# Patient Record
Sex: FEMALE | Race: BLACK OR AFRICAN AMERICAN | Employment: FULL TIME | URBAN - METROPOLITAN AREA
[De-identification: names, ages, dates, MRNs, and addresses within clinical notes are randomized per-mention and may not be internally consistent; named-entity substitution may affect disease eponyms.]

---

## 2017-02-02 RX ORDER — FLUCONAZOLE 150 MG/1
150 TABLET ORAL ONCE
Qty: 2 TABLET | Refills: 0 | Status: SHIPPED | OUTPATIENT
Start: 2017-02-02 | End: 2019-07-26 | Stop reason: SDUPTHER

## 2017-02-02 RX ORDER — TIZANIDINE 4 MG/1
4 TABLET ORAL EVERY 8 HOURS PRN
Qty: 30 TABLET | Refills: 0 | Status: SHIPPED | OUTPATIENT
Start: 2017-02-02 | End: 2017-05-17 | Stop reason: SDUPTHER

## 2017-02-10 ENCOUNTER — CARE COORDINATION (OUTPATIENT)
Dept: CARE COORDINATION | Age: 37
End: 2017-02-10

## 2017-02-16 ENCOUNTER — OFFICE VISIT (OUTPATIENT)
Dept: INTERNAL MEDICINE CLINIC | Age: 37
End: 2017-02-16

## 2017-02-16 VITALS
SYSTOLIC BLOOD PRESSURE: 110 MMHG | OXYGEN SATURATION: 98 % | BODY MASS INDEX: 27.15 KG/M2 | TEMPERATURE: 98.7 F | HEART RATE: 105 BPM | DIASTOLIC BLOOD PRESSURE: 78 MMHG | WEIGHT: 173 LBS | HEIGHT: 67 IN

## 2017-02-16 DIAGNOSIS — R05.9 COUGH: ICD-10-CM

## 2017-02-16 DIAGNOSIS — M79.7 FIBROMYALGIA: ICD-10-CM

## 2017-02-16 DIAGNOSIS — Z09 HOSPITAL DISCHARGE FOLLOW-UP: Primary | ICD-10-CM

## 2017-02-16 DIAGNOSIS — R07.89 COSTOCHONDRAL CHEST PAIN: ICD-10-CM

## 2017-02-16 DIAGNOSIS — I10 ESSENTIAL HYPERTENSION: ICD-10-CM

## 2017-02-16 PROCEDURE — 99214 OFFICE O/P EST MOD 30 MIN: CPT | Performed by: NURSE PRACTITIONER

## 2017-02-16 RX ORDER — METOCLOPRAMIDE 10 MG/1
10 TABLET ORAL
Qty: 120 TABLET | Refills: 3 | Status: SHIPPED | OUTPATIENT
Start: 2017-02-16 | End: 2017-03-17 | Stop reason: ALTCHOICE

## 2017-02-16 RX ORDER — VENLAFAXINE HYDROCHLORIDE 75 MG/1
CAPSULE, EXTENDED RELEASE ORAL
COMMUNITY
Start: 2017-01-09 | End: 2017-02-16 | Stop reason: SDUPTHER

## 2017-02-16 RX ORDER — METHYLPREDNISOLONE 4 MG/1
TABLET ORAL
Qty: 1 KIT | Refills: 0 | Status: SHIPPED | OUTPATIENT
Start: 2017-02-16 | End: 2017-03-17 | Stop reason: ALTCHOICE

## 2017-02-16 RX ORDER — BENZONATATE 100 MG/1
100 CAPSULE ORAL 3 TIMES DAILY PRN
Qty: 30 CAPSULE | Refills: 0 | Status: SHIPPED | OUTPATIENT
Start: 2017-02-16 | End: 2017-11-13

## 2017-02-16 RX ORDER — VENLAFAXINE HYDROCHLORIDE 75 MG/1
75 TABLET, EXTENDED RELEASE ORAL
Qty: 30 TABLET | Refills: 1 | Status: SHIPPED | OUTPATIENT
Start: 2017-02-16 | End: 2017-02-16

## 2017-02-16 RX ORDER — LANOLIN ALCOHOL/MO/W.PET/CERES
CREAM (GRAM) TOPICAL
COMMUNITY
Start: 2017-01-18 | End: 2017-05-17 | Stop reason: SDUPTHER

## 2017-02-16 ASSESSMENT — ENCOUNTER SYMPTOMS
CONSTIPATION: 0
COLOR CHANGE: 0
BLOOD IN STOOL: 0
COUGH: 0
SHORTNESS OF BREATH: 0
ABDOMINAL PAIN: 0

## 2017-02-21 ENCOUNTER — OFFICE VISIT (OUTPATIENT)
Dept: RHEUMATOLOGY | Age: 37
End: 2017-02-21

## 2017-02-21 VITALS
BODY MASS INDEX: 26.43 KG/M2 | HEIGHT: 67 IN | DIASTOLIC BLOOD PRESSURE: 72 MMHG | SYSTOLIC BLOOD PRESSURE: 110 MMHG | WEIGHT: 168.4 LBS | TEMPERATURE: 98.1 F

## 2017-02-21 DIAGNOSIS — M79.7 FIBROMYALGIA: ICD-10-CM

## 2017-02-21 DIAGNOSIS — R07.89 CHEST WALL PAIN: Primary | ICD-10-CM

## 2017-02-21 PROCEDURE — 99214 OFFICE O/P EST MOD 30 MIN: CPT | Performed by: INTERNAL MEDICINE

## 2017-02-21 RX ORDER — HYDROCODONE BITARTRATE AND ACETAMINOPHEN 5; 325 MG/1; MG/1
TABLET ORAL
Qty: 20 TABLET | Refills: 0 | Status: SHIPPED | OUTPATIENT
Start: 2017-02-21 | End: 2017-03-17 | Stop reason: ALTCHOICE

## 2017-02-21 RX ORDER — HYDROCODONE BITARTRATE AND ACETAMINOPHEN 5; 325 MG/1; MG/1
TABLET ORAL
Qty: 15 TABLET | Refills: 0 | Status: SHIPPED | OUTPATIENT
Start: 2017-02-21 | End: 2017-03-17 | Stop reason: ALTCHOICE

## 2017-03-17 ENCOUNTER — OFFICE VISIT (OUTPATIENT)
Dept: ENDOCRINOLOGY | Age: 37
End: 2017-03-17

## 2017-03-17 VITALS
HEIGHT: 67 IN | BODY MASS INDEX: 27.59 KG/M2 | SYSTOLIC BLOOD PRESSURE: 146 MMHG | WEIGHT: 175.8 LBS | DIASTOLIC BLOOD PRESSURE: 102 MMHG | HEART RATE: 83 BPM | RESPIRATION RATE: 16 BRPM | OXYGEN SATURATION: 100 %

## 2017-03-17 DIAGNOSIS — E05.90 HYPERTHYROIDISM: Primary | ICD-10-CM

## 2017-03-17 PROCEDURE — 99213 OFFICE O/P EST LOW 20 MIN: CPT | Performed by: INTERNAL MEDICINE

## 2017-03-17 RX ORDER — METHIMAZOLE 5 MG/1
5 TABLET ORAL DAILY
Qty: 30 TABLET | Refills: 1 | Status: SHIPPED | OUTPATIENT
Start: 2017-03-17 | End: 2019-03-20

## 2017-03-29 ENCOUNTER — TELEPHONE (OUTPATIENT)
Dept: INTERNAL MEDICINE CLINIC | Age: 37
End: 2017-03-29

## 2017-03-29 RX ORDER — ETODOLAC 400 MG/1
400 TABLET, FILM COATED ORAL 2 TIMES DAILY
Qty: 60 TABLET | Refills: 0 | Status: SHIPPED | OUTPATIENT
Start: 2017-03-29 | End: 2019-03-20

## 2017-03-30 ENCOUNTER — TELEPHONE (OUTPATIENT)
Dept: INTERNAL MEDICINE CLINIC | Age: 37
End: 2017-03-30

## 2017-04-11 ENCOUNTER — TELEPHONE (OUTPATIENT)
Dept: INTERNAL MEDICINE CLINIC | Age: 37
End: 2017-04-11

## 2017-04-11 RX ORDER — NITROFURANTOIN 25; 75 MG/1; MG/1
100 CAPSULE ORAL 2 TIMES DAILY
Qty: 14 CAPSULE | Refills: 0 | Status: SHIPPED | OUTPATIENT
Start: 2017-04-11 | End: 2017-04-18

## 2017-05-17 RX ORDER — VENLAFAXINE HYDROCHLORIDE 75 MG/1
CAPSULE, EXTENDED RELEASE ORAL
Qty: 30 CAPSULE | Status: CANCELLED | OUTPATIENT
Start: 2017-05-17

## 2017-05-17 RX ORDER — HYDROXYZINE HYDROCHLORIDE 25 MG/1
TABLET, FILM COATED ORAL
Qty: 30 TABLET | Refills: 1 | Status: SHIPPED | OUTPATIENT
Start: 2017-05-17 | End: 2017-11-13 | Stop reason: SDUPTHER

## 2017-05-17 RX ORDER — LANOLIN ALCOHOL/MO/W.PET/CERES
3 CREAM (GRAM) TOPICAL NIGHTLY PRN
Qty: 60 TABLET | Refills: 1 | Status: SHIPPED | OUTPATIENT
Start: 2017-05-17 | End: 2017-11-13 | Stop reason: SDUPTHER

## 2017-05-17 RX ORDER — TRAZODONE HYDROCHLORIDE 50 MG/1
50 TABLET ORAL NIGHTLY
Qty: 45 TABLET | Refills: 1 | Status: SHIPPED | OUTPATIENT
Start: 2017-05-17 | End: 2017-11-13 | Stop reason: SDUPTHER

## 2017-05-17 RX ORDER — FLUCONAZOLE 100 MG/1
100 TABLET ORAL ONCE
Qty: 2 TABLET | Refills: 0 | Status: CANCELLED | OUTPATIENT
Start: 2017-05-17 | End: 2017-05-17

## 2017-05-17 RX ORDER — VENLAFAXINE HYDROCHLORIDE 150 MG/1
150 CAPSULE, EXTENDED RELEASE ORAL DAILY
Qty: 30 CAPSULE | Refills: 1 | Status: SHIPPED | OUTPATIENT
Start: 2017-05-17 | End: 2017-11-13 | Stop reason: SDUPTHER

## 2017-05-17 RX ORDER — TIZANIDINE 4 MG/1
4 TABLET ORAL EVERY 8 HOURS PRN
Qty: 30 TABLET | Refills: 1 | Status: SHIPPED | OUTPATIENT
Start: 2017-05-17 | End: 2017-11-13 | Stop reason: SDUPTHER

## 2017-05-17 RX ORDER — FLUCONAZOLE 150 MG/1
150 TABLET ORAL ONCE
Qty: 2 TABLET | Refills: 0 | Status: SHIPPED | OUTPATIENT
Start: 2017-05-17 | End: 2017-05-17

## 2017-06-28 ENCOUNTER — OFFICE VISIT (OUTPATIENT)
Dept: INTERNAL MEDICINE CLINIC | Age: 37
End: 2017-06-28

## 2017-06-28 VITALS
SYSTOLIC BLOOD PRESSURE: 120 MMHG | WEIGHT: 168 LBS | HEIGHT: 67 IN | BODY MASS INDEX: 26.37 KG/M2 | DIASTOLIC BLOOD PRESSURE: 96 MMHG

## 2017-06-28 DIAGNOSIS — I10 ESSENTIAL HYPERTENSION: ICD-10-CM

## 2017-06-28 DIAGNOSIS — M79.7 FIBROMYALGIA: Primary | ICD-10-CM

## 2017-06-28 PROCEDURE — 99214 OFFICE O/P EST MOD 30 MIN: CPT | Performed by: NURSE PRACTITIONER

## 2017-06-28 ASSESSMENT — ENCOUNTER SYMPTOMS
BLOOD IN STOOL: 0
COLOR CHANGE: 0
ABDOMINAL PAIN: 0
COUGH: 0
SHORTNESS OF BREATH: 0
CONSTIPATION: 0

## 2017-07-05 ENCOUNTER — TELEPHONE (OUTPATIENT)
Dept: INTERNAL MEDICINE CLINIC | Age: 37
End: 2017-07-05

## 2017-07-21 ENCOUNTER — OFFICE VISIT (OUTPATIENT)
Dept: ENDOCRINOLOGY | Age: 37
End: 2017-07-21

## 2017-07-21 VITALS
BODY MASS INDEX: 25.01 KG/M2 | SYSTOLIC BLOOD PRESSURE: 136 MMHG | WEIGHT: 165 LBS | HEIGHT: 68 IN | DIASTOLIC BLOOD PRESSURE: 98 MMHG | HEART RATE: 75 BPM | RESPIRATION RATE: 14 BRPM

## 2017-07-21 DIAGNOSIS — E05.90 HYPERTHYROIDISM: Primary | ICD-10-CM

## 2017-07-21 DIAGNOSIS — E05.90 HYPERTHYROIDISM: ICD-10-CM

## 2017-07-21 LAB
ANION GAP SERPL CALCULATED.3IONS-SCNC: 14 MMOL/L (ref 3–16)
BUN BLDV-MCNC: 12 MG/DL (ref 7–20)
CALCIUM SERPL-MCNC: 9.7 MG/DL (ref 8.3–10.6)
CHLORIDE BLD-SCNC: 101 MMOL/L (ref 99–110)
CO2: 26 MMOL/L (ref 21–32)
CREAT SERPL-MCNC: 0.6 MG/DL (ref 0.6–1.1)
GFR AFRICAN AMERICAN: >60
GFR NON-AFRICAN AMERICAN: >60
GLUCOSE BLD-MCNC: 89 MG/DL (ref 70–99)
POTASSIUM SERPL-SCNC: 4.3 MMOL/L (ref 3.5–5.1)
SODIUM BLD-SCNC: 141 MMOL/L (ref 136–145)
T4 FREE: 1.4 NG/DL (ref 0.9–1.8)
TSH SERPL DL<=0.05 MIU/L-ACNC: 0.95 UIU/ML (ref 0.27–4.2)

## 2017-07-21 PROCEDURE — 99213 OFFICE O/P EST LOW 20 MIN: CPT | Performed by: INTERNAL MEDICINE

## 2017-07-24 ENCOUNTER — TELEPHONE (OUTPATIENT)
Dept: INTERNAL MEDICINE CLINIC | Age: 37
End: 2017-07-24

## 2017-07-24 RX ORDER — FLUCONAZOLE 150 MG/1
150 TABLET ORAL ONCE
Qty: 2 TABLET | Refills: 0 | Status: SHIPPED | OUTPATIENT
Start: 2017-07-24 | End: 2017-07-24

## 2017-07-24 RX ORDER — FLUCONAZOLE 100 MG/1
100 TABLET ORAL DAILY
Qty: 7 TABLET | Refills: 0 | Status: CANCELLED | OUTPATIENT
Start: 2017-07-24 | End: 2017-07-31

## 2017-11-13 ENCOUNTER — OFFICE VISIT (OUTPATIENT)
Dept: INTERNAL MEDICINE CLINIC | Age: 37
End: 2017-11-13

## 2017-11-13 VITALS
HEART RATE: 84 BPM | WEIGHT: 166 LBS | HEIGHT: 68 IN | OXYGEN SATURATION: 99 % | SYSTOLIC BLOOD PRESSURE: 122 MMHG | DIASTOLIC BLOOD PRESSURE: 82 MMHG | BODY MASS INDEX: 25.16 KG/M2

## 2017-11-13 DIAGNOSIS — M79.7 FIBROMYALGIA: Primary | ICD-10-CM

## 2017-11-13 DIAGNOSIS — F33.1 MODERATE EPISODE OF RECURRENT MAJOR DEPRESSIVE DISORDER (HCC): ICD-10-CM

## 2017-11-13 DIAGNOSIS — B37.31 YEAST VAGINITIS: ICD-10-CM

## 2017-11-13 DIAGNOSIS — G47.9 SLEEP DIFFICULTIES: ICD-10-CM

## 2017-11-13 PROCEDURE — 99999 PR OFFICE/OUTPT VISIT,PROCEDURE ONLY: CPT | Performed by: FAMILY MEDICINE

## 2017-11-13 RX ORDER — LANOLIN ALCOHOL/MO/W.PET/CERES
3 CREAM (GRAM) TOPICAL NIGHTLY PRN
Qty: 60 TABLET | Refills: 2 | Status: SHIPPED | OUTPATIENT
Start: 2017-11-13 | End: 2018-09-21 | Stop reason: SDUPTHER

## 2017-11-13 RX ORDER — TIZANIDINE 4 MG/1
4 TABLET ORAL EVERY 8 HOURS PRN
Qty: 30 TABLET | Refills: 2 | Status: SHIPPED | OUTPATIENT
Start: 2017-11-13 | End: 2018-09-21 | Stop reason: SDUPTHER

## 2017-11-13 RX ORDER — VENLAFAXINE HYDROCHLORIDE 150 MG/1
150 CAPSULE, EXTENDED RELEASE ORAL DAILY
Qty: 30 CAPSULE | Refills: 2 | Status: SHIPPED | OUTPATIENT
Start: 2017-11-13 | End: 2018-09-21 | Stop reason: SDUPTHER

## 2017-11-13 RX ORDER — FLUCONAZOLE 150 MG/1
150 TABLET ORAL ONCE
Qty: 2 TABLET | Refills: 0 | Status: SHIPPED | OUTPATIENT
Start: 2017-11-13 | End: 2017-11-13

## 2017-11-13 RX ORDER — HYDROXYZINE HYDROCHLORIDE 25 MG/1
TABLET, FILM COATED ORAL
Qty: 30 TABLET | Refills: 2 | Status: SHIPPED | OUTPATIENT
Start: 2017-11-13 | End: 2018-09-21 | Stop reason: SDUPTHER

## 2017-11-13 RX ORDER — TRAZODONE HYDROCHLORIDE 50 MG/1
50 TABLET ORAL NIGHTLY
Qty: 30 TABLET | Refills: 2 | Status: SHIPPED | OUTPATIENT
Start: 2017-11-13 | End: 2018-09-21 | Stop reason: SDUPTHER

## 2017-11-13 ASSESSMENT — ENCOUNTER SYMPTOMS
SHORTNESS OF BREATH: 0
DIARRHEA: 0
NAUSEA: 0
VOMITING: 0
WHEEZING: 0
ROS SKIN COMMENTS: HAIR THINNING
BACK PAIN: 1
ABDOMINAL PAIN: 0
STRIDOR: 0

## 2017-11-14 ENCOUNTER — TELEPHONE (OUTPATIENT)
Dept: INTERNAL MEDICINE CLINIC | Age: 37
End: 2017-11-14

## 2017-11-14 NOTE — TELEPHONE ENCOUNTER
Patient calling to verify if the [place] papers were ever received in our office. Call patient only if they were not received yesterday at [phone] so she can call and have others sent to the office.

## 2017-11-20 ENCOUNTER — TELEPHONE (OUTPATIENT)
Dept: INTERNAL MEDICINE CLINIC | Age: 37
End: 2017-11-20

## 2018-04-06 ENCOUNTER — TELEPHONE (OUTPATIENT)
Dept: INTERNAL MEDICINE CLINIC | Age: 38
End: 2018-04-06

## 2018-05-08 ENCOUNTER — OFFICE VISIT (OUTPATIENT)
Dept: INTERNAL MEDICINE CLINIC | Age: 38
End: 2018-05-08

## 2018-05-08 VITALS
DIASTOLIC BLOOD PRESSURE: 82 MMHG | WEIGHT: 162 LBS | BODY MASS INDEX: 25.43 KG/M2 | SYSTOLIC BLOOD PRESSURE: 128 MMHG | TEMPERATURE: 98.4 F | HEIGHT: 67 IN

## 2018-05-08 DIAGNOSIS — M25.50 ARTHRALGIA, UNSPECIFIED JOINT: ICD-10-CM

## 2018-05-08 DIAGNOSIS — R30.0 DYSURIA: Primary | ICD-10-CM

## 2018-05-08 DIAGNOSIS — H61.23 BILATERAL IMPACTED CERUMEN: ICD-10-CM

## 2018-05-08 DIAGNOSIS — I10 ESSENTIAL HYPERTENSION: ICD-10-CM

## 2018-05-08 DIAGNOSIS — E05.90 HYPERTHYROIDISM: ICD-10-CM

## 2018-05-08 DIAGNOSIS — M79.7 FIBROMYALGIA: ICD-10-CM

## 2018-05-08 PROCEDURE — 99214 OFFICE O/P EST MOD 30 MIN: CPT | Performed by: NURSE PRACTITIONER

## 2018-05-08 ASSESSMENT — ENCOUNTER SYMPTOMS
BLOOD IN STOOL: 0
ABDOMINAL PAIN: 0
CONSTIPATION: 0
COUGH: 0
SHORTNESS OF BREATH: 0
COLOR CHANGE: 0

## 2018-05-14 ENCOUNTER — TELEPHONE (OUTPATIENT)
Dept: INTERNAL MEDICINE CLINIC | Age: 38
End: 2018-05-14

## 2018-07-17 ENCOUNTER — OFFICE VISIT (OUTPATIENT)
Dept: RHEUMATOLOGY | Age: 38
End: 2018-07-17

## 2018-07-17 VITALS
BODY MASS INDEX: 25.58 KG/M2 | HEIGHT: 67 IN | WEIGHT: 163 LBS | SYSTOLIC BLOOD PRESSURE: 120 MMHG | TEMPERATURE: 98.4 F | DIASTOLIC BLOOD PRESSURE: 78 MMHG

## 2018-07-17 DIAGNOSIS — M79.7 FIBROMYALGIA: Primary | ICD-10-CM

## 2018-07-17 PROCEDURE — 99214 OFFICE O/P EST MOD 30 MIN: CPT | Performed by: INTERNAL MEDICINE

## 2018-07-17 RX ORDER — PREGABALIN 25 MG/1
CAPSULE ORAL
Qty: 60 CAPSULE | Refills: 2 | Status: SHIPPED | OUTPATIENT
Start: 2018-07-17 | End: 2019-03-20

## 2018-07-17 NOTE — PROGRESS NOTES
edema. Data:  Lab Results   Component Value Date    WBC 6.0 02/09/2017    RBC 4.19 02/09/2017    HGB 10.0 02/09/2017    HCT 32.0 02/09/2017     02/09/2017    MCV 76.4 02/09/2017    MCH 23.8 02/09/2017    MCHC 31.2 02/09/2017    RDW 18.0 02/09/2017    SEGSPCT 50 11/26/2014    LYMPHOPCT 33.8 02/09/2017    MONOPCT 9.5 02/09/2017    BASOPCT 0.8 02/09/2017    MONOSABS 0.6 02/09/2017    LYMPHSABS 2.0 02/09/2017    EOSABS 0.3 02/09/2017    BASOSABS 0.1 02/09/2017       Chemistry        Component Value Date/Time     07/21/2017 1133    K 4.3 07/21/2017 1133     07/21/2017 1133    CO2 26 07/21/2017 1133    BUN 12 07/21/2017 1133    CREATININE 0.6 07/21/2017 1133        Component Value Date/Time    CALCIUM 9.7 07/21/2017 1133    ALKPHOS 95 09/30/2016 1206    AST 15 09/30/2016 1206    ALT 7 (L) 09/30/2016 1206    BILITOT 0.5 09/30/2016 1206          I thank you for giving me the opportunity to be involved in [employer] care and I look forward following Tonia Mcgowan along with you. If you have any questions or concerns please feel free to contact me at any time. Enid Mejia MD 07/17/18         NOTE: This report was transcribed using voice recognition software. Every effort was made to ensure accuracy; however, inadvertent computerized transcription errors may be present.

## 2018-07-31 PROBLEM — N20.0 RIGHT NEPHROLITHIASIS: Status: ACTIVE | Noted: 2018-07-31

## 2018-08-01 PROBLEM — N20.2 NEPHROURETEROLITHIASIS: Status: ACTIVE | Noted: 2018-07-31

## 2018-08-01 PROBLEM — N20.1 URETEROLITHIASIS: Status: ACTIVE | Noted: 2018-07-31

## 2018-09-21 ENCOUNTER — HOSPITAL ENCOUNTER (OUTPATIENT)
Age: 38
Discharge: HOME | End: 2018-09-21
Payer: MEDICAID

## 2018-09-21 ENCOUNTER — OFFICE VISIT (OUTPATIENT)
Dept: INTERNAL MEDICINE CLINIC | Age: 38
End: 2018-09-21

## 2018-09-21 ENCOUNTER — TELEPHONE (OUTPATIENT)
Dept: INTERNAL MEDICINE CLINIC | Age: 38
End: 2018-09-21

## 2018-09-21 VITALS
BODY MASS INDEX: 25.84 KG/M2 | DIASTOLIC BLOOD PRESSURE: 98 MMHG | HEART RATE: 73 BPM | SYSTOLIC BLOOD PRESSURE: 110 MMHG | OXYGEN SATURATION: 99 % | WEIGHT: 165 LBS

## 2018-09-21 DIAGNOSIS — F33.1 MODERATE EPISODE OF RECURRENT MAJOR DEPRESSIVE DISORDER (HCC): ICD-10-CM

## 2018-09-21 DIAGNOSIS — G47.9 SLEEP DIFFICULTIES: ICD-10-CM

## 2018-09-21 DIAGNOSIS — R19.7 DIARRHEA, UNSPECIFIED TYPE: ICD-10-CM

## 2018-09-21 DIAGNOSIS — B37.31 YEAST VAGINITIS: ICD-10-CM

## 2018-09-21 DIAGNOSIS — R11.2 NAUSEA AND VOMITING, INTRACTABILITY OF VOMITING NOT SPECIFIED, UNSPECIFIED VOMITING TYPE: Primary | ICD-10-CM

## 2018-09-21 DIAGNOSIS — N20.2 NEPHROURETEROLITHIASIS: ICD-10-CM

## 2018-09-21 DIAGNOSIS — M79.7 FIBROMYALGIA: ICD-10-CM

## 2018-09-21 PROCEDURE — 99214 OFFICE O/P EST MOD 30 MIN: CPT | Performed by: NURSE PRACTITIONER

## 2018-09-21 PROCEDURE — G8419 CALC BMI OUT NRM PARAM NOF/U: HCPCS | Performed by: NURSE PRACTITIONER

## 2018-09-21 PROCEDURE — G8427 DOCREV CUR MEDS BY ELIG CLIN: HCPCS | Performed by: NURSE PRACTITIONER

## 2018-09-21 PROCEDURE — 1036F TOBACCO NON-USER: CPT | Performed by: NURSE PRACTITIONER

## 2018-09-21 RX ORDER — LANOLIN ALCOHOL/MO/W.PET/CERES
3 CREAM (GRAM) TOPICAL NIGHTLY PRN
Qty: 60 TABLET | Refills: 2 | Status: SHIPPED | OUTPATIENT
Start: 2018-09-21 | End: 2019-03-20

## 2018-09-21 RX ORDER — HYDROXYZINE HYDROCHLORIDE 25 MG/1
TABLET, FILM COATED ORAL
Qty: 30 TABLET | Refills: 2 | Status: SHIPPED | OUTPATIENT
Start: 2018-09-21 | End: 2019-03-20 | Stop reason: SDUPTHER

## 2018-09-21 RX ORDER — TIZANIDINE 4 MG/1
4 TABLET ORAL EVERY 8 HOURS PRN
Qty: 30 TABLET | Refills: 2 | Status: SHIPPED | OUTPATIENT
Start: 2018-09-21 | End: 2019-03-20 | Stop reason: SDUPTHER

## 2018-09-21 RX ORDER — VENLAFAXINE HYDROCHLORIDE 150 MG/1
150 CAPSULE, EXTENDED RELEASE ORAL DAILY
Qty: 30 CAPSULE | Refills: 2 | Status: SHIPPED | OUTPATIENT
Start: 2018-09-21 | End: 2019-03-20

## 2018-09-21 RX ORDER — ONDANSETRON 4 MG/1
4 TABLET, ORALLY DISINTEGRATING ORAL EVERY 8 HOURS PRN
Qty: 20 TABLET | Refills: 0 | Status: SHIPPED | OUTPATIENT
Start: 2018-09-21 | End: 2019-05-21 | Stop reason: SDUPTHER

## 2018-09-21 RX ORDER — FLUCONAZOLE 100 MG/1
100 TABLET ORAL DAILY
Qty: 2 TABLET | Refills: 0 | Status: SHIPPED | OUTPATIENT
Start: 2018-09-21 | End: 2019-03-20

## 2018-09-21 RX ORDER — TRAZODONE HYDROCHLORIDE 50 MG/1
50 TABLET ORAL NIGHTLY
Qty: 30 TABLET | Refills: 2 | Status: SHIPPED | OUTPATIENT
Start: 2018-09-21 | End: 2019-03-20 | Stop reason: SDUPTHER

## 2018-09-21 ASSESSMENT — ENCOUNTER SYMPTOMS
ABDOMINAL DISTENTION: 0
DIARRHEA: 1
ABDOMINAL PAIN: 1
BLOOD IN STOOL: 0
VOMITING: 1
CONSTIPATION: 0
RESPIRATORY NEGATIVE: 1
NAUSEA: 1

## 2018-09-21 NOTE — PROGRESS NOTES
Social History     Social History    Marital status: Single     Spouse name: N/A    Number of children: N/A    Years of education: N/A     Occupational History    Not on file. Social History Main Topics    Smoking status: Never Smoker    Smokeless tobacco: Never Used    Alcohol use 0.0 oz/week      Comment: occasional     Drug use: No    Sexual activity: Yes     Partners: Male     Other Topics Concern    Not on file     Social History Narrative    No narrative on file       Review of Systems   Constitutional: Positive for appetite change. Negative for activity change, chills and fever. HENT: Negative. Respiratory: Negative. Cardiovascular: Negative. Gastrointestinal: Positive for abdominal pain, diarrhea, nausea and vomiting. Negative for abdominal distention, blood in stool and constipation. Genitourinary: Positive for flank pain. Skin: Negative. Neurological: Negative. Hematological: Negative. Psychiatric/Behavioral: Negative. OBJECTIVE:  BP (!) 110/98   Pulse 73   Wt 165 lb (74.8 kg)   SpO2 99%   BMI 25.84 kg/m²     Physical Exam   Constitutional: She is oriented to person, place, and time. She appears well-developed and well-nourished. HENT:   Head: Normocephalic and atraumatic. Mouth/Throat: Oropharynx is clear and moist.   Eyes: Pupils are equal, round, and reactive to light. Conjunctivae are normal. No scleral icterus. Neck: Normal range of motion. Neck supple. Cardiovascular: Normal rate, regular rhythm and normal heart sounds. Pulmonary/Chest: Effort normal and breath sounds normal. No respiratory distress. Abdominal: Soft. Normal appearance and bowel sounds are normal. She exhibits no distension and no mass. There is no hepatosplenomegaly. There is tenderness (generalized to palpation. ). There is CVA tenderness. There is no rebound and no guarding. Musculoskeletal: Normal range of motion.    Neurological: She is alert and oriented to

## 2018-09-21 NOTE — TELEPHONE ENCOUNTER
Patient faxed [facility] paperwork to our office. It is due 9/30/18  - all that needs to be done on this is a date change since she was in the office in May. Please review.   Call her back at [phone]

## 2018-09-26 NOTE — TELEPHONE ENCOUNTER
Please let her know that I have the forms. I will try to fill them out in the next 1-2 days. I had not filled them out previously, as I wasn't sure if they were for her chronic FMLA condition, or recent urologic issues.   I will fill them out for her chronic conditions

## 2018-10-25 ENCOUNTER — TELEPHONE (OUTPATIENT)
Dept: INTERNAL MEDICINE CLINIC | Age: 38
End: 2018-10-25

## 2019-03-20 ENCOUNTER — OFFICE VISIT (OUTPATIENT)
Dept: INTERNAL MEDICINE CLINIC | Age: 39
End: 2019-03-20
Payer: MEDICAID

## 2019-03-20 VITALS
DIASTOLIC BLOOD PRESSURE: 82 MMHG | TEMPERATURE: 98.4 F | WEIGHT: 175 LBS | HEART RATE: 90 BPM | SYSTOLIC BLOOD PRESSURE: 128 MMHG | BODY MASS INDEX: 27.41 KG/M2 | OXYGEN SATURATION: 98 %

## 2019-03-20 DIAGNOSIS — J22 LOWER RESPIRATORY INFECTION: Primary | ICD-10-CM

## 2019-03-20 DIAGNOSIS — F33.1 MODERATE EPISODE OF RECURRENT MAJOR DEPRESSIVE DISORDER (HCC): ICD-10-CM

## 2019-03-20 DIAGNOSIS — G47.9 SLEEP DIFFICULTIES: ICD-10-CM

## 2019-03-20 DIAGNOSIS — M79.7 FIBROMYALGIA: ICD-10-CM

## 2019-03-20 PROCEDURE — 99214 OFFICE O/P EST MOD 30 MIN: CPT | Performed by: FAMILY MEDICINE

## 2019-03-20 PROCEDURE — G8484 FLU IMMUNIZE NO ADMIN: HCPCS | Performed by: FAMILY MEDICINE

## 2019-03-20 PROCEDURE — G8419 CALC BMI OUT NRM PARAM NOF/U: HCPCS | Performed by: FAMILY MEDICINE

## 2019-03-20 PROCEDURE — G8427 DOCREV CUR MEDS BY ELIG CLIN: HCPCS | Performed by: FAMILY MEDICINE

## 2019-03-20 PROCEDURE — 1036F TOBACCO NON-USER: CPT | Performed by: FAMILY MEDICINE

## 2019-03-20 RX ORDER — BENZONATATE 100 MG/1
100-200 CAPSULE ORAL 3 TIMES DAILY PRN
Qty: 30 CAPSULE | Refills: 0 | Status: SHIPPED | OUTPATIENT
Start: 2019-03-20 | End: 2019-05-01

## 2019-03-20 RX ORDER — HYDROXYZINE HYDROCHLORIDE 25 MG/1
TABLET, FILM COATED ORAL
Qty: 30 TABLET | Refills: 2 | Status: SHIPPED | OUTPATIENT
Start: 2019-03-20 | End: 2019-05-01 | Stop reason: SDUPTHER

## 2019-03-20 RX ORDER — GUAIFENESIN 600 MG/1
600 TABLET, EXTENDED RELEASE ORAL 2 TIMES DAILY
Qty: 30 TABLET | Refills: 0 | Status: SHIPPED | OUTPATIENT
Start: 2019-03-20 | End: 2019-04-04

## 2019-03-20 RX ORDER — ALBUTEROL SULFATE 90 UG/1
2 AEROSOL, METERED RESPIRATORY (INHALATION) 4 TIMES DAILY PRN
Qty: 1 INHALER | Refills: 5 | Status: SHIPPED | OUTPATIENT
Start: 2019-03-20 | End: 2019-09-27 | Stop reason: SDUPTHER

## 2019-03-20 RX ORDER — TRAZODONE HYDROCHLORIDE 50 MG/1
50 TABLET ORAL NIGHTLY
Qty: 30 TABLET | Refills: 2 | Status: SHIPPED | OUTPATIENT
Start: 2019-03-20 | End: 2019-05-01

## 2019-03-20 RX ORDER — VENLAFAXINE HYDROCHLORIDE 75 MG/1
75 CAPSULE, EXTENDED RELEASE ORAL DAILY
Qty: 30 CAPSULE | Refills: 5 | Status: SHIPPED | OUTPATIENT
Start: 2019-03-20 | End: 2019-08-05

## 2019-03-20 RX ORDER — AZITHROMYCIN 250 MG/1
250 TABLET, FILM COATED ORAL SEE ADMIN INSTRUCTIONS
Qty: 6 TABLET | Refills: 0 | Status: SHIPPED | OUTPATIENT
Start: 2019-03-20 | End: 2019-03-25

## 2019-03-20 RX ORDER — TIZANIDINE 4 MG/1
TABLET ORAL
Qty: 60 TABLET | Refills: 5 | Status: SHIPPED | OUTPATIENT
Start: 2019-03-20 | End: 2019-12-10 | Stop reason: SDUPTHER

## 2019-03-20 ASSESSMENT — ENCOUNTER SYMPTOMS
STRIDOR: 0
DIARRHEA: 0
BACK PAIN: 1
VOMITING: 0
NAUSEA: 0
WHEEZING: 0
ABDOMINAL PAIN: 0
SHORTNESS OF BREATH: 0

## 2019-03-20 NOTE — PROGRESS NOTES
[facility]          Chief Complaint   Patient presents with    Chest Congestion     with green sputum dx with URI from urgent care a month ago. Taking an inhaler with relief.  Other     fibromyalgia    Forms     FMLA       Hypertension   Associated symptoms include headaches (occ migraines). Pertinent negatives include no chest pain, palpitations or shortness of breath. Here for a sick visit, and chronic concerns    C/o chest congestion, and cough (occ productive). Used to have PND and nasal congestion, but that has improved. Used her son's albuterol inhaler which helped . Hasn't been in for routine visit for chronic health issues for over 15 months. Requests to have FMLA forms filled out though. Has been without her medications refill for over 1 month; she has been taking her meds sporadically to make them  last longer. Has been off effexor for a couple days and feels her mood slumping. Has needed to miss up to 6 days/month due to fibromyalgia flares, for 8 hours/day . (occ has to leave early, and this gets marked as a full day off). Lives with BF, 5 kids (5-15 yo). Moved to [facility] 10 months ago (so this office is even further, but she is adament that she wants to keep me as her PCP). Working at [employer] still, full-time. Likes the job    I personally reviewed and updated patient's past medical history, past surgical history, family history, allergies, and social history as captured in the relevant section of patient's chart.     Current Outpatient Medications   Medication Sig Dispense Refill    tiZANidine (ZANAFLEX) 4 MG tablet Take 1 tablet daily, and in the evening as needed 60 tablet 5    hydrOXYzine (ATARAX) 25 MG tablet TAKE ONE TABLET BY MOUTH THREE TIMES DAILY AS NEEDED FOR ITCHING 30 tablet 2    traZODone (DESYREL) 50 MG tablet Take 1 tablet by mouth nightly 30 tablet 2    venlafaxine (EFFEXOR XR) 75 MG extended release capsule Take 1 capsule by mouth daily 30 capsule 5                         ondansetron (ZOFRAN ODT) 4 MG disintegrating tablet Take 1 tablet by mouth every 8 hours as needed for Nausea 20 tablet 0     No current facility-administered medications for this visit. Review of Systems   Constitutional: Positive for fatigue. Negative for activity change, appetite change, chills, fever and unexpected weight change. HENT: Positive for rhinorrhea. Negative for congestion, postnasal drip, sinus pressure, sore throat and trouble swallowing. Eyes: Negative for discharge. Respiratory: Positive for cough. Negative for chest tightness, shortness of breath, wheezing and stridor. Cardiovascular: Negative for chest pain and palpitations. Gastrointestinal: Negative for abdominal pain, diarrhea, nausea and vomiting. Genitourinary: Negative for vaginal discharge. Musculoskeletal: Positive for back pain and myalgias (inrtermittent fibromyalgia flares). Skin: Negative for rash. Neurological: Positive for headaches (occ migraines). Psychiatric/Behavioral: Positive for sleep disturbance. Negative for dysphoric mood and suicidal ideas. The patient is nervous/anxious (stable when on effexor). Physical Exam   Constitutional: She is oriented to person, place, and time. She appears well-developed and well-nourished. No distress. HENT:   Head: Normocephalic and atraumatic. Eyes: Conjunctivae are normal.   Cardiovascular: Normal rate, regular rhythm and normal heart sounds. Pulmonary/Chest: Effort normal. She has no wheezes. She has no rales. Coarse BS throughout, no rhonchi or rales   Abdominal: Soft. Bowel sounds are normal. She exhibits no distension. Musculoskeletal: Normal range of motion. She exhibits no edema. Neurological: She is alert and oriented to person, place, and time. Skin: Skin is warm and dry. No rash noted. She is not diaphoretic. Psychiatric: She has a normal mood and affect.  Her behavior is normal. Thought content normal.   Vitals reviewed. Vitals:    03/20/19 1355   BP: 128/82   Site: Right Upper Arm   Position: Sitting   Cuff Size: Medium Adult   Pulse: 90   Temp: 98.4 °F (36.9 °C)   TempSrc: Oral   SpO2: 98%   Weight: 175 lb (79.4 kg)     Body mass index is 27.41 kg/m². Assessment/Plan:    1. Lower respiratory infection  Will treat with Zpak due to concern for Atypical PNA due to mycoplasma  Also rec mucinex, tessalon perles, and albuterol inhaler prn  REc plenty of fluids and OTC analgesics prn    - guaiFENesin (MUCINEX) 600 MG extended release tablet; Take 1 tablet by mouth 2 times daily for 15 days  Dispense: 30 tablet; Refill: 0  - benzonatate (TESSALON PERLES) 100 MG capsule; Take 1-2 capsules by mouth 3 times daily as needed for Cough  Dispense: 30 capsule; Refill: 0  - albuterol sulfate  (90 Base) MCG/ACT inhaler; Inhale 2 puffs into the lungs 4 times daily as needed for Wheezing  Dispense: 1 Inhaler; Refill: 5  - azithromycin (ZITHROMAX) 250 MG tablet; Take 1 tablet by mouth See Admin Instructions for 5 days 500mg on day 1 followed by 250mg on days 2 - 5  Dispense: 6 tablet; Refill: 0    2. Fibromyalgia  -sx's are fairly stable, with persistent occasional flares  -continue on Effexor daily, and muscle relaxant tizanidine prn   Has seen rheum Dr Shell Wright in the past  Intermittent flares cause her to miss work; Will fill out new FMLA forms    - tiZANidine (ZANAFLEX) 4 MG tablet; Take 1 tablet daily, and in the evening as needed  Dispense: 60 tablet; Refill: 5    3. Sleep difficulties  Improved with melatonin and trazodone  Insurance will no longer cover melatonin; advised her to obtain a bottle OTC    - traZODone (DESYREL) 50 MG tablet; Take 1 tablet by mouth nightly  Dispense: 30 tablet; Refill: 2    4.  Moderate episode of recurrent major depressive disorder (HCC)  Mood is generally stable when she takes effexor daily  Urged good self care; try to get adequate sleep, eat a healthy diet, try to exercise    - venlafaxine (EFFEXOR XR) 75 MG extended release capsule; Take 1 capsule by mouth daily  Dispense: 30 capsule; Refill: 5        Return for physical soon (can use 40 min slot).

## 2019-03-28 ENCOUNTER — TELEPHONE (OUTPATIENT)
Dept: INTERNAL MEDICINE CLINIC | Age: 39
End: 2019-03-28

## 2019-04-01 ASSESSMENT — ENCOUNTER SYMPTOMS
EYE DISCHARGE: 0
CHEST TIGHTNESS: 0
COUGH: 1
TROUBLE SWALLOWING: 0
RHINORRHEA: 1
SINUS PRESSURE: 0
SORE THROAT: 0

## 2019-04-02 ENCOUNTER — TELEPHONE (OUTPATIENT)
Dept: INTERNAL MEDICINE CLINIC | Age: 39
End: 2019-04-02

## 2019-04-02 NOTE — TELEPHONE ENCOUNTER
Patient requesting we call [facility] / [facility] New[facility] - [phone] ext [phone]    She received an email saying Dr. Bobby Brady did not fill in the dates and did not sign. I looked at the form for her and it is all documented correctly.

## 2019-04-02 NOTE — TELEPHONE ENCOUNTER
I called [employer] [facility] they said they did not get a doctors signature on the one we sent. I went ahead and resent the document again. I tried to notify the patient but there was no answer. Will try again later to contact the patient.

## 2019-04-10 ENCOUNTER — TELEPHONE (OUTPATIENT)
Dept: INTERNAL MEDICINE CLINIC | Age: 39
End: 2019-04-10

## 2019-04-10 NOTE — TELEPHONE ENCOUNTER
Patient said she is already taking melatonin and trazodone with no relief. She was willing to see Jhonathan for an appointment which I transferred her to scheduling to schedule. She said she also has a terrible cough that she is taking the tessalon perles for but those are not helping at all.
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
## 2019-04-29 ENCOUNTER — OFFICE VISIT (OUTPATIENT)
Dept: PSYCHOLOGY | Age: 39
End: 2019-04-29
Payer: MEDICARE

## 2019-04-29 DIAGNOSIS — F43.10 PTSD (POST-TRAUMATIC STRESS DISORDER): Primary | ICD-10-CM

## 2019-04-29 PROCEDURE — 90791 PSYCH DIAGNOSTIC EVALUATION: CPT | Performed by: SOCIAL WORKER

## 2019-04-29 PROCEDURE — 1036F TOBACCO NON-USER: CPT | Performed by: SOCIAL WORKER
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
## 2019-05-01 ENCOUNTER — OFFICE VISIT (OUTPATIENT)
Dept: INTERNAL MEDICINE CLINIC | Age: 39
End: 2019-05-01
Payer: MEDICAID

## 2019-05-01 ENCOUNTER — HOSPITAL ENCOUNTER (OUTPATIENT)
Age: 39
Discharge: HOME | End: 2019-05-01
Payer: MEDICAID

## 2019-05-01 VITALS
DIASTOLIC BLOOD PRESSURE: 82 MMHG | HEART RATE: 80 BPM | WEIGHT: 175 LBS | BODY MASS INDEX: 27.41 KG/M2 | OXYGEN SATURATION: 99 % | SYSTOLIC BLOOD PRESSURE: 134 MMHG

## 2019-05-01 DIAGNOSIS — E05.00 GRAVES DISEASE: ICD-10-CM

## 2019-05-01 DIAGNOSIS — Z00.00 ROUTINE PHYSICAL EXAMINATION: ICD-10-CM

## 2019-05-01 DIAGNOSIS — Z00.00 ROUTINE PHYSICAL EXAMINATION: Primary | ICD-10-CM

## 2019-05-01 DIAGNOSIS — M79.7 FIBROMYALGIA: ICD-10-CM

## 2019-05-01 DIAGNOSIS — R05.3 PERSISTENT COUGH: ICD-10-CM

## 2019-05-01 DIAGNOSIS — N92.0 MENORRHAGIA WITH REGULAR CYCLE: ICD-10-CM

## 2019-05-01 DIAGNOSIS — G47.9 SLEEP DIFFICULTIES: ICD-10-CM

## 2019-05-01 DIAGNOSIS — H61.23 BILATERAL IMPACTED CERUMEN: ICD-10-CM

## 2019-05-01 DIAGNOSIS — F33.1 MODERATE EPISODE OF RECURRENT MAJOR DEPRESSIVE DISORDER (HCC): ICD-10-CM

## 2019-05-01 LAB
A/G RATIO: 1.3 (ref 1.1–2.2)
ALBUMIN SERPL-MCNC: 4.6 G/DL (ref 3.4–5)
ALP BLD-CCNC: 97 U/L (ref 40–129)
ALT SERPL-CCNC: 14 U/L (ref 10–40)
ANION GAP SERPL CALCULATED.3IONS-SCNC: 12 MMOL/L (ref 3–16)
AST SERPL-CCNC: 15 U/L (ref 15–37)
BASOPHILS ABSOLUTE: 0.1 K/UL (ref 0–0.2)
BASOPHILS RELATIVE PERCENT: 0.9 %
BILIRUB SERPL-MCNC: 0.3 MG/DL (ref 0–1)
BUN BLDV-MCNC: 13 MG/DL (ref 7–20)
CALCIUM SERPL-MCNC: 9.7 MG/DL (ref 8.3–10.6)
CHLORIDE BLD-SCNC: 108 MMOL/L (ref 99–110)
CHOLESTEROL, TOTAL: 176 MG/DL (ref 0–199)
CO2: 24 MMOL/L (ref 21–32)
CREAT SERPL-MCNC: 0.8 MG/DL (ref 0.6–1.1)
EOSINOPHILS ABSOLUTE: 0.4 K/UL (ref 0–0.6)
EOSINOPHILS RELATIVE PERCENT: 4.3 %
GFR AFRICAN AMERICAN: >60
GFR NON-AFRICAN AMERICAN: >60
GLOBULIN: 3.6 G/DL
GLUCOSE BLD-MCNC: 83 MG/DL (ref 70–99)
HCT VFR BLD CALC: 34.3 % (ref 36–48)
HDLC SERPL-MCNC: 41 MG/DL (ref 40–60)
HEMOGLOBIN: 10.7 G/DL (ref 12–16)
LDL CHOLESTEROL CALCULATED: 113 MG/DL
LYMPHOCYTES ABSOLUTE: 2.2 K/UL (ref 1–5.1)
LYMPHOCYTES RELATIVE PERCENT: 26.1 %
MCH RBC QN AUTO: 23.3 PG (ref 26–34)
MCHC RBC AUTO-ENTMCNC: 31.3 G/DL (ref 31–36)
MCV RBC AUTO: 74.6 FL (ref 80–100)
MONOCYTES ABSOLUTE: 0.8 K/UL (ref 0–1.3)
MONOCYTES RELATIVE PERCENT: 9.5 %
NEUTROPHILS ABSOLUTE: 5.1 K/UL (ref 1.7–7.7)
NEUTROPHILS RELATIVE PERCENT: 59.2 %
PDW BLD-RTO: 18 % (ref 12.4–15.4)
PLATELET # BLD: 310 K/UL (ref 135–450)
PMV BLD AUTO: 8.4 FL (ref 5–10.5)
POTASSIUM SERPL-SCNC: 3.9 MMOL/L (ref 3.5–5.1)
RBC # BLD: 4.6 M/UL (ref 4–5.2)
SODIUM BLD-SCNC: 144 MMOL/L (ref 136–145)
T4 FREE: 1.3 NG/DL (ref 0.9–1.8)
TOTAL PROTEIN: 8.2 G/DL (ref 6.4–8.2)
TRIGL SERPL-MCNC: 112 MG/DL (ref 0–150)
TSH REFLEX: 0.56 UIU/ML (ref 0.27–4.2)
VLDLC SERPL CALC-MCNC: 22 MG/DL
WBC # BLD: 8.6 K/UL (ref 4–11)

## 2019-05-01 PROCEDURE — G8427 DOCREV CUR MEDS BY ELIG CLIN: HCPCS | Performed by: FAMILY MEDICINE

## 2019-05-01 PROCEDURE — 84439 ASSAY OF FREE THYROXINE: CPT

## 2019-05-01 PROCEDURE — 99213 OFFICE O/P EST LOW 20 MIN: CPT | Performed by: FAMILY MEDICINE

## 2019-05-01 PROCEDURE — 85025 COMPLETE CBC W/AUTO DIFF WBC: CPT

## 2019-05-01 PROCEDURE — 80053 COMPREHEN METABOLIC PANEL: CPT

## 2019-05-01 PROCEDURE — 69210 REMOVE IMPACTED EAR WAX UNI: CPT | Performed by: FAMILY MEDICINE

## 2019-05-01 PROCEDURE — G8419 CALC BMI OUT NRM PARAM NOF/U: HCPCS | Performed by: FAMILY MEDICINE

## 2019-05-01 PROCEDURE — 83036 HEMOGLOBIN GLYCOSYLATED A1C: CPT

## 2019-05-01 PROCEDURE — 80061 LIPID PANEL: CPT

## 2019-05-01 PROCEDURE — 84443 ASSAY THYROID STIM HORMONE: CPT

## 2019-05-01 PROCEDURE — 1036F TOBACCO NON-USER: CPT | Performed by: FAMILY MEDICINE

## 2019-05-01 PROCEDURE — 36415 COLL VENOUS BLD VENIPUNCTURE: CPT

## 2019-05-01 PROCEDURE — 99395 PREV VISIT EST AGE 18-39: CPT | Performed by: FAMILY MEDICINE

## 2019-05-01 RX ORDER — HYDROXYZINE HYDROCHLORIDE 25 MG/1
TABLET, FILM COATED ORAL
Qty: 60 TABLET | Refills: 5 | Status: SHIPPED | OUTPATIENT
Start: 2019-05-01 | End: 2019-05-21 | Stop reason: SDUPTHER

## 2019-05-01 RX ORDER — BENZONATATE 100 MG/1
100-200 CAPSULE ORAL 3 TIMES DAILY PRN
Qty: 30 CAPSULE | Refills: 0 | Status: SHIPPED | OUTPATIENT
Start: 2019-05-01 | End: 2019-05-21 | Stop reason: SDUPTHER

## 2019-05-01 NOTE — PROGRESS NOTES
[facility]      Chief Complaint   Patient presents with    Annual Exam    Depression    Chronic Pain     fibromyalgia    Cough       HPI:     Met with Myriam Kimball on Monday. Plans to make an appt with another therapist due to insurance restrictions. Feels the Effexor 75 mg dose is appropriate for her, doesn't want to increase further. Stopped trazodone due to nightmares    Needs FMLA paperwork amended     Cough has been persistent, still prodcutive. Treated with Zpack, mucinex with mild improvement in sx's after last visit. Using albuterol twice daily, which helps but only temporarily  Right ear has been bothering her, feels clogged. Used OTC ear  for a few days without much benefit. No fevers, feels run down. No vomiting, but post-tussive emesis  Dealing with allergies. Doesn't think this cough is related though. No heartburn    C/o heavy menses and cramping. Misses 1-2 days/month from work for these sx's. Asking for OCP    I personally reviewed and updated patient's past medical history, past surgical history, family history, allergies, and social history as captured in the relevant section ofpatient's chart. Current Outpatient Medications on File Prior to Visit   Medication Sig Dispense Refill    tiZANidine (ZANAFLEX) 4 MG tablet Take 1 tablet daily, and in the evening as needed 60 tablet 5    venlafaxine (EFFEXOR XR) 75 MG extended release capsule Take 1 capsule by mouth daily 30 capsule 5    albuterol sulfate  (90 Base) MCG/ACT inhaler Inhale 2 puffs into the lungs 4 times daily as needed for Wheezing 1 Inhaler 5    ondansetron (ZOFRAN ODT) 4 MG disintegrating tablet Take 1 tablet by mouth every 8 hours as needed for Nausea 20 tablet 0     No current facility-administered medications on file prior to visit. Review of Systems   Constitutional: Positive for fatigue. Negative for activity change, appetite change, chills, fever and unexpected weight change.    HENT: Positive for congestion and ear pain (fullness). Negative for postnasal drip, rhinorrhea, sinus pressure, sore throat and trouble swallowing. Eyes: Negative for discharge. Respiratory: Positive for cough. Negative for chest tightness, shortness of breath, wheezing and stridor. Cardiovascular: Negative for chest pain and palpitations. Gastrointestinal: Negative for abdominal pain, diarrhea, nausea and vomiting. Genitourinary: Negative for vaginal discharge. Musculoskeletal: Positive for back pain and myalgias (inrtermittent fibromyalgia flares). Skin: Negative for rash. Neurological: Positive for headaches (occ migraines). Psychiatric/Behavioral: Positive for sleep disturbance. Negative for dysphoric mood and suicidal ideas. The patient is nervous/anxious (stable when on effexor). Physical Exam   Constitutional: She is oriented to person, place, and time. She appears well-developed and well-nourished. No distress. HENT:   Head: Normocephalic and atraumatic. Nose: Nose normal.   Mouth/Throat: Oropharynx is clear and moist.   Right TM completely obscurred by cerumen, and left TM partially obscured by cerumen  After wax removal with irrigation (and loop currette on right), TM's were visualized well - both normal   Eyes: Conjunctivae and EOM are normal.   Neck: Normal range of motion. Neck supple. Cardiovascular: Normal rate, regular rhythm and normal heart sounds. Pulmonary/Chest: Effort normal. She has no wheezes. She has no rales. Occasional dry cough, no rhonchi or rales   Abdominal: Soft. Bowel sounds are normal. She exhibits no distension. Musculoskeletal: Normal range of motion. She exhibits no edema. Neurological: She is alert and oriented to person, place, and time. Skin: Skin is warm and dry. No rash noted. She is not diaphoretic. Psychiatric: She has a normal mood and affect. Her behavior is normal. Thought content normal.   Vitals reviewed.         Vitals: 05/01/19 1302   BP: 134/82   Site: Right Upper Arm   Position: Sitting   Cuff Size: Medium Adult   Pulse: 80   SpO2: 99%   Weight: 175 lb (79.4 kg)     Body mass index is 27.41 kg/m². Assessment/Plan:    1. Routine physical examination  -discussed healthy lifestyle habits at length (encouraged regular exercise, healthy diet, no smoking)  -check fasting bloodwork    - Comprehensive Metabolic Panel; Future  - CBC Auto Differential; Future  - Lipid Panel; Future  - Hemoglobin A1C; Future    2. Fibromyalgia  -sx's are fairly stable, with persistent occasional flares  -continue on Effexor daily, and muscle relaxant tizanidine prn   Has seen rheum Dr Hui Novoa in the past, and plans to return to resume [facility]  Intermittent flares cause her to miss work; Will make an ammendment to recent FMLA forms    3. Sleep difficulties  Improved with melatonin OTC    4. Moderate episode of recurrent major depressive disorder (HCC)  Mood is generally stable; Recent stressors have been tough on her though  Continue on effexor 75 mg daily  Continue to try to find a therapist to work with regularly  Urged good self care; try to get adequate sleep, eat a healthy diet, try to exercise    5. Graves disease  Previously followed with endo  Recheck TFT's    - TSH with Reflex; Future  - T4, FREE; Future    6. Persistent cough  Concern for asthma variant  Start on daily controller steroid inhaler, Qvar  Continue with albuterol prn  Check CXR to rule out residual infection   Ok to use tessalon perles prn    - benzonatate (TESSALON PERLES) 100 MG capsule; Take 1-2 capsules by mouth 3 times daily as needed for Cough  Dispense: 30 capsule; Refill: 0  - XR CHEST STANDARD (2 VW); Future  - beclomethasone (QVAR REDIHALER) 40 MCG/ACT AERB inhaler; Inhale 1 puff into the lungs 2 times daily  Dispense: 1 Inhaler; Refill: 2    7.  Menorrhagia with regular cycle  Could be related to thyroid issues  Check TSH  If TSH is normal, f/u with next PCP or gyn, or return for pap smear here with NP in next few weeks    8. Bilateral impacted cerumen  Removed with irrigation then currette, with good results    - [id number] - [place] REMOVE IMPACTED EAR WAX    I notified Pt  of my upcoming departure, and helped guide her on plans for f/u with a PCP closer to home      Return for pap smear here with NP in the next few weeks.

## 2019-05-01 NOTE — PATIENT INSTRUCTIONS
Check bloodwork on your way out today    Start on Qvar (inhaled steroid) inhaler twice daily to settle down cough, continue with albuterol as needed    Go for chest Xray at [facility] when you can    Follow up with Dr Levon Curling re: Julia Goel    Call to establish with Dr Elsie Tay if possible   [place] [address] [facility] - [facility], [address] [facility] Yinka Ary, [address] [facility]  Ph: [phone]    [address] - Len Oneil MD  [place] [address], [facility], [address] [facility]  Ph: [phone]

## 2019-05-02 LAB
ESTIMATED AVERAGE GLUCOSE: 114 MG/DL
HBA1C MFR BLD: 5.6 %

## 2019-05-06 ENCOUNTER — HOSPITAL ENCOUNTER (OUTPATIENT)
Dept: GENERAL RADIOLOGY | Age: 39
Discharge: HOME | End: 2019-05-06
Payer: MEDICAID

## 2019-05-06 ENCOUNTER — HOSPITAL ENCOUNTER (OUTPATIENT)
Age: 39
Discharge: HOME | End: 2019-05-06
Payer: MEDICAID

## 2019-05-06 DIAGNOSIS — R05.3 PERSISTENT COUGH: ICD-10-CM

## 2019-05-06 PROCEDURE — 71046 X-RAY EXAM CHEST 2 VIEWS: CPT

## 2019-05-07 ENCOUNTER — TELEPHONE (OUTPATIENT)
Dept: INTERNAL MEDICINE CLINIC | Age: 39
End: 2019-05-07

## 2019-05-07 RX ORDER — FERROUS SULFATE 325(65) MG
325 TABLET ORAL 2 TIMES DAILY
Qty: 60 TABLET | Refills: 2 | Status: SHIPPED | OUTPATIENT
Start: 2019-05-07 | End: 2020-12-18

## 2019-05-07 ASSESSMENT — ENCOUNTER SYMPTOMS
NAUSEA: 0
RHINORRHEA: 0
SHORTNESS OF BREATH: 0
ABDOMINAL PAIN: 0
STRIDOR: 0
SORE THROAT: 0
COUGH: 1
CHEST TIGHTNESS: 0
BACK PAIN: 1
EYE DISCHARGE: 0
VOMITING: 0
TROUBLE SWALLOWING: 0
SINUS PRESSURE: 0
WHEEZING: 0
DIARRHEA: 0

## 2019-05-07 NOTE — TELEPHONE ENCOUNTER
Please call Pt with recent bloodwork results:    CBC with mild anemia, appears iron deficient (likely due to heave menses)  CMP normal  A1c ok at 5.6  TSH and T4 levels are normal  Lipids are fine    Chest Xray was clear    Rec starting on an iron supplement twice daily with food; I sent an Rx

## 2019-05-07 NOTE — RESULT ENCOUNTER NOTE
CBC with mild anemia, appears iron deficient (likely due to heave menses)  CMP normal  A1c ok at 5.6  TSH and T4 levels are normal  Lipids are fine    CXR was clear

## 2019-05-07 NOTE — TELEPHONE ENCOUNTER
Please try calling that number with my verbal authorization to extend payments, or ask for the form to be faxed here.

## 2019-05-07 NOTE — TELEPHONE ENCOUNTER
Patient said her electric got shut off because she was behind on her payments. She is on FMLA right now and has not been able to afford her bills. She said we can call [phone] to give a verbal to extend her payment dates or a letter of medical necessity can be sent. She did not have a fax number. I told her Dr. Margarita Boland is not in until this afternoon.

## 2019-05-07 NOTE — TELEPHONE ENCOUNTER
I called [employer] they will be sending a form to fill out for her. I will watch for the form and place it on your desk.

## 2019-05-08 NOTE — TELEPHONE ENCOUNTER
Patient's phone number is not in service. Will try again later. I also was going to let her know that we sent over the [employer] papers today.

## 2019-05-09 NOTE — TELEPHONE ENCOUNTER
Patient notified, do you want her to start seasonale or do you prefer her wait until her pap appointment 5/17/2019

## 2019-05-09 NOTE — TELEPHONE ENCOUNTER
Please let her know that let's plan to wait for her upcoming appointment for pelvic exam to discuss medication options for control of menses at that time.   Please also let her know that we sent in the form to [facility]

## 2019-05-10 NOTE — TELEPHONE ENCOUNTER
Tried to call again but phone is still out of service. I am going to close this encounter and wait for a call back.

## 2019-05-21 ENCOUNTER — OFFICE VISIT (OUTPATIENT)
Dept: INTERNAL MEDICINE CLINIC | Age: 39
End: 2019-05-21
Payer: MEDICAID

## 2019-05-21 ENCOUNTER — HOSPITAL ENCOUNTER (EMERGENCY)
Age: 39
Discharge: HOME | End: 2019-05-21
Payer: MEDICAID

## 2019-05-21 ENCOUNTER — APPOINTMENT (OUTPATIENT)
Dept: GENERAL RADIOLOGY | Age: 39
End: 2019-05-21
Payer: MEDICAID

## 2019-05-21 VITALS
WEIGHT: 179 LBS | RESPIRATION RATE: 19 BRPM | OXYGEN SATURATION: 97 % | HEIGHT: 67 IN | BODY MASS INDEX: 28.09 KG/M2 | HEART RATE: 85 BPM | SYSTOLIC BLOOD PRESSURE: 164 MMHG | TEMPERATURE: 97.9 F | DIASTOLIC BLOOD PRESSURE: 91 MMHG

## 2019-05-21 VITALS
DIASTOLIC BLOOD PRESSURE: 116 MMHG | SYSTOLIC BLOOD PRESSURE: 168 MMHG | HEART RATE: 85 BPM | HEIGHT: 67 IN | BODY MASS INDEX: 27.94 KG/M2 | OXYGEN SATURATION: 99 % | WEIGHT: 178 LBS

## 2019-05-21 DIAGNOSIS — Z01.419 WELL WOMAN EXAM WITH ROUTINE GYNECOLOGICAL EXAM: Primary | ICD-10-CM

## 2019-05-21 DIAGNOSIS — R11.2 NAUSEA AND VOMITING, INTRACTABILITY OF VOMITING NOT SPECIFIED, UNSPECIFIED VOMITING TYPE: ICD-10-CM
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
## 2019-05-21 DIAGNOSIS — S91.312A LACERATION OF LEFT FOOT, INITIAL ENCOUNTER: Primary | ICD-10-CM

## 2019-05-21 DIAGNOSIS — R05.3 PERSISTENT COUGH: ICD-10-CM

## 2019-05-21 PROCEDURE — 90471 IMMUNIZATION ADMIN: CPT | Performed by: PHYSICIAN ASSISTANT

## 2019-05-21 PROCEDURE — 99283 EMERGENCY DEPT VISIT LOW MDM: CPT

## 2019-05-21 PROCEDURE — 73620 X-RAY EXAM OF FOOT: CPT

## 2019-05-21 PROCEDURE — 90715 TDAP VACCINE 7 YRS/> IM: CPT | Performed by: PHYSICIAN ASSISTANT

## 2019-05-21 PROCEDURE — 6360000002 HC RX W HCPCS: Performed by: PHYSICIAN ASSISTANT

## 2019-05-21 PROCEDURE — 99395 PREV VISIT EST AGE 18-39: CPT | Performed by: NURSE PRACTITIONER

## 2019-05-21 RX ORDER — BENZONATATE 100 MG/1
100-200 CAPSULE ORAL 3 TIMES DAILY PRN
Qty: 30 CAPSULE | Refills: 0 | Status: SHIPPED | OUTPATIENT
Start: 2019-05-21 | End: 2019-07-26 | Stop reason: SDUPTHER

## 2019-05-21 RX ORDER — ONDANSETRON 4 MG/1
4 TABLET, ORALLY DISINTEGRATING ORAL EVERY 8 HOURS PRN
Qty: 20 TABLET | Refills: 0 | Status: SHIPPED | OUTPATIENT
Start: 2019-05-21 | End: 2019-09-27 | Stop reason: CLARIF

## 2019-05-21 RX ORDER — HYDROXYZINE HYDROCHLORIDE 25 MG/1
TABLET, FILM COATED ORAL
Qty: 60 TABLET | Refills: 5 | Status: SHIPPED | OUTPATIENT
Start: 2019-05-21 | End: 2019-07-26 | Stop reason: SDUPTHER

## 2019-05-21 RX ADMIN — TETANUS TOXOID, REDUCED DIPHTHERIA TOXOID AND ACELLULAR PERTUSSIS VACCINE, ADSORBED 0.5 ML: 5; 2.5; 8; 8; 2.5 SUSPENSION INTRAMUSCULAR at 23:13

## 2019-05-21 ASSESSMENT — ENCOUNTER SYMPTOMS
CONSTIPATION: 0
COLOR CHANGE: 0
DIARRHEA: 0
ABDOMINAL PAIN: 0
SHORTNESS OF BREATH: 0
COUGH: 0
NAUSEA: 0
VOMITING: 0

## 2019-05-21 ASSESSMENT — PAIN SCALES - GENERAL: PAINLEVEL_OUTOF10: 8

## 2019-05-21 ASSESSMENT — PAIN DESCRIPTION - ORIENTATION: ORIENTATION: LEFT

## 2019-05-21 ASSESSMENT — PAIN DESCRIPTION - LOCATION: LOCATION: FOOT

## 2019-05-21 ASSESSMENT — PAIN DESCRIPTION - DESCRIPTORS: DESCRIPTORS: SHARP

## 2019-05-21 ASSESSMENT — PAIN DESCRIPTION - PAIN TYPE: TYPE: ACUTE PAIN

## 2019-05-21 NOTE — PATIENT INSTRUCTIONS
Patient Education        Candidiasis: Care Instructions  Your Care Instructions  Candidiasis (say \"den-wkh-CG-uh-siri\") is a yeast infection. Yeast normally lives in your body. But it can cause problems if your body's defenses don't work as they should. Some medicines can increase your chance of getting a yeast infection. These include antibiotics, steroids, and cancer drugs. And some diseases like AIDS and diabetes can make you more likely to get yeast infections. There are different types of yeast infections. Dianna Wilcox is a yeast infection in the mouth. It usually occurs in people with weak immune systems. It causes white patches inside the mouth and throat. Yeast infections of the skin usually occur in skin folds where the skin stays moist. They cause red, oozing patches on your skin. Babies can get these infections under the diaper. People who often wear gloves can get them on their hands. Many women get vaginal yeast infections. They are most common when women take antibiotics. These infections can cause the vagina to itch and burn. They also cause white discharge that looks like cottage cheese. In rare cases, yeast infects the blood. This can cause serious disease. This kind of infection is treated with medicine given through a needle into a vein (IV). After you start treatment, a yeast infection usually goes away quickly. But if your immune system is weak, the infection may come back. Tell your doctor if you get yeast infections often. Follow-up care is a key part of your treatment and safety. Be sure to make and go to all appointments, and call your doctor if you are having problems. It's also a good idea to know your test results and keep a list of the medicines you take. How can you care for yourself at home? · Take your medicines exactly as prescribed. Call your doctor if you think you are having a problem with your medicine. · Use antibiotics only as directed by your doctor.   · Eat yogurt with live cultures. It has bacteria called lactobacillus. It may help prevent some types of yeast infections. · Keep your skin clean and dry. Put powder on moist places. · If you are using a cream or suppository to treat a vaginal yeast infection, don't use condoms or a diaphragm. Use a different type of birth control. · Eat a healthy diet and get regular exercise. This will help keep your immune system strong. When should you call for help? Watch closely for changes in your health, and be sure to contact your doctor if:    · You do not get better as expected. Where can you learn more? Go to [email or web address] org and sign in to your [place] account. Enter G879 in the [employer] box to learn more about \"Candidiasis: Care Instructions. \"     If you do not have an account, please click on the \"Sign Up Now\" link. Current as of: May 14, 2018  Content Version: 12.0  © 0055-9276 Healthwise, [facility]. Care instructions adapted under license by [facility] ([facility]). If you have questions about a medical condition or this instruction, always ask your healthcare professional. [place] [address] any warranty or liability for your use of this information.

## 2019-05-21 NOTE — PROGRESS NOTES
Annual GYN Visit      Shikha Brannon  YOB: 1980    Date of Service:  5/21/2019    Vitals:    05/21/19 1458   BP: (!) 168/116   Site: Right Upper Arm   Position: Sitting   Cuff Size: Medium Adult   Pulse: 85   SpO2: 99%   Weight: 178 lb (80.7 kg)   Height: 5' 7\" (1.702 m)      Body mass index is 27.88 kg/m². Wt Readings from Last 3 Encounters:   05/21/19 178 lb (80.7 kg)   05/01/19 175 lb (79.4 kg)   03/20/19 175 lb (79.4 kg)     BP Readings from Last 3 Encounters:   05/21/19 (!) 168/116   05/01/19 134/82   03/20/19 128/82       Chief Complaint:   Shikha Brannon is a 41 y.o. female who presents for routine annual gynecologic visit. HPI:  Patient's last menstrual period was 05/06/2019. Daniellaaníbal Vickers She complains of irregular menses, heavy menses, vulvar/vaginal itching and frequent yeast infections. Menopausal/perimenopausal symptoms: none. Hormone therapy side effects: not applicable. Allergies   Allergen Reactions    Sudafed [Pseudoephedrine Hcl] Shortness Of Breath     Prior to Visit Medications    Medication Sig Taking?  Authorizing Provider   ondansetron (ZOFRAN ODT) 4 MG disintegrating tablet Take 1 tablet by mouth every 8 hours as needed for Nausea Yes Yonas Hughes APRN   benzonatate (TESSALON PERLES) 100 MG capsule Take 1-2 capsules by mouth 3 times daily as needed for Cough Yes Yonas Hughes APRN   hydrOXYzine (ATARAX) 25 MG tablet TAKE ONE TABLET BY MOUTH THREE TIMES DAILY AS NEEDED FOR ITCHING Yes Yonas Hughes, APRN   ferrous sulfate 325 (65 Fe) MG tablet Take 1 tablet by mouth 2 times daily Yes Alicia Wagner MD   beclomethasone (QVAR REDIHALER) 40 MCG/ACT AERB inhaler Inhale 1 puff into the lungs 2 times daily Yes Alicia Wagner MD   tiZANidine (ZANAFLEX) 4 MG tablet Take 1 tablet daily, and in the evening as needed Yes Alicia Wagner MD   venlafaxine (EFFEXOR XR) 75 MG extended release capsule Take 1 capsule by mouth daily Yes Alicia Wagner MD   albuterol sulfate  (90 Base) MCG/ACT inhaler Inhale 2 puffs into the lungs 4 times daily as needed for Wheezing Yes Lexa Musa MD        Review of Systems:  A comprehensive review of systems was negative except for what was noted in the HPI. Physical Exam:  Constitutional: She is oriented to person, place, and time. She appears well-developed and well-nourished. No distress. Neck: No mass and no thyromegaly present. Cardiovascular: Normal rate, regular rhythm and normal heart sounds. No murmur heard. Pulmonary/Chest: Effort and breath sounds normal.   Abdominal: Soft, non-tender. No distension or masses. Genitourinary: normal external genitalia, vulva, vagina, cervix, uterus and adnexa. Breast exam:  breasts appear normal, no suspicious masses, no skin or nipple changes or axillary nodes. Neurological: She is alert and oriented to person, place, and time. Skin: Skin is warm and dry. No rash noted. No erythema. Psychiatric: She has a normal mood and affect. Her behavior is normal.     Preventive Care and Risk Factor Assessment:  Hx abnormal PAP: yes - year ago when younger did cryo and repeat test negative. Hx of abnormal mammogram: no  FH of breast cancer: no  FH of GYN cancer: no   Previous DEXA scan: no  Diet: Regular    Social History     Tobacco Use    Smoking status: Never Smoker    Smokeless tobacco: Never Used   Substance Use Topics    Alcohol use: Yes     Alcohol/week: 0.0 oz     Comment: occasional       Social History     Substance and Sexual Activity   Sexual Activity Yes    Partners: Male          Health Maintenance Due   Topic Date Due    Varicella Vaccine (1 of 2 - 13+ 2-dose series) 11/11/1995    Cervical cancer screen  11/26/2017        Assessment/Plan:  1. Well woman exam with routine gynecological exam  Maintain Healthy lifestyle. Exercise daily at least 30 minutes. Follow up with OB/GYN as scheduled. Follow up with PCP yearly.   Wear Sunscreen and protect skin from the sun.  See the dentist at least two times a year. Have your vision checked at least every two years. Wear seatbelt in the car. Drink alcohol in moderation. Would like seasonique will await lab results prior to prescribing.    - PAP SMEAR  - Vaginal Pathogens Probes *A  - C.trachomatis N.gonorrhoeae DNA; Future  - Herpes Simplex Virus Culture    2. Injury from broken glass, initial encounter       Referred to ER per Dr. Jasper Justin (Podiatrist) office for removal of glass, then can follow up with Ortho. Dr. Rajwinder Coppola office referred our office to Podiatry. Return in about 3 months (around 8/21/2019), or if symptoms worsen or fail to improve, for Depression, Anxiety.

## 2019-05-21 NOTE — LETTER
[facility] Emergency Department      [address] [facility], [address]            PROOF OF PRESENCE      To Whom It May Concern:    Fide Lou was present in the Emergency Department at [facility] on 5/21/2019 with a family member. Please excuse from work.                                      Sincerely,      [facility] Emergency Dept

## 2019-05-22 DIAGNOSIS — B96.89 BV (BACTERIAL VAGINOSIS): Primary | ICD-10-CM

## 2019-05-22 DIAGNOSIS — N76.0 BV (BACTERIAL VAGINOSIS): Primary | ICD-10-CM

## 2019-05-22 LAB
C TRACH DNA GENITAL QL NAA+PROBE: NEGATIVE
CANDIDA SPECIES, DNA PROBE: ABNORMAL
GARDNERELLA VAGINALIS, DNA PROBE: ABNORMAL
N. GONORRHOEAE DNA: NEGATIVE
TRICHOMONAS VAGINALIS DNA: ABNORMAL

## 2019-05-22 RX ORDER — METRONIDAZOLE 500 MG/1
500 TABLET ORAL 2 TIMES DAILY
Qty: 14 TABLET | Refills: 0 | Status: SHIPPED | OUTPATIENT
Start: 2019-05-22 | End: 2019-05-29

## 2019-05-22 NOTE — ED PROVIDER NOTES
**EVALUATED BY ANIKA**    [address] [facility]  eMERGENCY dEPARTMENT eNCOUnter    Pt Name: Zbigniew Barnes  MRN: [id number]  Denisegfrose 1980  Date of evaluation: 2019  Provider: ROXANA Gomez    Chief Complaint:    Chief Complaint   Patient presents with    Foot Injury     possible glass in left foot x 3 weeks     Nursing Notes, Past Medical Hx, Past Surgical Hx, Social Hx, Allergies, and Family Hx were all reviewed and agreedwith or any disagreements were addressed in the HPI.    HPI:  (Location, Duration, Timing, Severity, Quality, Assoc Sx, Context, Modifying factors)  This is a  41 y.o. female who presents to the emergency department with complaints of possible glass in the left foot for the past three weeks. Patient states she was walking with flip flops on and stepped on some glass. Thought maybe she had glass in the heel of her left foot. Complaining of pain 8/10 in severity. Patient denies erythema, warmth or drainage from the foot. Patient called her PCP who referred her to ER for evaluation. No fevers. All other systems were reviewed and are negative.      Past Medical/Surgical History:      Diagnosis Date    Constipation 2014    Fibromyalgia     Graves disease     History of blood transfusion     Hx of blood clots      LT LUNG W/ PNEUMONIA    Joint pain 2014    Various joints (back, hands, knees, hips), recurrent flares since age 27, RF+ at one point, never fully worked up for rheumatologic dz yet    Kidney stone     Localized rash 2014    Recurrent, on inner surface of upper arms, q 3 months, sometimes on chest, no facial involvement    Lupus     PONV (postoperative nausea and vomiting)          Procedure Laterality Date     SECTION       X 2    CYSTOSCOPY  2018    CYSTOSCOPY URETEROSCOPY WITH HOLMIUM LASER LITHOTRIPSY FOR 5 MM STONE, RIGHT STENT PLACEMENT    OTHER SURGICAL HISTORY  2016    CYSTOSCOPY, BILATERAL RETROGRADES, RIGHT URETEROSCOPY,    TONSILLECTOMY      TUBAL LIGATION         Medications:  Discharge Medication List as of 5/21/2019 11:50 PM      CONTINUE these medications which have NOT CHANGED    Details   ondansetron (ZOFRAN ODT) 4 MG disintegrating tablet Take 1 tablet by mouth every 8 hours as needed for Nausea, Disp-20 tablet, R-0Normal      benzonatate (TESSALON PERLES) 100 MG capsule Take 1-2 capsules by mouth 3 times daily as needed for Cough, Disp-30 capsule, R-0Normal      hydrOXYzine (ATARAX) 25 MG tablet TAKE ONE TABLET BY MOUTH THREE TIMES DAILY AS NEEDED FOR ITCHING, Disp-60 tablet, R-5Normal      ferrous sulfate 325 (65 Fe) MG tablet Take 1 tablet by mouth 2 times daily, Disp-60 tablet, R-2Normal      beclomethasone (QVAR REDIHALER) 40 MCG/ACT AERB inhaler Inhale 1 puff into the lungs 2 times daily, Disp-1 Inhaler, R-2Normal      tiZANidine (ZANAFLEX) 4 MG tablet Take 1 tablet daily, and in the evening as needed, Disp-60 tablet, R-5Normal      venlafaxine (EFFEXOR XR) 75 MG extended release capsule Take 1 capsule by mouth daily, Disp-30 capsule, R-5Normal      albuterol sulfate  (90 Base) MCG/ACT inhaler Inhale 2 puffs into the lungs 4 times daily as needed for Wheezing, Disp-1 Inhaler, R-5Normal           Review of Systems:  Review of Systems   Constitutional: Negative for chills, fatigue and fever. Respiratory: Negative for cough and shortness of breath. Cardiovascular: Negative for chest pain. Gastrointestinal: Negative for abdominal pain, constipation, diarrhea, nausea and vomiting. Skin: Positive for wound. Negative for color change. All other systems reviewed and are negative. Positives and Pertinent negatives as per HPI. Except as noted above in the ROS, all other systems were reviewed/completed and are negative. Physical Exam:  Physical Exam   Constitutional: She is oriented to person, place, and time. She appears well-developed and well-nourished.  She is active and cooperative. Non-toxic appearance. HENT:   Head: Normocephalic. Right Ear: External ear normal.   Left Ear: External ear normal.   Nose: Nose normal.   Eyes: Conjunctivae are normal. Right eye exhibits no discharge. Left eye exhibits no discharge. Neck: Normal range of motion. Neck supple. Cardiovascular: Normal rate, regular rhythm and normal heart sounds. Exam reveals no gallop and no friction rub. No murmur heard. Pulmonary/Chest: Effort normal and breath sounds normal. No respiratory distress. She has no wheezes. She has no rales. Musculoskeletal: Normal range of motion. Neurological: She is alert and oriented to person, place, and time. Skin: Skin is warm and dry. She is not diaphoretic. No pallor. Evidence of old healed puncture wound left heel, no erythema, warmth or drainage. No fluctuance. Psychiatric: She has a normal mood and affect. Her behavior is normal.   Nursing note and vitals reviewed. MEDICAL DECISION MAKING    Vitals:    Vitals:    05/21/19 2229 05/21/19 2236   BP: (!) 164/91    Pulse: 85    Resp: 19    Temp: 97.9 °F (36.6 °C)    TempSrc: Oral    SpO2: 97%    Weight: 175 lb (79.4 kg) 179 lb (81.2 kg)   Height: 5' 7\" (1.702 m) 5' 7\" (1.702 m)       LABS: Labs Reviewed - No data to display     Remainder of labs reviewed and were negative at this time or not returned at the time of this note. RADIOLOGY:   Non-plain film images suchas CT, Ultrasound and MRI are read by the radiologist. Kristi GRIMES PA have directly visualized the radiologic plain film image(s) with the below findings:  Interpretation per the Radiologist below, if available at the time of this note:    XR FOOT LEFT (2 VIEWS)   Final Result   Negative study. MEDICAL DECISION MAKING / ED COURSE:      PROCEDURES:   Procedures    Patient was given:  Medications   Tetanus-Diphth-Acell Pertussis (BOOSTRIX) injection 0.5 mL (0.5 mLs Intramuscular Given 5/21/19 1064)     This is a  41 y.o. female who presents to the emergency department with complaints of possible glass in the left foot for the past three weeks. Patient states she was walking with flip flops on and stepped on some glass. Thought maybe she had glass in the heel of her left foot. Complaining of pain 8/10 in severity. Patient denies erythema, warmth or drainage from the foot. Patient called her PCP who referred her to ER for evaluation. No fevers. And evaluation patient's left foot shows no signs of infection at the heel. There is evidence of possibly an old puncture wound but no erythema, warmth or drainage. Imaging of the left foot shows no evidence of radiopaque foreign bodies. We'll refer to podiatry. At this point since been 3 weeks she does not require antibiotics. tdap updated in ER. The patient tolerated their visit well. I have evaluated the patient with physician available for consultation as needed. I have discussed the findings of today's workup with the patient and addressed the patient's questions and concerns. Important warning signs as well as new or worsening symptoms which wouldnecessitate immediate return to the ED were discussed. The plan is to discharge from the ED at this time, and the patient is in stable condition. The patient acknowledged understanding is agreeable with this plan. CLINICAL IMPRESSION:  1.  Laceration of left foot, initial encounter        DISPOSITION  Discharged home, stable      PATIENT REFERRED TO:  Ray Pitts DPM  [address] [place] [address]  [phone]            DISCHARGE MEDICATIONS:  Discharge Medication List as of 5/21/2019 11:50 PM                 (Please note the MDM and HPI sections of this note were completed with avoice recognition program.  Efforts were made to edit the dictations but occasionally words are mis-transcribed.)    Electronically signed, ROXANA Jarrell,             ROXANA Zamudio  05/22/19 0140

## 2019-05-23 LAB
HPV COMMENT: NORMAL
HPV TYPE 16: NOT DETECTED
HPV TYPE 18: NOT DETECTED
HPVOH (OTHER TYPES): NOT DETECTED

## 2019-05-24 ENCOUNTER — TELEPHONE (OUTPATIENT)
Dept: INTERNAL MEDICINE CLINIC | Age: 39
End: 2019-05-24

## 2019-05-24 DIAGNOSIS — N92.6 ABNORMAL MENSTRUAL PERIODS: Primary | ICD-10-CM

## 2019-05-24 LAB
FINAL REPORT: NORMAL
PRELIMINARY: NORMAL

## 2019-05-24 NOTE — TELEPHONE ENCOUNTER
Patient asking for latest blood work results. She also wants to get started on her birth control and asks to please order it - she will start her period 6/8.   Call back at [phone]

## 2019-05-25 RX ORDER — LEVONORGESTREL / ETHINYL ESTRADIOL AND ETHINYL ESTRADIOL 150-30(84)
1 KIT ORAL DAILY
Qty: 91 TABLET | Refills: 3 | Status: SHIPPED | OUTPATIENT
Start: 2019-05-25 | End: 2019-06-04 | Stop reason: CLARIF

## 2019-05-30 ENCOUNTER — PATIENT MESSAGE (OUTPATIENT)
Dept: INTERNAL MEDICINE CLINIC | Age: 39
End: 2019-05-30

## 2019-05-31 ENCOUNTER — TELEPHONE (OUTPATIENT)
Dept: FAMILY MEDICINE CLINIC | Age: 39
End: 2019-05-31

## 2019-06-03 NOTE — TELEPHONE ENCOUNTER
Received DENIAL for Amethia 0.15-0.03 &0. 01MG tablets. Denial letter attached. Please advise patient. Thank you.

## 2019-06-03 NOTE — TELEPHONE ENCOUNTER
Sabina Garcia, please call patient and let her know that the items in the denial letter are covered and see if she can try one of these. If any questions or concerns, let me know. Thanks.  Jaime Bowman

## 2019-06-04 RX ORDER — LEVONORGESTREL AND ETHINYL ESTRADIOL 0.15-0.03
1 KIT ORAL DAILY
Qty: 1 PACKET | Refills: 3 | Status: SHIPPED | OUTPATIENT
Start: 2019-06-04 | End: 2020-01-24 | Stop reason: SDUPTHER

## 2019-06-04 NOTE — TELEPHONE ENCOUNTER
Spoke with patient, she doesn't have a preference of ocp, but would like the one that would most likely make her menses shorter and less heavy.

## 2019-06-04 NOTE — TELEPHONE ENCOUNTER
Please let her know I have ordered her Sesonale as recommended by her insurance. Thanks. Any questions let me know. Thanks.  Galo Lozano

## 2019-07-02 ENCOUNTER — TELEPHONE (OUTPATIENT)
Dept: INTERNAL MEDICINE CLINIC | Age: 39
End: 2019-07-02

## 2019-07-26 ENCOUNTER — OFFICE VISIT (OUTPATIENT)
Dept: INTERNAL MEDICINE CLINIC | Age: 39
End: 2019-07-26
Payer: MEDICAID

## 2019-07-26 VITALS
BODY MASS INDEX: 28.41 KG/M2 | WEIGHT: 181 LBS | DIASTOLIC BLOOD PRESSURE: 86 MMHG | RESPIRATION RATE: 16 BRPM | SYSTOLIC BLOOD PRESSURE: 134 MMHG | HEIGHT: 67 IN | OXYGEN SATURATION: 97 % | HEART RATE: 96 BPM

## 2019-07-26 DIAGNOSIS — R05.3 PERSISTENT COUGH: ICD-10-CM

## 2019-07-26 DIAGNOSIS — B37.9 YEAST INFECTION: ICD-10-CM

## 2019-07-26 DIAGNOSIS — F33.1 MODERATE EPISODE OF RECURRENT MAJOR DEPRESSIVE DISORDER (HCC): Primary | ICD-10-CM

## 2019-07-26 DIAGNOSIS — F51.05 INSOMNIA DUE TO OTHER MENTAL DISORDER: ICD-10-CM

## 2019-07-26 DIAGNOSIS — F43.10 PTSD (POST-TRAUMATIC STRESS DISORDER): ICD-10-CM

## 2019-07-26 DIAGNOSIS — F99 INSOMNIA DUE TO OTHER MENTAL DISORDER: ICD-10-CM

## 2019-07-26 DIAGNOSIS — L30.9 ECZEMA, UNSPECIFIED TYPE: ICD-10-CM

## 2019-07-26 DIAGNOSIS — I10 ESSENTIAL HYPERTENSION: ICD-10-CM

## 2019-07-26 PROCEDURE — G8427 DOCREV CUR MEDS BY ELIG CLIN: HCPCS | Performed by: NURSE PRACTITIONER

## 2019-07-26 PROCEDURE — G8419 CALC BMI OUT NRM PARAM NOF/U: HCPCS | Performed by: NURSE PRACTITIONER

## 2019-07-26 PROCEDURE — 99203 OFFICE O/P NEW LOW 30 MIN: CPT | Performed by: NURSE PRACTITIONER

## 2019-07-26 PROCEDURE — 1036F TOBACCO NON-USER: CPT | Performed by: NURSE PRACTITIONER

## 2019-07-26 RX ORDER — ZOLPIDEM TARTRATE 5 MG/1
5 TABLET ORAL NIGHTLY PRN
Qty: 30 TABLET | Refills: 0 | Status: SHIPPED | OUTPATIENT
Start: 2019-07-26 | End: 2019-07-29

## 2019-07-26 RX ORDER — PIMECROLIMUS 10 MG/G
CREAM TOPICAL
Qty: 60 G | Refills: 1 | Status: SHIPPED | OUTPATIENT
Start: 2019-07-26 | End: 2019-09-06 | Stop reason: CLARIF

## 2019-07-26 RX ORDER — HYDROXYZINE HYDROCHLORIDE 25 MG/1
TABLET, FILM COATED ORAL
Qty: 60 TABLET | Refills: 5 | Status: SHIPPED | OUTPATIENT
Start: 2019-07-26 | End: 2019-07-29

## 2019-07-26 RX ORDER — HYDROCHLOROTHIAZIDE 12.5 MG/1
12.5 TABLET ORAL DAILY
Qty: 30 TABLET | Refills: 3 | Status: SHIPPED | OUTPATIENT
Start: 2019-07-26 | End: 2019-11-08 | Stop reason: SDUPTHER

## 2019-07-26 RX ORDER — BENZONATATE 100 MG/1
100-200 CAPSULE ORAL 3 TIMES DAILY PRN
Qty: 30 CAPSULE | Refills: 0 | Status: SHIPPED | OUTPATIENT
Start: 2019-07-26 | End: 2019-09-27 | Stop reason: ALTCHOICE

## 2019-07-26 RX ORDER — FLUCONAZOLE 150 MG/1
150 TABLET ORAL ONCE
Qty: 2 TABLET | Refills: 0 | Status: SHIPPED | OUTPATIENT
Start: 2019-07-26 | End: 2020-01-24 | Stop reason: SDUPTHER

## 2019-07-26 NOTE — PATIENT INSTRUCTIONS
hands and feet and then stretching and moving your entire body. Sometimes people fall asleep during relaxation, but they usually wake up shortly afterward. · Always give yourself time to return to full alertness before you drive a car or do anything that might cause an accident if you are not fully alert. Never play a relaxation tape while you drive a car. When should you call for help? Call 911 anytime you think you may need emergency care. For example, call if:    · You feel you cannot stop from hurting yourself or someone else.    Watch closely for changes in your health, and be sure to contact your doctor if:    · Your PTSD symptoms are getting worse.     · You have new or worsening symptoms of anxiety.     · You are not getting better as expected. Where can you learn more? Go to [email or web address]://Colibri IO.[email or web address] org and sign in to your [employer] account. Enter P239 in the Eatwave box to learn more about \"Post-Traumatic Stress Disorder (PTSD): Care Instructions. \"     If you do not have an account, please click on the \"Sign Up Now\" link. Current as of: September 11, 2018  Content Version: 12.0  © 0605-6465 [employer]. Care instructions adapted under license by [facility] ([facility]). If you have questions about a medical condition or this instruction, always ask your healthcare professional. [address] any warranty or liability for your use of this information. Patient Education        DASH Diet: Care Instructions  Your Care Instructions    The DASH diet is an eating plan that can help lower your blood pressure. DASH stands for Dietary Approaches to Stop Hypertension. Hypertension is high blood pressure. The DASH diet focuses on eating foods that are high in calcium, potassium, and magnesium. These nutrients can lower blood pressure. The foods that are highest in these nutrients are fruits, vegetables, low-fat dairy products, nuts, seeds, and legumes. But taking calcium, potassium, and magnesium supplements instead of eating foods that are high in those nutrients does not have the same effect. The DASH diet also includes whole grains, fish, and poultry. The DASH diet is one of several lifestyle changes your doctor may recommend to lower your high blood pressure. Your doctor may also want you to decrease the amount of sodium in your diet. Lowering sodium while following the DASH diet can lower blood pressure even further than just the DASH diet alone. Follow-up care is a key part of your treatment and safety. Be sure to make and go to all appointments, and call your doctor if you are having problems. It's also a good idea to know your test results and keep a list of the medicines you take. How can you care for yourself at home? Following the DASH diet  · Eat 4 to 5 servings of fruit each day. A serving is 1 medium-sized piece of fruit, ½ cup chopped or canned fruit, 1/4 cup dried fruit, or 4 ounces (½ cup) of fruit juice. Choose fruit more often than fruit juice. · Eat 4 to 5 servings of vegetables each day. A serving is 1 cup of lettuce or raw leafy vegetables, ½ cup of chopped or cooked vegetables, or 4 ounces (½ cup) of vegetable juice. Choose vegetables more often than vegetable juice. · Get 2 to 3 servings of low-fat and fat-free dairy each day. A serving is 8 ounces of milk, 1 cup of yogurt, or 1 ½ ounces of cheese. · Eat 6 to 8 servings of grains each day. A serving is 1 slice of bread, 1 ounce of dry cereal, or ½ cup of cooked rice, pasta, or cooked cereal. Try to choose whole-grain products as much as possible. · Limit lean meat, poultry, and fish to 2 servings each day. A serving is 3 ounces, about the size of a deck of cards. · Eat 4 to 5 servings of nuts, seeds, and legumes (cooked dried beans, lentils, and split peas) each week. A serving is 1/3 cup of nuts, 2 tablespoons of seeds, or ½ cup of cooked beans or peas.   · Limit fats and oils to 2 to 3 servings each day. A serving is 1 teaspoon of vegetable oil or 2 tablespoons of salad dressing. · Limit sweets and added sugars to 5 servings or less a week. A serving is 1 tablespoon jelly or jam, ½ cup sorbet, or 1 cup of lemonade. · Eat less than 2,300 milligrams (mg) of sodium a day. If you limit your sodium to 1,500 mg a day, you can lower your blood pressure even more. Tips for success  · Start small. Do not try to make dramatic changes to your diet all at once. You might feel that you are missing out on your favorite foods and then be more likely to not follow the plan. Make small changes, and stick with them. Once those changes become habit, add a few more changes. · Try some of the following:  ? Make it a goal to eat a fruit or vegetable at every meal and at snacks. This will make it easy to get the recommended amount of fruits and vegetables each day. ? Try yogurt topped with fruit and nuts for a snack or healthy dessert. ? Add lettuce, tomato, cucumber, and onion to sandwiches. ? Combine a ready-made pizza crust with low-fat mozzarella cheese and lots of vegetable toppings. Try using tomatoes, squash, spinach, broccoli, carrots, cauliflower, and onions. ? Have a variety of cut-up vegetables with a low-fat dip as an appetizer instead of chips and dip. ? Sprinkle sunflower seeds or chopped almonds over salads. Or try adding chopped walnuts or almonds to cooked vegetables. ? Try some vegetarian meals using beans and peas. Add garbanzo or kidney beans to salads. Make burritos and tacos with mashed mendoza beans or black beans. Where can you learn more? Go to https://[email or web address].[email or web address] org and sign in to your [employer] account. Enter M342 in the [facility] box to learn more about \"DASH Diet: Care Instructions. \"     If you do not have an account, please click on the \"Sign Up Now\" link.   Current as of: July 22, 2018  Content Version: 12.0  © 6631-3831 Healthwise[employer] Incorporated. Care instructions adapted under license by [facility] ([facility]). If you have questions about a medical condition or this instruction, always ask your healthcare professional. Danny Frost any warranty or liability for your use of this information. Patient Education        Learning About Diuretics for High Blood Pressure  Introduction  Diuretics help to lower blood pressure. This reduces your risk of a heart attack and stroke. It also reduces your risk of kidney disease. Diuretics cause your kidneys to remove sodium and water. They also relax the blood vessel walls. These help lower your blood pressure. Examples  · Chlorthalidone  · Hydrochlorothiazide  Possible side effects  There are some common side effects. They are:  · Too little potassium. · Feeling dizzy. · Rash. · Urinating a lot. · High blood sugar. (But this is not common.)  You may have other side effects. Check the information that comes with your medicine. What to know about taking this medicine  · You may take other medicines for blood pressure. Diuretics can help those work better. They can also prevent extra fluid in your body. · You may need to take potassium pills. Or you may have to watch how much potassium is in your food. Ask your doctor about this. · You may need blood tests to check your kidneys and your potassium level. · Take your medicines exactly as prescribed. Call your doctor if you think you are having a problem with your medicine. · Check with your doctor or pharmacist before you use any other medicines. This includes over-the-counter medicines. Make sure your doctor knows all of the medicines, vitamins, herbal products, and supplements you take. Taking some medicines together can cause problems. Where can you learn more? Go to https://mamie.[email or web address] org and sign in to your [employer] account.  Enter Q549 in the [employer] box to learn more about \"Learning About Diuretics for High Blood Pressure. \"     If you do not have an account, please click on the \"Sign Up Now\" link. Current as of: July 22, 2018  Content Version: 12.0  © 1320-1251 Healthwise, Incorporated. Care instructions adapted under license by [facility] ([facility]). If you have questions about a medical condition or this instruction, always ask your healthcare professional. [address] any warranty or liability for your use of this information.

## 2019-07-26 NOTE — PROGRESS NOTES
SUBJECTIVE:  Sho Hillman a 41 y. o.female    Chief Complaint   Patient presents with    Hypertension    Epistaxis     On going for 2.5 weeks       Patient presents to the office to follow up ER visit hypertension with epitaxis. Recently changed to me as PCP. Records reviewed and BP running on last two visits on high side of normal. No on BP meds. Blood pressure in the office today is 134/86. Nosebleeds did not want to go to ER. Effexor not working, trouble sleeping. Seeing counselor as to change to something for PTSD that will help and insurance will pay for Marline Philip would like for sleep. Labs were discussed. Denies fever/chills. No shortness of breath. No wheezing. No chest pain. No syncope. No dizziness. No headache. No issues with bowel or bladder. No melena. No hematuria. Nose bleeds have improved.          Past Medical History:   Diagnosis Date    Constipation 2014    Fibromyalgia     Graves disease     History of blood transfusion     Hx of blood clots      LT LUNG W/ PNEUMONIA    Joint pain 2014    Various joints (back, hands, knees, hips), recurrent flares since age 27, RF+ at one point, never fully worked up for rheumatologic dz yet    Kidney stone     Localized rash 2014    Recurrent, on inner surface of upper arms, q 3 months, sometimes on chest, no facial involvement    Lupus ([address]     PONV (postoperative nausea and vomiting)        Past Surgical History:   Procedure Laterality Date     SECTION       X 2    CYSTOSCOPY  2018    CYSTOSCOPY URETEROSCOPY WITH HOLMIUM LASER LITHOTRIPSY FOR 5 MM STONE, RIGHT STENT PLACEMENT    OTHER SURGICAL HISTORY  2016    CYSTOSCOPY, BILATERAL RETROGRADES, RIGHT URETEROSCOPY,    TONSILLECTOMY      TUBAL LIGATION         Family History   Problem Relation Age of Onset    Cancer Maternal Grandmother         Lung Ca       Social History     Socioeconomic History    Marital status: Single     Spouse name: Not on file

## 2019-07-29 ENCOUNTER — HOSPITAL ENCOUNTER (EMERGENCY)
Age: 39
Discharge: HOME | End: 2019-07-29
Attending: EMERGENCY MEDICINE
Payer: MEDICAID

## 2019-07-29 ENCOUNTER — TELEPHONE (OUTPATIENT)
Dept: INTERNAL MEDICINE CLINIC | Age: 39
End: 2019-07-29

## 2019-07-29 VITALS
RESPIRATION RATE: 16 BRPM | BODY MASS INDEX: 28.35 KG/M2 | WEIGHT: 181 LBS | SYSTOLIC BLOOD PRESSURE: 159 MMHG | OXYGEN SATURATION: 100 % | DIASTOLIC BLOOD PRESSURE: 100 MMHG | TEMPERATURE: 97.7 F | HEART RATE: 94 BPM

## 2019-07-29 DIAGNOSIS — T78.40XA ALLERGIC REACTION, INITIAL ENCOUNTER: Primary | ICD-10-CM

## 2019-07-29 PROCEDURE — 6360000002 HC RX W HCPCS

## 2019-07-29 PROCEDURE — 6370000000 HC RX 637 (ALT 250 FOR IP): Performed by: PHYSICIAN ASSISTANT

## 2019-07-29 PROCEDURE — 96372 THER/PROPH/DIAG INJ SC/IM: CPT

## 2019-07-29 PROCEDURE — 99283 EMERGENCY DEPT VISIT LOW MDM: CPT

## 2019-07-29 RX ORDER — FAMOTIDINE 20 MG/1
20 TABLET, FILM COATED ORAL 2 TIMES DAILY
Qty: 60 TABLET | Refills: 0 | Status: SHIPPED | OUTPATIENT
Start: 2019-07-29 | End: 2019-09-27

## 2019-07-29 RX ORDER — DIPHENHYDRAMINE HCL 25 MG
25 CAPSULE ORAL EVERY 4 HOURS PRN
Qty: 20 CAPSULE | Refills: 0 | Status: SHIPPED | OUTPATIENT
Start: 2019-07-29 | End: 2019-08-08

## 2019-07-29 RX ORDER — DIPHENHYDRAMINE HCL 25 MG
50 TABLET ORAL ONCE
Status: COMPLETED | OUTPATIENT
Start: 2019-07-29 | End: 2019-07-29

## 2019-07-29 RX ORDER — PREDNISONE 20 MG/1
60 TABLET ORAL ONCE
Status: COMPLETED | OUTPATIENT
Start: 2019-07-29 | End: 2019-07-29

## 2019-07-29 RX ORDER — FAMOTIDINE 20 MG/1
40 TABLET, FILM COATED ORAL ONCE
Status: COMPLETED | OUTPATIENT
Start: 2019-07-29 | End: 2019-07-29

## 2019-07-29 RX ORDER — PREDNISONE 10 MG/1
60 TABLET ORAL DAILY
Qty: 30 TABLET | Refills: 0 | Status: SHIPPED | OUTPATIENT
Start: 2019-07-29 | End: 2019-08-03

## 2019-07-29 RX ORDER — EPINEPHRINE 1 MG/ML
0.3 INJECTION, SOLUTION, CONCENTRATE INTRAVENOUS ONCE
Status: COMPLETED | OUTPATIENT
Start: 2019-07-29 | End: 2019-07-29

## 2019-07-29 RX ORDER — EPINEPHRINE 0.3 MG/.3ML
0.3 INJECTION SUBCUTANEOUS ONCE
Qty: 1 EACH | Refills: 0 | Status: SHIPPED | OUTPATIENT
Start: 2019-07-29 | End: 2022-09-14 | Stop reason: SDUPTHER

## 2019-07-29 RX ADMIN — PREDNISONE 60 MG: 20 TABLET ORAL at 09:36

## 2019-07-29 RX ADMIN — FAMOTIDINE 40 MG: 20 TABLET ORAL at 09:36

## 2019-07-29 RX ADMIN — EPINEPHRINE 0.3 MG: 1 INJECTION INTRAMUSCULAR; INTRAVENOUS; SUBCUTANEOUS at 12:02

## 2019-07-29 RX ADMIN — EPINEPHRINE 0.3 MG: 1 INJECTION, SOLUTION, CONCENTRATE INTRAVENOUS at 12:02

## 2019-07-29 RX ADMIN — DIPHENHYDRAMINE HCL 50 MG: 25 TABLET ORAL at 09:36

## 2019-07-29 ASSESSMENT — ENCOUNTER SYMPTOMS
FACIAL SWELLING: 1
ABDOMINAL PAIN: 0
COUGH: 0
DIARRHEA: 0
CONSTIPATION: 0
BACK PAIN: 0
SHORTNESS OF BREATH: 0
VOICE CHANGE: 0
WHEEZING: 0
COLOR CHANGE: 0
VOMITING: 0
STRIDOR: 0
TROUBLE SWALLOWING: 0
NAUSEA: 0
ABDOMINAL DISTENTION: 0

## 2019-07-29 NOTE — ED NOTES
Patient reports she didn't take her morning meds, including her blood pressure medications.      1306 Anastacia MCCANN RN  07/29/19 0542

## 2019-07-29 NOTE — ED PROVIDER NOTES
distention, abdominal pain, constipation, diarrhea, nausea and vomiting. Musculoskeletal: Negative for back pain, neck pain and neck stiffness. Skin: Negative for color change, pallor, rash and wound. Neurological: Negative for dizziness, tremors, seizures, syncope, facial asymmetry, speech difficulty, weakness, light-headedness, numbness and headaches. Psychiatric/Behavioral: Negative for confusion. All other systems reviewed and are negative. Positives and Pertinent negatives as per HPI. Except as noted abovein the ROS, all other systems were reviewed and negative.        PAST MEDICAL HISTORY     Past Medical History:   Diagnosis Date    Constipation 2014    Fibromyalgia     Graves disease     History of blood transfusion     Hx of blood clots      LT LUNG W/ PNEUMONIA    Joint pain 2014    Various joints (back, hands, knees, hips), recurrent flares since age 27, RF+ at one point, never fully worked up for rheumatologic dz yet    Kidney stone     Localized rash 2014    Recurrent, on inner surface of upper arms, q 3 months, sometimes on chest, no facial involvement    Lupus ([address]     PONV (postoperative nausea and vomiting)          SURGICAL HISTORY     Past Surgical History:   Procedure Laterality Date     SECTION       X 2    CYSTOSCOPY  2018    CYSTOSCOPY URETEROSCOPY WITH HOLMIUM LASER LITHOTRIPSY FOR 5 MM STONE, RIGHT STENT PLACEMENT    OTHER SURGICAL HISTORY  2016    CYSTOSCOPY, BILATERAL RETROGRADES, RIGHT URETEROSCOPY,    TONSILLECTOMY      TUBAL LIGATION           CURRENTMEDICATIONS       Previous Medications    ALBUTEROL SULFATE  (90 BASE) MCG/ACT INHALER    Inhale 2 puffs into the lungs 4 times daily as needed for Wheezing    BECLOMETHASONE (QVAR REDIHALER) 40 MCG/ACT AERB INHALER    Inhale 1 puff into the lungs 2 times daily    BENZONATATE (TESSALON PERLES) 100 MG CAPSULE    Take 1-2 capsules by mouth 3 times daily as needed for Cough    FERROUS SULFATE 325 (65 FE) MG TABLET    Take 1 tablet by mouth 2 times daily    HYDROCHLOROTHIAZIDE (HYDRODIURIL) 12.5 MG TABLET    Take 1 tablet by mouth daily    LEVONORGESTREL-ETHINYL ESTRADIOL (SEASONALE) 0.15-0.03 MG PER TABLET    Take 1 tablet by mouth daily    ONDANSETRON (ZOFRAN ODT) 4 MG DISINTEGRATING TABLET    Take 1 tablet by mouth every 8 hours as needed for Nausea    PIMECROLIMUS (ELIDEL) 1 % CREAM    Apply topically 2 times daily. TIZANIDINE (ZANAFLEX) 4 MG TABLET    Take 1 tablet daily, and in the evening as needed    VENLAFAXINE (EFFEXOR XR) 75 MG EXTENDED RELEASE CAPSULE    Take 1 capsule by mouth daily         ALLERGIES     Sudafed [pseudoephedrine hcl]; Ambien [zolpidem tartrate]; and Zoloft [sertraline hcl]    FAMILYHISTORY       Family History   Problem Relation Age of Onset    Cancer Maternal Grandmother         Lung Ca          SOCIAL HISTORY       Social History     Socioeconomic History    Marital status: Single     Spouse name: None    Number of children: None    Years of education: None    Highest education level: None   Occupational History    None   Social Needs    Financial resource strain: None    Food insecurity:     Worry: None     Inability: None    Transportation needs:     Medical: None     Non-medical: None   Tobacco Use    Smoking status: Never Smoker    Smokeless tobacco: Never Used   Substance and Sexual Activity    Alcohol use:  Yes     Alcohol/week: 0.0 standard drinks     Comment: occasional     Drug use: No    Sexual activity: Yes     Partners: Male   Lifestyle    Physical activity:     Days per week: None     Minutes per session: None    Stress: None   Relationships    Social connections:     Talks on phone: None     Gets together: None     Attends Shinto service: None     Active member of club or organization: None     Attends meetings of clubs or organizations: None     Relationship status: None    Intimate partner violence:     Fear of current or ex partner: None     Emotionally abused: None     Physically abused: None     Forced sexual activity: None   Other Topics Concern    None   Social History Narrative    None       SCREENINGS             PHYSICAL EXAM    (up to 7 for level 4, 8 or more for level 5)     ED Triage Vitals [07/29/19 0919]   BP Temp Temp Source Pulse Resp SpO2 Height Weight   (!) 159/102 97.7 °F (36.5 °C) Infrared 88 16 100 % -- 181 lb (82.1 kg)       Physical Exam   Constitutional: She is oriented to person, place, and time. She appears well-developed and well-nourished. No distress. HENT:   Head: Normocephalic and atraumatic. Right Ear: Hearing, tympanic membrane, external ear and ear canal normal.   Left Ear: Hearing, tympanic membrane, external ear and ear canal normal.   Nose: Nose normal. Right sinus exhibits no maxillary sinus tenderness and no frontal sinus tenderness. Left sinus exhibits no maxillary sinus tenderness and no frontal sinus tenderness. Mouth/Throat: Uvula is midline, oropharynx is clear and moist and mucous membranes are normal. No oral lesions. No trismus in the jaw. No dental abscesses or uvula swelling. Tonsils are 1+ on the right. Tonsils are 1+ on the left. No tonsillar exudate. Lower lip edema with minimal upper lip edema. No notable uvula, posterior oropharyngeal, or tongue edema. Eyes: Pupils are equal, round, and reactive to light. Conjunctivae and EOM are normal. Right eye exhibits no discharge. Left eye exhibits no discharge. No scleral icterus. Neck: Normal range of motion. No JVD present. No Brudzinski's sign and no Kernig's sign noted. Cardiovascular: Normal rate. Pulmonary/Chest: Effort normal and breath sounds normal.   Abdominal: Soft. Bowel sounds are normal. She exhibits no distension. There is no tenderness. Musculoskeletal: Normal range of motion. Lymphadenopathy:     She has no cervical adenopathy.    Neurological: She is alert and oriented to person, place, and time. No cranial nerve deficit. Skin: Skin is warm and dry. Capillary refill takes less than 2 seconds. No rash noted. She is not diaphoretic. No erythema. No pallor. Psychiatric: She has a normal mood and affect. Her behavior is normal.   Nursing note and vitals reviewed. DIAGNOSTIC RESULTS   LABS:    Labs Reviewed - No data to display    All other labs were within normal range or not returned as of this dictation. EKG: All EKG's are interpreted by the Emergency Department Physician in the absence of a cardiologist.  Please see their note for interpretation of EKG. RADIOLOGY:   Non-plain film images such as CT, Ultrasound and MRI are read by the radiologist. Plain radiographic images are visualized andpreliminarily interpreted by the  ED Provider with the below findings:        Interpretation perthe Radiologist below, if available at the time of this note:    No orders to display     No results found. PROCEDURES   Unless otherwise noted below, none     Procedures    CRITICAL CARE TIME   Critical Care  There was a high probability of life-threatening clinical deterioration in the patient's condition requiring my urgent intervention. Total critical care time with the patient was 31 minutes excluding separately reportable procedures. Critical care required due to patients allergic reaction prompting more aggressive therapy and observation.        CONSULTS:  None      EMERGENCY DEPARTMENT COURSE and DIFFERENTIAL DIAGNOSIS/MDM:   Vitals:    Vitals:    07/29/19 1245 07/29/19 1300 07/29/19 1315 07/29/19 1400   BP: (!) 166/86 (!) 150/87 (!) 152/92 (!) 159/100   Pulse: 92 97 98 94   Resp:       Temp:       TempSrc:       SpO2: 99% 99% 99% 100%   Weight:           Patient was given thefollowing medications:  Medications   diphenhydrAMINE (BENADRYL) tablet 50 mg (50 mg Oral Given 7/29/19 0936)   famotidine (PEPCID) tablet 40 mg (40 mg Oral Given 7/29/19 0936)   predniSONE (DELTASONE) tablet 60 mg (60 mg Oral Given 7/29/19 2536)   EPINEPHrine PF 1 MG/ML injection 0.3 mg (0.3 mg Intramuscular Given 7/29/19 1203)       This patient presents to the emergency department complaining of lip swelling, sensation of tongue and throat swelling. Throughout stay here, airway is managed. No evidence of significant airway compromise. However, patient felt as though her symptoms were worsening even after taking Benadryl, Pepcid and prednisone, therefore was given epinephrine injection and observed for several hours. Upon reevaluation, she is feeling much better and symptoms appear to be improving. She will be sent home with these medications. She is advised to follow-up with PCP for recheck and may return to ED per discharge instructions. She is otherwise stable for discharge. My suspicion is low for acute strep pharyngitis, peritonsillar or tonsillar abscess, retropharyngeal abscess, bacterial tracheitis, epiglottitis, meningitis, encephalitis, foreign body,  mononucleosis, mumps, lymphoma, leukemia, asthma exacerbation, osteomyelitis, mastoiditis, sepsis, DKA, alexandria angina, or other concerning pathology. FINAL IMPRESSION      1.  Allergic reaction, initial encounter          DISPOSITION/PLAN   DISPOSITION Decision To Discharge 07/29/2019 01:49:43 PM      PATIENT REFERREDTO:  Allana Boast, APRN - [facility]  [address] [address]  [phone]      for follow up in 1-3 days    [facility]  [address] [facility]  [phone]    If symptoms worsen      DISCHARGE MEDICATIONS:  New Prescriptions    DIPHENHYDRAMINE (BENADRYL) 25 MG CAPSULE    Take 1 capsule by mouth every 4 hours as needed for Itching    EPINEPHRINE (EPIPEN 2-LUANNE) 0.3 MG/0.3ML SOAJ INJECTION    Inject 0.3 mLs into the muscle once for 1 dose Use as directed for allergic reaction    FAMOTIDINE (PEPCID) 20 MG TABLET    Take 1 tablet by mouth 2 times daily    PREDNISONE (DELTASONE) 10

## 2019-07-29 NOTE — ED PROVIDER NOTES
I independently performed a history and physical on Stephon. All diagnostic, treatment, and disposition decisions were made by myself in conjunction with the advanced practice provider. I have participated in the medical decision making and directed the treatment plan and disposition of the patient. For further details of [facility] emergency department encounter, please see the advanced practice provider's documentation. CHIEF COMPLAINT  Chief Complaint   Patient presents with    Allergic Reaction     multiple new medications started this weekend, pt. with facial swelling and \"it feels like my tongue is swelling too. \" denies SOB. Briefly, Stephon is a 41 y.o. female  who presents to the ED complaining of feeling like her tongue and throat are swollen in her lower lip. Despite the swelling she does not feel short of breath, she is laying flat. She is having some trouble swallowing because of it though. She denies any urticarial rash. She is already been treated for topical rash on both arms though unrelated to her current symptoms. She took her second dose of Zoloft and second dose of Ambien last night and is unsure which medication may be causing her reaction. She denies any other new medication exposures or other allergen exposure. FOCUSED PHYSICAL EXAMINATION  BP (!) 159/100   Pulse 94   Temp 97.7 °F (36.5 °C) (Infrared)   Resp 16   Wt 181 lb (82.1 kg)   LMP 05/06/2019 (Approximate)   SpO2 100%   BMI 28.35 kg/m²    Focused physical examination notable for no acute distress, well-appearing, well-nourished, normal speech and mentation without obvious facial droop, no obvious rash. No obvious cranial nerve deficits on my initial exam.  Lower lip and tongue are minimally swollen, oropharynx is clear, no stridor, trachea midline, no wheezing, abdomen soft nontender nondistended. Regular rate and rhythm, clear to auscultation bilaterally.   Floor the mouth is

## 2019-07-30 ENCOUNTER — TELEPHONE (OUTPATIENT)
Dept: FAMILY MEDICINE CLINIC | Age: 39
End: 2019-07-30

## 2019-08-02 ENCOUNTER — OFFICE VISIT (OUTPATIENT)
Dept: INTERNAL MEDICINE CLINIC | Age: 39
End: 2019-08-02
Payer: MEDICAID

## 2019-08-02 ENCOUNTER — APPOINTMENT (OUTPATIENT)
Dept: CT IMAGING | Age: 39
End: 2019-08-02
Payer: MEDICAID

## 2019-08-02 ENCOUNTER — HOSPITAL ENCOUNTER (EMERGENCY)
Age: 39
Discharge: HOME | End: 2019-08-02
Attending: EMERGENCY MEDICINE
Payer: MEDICAID

## 2019-08-02 VITALS
BODY MASS INDEX: 28.09 KG/M2 | HEIGHT: 67 IN | OXYGEN SATURATION: 100 % | WEIGHT: 179 LBS | RESPIRATION RATE: 16 BRPM | DIASTOLIC BLOOD PRESSURE: 110 MMHG | SYSTOLIC BLOOD PRESSURE: 162 MMHG | HEART RATE: 72 BPM

## 2019-08-02 VITALS
HEART RATE: 75 BPM | TEMPERATURE: 98.2 F | DIASTOLIC BLOOD PRESSURE: 102 MMHG | RESPIRATION RATE: 14 BRPM | OXYGEN SATURATION: 98 % | WEIGHT: 179 LBS | BODY MASS INDEX: 28.04 KG/M2 | SYSTOLIC BLOOD PRESSURE: 145 MMHG

## 2019-08-02 DIAGNOSIS — R10.9 ABDOMINAL PAIN, UNSPECIFIED ABDOMINAL LOCATION: ICD-10-CM

## 2019-08-02 DIAGNOSIS — I10 ESSENTIAL HYPERTENSION: Primary | ICD-10-CM

## 2019-08-02 DIAGNOSIS — T78.40XD ALLERGIC REACTION TO DRUG, SUBSEQUENT ENCOUNTER: ICD-10-CM

## 2019-08-02 DIAGNOSIS — R11.2 NON-INTRACTABLE VOMITING WITH NAUSEA, UNSPECIFIED VOMITING TYPE: ICD-10-CM

## 2019-08-02 DIAGNOSIS — J02.9 SORE THROAT: Primary | ICD-10-CM

## 2019-08-02 DIAGNOSIS — J38.4 LARYNGEAL EDEMA: ICD-10-CM

## 2019-08-02 LAB
A/G RATIO: 1.1 (ref 1.1–2.2)
ALBUMIN SERPL-MCNC: 4.1 G/DL (ref 3.4–5)
ALP BLD-CCNC: 73 U/L (ref 40–129)
ALT SERPL-CCNC: 30 U/L (ref 10–40)
ANION GAP SERPL CALCULATED.3IONS-SCNC: 13 MMOL/L (ref 3–16)
AST SERPL-CCNC: 26 U/L (ref 15–37)
BASOPHILS ABSOLUTE: 0.1 K/UL (ref 0–0.2)
BASOPHILS RELATIVE PERCENT: 1 %
BILIRUB SERPL-MCNC: 0.3 MG/DL (ref 0–1)
BILIRUBIN URINE: NEGATIVE
BLOOD, URINE: NEGATIVE
BUN BLDV-MCNC: 17 MG/DL (ref 7–20)
CALCIUM SERPL-MCNC: 8.6 MG/DL (ref 8.3–10.6)
CHLORIDE BLD-SCNC: 105 MMOL/L (ref 99–110)
CLARITY: CLEAR
CO2: 22 MMOL/L (ref 21–32)
COLOR: YELLOW
CREAT SERPL-MCNC: 0.7 MG/DL (ref 0.6–1.1)
EOSINOPHILS ABSOLUTE: 0.1 K/UL (ref 0–0.6)
EOSINOPHILS RELATIVE PERCENT: 0.9 %
GFR AFRICAN AMERICAN: >60
GFR NON-AFRICAN AMERICAN: >60
GLOBULIN: 3.6 G/DL
GLUCOSE BLD-MCNC: 78 MG/DL (ref 70–99)
GLUCOSE URINE: NEGATIVE MG/DL
HCT VFR BLD CALC: 35.9 % (ref 36–48)
HEMOGLOBIN: 11.2 G/DL (ref 12–16)
KETONES, URINE: NEGATIVE MG/DL
LEUKOCYTE ESTERASE, URINE: NEGATIVE
LIPASE: 46 U/L (ref 13–60)
LYMPHOCYTES ABSOLUTE: 3.4 K/UL (ref 1–5.1)
LYMPHOCYTES RELATIVE PERCENT: 34.3 %
MCH RBC QN AUTO: 22.8 PG (ref 26–34)
MCHC RBC AUTO-ENTMCNC: 31.2 G/DL (ref 31–36)
MCV RBC AUTO: 73.1 FL (ref 80–100)
MICROSCOPIC EXAMINATION: NORMAL
MONOCYTES ABSOLUTE: 0.9 K/UL (ref 0–1.3)
MONOCYTES RELATIVE PERCENT: 9 %
NEUTROPHILS ABSOLUTE: 5.5 K/UL (ref 1.7–7.7)
NEUTROPHILS RELATIVE PERCENT: 54.8 %
NITRITE, URINE: NEGATIVE
PDW BLD-RTO: 18 % (ref 12.4–15.4)
PH UA: 6 (ref 5–8)
PLATELET # BLD: 315 K/UL (ref 135–450)
PMV BLD AUTO: 8.4 FL (ref 5–10.5)
POTASSIUM REFLEX MAGNESIUM: 3.7 MMOL/L (ref 3.5–5.1)
PROTEIN UA: NEGATIVE MG/DL
RBC # BLD: 4.91 M/UL (ref 4–5.2)
S PYO AG THROAT QL: NEGATIVE
SODIUM BLD-SCNC: 140 MMOL/L (ref 136–145)
SPECIFIC GRAVITY UA: 1.02 (ref 1–1.03)
TOTAL PROTEIN: 7.7 G/DL (ref 6.4–8.2)
URINE REFLEX TO CULTURE: NORMAL
URINE TYPE: NORMAL
UROBILINOGEN, URINE: 1 E.U./DL
WBC # BLD: 10 K/UL (ref 4–11)

## 2019-08-02 PROCEDURE — G8427 DOCREV CUR MEDS BY ELIG CLIN: HCPCS | Performed by: NURSE PRACTITIONER

## 2019-08-02 PROCEDURE — 6360000002 HC RX W HCPCS: Performed by: EMERGENCY MEDICINE

## 2019-08-02 PROCEDURE — 99214 OFFICE O/P EST MOD 30 MIN: CPT | Performed by: NURSE PRACTITIONER

## 2019-08-02 PROCEDURE — 81003 URINALYSIS AUTO W/O SCOPE: CPT

## 2019-08-02 PROCEDURE — 87880 STREP A ASSAY W/OPTIC: CPT

## 2019-08-02 PROCEDURE — 1036F TOBACCO NON-USER: CPT | Performed by: NURSE PRACTITIONER

## 2019-08-02 PROCEDURE — 96375 TX/PRO/DX INJ NEW DRUG ADDON: CPT

## 2019-08-02 PROCEDURE — 74176 CT ABD & PELVIS W/O CONTRAST: CPT

## 2019-08-02 PROCEDURE — G8419 CALC BMI OUT NRM PARAM NOF/U: HCPCS | Performed by: NURSE PRACTITIONER

## 2019-08-02 PROCEDURE — 99285 EMERGENCY DEPT VISIT HI MDM: CPT

## 2019-08-02 PROCEDURE — 96361 HYDRATE IV INFUSION ADD-ON: CPT

## 2019-08-02 PROCEDURE — 85025 COMPLETE CBC W/AUTO DIFF WBC: CPT

## 2019-08-02 PROCEDURE — 87081 CULTURE SCREEN ONLY: CPT

## 2019-08-02 PROCEDURE — 80053 COMPREHEN METABOLIC PANEL: CPT

## 2019-08-02 PROCEDURE — 2580000003 HC RX 258: Performed by: EMERGENCY MEDICINE

## 2019-08-02 PROCEDURE — 96374 THER/PROPH/DIAG INJ IV PUSH: CPT

## 2019-08-02 PROCEDURE — 83690 ASSAY OF LIPASE: CPT

## 2019-08-02 RX ORDER — PROMETHAZINE HYDROCHLORIDE 25 MG/1
25 TABLET ORAL EVERY 6 HOURS PRN
Qty: 10 TABLET | Refills: 0 | Status: SHIPPED | OUTPATIENT
Start: 2019-08-02 | End: 2019-08-09

## 2019-08-02 RX ORDER — ONDANSETRON 2 MG/ML
4 INJECTION INTRAMUSCULAR; INTRAVENOUS ONCE
Status: COMPLETED | OUTPATIENT
Start: 2019-08-02 | End: 2019-08-02

## 2019-08-02 RX ORDER — KETOROLAC TROMETHAMINE 30 MG/ML
15 INJECTION, SOLUTION INTRAMUSCULAR; INTRAVENOUS ONCE
Status: COMPLETED | OUTPATIENT
Start: 2019-08-02 | End: 2019-08-02

## 2019-08-02 RX ORDER — DICYCLOMINE HYDROCHLORIDE 10 MG/1
20 CAPSULE ORAL EVERY 6 HOURS PRN
Qty: 20 CAPSULE | Refills: 0 | Status: SHIPPED | OUTPATIENT
Start: 2019-08-02 | End: 2019-09-27

## 2019-08-02 RX ORDER — 0.9 % SODIUM CHLORIDE 0.9 %
1000 INTRAVENOUS SOLUTION INTRAVENOUS ONCE
Status: COMPLETED | OUTPATIENT
Start: 2019-08-02 | End: 2019-08-02

## 2019-08-02 RX ORDER — ONDANSETRON 4 MG/1
4 TABLET, FILM COATED ORAL EVERY 8 HOURS PRN
Qty: 10 TABLET | Refills: 0 | Status: SHIPPED | OUTPATIENT
Start: 2019-08-02 | End: 2019-08-02

## 2019-08-02 RX ORDER — PROMETHAZINE HYDROCHLORIDE 25 MG/ML
12.5 INJECTION, SOLUTION INTRAMUSCULAR; INTRAVENOUS ONCE
Status: COMPLETED | OUTPATIENT
Start: 2019-08-02 | End: 2019-08-02

## 2019-08-02 RX ADMIN — PROMETHAZINE HYDROCHLORIDE 12.5 MG: 25 INJECTION INTRAMUSCULAR; INTRAVENOUS at 19:37

## 2019-08-02 RX ADMIN — KETOROLAC TROMETHAMINE 15 MG: 30 INJECTION, SOLUTION INTRAMUSCULAR at 18:28

## 2019-08-02 RX ADMIN — ONDANSETRON 4 MG: 2 INJECTION INTRAMUSCULAR; INTRAVENOUS at 18:28

## 2019-08-02 RX ADMIN — SODIUM CHLORIDE 1000 ML: 9 INJECTION, SOLUTION INTRAVENOUS at 18:28

## 2019-08-02 ASSESSMENT — PAIN SCALES - GENERAL
PAINLEVEL_OUTOF10: 8
PAINLEVEL_OUTOF10: 8

## 2019-08-02 ASSESSMENT — PAIN DESCRIPTION - LOCATION: LOCATION: ABDOMEN;GENERALIZED

## 2019-08-02 ASSESSMENT — ENCOUNTER SYMPTOMS
TROUBLE SWALLOWING: 1
CHOKING: 0
EYES NEGATIVE: 1
CHEST TIGHTNESS: 0
ALLERGIC/IMMUNOLOGIC NEGATIVE: 1
WHEEZING: 0
GASTROINTESTINAL NEGATIVE: 1
SORE THROAT: 1
SHORTNESS OF BREATH: 0

## 2019-08-02 ASSESSMENT — PAIN DESCRIPTION - PAIN TYPE: TYPE: ACUTE PAIN

## 2019-08-02 NOTE — ED PROVIDER NOTES
will be discharged home with the below medications and strict return precautions. Patient instructed to follow up with their primary care doctor in one-two days and return to the emergency department if worsening of the condition or any other concerns. Please see discharge instructions for further delineation regarding the specific discharge instructions explained and given to the patient. CONSULTS:  None    PROCEDURES:  Unless otherwise noted below, none     Procedures    FINAL IMPRESSION      1. Sore throat    2. Abdominal pain, unspecified abdominal location    3. Non-intractable vomiting with nausea, unspecified vomiting type          DISPOSITION/PLAN   DISPOSITION Decision To Discharge 08/02/2019 07:32:28 PM      PATIENT REFERRED TO:  Cory David, LEESA - CNP  [address]  [phone]    Schedule an appointment as soon as possible for a visit       [facility]  [address] [place] [address]  [phone]    If symptoms worsen      DISCHARGE MEDICATIONS:  New Prescriptions    DICYCLOMINE (BENTYL) 10 MG CAPSULE    Take 2 capsules by mouth every 6 hours as needed (cramps)    PROMETHAZINE (PHENERGAN) 25 MG TABLET    Take 1 tablet by mouth every 6 hours as needed for Nausea WARNING:  May cause drowsiness. May impair ability to operate vehicles or machinery. Do not use in combination with alcohol. (Please note:  Portions of this note were completed with a voice recognition program. Efforts were made to edit the dictations but occasionally words and phrases are mis-transcribed.)    Form v2016. J.5-cn    Alfonso Lynch DO (electronically signed)  Emergency Medicine Provider              Tim José DO  08/02/19 2025

## 2019-08-04 LAB — S PYO THROAT QL CULT: NORMAL

## 2019-08-05 ASSESSMENT — ENCOUNTER SYMPTOMS
VOMITING: 0
DIARRHEA: 0
NAUSEA: 0
CHEST TIGHTNESS: 0
COUGH: 0
ALLERGIC/IMMUNOLOGIC NEGATIVE: 1
SHORTNESS OF BREATH: 0
CONSTIPATION: 0
ABDOMINAL PAIN: 0

## 2019-08-07 ENCOUNTER — TELEPHONE (OUTPATIENT)
Dept: INTERNAL MEDICINE CLINIC | Age: 39
End: 2019-08-07

## 2019-08-16 ENCOUNTER — OFFICE VISIT (OUTPATIENT)
Dept: INTERNAL MEDICINE CLINIC | Age: 39
End: 2019-08-16
Payer: MEDICAID

## 2019-08-16 VITALS
WEIGHT: 182 LBS | RESPIRATION RATE: 16 BRPM | HEART RATE: 91 BPM | HEIGHT: 67 IN | SYSTOLIC BLOOD PRESSURE: 138 MMHG | BODY MASS INDEX: 28.56 KG/M2 | OXYGEN SATURATION: 99 % | DIASTOLIC BLOOD PRESSURE: 72 MMHG

## 2019-08-16 DIAGNOSIS — F51.05 INSOMNIA DUE TO OTHER MENTAL DISORDER: Primary | ICD-10-CM

## 2019-08-16 DIAGNOSIS — I10 ESSENTIAL HYPERTENSION: ICD-10-CM

## 2019-08-16 DIAGNOSIS — F33.1 MODERATE EPISODE OF RECURRENT MAJOR DEPRESSIVE DISORDER (HCC): ICD-10-CM

## 2019-08-16 DIAGNOSIS — F99 INSOMNIA DUE TO OTHER MENTAL DISORDER: Primary | ICD-10-CM

## 2019-08-16 PROCEDURE — G8427 DOCREV CUR MEDS BY ELIG CLIN: HCPCS | Performed by: NURSE PRACTITIONER

## 2019-08-16 PROCEDURE — 96160 PT-FOCUSED HLTH RISK ASSMT: CPT | Performed by: NURSE PRACTITIONER

## 2019-08-16 PROCEDURE — G8419 CALC BMI OUT NRM PARAM NOF/U: HCPCS | Performed by: NURSE PRACTITIONER

## 2019-08-16 PROCEDURE — 99213 OFFICE O/P EST LOW 20 MIN: CPT | Performed by: NURSE PRACTITIONER

## 2019-08-16 PROCEDURE — 1036F TOBACCO NON-USER: CPT | Performed by: NURSE PRACTITIONER

## 2019-08-16 RX ORDER — AMITRIPTYLINE HYDROCHLORIDE 25 MG/1
25 TABLET, FILM COATED ORAL NIGHTLY
Qty: 30 TABLET | Refills: 1 | Status: SHIPPED | OUTPATIENT
Start: 2019-08-16 | End: 2019-09-06

## 2019-08-16 ASSESSMENT — PATIENT HEALTH QUESTIONNAIRE - PHQ9
9. THOUGHTS THAT YOU WOULD BE BETTER OFF DEAD, OR OF HURTING YOURSELF: 0
SUM OF ALL RESPONSES TO PHQ9 QUESTIONS 1 & 2: 4
SUM OF ALL RESPONSES TO PHQ QUESTIONS 1-9: 21
SUM OF ALL RESPONSES TO PHQ QUESTIONS 1-9: 21
1. LITTLE INTEREST OR PLEASURE IN DOING THINGS: 2
10. IF YOU CHECKED OFF ANY PROBLEMS, HOW DIFFICULT HAVE THESE PROBLEMS MADE IT FOR YOU TO DO YOUR WORK, TAKE CARE OF THINGS AT HOME, OR GET ALONG WITH OTHER PEOPLE: 2
6. FEELING BAD ABOUT YOURSELF - OR THAT YOU ARE A FAILURE OR HAVE LET YOURSELF OR YOUR FAMILY DOWN: 2
8. MOVING OR SPEAKING SO SLOWLY THAT OTHER PEOPLE COULD HAVE NOTICED. OR THE OPPOSITE, BEING SO FIGETY OR RESTLESS THAT YOU HAVE BEEN MOVING AROUND A LOT MORE THAN USUAL: 3
4. FEELING TIRED OR HAVING LITTLE ENERGY: 3
2. FEELING DOWN, DEPRESSED OR HOPELESS: 2
7. TROUBLE CONCENTRATING ON THINGS, SUCH AS READING THE NEWSPAPER OR WATCHING TELEVISION: 3
3. TROUBLE FALLING OR STAYING ASLEEP: 3
5. POOR APPETITE OR OVEREATING: 3

## 2019-08-16 ASSESSMENT — ENCOUNTER SYMPTOMS
SHORTNESS OF BREATH: 0
CONSTIPATION: 0
CHEST TIGHTNESS: 0
COUGH: 0
ABDOMINAL PAIN: 0
ALLERGIC/IMMUNOLOGIC NEGATIVE: 1
DIARRHEA: 0
VOMITING: 0
NAUSEA: 0

## 2019-08-16 NOTE — PROGRESS NOTES
for dizziness, seizures, syncope, weakness and light-headedness. Hematological: Negative. Psychiatric/Behavioral: Positive for dysphoric mood and sleep disturbance. OBJECTIVE:  /72 (Site: Right Upper Arm, Position: Sitting, Cuff Size: Medium Adult)   Pulse 91   Resp 16   Ht 5' 7.25\" (1.708 m)   Wt 182 lb (82.6 kg)   LMP 05/06/2019 (Approximate)   SpO2 99%   Breastfeeding? No   BMI 28.29 kg/m²     Physical Exam   Constitutional: She is oriented to person, place, and time. She appears well-developed and well-nourished. HENT:   Head: Normocephalic and atraumatic. Mouth/Throat: Oropharynx is clear and moist and mucous membranes are normal. No oropharyngeal exudate, posterior oropharyngeal edema or posterior oropharyngeal erythema. No tonsillar exudate. Eyes: Pupils are equal, round, and reactive to light. Conjunctivae are normal. No scleral icterus. Neck: Normal range of motion. Neck supple. No JVD present. No tracheal tenderness present. Carotid bruit is not present. No edema and no erythema present. No thyroid mass and no thyromegaly present. Cardiovascular: Normal rate, regular rhythm and normal heart sounds. Pulmonary/Chest: Effort normal and breath sounds normal. No respiratory distress. Abdominal: Soft. Normal appearance and bowel sounds are normal. There is no hepatosplenomegaly. There is no CVA tenderness. Musculoskeletal: Normal range of motion. Neurological: She is alert and oriented to person, place, and time. Skin: Skin is warm, dry and intact. Psychiatric: Her speech is normal and behavior is normal. Judgment and thought content normal. Her mood appears anxious. She exhibits a depressed mood. Vitals reviewed. ASSESSMENT/PLAN:    1.  Insomnia due to other mental disorder  Practice good sleep hygiene  Due to reaction to other meds will try this first to see if it will help sleep and depression  Patient states not sleeping is more of the issue  Please refer

## 2019-09-06 ENCOUNTER — OFFICE VISIT (OUTPATIENT)
Dept: INTERNAL MEDICINE CLINIC | Age: 39
End: 2019-09-06
Payer: MEDICAID

## 2019-09-06 VITALS
HEIGHT: 68 IN | HEART RATE: 89 BPM | BODY MASS INDEX: 27.58 KG/M2 | RESPIRATION RATE: 16 BRPM | WEIGHT: 182 LBS | DIASTOLIC BLOOD PRESSURE: 88 MMHG | OXYGEN SATURATION: 98 % | SYSTOLIC BLOOD PRESSURE: 128 MMHG

## 2019-09-06 DIAGNOSIS — L30.9 ECZEMA, UNSPECIFIED TYPE: ICD-10-CM

## 2019-09-06 DIAGNOSIS — F99 INSOMNIA DUE TO OTHER MENTAL DISORDER: ICD-10-CM

## 2019-09-06 DIAGNOSIS — G43.101 MIGRAINE WITH AURA AND WITH STATUS MIGRAINOSUS, NOT INTRACTABLE: Primary | ICD-10-CM

## 2019-09-06 DIAGNOSIS — F51.05 INSOMNIA DUE TO OTHER MENTAL DISORDER: ICD-10-CM

## 2019-09-06 PROCEDURE — G8428 CUR MEDS NOT DOCUMENT: HCPCS | Performed by: NURSE PRACTITIONER

## 2019-09-06 PROCEDURE — 99214 OFFICE O/P EST MOD 30 MIN: CPT | Performed by: NURSE PRACTITIONER

## 2019-09-06 PROCEDURE — G8419 CALC BMI OUT NRM PARAM NOF/U: HCPCS | Performed by: NURSE PRACTITIONER

## 2019-09-06 PROCEDURE — 1036F TOBACCO NON-USER: CPT | Performed by: NURSE PRACTITIONER

## 2019-09-06 RX ORDER — TEMAZEPAM 22.5 MG/1
22.5 CAPSULE ORAL NIGHTLY PRN
Qty: 30 CAPSULE | Refills: 1 | Status: SHIPPED | OUTPATIENT
Start: 2019-09-06 | End: 2019-09-27

## 2019-09-06 RX ORDER — RIZATRIPTAN BENZOATE 10 MG/1
10 TABLET ORAL
Qty: 27 TABLET | Refills: 1 | Status: SHIPPED | OUTPATIENT
Start: 2019-09-06 | End: 2020-01-24 | Stop reason: SDUPTHER

## 2019-09-06 RX ORDER — DULOXETIN HYDROCHLORIDE 30 MG/1
60 CAPSULE, DELAYED RELEASE ORAL DAILY
Qty: 58 CAPSULE | Refills: 2 | Status: SHIPPED | OUTPATIENT
Start: 2019-09-06 | End: 2019-09-27 | Stop reason: DRUGHIGH

## 2019-09-06 RX ORDER — TRIAMCINOLONE ACETONIDE 0.25 MG/ML
LOTION TOPICAL 2 TIMES DAILY
Qty: 60 ML | Refills: 2 | Status: SHIPPED | OUTPATIENT
Start: 2019-09-06 | End: 2020-01-24 | Stop reason: SDUPTHER

## 2019-09-10 DIAGNOSIS — G43.109 MIGRAINE WITH AURA AND WITHOUT STATUS MIGRAINOSUS, NOT INTRACTABLE: Primary | ICD-10-CM

## 2019-09-11 NOTE — TELEPHONE ENCOUNTER
Received APPROVAL for DULoxetine HCl 30MG dr capsules starting 09/10/2019 through 10/10/2019. Approval letter attached. Received DENIAL for Aimovig 70MG/ML auto-injectors. Denial letter attached. Please notify patient. Thank you.

## 2019-09-12 ENCOUNTER — TELEPHONE (OUTPATIENT)
Dept: INTERNAL MEDICINE CLINIC | Age: 39
End: 2019-09-12

## 2019-09-12 NOTE — TELEPHONE ENCOUNTER
Submitted PA for Ajovy 225MG/1.5ML syringes,  Key: [id number] with added information from [employer]. Status PENDING.

## 2019-09-27 ENCOUNTER — OFFICE VISIT (OUTPATIENT)
Dept: INTERNAL MEDICINE CLINIC | Age: 39
End: 2019-09-27
Payer: MEDICAID

## 2019-09-27 VITALS
RESPIRATION RATE: 16 BRPM | BODY MASS INDEX: 28.34 KG/M2 | HEART RATE: 87 BPM | SYSTOLIC BLOOD PRESSURE: 136 MMHG | HEIGHT: 68 IN | DIASTOLIC BLOOD PRESSURE: 84 MMHG | WEIGHT: 187 LBS | OXYGEN SATURATION: 100 %

## 2019-09-27 DIAGNOSIS — R11.0 NAUSEA: ICD-10-CM

## 2019-09-27 DIAGNOSIS — F33.1 MODERATE EPISODE OF RECURRENT MAJOR DEPRESSIVE DISORDER (HCC): ICD-10-CM

## 2019-09-27 DIAGNOSIS — M79.7 FIBROMYALGIA: ICD-10-CM

## 2019-09-27 DIAGNOSIS — G43.101 MIGRAINE WITH AURA AND WITH STATUS MIGRAINOSUS, NOT INTRACTABLE: ICD-10-CM

## 2019-09-27 DIAGNOSIS — F43.10 PTSD (POST-TRAUMATIC STRESS DISORDER): ICD-10-CM

## 2019-09-27 DIAGNOSIS — N20.0 KIDNEY STONE: ICD-10-CM

## 2019-09-27 DIAGNOSIS — F99 INSOMNIA DUE TO OTHER MENTAL DISORDER: ICD-10-CM

## 2019-09-27 DIAGNOSIS — J45.20 MILD INTERMITTENT ASTHMA WITHOUT COMPLICATION: ICD-10-CM

## 2019-09-27 DIAGNOSIS — F51.05 INSOMNIA DUE TO OTHER MENTAL DISORDER: ICD-10-CM

## 2019-09-27 DIAGNOSIS — R10.9 ACUTE RIGHT FLANK PAIN: Primary | ICD-10-CM

## 2019-09-27 DIAGNOSIS — Z23 FLU VACCINE NEED: ICD-10-CM

## 2019-09-27 LAB
BILIRUBIN, POC: NORMAL
BLOOD URINE, POC: NORMAL
CLARITY, POC: CLEAR
COLOR, POC: YELLOW
GLUCOSE URINE, POC: NORMAL
KETONES, POC: NORMAL
LEUKOCYTE EST, POC: NORMAL
NITRITE, POC: NORMAL
PH, POC: 6.5
PROTEIN, POC: NORMAL
SPECIFIC GRAVITY, POC: 1.02
UROBILINOGEN, POC: 3.5

## 2019-09-27 PROCEDURE — G8427 DOCREV CUR MEDS BY ELIG CLIN: HCPCS | Performed by: NURSE PRACTITIONER

## 2019-09-27 PROCEDURE — 99214 OFFICE O/P EST MOD 30 MIN: CPT | Performed by: NURSE PRACTITIONER

## 2019-09-27 PROCEDURE — 90471 IMMUNIZATION ADMIN: CPT | Performed by: NURSE PRACTITIONER

## 2019-09-27 PROCEDURE — 81002 URINALYSIS NONAUTO W/O SCOPE: CPT | Performed by: NURSE PRACTITIONER

## 2019-09-27 PROCEDURE — 90686 IIV4 VACC NO PRSV 0.5 ML IM: CPT | Performed by: NURSE PRACTITIONER

## 2019-09-27 PROCEDURE — 1036F TOBACCO NON-USER: CPT | Performed by: NURSE PRACTITIONER

## 2019-09-27 PROCEDURE — G8419 CALC BMI OUT NRM PARAM NOF/U: HCPCS | Performed by: NURSE PRACTITIONER

## 2019-09-27 RX ORDER — TEMAZEPAM 30 MG/1
30 CAPSULE ORAL NIGHTLY PRN
Qty: 30 CAPSULE | Refills: 1 | Status: SHIPPED | OUTPATIENT
Start: 2019-09-27 | End: 2019-11-12 | Stop reason: SDUPTHER

## 2019-09-27 RX ORDER — ALBUTEROL SULFATE 90 UG/1
2 AEROSOL, METERED RESPIRATORY (INHALATION) 4 TIMES DAILY PRN
Qty: 1 INHALER | Refills: 5 | Status: SHIPPED | OUTPATIENT
Start: 2019-09-27 | End: 2020-01-24 | Stop reason: SDUPTHER

## 2019-09-27 RX ORDER — ONDANSETRON 4 MG/1
4 TABLET, FILM COATED ORAL DAILY PRN
Qty: 30 TABLET | Refills: 0 | Status: SHIPPED | OUTPATIENT
Start: 2019-09-27 | End: 2019-11-08 | Stop reason: SDUPTHER

## 2019-09-27 RX ORDER — DULOXETIN HYDROCHLORIDE 30 MG/1
30 CAPSULE, DELAYED RELEASE ORAL DAILY
Qty: 30 CAPSULE | Refills: 3 | Status: SHIPPED | OUTPATIENT
Start: 2019-09-27 | End: 2019-11-11

## 2019-09-27 RX ORDER — TRAMADOL HYDROCHLORIDE 50 MG/1
50 TABLET ORAL EVERY 6 HOURS PRN
Qty: 20 TABLET | Refills: 0 | Status: SHIPPED | OUTPATIENT
Start: 2019-09-27 | End: 2020-03-06 | Stop reason: SDUPTHER

## 2019-09-28 PROBLEM — F51.05 INSOMNIA DUE TO OTHER MENTAL DISORDER: Status: ACTIVE | Noted: 2019-09-28

## 2019-09-28 PROBLEM — F33.1 MODERATE EPISODE OF RECURRENT MAJOR DEPRESSIVE DISORDER (HCC): Status: ACTIVE | Noted: 2019-09-28

## 2019-09-28 PROBLEM — F99 INSOMNIA DUE TO OTHER MENTAL DISORDER: Status: ACTIVE | Noted: 2019-09-28

## 2019-09-28 PROBLEM — G43.101 MIGRAINE WITH AURA AND WITH STATUS MIGRAINOSUS, NOT INTRACTABLE: Status: ACTIVE | Noted: 2019-09-28

## 2019-09-28 PROBLEM — N20.0 KIDNEY STONE: Status: ACTIVE | Noted: 2019-09-28

## 2019-09-28 PROBLEM — F43.10 PTSD (POST-TRAUMATIC STRESS DISORDER): Status: ACTIVE | Noted: 2019-09-28

## 2019-09-28 PROBLEM — J45.20 MILD INTERMITTENT ASTHMA WITHOUT COMPLICATION: Status: ACTIVE | Noted: 2019-09-28

## 2019-09-28 ASSESSMENT — ENCOUNTER SYMPTOMS
SHORTNESS OF BREATH: 0
ABDOMINAL PAIN: 0
CONSTIPATION: 0
NAUSEA: 1
COUGH: 0
PHOTOPHOBIA: 1
ALLERGIC/IMMUNOLOGIC NEGATIVE: 1
DIARRHEA: 0
CHEST TIGHTNESS: 0

## 2019-09-30 DIAGNOSIS — N30.01 ACUTE CYSTITIS WITH HEMATURIA: Primary | ICD-10-CM

## 2019-09-30 LAB
ORGANISM: ABNORMAL
URINE CULTURE, ROUTINE: ABNORMAL

## 2019-09-30 RX ORDER — CIPROFLOXACIN 250 MG/1
250 TABLET, FILM COATED ORAL 2 TIMES DAILY
Qty: 14 TABLET | Refills: 0 | Status: SHIPPED | OUTPATIENT
Start: 2019-09-30 | End: 2019-10-07

## 2019-10-03 ASSESSMENT — ENCOUNTER SYMPTOMS
ALLERGIC/IMMUNOLOGIC NEGATIVE: 1
DIARRHEA: 0
VOMITING: 0
CHEST TIGHTNESS: 0
SHORTNESS OF BREATH: 0
CONSTIPATION: 0
ABDOMINAL PAIN: 0
COUGH: 0
NAUSEA: 0

## 2019-10-07 ENCOUNTER — TELEPHONE (OUTPATIENT)
Dept: INTERNAL MEDICINE CLINIC | Age: 39
End: 2019-10-07

## 2019-11-08 ENCOUNTER — OFFICE VISIT (OUTPATIENT)
Dept: INTERNAL MEDICINE CLINIC | Age: 39
End: 2019-11-08
Payer: MEDICAID

## 2019-11-08 ENCOUNTER — TELEPHONE (OUTPATIENT)
Dept: FAMILY MEDICINE CLINIC | Age: 39
End: 2019-11-08

## 2019-11-08 VITALS
WEIGHT: 189.8 LBS | HEART RATE: 82 BPM | HEIGHT: 67 IN | RESPIRATION RATE: 16 BRPM | SYSTOLIC BLOOD PRESSURE: 124 MMHG | OXYGEN SATURATION: 82 % | BODY MASS INDEX: 29.79 KG/M2 | DIASTOLIC BLOOD PRESSURE: 92 MMHG

## 2019-11-08 DIAGNOSIS — K29.70 GASTRITIS WITHOUT BLEEDING, UNSPECIFIED CHRONICITY, UNSPECIFIED GASTRITIS TYPE: ICD-10-CM

## 2019-11-08 DIAGNOSIS — F43.10 PTSD (POST-TRAUMATIC STRESS DISORDER): Primary | ICD-10-CM

## 2019-11-08 DIAGNOSIS — R11.0 NAUSEA: ICD-10-CM

## 2019-11-08 DIAGNOSIS — N30.01 ACUTE CYSTITIS WITH HEMATURIA: ICD-10-CM

## 2019-11-08 DIAGNOSIS — M79.89 SWELLING OF BOTH LOWER EXTREMITIES: ICD-10-CM

## 2019-11-08 DIAGNOSIS — G43.101 MIGRAINE WITH AURA AND WITH STATUS MIGRAINOSUS, NOT INTRACTABLE: ICD-10-CM

## 2019-11-08 DIAGNOSIS — R19.7 DIARRHEA, UNSPECIFIED TYPE: Primary | ICD-10-CM

## 2019-11-08 DIAGNOSIS — F33.1 MODERATE EPISODE OF RECURRENT MAJOR DEPRESSIVE DISORDER (HCC): ICD-10-CM

## 2019-11-08 DIAGNOSIS — E05.00 GRAVES DISEASE: ICD-10-CM

## 2019-11-08 DIAGNOSIS — I10 ESSENTIAL HYPERTENSION: ICD-10-CM

## 2019-11-08 LAB
BILIRUBIN, POC: NORMAL
BLOOD URINE, POC: NORMAL
CLARITY, POC: CLEAR
COLOR, POC: YELLOW
GLUCOSE URINE, POC: NORMAL
KETONES, POC: 5
LEUKOCYTE EST, POC: NORMAL
NITRITE, POC: NORMAL
PH, POC: 6
PROTEIN, POC: 15
SPECIFIC GRAVITY, POC: 1.02
UROBILINOGEN, POC: 0.2

## 2019-11-08 PROCEDURE — G8482 FLU IMMUNIZE ORDER/ADMIN: HCPCS | Performed by: NURSE PRACTITIONER

## 2019-11-08 PROCEDURE — G8427 DOCREV CUR MEDS BY ELIG CLIN: HCPCS | Performed by: NURSE PRACTITIONER

## 2019-11-08 PROCEDURE — 99214 OFFICE O/P EST MOD 30 MIN: CPT | Performed by: NURSE PRACTITIONER

## 2019-11-08 PROCEDURE — 81002 URINALYSIS NONAUTO W/O SCOPE: CPT | Performed by: NURSE PRACTITIONER

## 2019-11-08 PROCEDURE — 1036F TOBACCO NON-USER: CPT | Performed by: NURSE PRACTITIONER

## 2019-11-08 PROCEDURE — G8419 CALC BMI OUT NRM PARAM NOF/U: HCPCS | Performed by: NURSE PRACTITIONER

## 2019-11-08 RX ORDER — DULOXETIN HYDROCHLORIDE 30 MG/1
60 CAPSULE, DELAYED RELEASE ORAL DAILY
Qty: 58 CAPSULE | Refills: 2 | Status: SHIPPED | OUTPATIENT
Start: 2019-11-08 | End: 2019-11-08

## 2019-11-08 RX ORDER — ONDANSETRON 4 MG/1
4 TABLET, FILM COATED ORAL DAILY PRN
Qty: 30 TABLET | Refills: 0 | Status: SHIPPED | OUTPATIENT
Start: 2019-11-08 | End: 2020-01-24 | Stop reason: SDUPTHER

## 2019-11-08 RX ORDER — SULFAMETHOXAZOLE AND TRIMETHOPRIM 800; 160 MG/1; MG/1
1 TABLET ORAL 2 TIMES DAILY
Qty: 10 TABLET | Refills: 0 | Status: SHIPPED | OUTPATIENT
Start: 2019-11-08 | End: 2019-11-12

## 2019-11-08 RX ORDER — SUCRALFATE 1 G/1
1 TABLET ORAL 2 TIMES DAILY
Qty: 60 TABLET | Refills: 1 | Status: SHIPPED | OUTPATIENT
Start: 2019-11-08 | End: 2020-01-24 | Stop reason: SDUPTHER

## 2019-11-08 RX ORDER — HYDROCHLOROTHIAZIDE 12.5 MG/1
12.5 TABLET ORAL DAILY
Qty: 30 TABLET | Refills: 3 | Status: SHIPPED | OUTPATIENT
Start: 2019-11-08 | End: 2020-01-24 | Stop reason: SDUPTHER

## 2019-11-08 RX ORDER — PANTOPRAZOLE SODIUM 20 MG/1
20 TABLET, DELAYED RELEASE ORAL
Qty: 30 TABLET | Refills: 0 | Status: SHIPPED | OUTPATIENT
Start: 2019-11-08 | End: 2020-01-24 | Stop reason: SDUPTHER

## 2019-11-08 RX ORDER — DULOXETIN HYDROCHLORIDE 30 MG/1
60 CAPSULE, DELAYED RELEASE ORAL DAILY
Qty: 60 CAPSULE | Refills: 5 | Status: SHIPPED | OUTPATIENT
Start: 2019-11-08 | End: 2019-11-11 | Stop reason: DRUGHIGH

## 2019-11-08 ASSESSMENT — ENCOUNTER SYMPTOMS
ABDOMINAL PAIN: 1
DIARRHEA: 1
COUGH: 0
ALLERGIC/IMMUNOLOGIC NEGATIVE: 1
SHORTNESS OF BREATH: 0
NAUSEA: 0
CONSTIPATION: 0
VOMITING: 0
CHEST TIGHTNESS: 0

## 2019-11-11 DIAGNOSIS — F43.10 PTSD (POST-TRAUMATIC STRESS DISORDER): ICD-10-CM

## 2019-11-11 DIAGNOSIS — N30.01 ACUTE CYSTITIS WITH HEMATURIA: Primary | ICD-10-CM

## 2019-11-11 DIAGNOSIS — N39.0 E-COLI UTI: ICD-10-CM

## 2019-11-11 DIAGNOSIS — B96.20 E-COLI UTI: ICD-10-CM

## 2019-11-11 DIAGNOSIS — F33.1 MODERATE EPISODE OF RECURRENT MAJOR DEPRESSIVE DISORDER (HCC): ICD-10-CM

## 2019-11-11 LAB
ORGANISM: ABNORMAL
URINE CULTURE, ROUTINE: ABNORMAL

## 2019-11-11 RX ORDER — CEFDINIR 300 MG/1
300 CAPSULE ORAL 2 TIMES DAILY
Qty: 14 CAPSULE | Refills: 0 | Status: SHIPPED | OUTPATIENT
Start: 2019-11-11 | End: 2019-11-18

## 2019-11-11 RX ORDER — DULOXETIN HYDROCHLORIDE 60 MG/1
60 CAPSULE, DELAYED RELEASE ORAL DAILY
Qty: 30 CAPSULE | Refills: 5 | Status: SHIPPED | OUTPATIENT
Start: 2019-11-11 | End: 2020-01-24 | Stop reason: SDUPTHER

## 2019-11-12 DIAGNOSIS — F99 INSOMNIA DUE TO OTHER MENTAL DISORDER: ICD-10-CM

## 2019-11-12 DIAGNOSIS — G43.101 MIGRAINE WITH AURA AND WITH STATUS MIGRAINOSUS, NOT INTRACTABLE: ICD-10-CM

## 2019-11-12 DIAGNOSIS — F51.05 INSOMNIA DUE TO OTHER MENTAL DISORDER: ICD-10-CM

## 2019-11-12 RX ORDER — TEMAZEPAM 30 MG/1
30 CAPSULE ORAL NIGHTLY PRN
Qty: 30 CAPSULE | Refills: 1 | Status: SHIPPED | OUTPATIENT
Start: 2019-11-12 | End: 2020-03-27 | Stop reason: SDUPTHER

## 2019-11-13 ENCOUNTER — TELEPHONE (OUTPATIENT)
Dept: FAMILY MEDICINE CLINIC | Age: 39
End: 2019-11-13

## 2019-12-10 ENCOUNTER — OFFICE VISIT (OUTPATIENT)
Dept: INTERNAL MEDICINE CLINIC | Age: 39
End: 2019-12-10
Payer: MEDICAID

## 2019-12-10 VITALS
WEIGHT: 192.4 LBS | SYSTOLIC BLOOD PRESSURE: 127 MMHG | DIASTOLIC BLOOD PRESSURE: 82 MMHG | BODY MASS INDEX: 30.2 KG/M2 | OXYGEN SATURATION: 99 % | HEIGHT: 67 IN | RESPIRATION RATE: 16 BRPM | HEART RATE: 79 BPM

## 2019-12-10 LAB
BILIRUBIN, POC: NORMAL
BLOOD URINE, POC: NORMAL
CLARITY, POC: CLEAR
COLOR, POC: YELLOW
GLUCOSE URINE, POC: NORMAL
KETONES, POC: 5
LEUKOCYTE EST, POC: NORMAL
NITRITE, POC: NORMAL
PH, POC: 6
PROTEIN, POC: NORMAL
SPECIFIC GRAVITY, POC: 1.02
UROBILINOGEN, POC: 0.2

## 2019-12-10 PROCEDURE — 99214 OFFICE O/P EST MOD 30 MIN: CPT | Performed by: NURSE PRACTITIONER

## 2019-12-10 PROCEDURE — G8482 FLU IMMUNIZE ORDER/ADMIN: HCPCS | Performed by: NURSE PRACTITIONER

## 2019-12-10 PROCEDURE — G8427 DOCREV CUR MEDS BY ELIG CLIN: HCPCS | Performed by: NURSE PRACTITIONER

## 2019-12-10 PROCEDURE — G8417 CALC BMI ABV UP PARAM F/U: HCPCS | Performed by: NURSE PRACTITIONER

## 2019-12-10 PROCEDURE — 1036F TOBACCO NON-USER: CPT | Performed by: NURSE PRACTITIONER

## 2019-12-10 PROCEDURE — 81002 URINALYSIS NONAUTO W/O SCOPE: CPT | Performed by: NURSE PRACTITIONER

## 2019-12-10 RX ORDER — TIZANIDINE 4 MG/1
TABLET ORAL
Qty: 60 TABLET | Refills: 5 | Status: SHIPPED | OUTPATIENT
Start: 2019-12-10 | End: 2020-01-24 | Stop reason: SDUPTHER

## 2019-12-10 RX ORDER — HYDROCODONE BITARTRATE AND ACETAMINOPHEN 5; 325 MG/1; MG/1
1 TABLET ORAL EVERY 6 HOURS PRN
Qty: 20 TABLET | Refills: 0 | Status: SHIPPED | OUTPATIENT
Start: 2019-12-10 | End: 2019-12-17

## 2019-12-10 RX ORDER — PREGABALIN 50 MG/1
50 CAPSULE ORAL 2 TIMES DAILY
Qty: 60 CAPSULE | Refills: 3 | Status: SHIPPED | OUTPATIENT
Start: 2019-12-10 | End: 2020-01-24 | Stop reason: SDUPTHER

## 2019-12-10 RX ORDER — INHALER,ASSIST DEVICE,LG MASK
1 SPACER (EA) MISCELLANEOUS 2 TIMES DAILY
Qty: 1 EACH | Refills: 0 | Status: SHIPPED | OUTPATIENT
Start: 2019-12-10 | End: 2020-05-05

## 2019-12-10 NOTE — PROGRESS NOTES
SUBJECTIVE:  Mirlande Pepe a 42 y. o.female    Chief Complaint   Patient presents with    Joint Swelling     Started 3 days ago    Back Pain    Other     Requesting to have her [employer] form updated; states that she took off 7 days and she needs more time alloted. Wants to discuss as well       HPI  Patient is here for  fibromyalgia flare that  Started 3 days ago. She is unable to get her pain under control. She is having a hard time sleeping due to pain which is constant and 10/10 at this time. She Denies fever/chills. +joint swelling. No shortness of breath (H.O of Asthma would like meds refilled). No dizziness. No syncope. + numbness/tingling. No issues with bowel. Bladder; continues with urgency and frequency,some back pain, there is a history of kidney stones. No hematuria. No melena. She would like to have her FMLA  Updated for these flares as the duration is increasing and control decreasing.      Past Medical History:   Diagnosis Date    Constipation 2014    Fibromyalgia     Graves disease     History of blood transfusion     Hx of blood clots      LT LUNG W/ PNEUMONIA    Joint pain 2014    Various joints (back, hands, knees, hips), recurrent flares since age 27, RF+ at one point, never fully worked up for rheumatologic dz yet    Kidney stone     Localized rash 2014    Recurrent, on inner surface of upper arms, q 3 months, sometimes on chest, no facial involvement    Lupus ([address]     PONV (postoperative nausea and vomiting)        Past Surgical History:   Procedure Laterality Date     SECTION       X 2    CYSTOSCOPY  2018    CYSTOSCOPY URETEROSCOPY WITH HOLMIUM LASER LITHOTRIPSY FOR 5 MM STONE, RIGHT STENT PLACEMENT    OTHER SURGICAL HISTORY  2016    CYSTOSCOPY, BILATERAL RETROGRADES, RIGHT URETEROSCOPY,    TONSILLECTOMY      TUBAL LIGATION         Family History   Problem Relation Age of Onset    Cancer Maternal Grandmother         Lung Ca Negative for dizziness, syncope and light-headedness. Hematological: Negative. Psychiatric/Behavioral: Positive for sleep disturbance. The patient is nervous/anxious. OBJECTIVE:  /82 (Site: Right Upper Arm, Position: Sitting, Cuff Size: Medium Adult)   Pulse 79   Resp 16   Ht 5' 7\" (1.702 m)   Wt 192 lb 6.4 oz (87.3 kg)   LMP 12/03/2019 (Approximate)   SpO2 99%   Breastfeeding? No   BMI 30.13 kg/m²     Physical Exam  Vitals signs reviewed. Constitutional:       Appearance: Normal appearance. She is well-developed. She is ill-appearing. HENT:      Head: Normocephalic and atraumatic. Eyes:      General: No scleral icterus. Conjunctiva/sclera: Conjunctivae normal.      Pupils: Pupils are equal, round, and reactive to light. Neck:      Musculoskeletal: Normal range of motion and neck supple. Cardiovascular:      Rate and Rhythm: Normal rate and regular rhythm. Heart sounds: Normal heart sounds. Pulmonary:      Effort: Pulmonary effort is normal. No respiratory distress. Breath sounds: Normal breath sounds. No wheezing, rhonchi or rales. Abdominal:      General: Bowel sounds are normal.      Palpations: Abdomen is soft. Musculoskeletal: Normal range of motion. General: Swelling (bilateral knees) and tenderness (bialteral knees, and mid back) present. Skin:     General: Skin is warm and dry. Neurological:      Mental Status: She is alert and oriented to person, place, and time. Psychiatric:         Attention and Perception: Attention normal.         Mood and Affect: Mood is anxious and depressed. Speech: Speech normal.         Behavior: Behavior normal. Behavior is cooperative. Thought Content: Thought content normal.         Judgment: Judgment normal.         ASSESSMENT/PLAN:    1. Fibromyalgia  Increase Lyrica. N Longer doing well on lower dose. Continue with Zanaflex as prescribed. Please refer to educational handout provided.     - pregabalin (LYRICA) 50 MG capsule; Take 1 capsule by mouth 2 times daily for 30 days. Dispense: 60 capsule; Refill: 3  - tiZANidine (ZANAFLEX) 4 MG tablet; Take 1 tablet daily, and in the evening as needed  Dispense: 60 tablet; Refill: 5    2. Kidney stone  Follow up as scheduled. Consider referral to Urology if no improvement, continues, or worsens. CT scan 8/2/19:   No hydronephrosis on the right.  Stones are seen in the right kidney,   measuring 3 mm in size or less       No hydronephrosis on the left.  Stones are seen in the left kidney measuring   4 mm in size or less. 3. Mild intermittent asthma without complication-Stable  Take medication as prescribed. Using Aero chamber may help deliver appropriate amount of medication as needed due to joint issues and trouble with manipulating inhaler. - Spacer/Aero-Holding Chambers (AEROCHAMBER PLUS NAVDEEP-VU LARGE) MISC; 1 Device by Does not apply route 2 times daily  Dispense: 1 each; Refill: 0    4. Frequency of micturition  Consider referral to Urology/Nephrology. - POCT Urinalysis no Micro  - URINE CULTURE    5. Urgency of micturition  Consider referral to Urology/Nephrology. - POCT Urinalysis no Micro  - URINE CULTURE      Return in about 6 weeks (around 1/21/2020), or if symptoms worsen or fail to improve, for Fibromyalgia, Thyroid, labs due.

## 2019-12-10 NOTE — PATIENT INSTRUCTIONS
depression. · Learn about fibromyalgia. This makes coping easier. Then, take an active role in your treatment. · Think about joining a support group with others who have fibromyalgia to learn more and get support. When should you call for help? Watch closely for changes in your health, and be sure to contact your doctor if:    · You feel sad, helpless, or hopeless; lose interest in things you used to enjoy; or have other symptoms of depression.     · Your fibromyalgia symptoms get worse. Where can you learn more? Go to [email or web address] and sign in to your [employer] account. Enter V003 in the [employer] box to learn more about \"Fibromyalgia: Care Instructions. \"     If you do not have an account, please click on the \"Sign Up Now\" link. Current as of: March 28, 2019  Content Version: 12.1  © 8534-1908 Healthwise, Incorporated. Care instructions adapted under license by [facility] ([facility]). If you have questions about a medical condition or this instruction, always ask your healthcare professional. [address] any warranty or liability for your use of this information.

## 2019-12-11 ENCOUNTER — TELEPHONE (OUTPATIENT)
Dept: FAMILY MEDICINE CLINIC | Age: 39
End: 2019-12-11

## 2019-12-12 LAB — URINE CULTURE, ROUTINE: NORMAL

## 2019-12-17 ENCOUNTER — TELEPHONE (OUTPATIENT)
Dept: INTERNAL MEDICINE CLINIC | Age: 39
End: 2019-12-17

## 2019-12-17 DIAGNOSIS — M79.7 FIBROMYALGIA: ICD-10-CM

## 2019-12-17 DIAGNOSIS — J45.20 MILD INTERMITTENT ASTHMA WITHOUT COMPLICATION: Primary | ICD-10-CM

## 2019-12-17 DIAGNOSIS — N20.0 KIDNEY STONE: ICD-10-CM

## 2019-12-17 RX ORDER — INHALER,ASSIST DEVICE,MED MASK
1 SPACER (EA) MISCELLANEOUS 2 TIMES DAILY
Qty: 2 EACH | Refills: 0 | Status: ON HOLD | OUTPATIENT
Start: 2019-12-17 | End: 2020-09-09 | Stop reason: HOSPADM

## 2019-12-17 RX ORDER — HYDROCODONE BITARTRATE AND ACETAMINOPHEN 7.5; 325 MG/1; MG/1
1 TABLET ORAL EVERY 6 HOURS PRN
Qty: 28 TABLET | Refills: 0 | Status: SHIPPED | OUTPATIENT
Start: 2019-12-17 | End: 2020-03-10 | Stop reason: SDUPTHER

## 2020-01-03 ASSESSMENT — ENCOUNTER SYMPTOMS
COUGH: 0
ABDOMINAL PAIN: 0
BACK PAIN: 1
SHORTNESS OF BREATH: 0
VOMITING: 0
CONSTIPATION: 0
ALLERGIC/IMMUNOLOGIC NEGATIVE: 1
NAUSEA: 0
DIARRHEA: 0
CHEST TIGHTNESS: 0

## 2020-01-24 ENCOUNTER — TELEPHONE (OUTPATIENT)
Dept: INTERNAL MEDICINE CLINIC | Age: 40
End: 2020-01-24

## 2020-01-24 ENCOUNTER — HOSPITAL ENCOUNTER (OUTPATIENT)
Age: 40
Discharge: HOME | End: 2020-01-24
Payer: MEDICAID

## 2020-01-24 ENCOUNTER — OFFICE VISIT (OUTPATIENT)
Dept: INTERNAL MEDICINE CLINIC | Age: 40
End: 2020-01-24
Payer: MEDICAID

## 2020-01-24 VITALS
BODY MASS INDEX: 29.66 KG/M2 | RESPIRATION RATE: 16 BRPM | HEART RATE: 92 BPM | HEIGHT: 67 IN | WEIGHT: 189 LBS | OXYGEN SATURATION: 99 % | SYSTOLIC BLOOD PRESSURE: 122 MMHG | DIASTOLIC BLOOD PRESSURE: 82 MMHG

## 2020-01-24 LAB
A/G RATIO: 1.4 (ref 1.1–2.2)
ALBUMIN SERPL-MCNC: 4.6 G/DL (ref 3.4–5)
ALP BLD-CCNC: 88 U/L (ref 40–129)
ALT SERPL-CCNC: 10 U/L (ref 10–40)
ANION GAP SERPL CALCULATED.3IONS-SCNC: 10 MMOL/L (ref 3–16)
AST SERPL-CCNC: 12 U/L (ref 15–37)
BACTERIA: ABNORMAL /HPF
BASOPHILS ABSOLUTE: 0.1 K/UL (ref 0–0.2)
BASOPHILS RELATIVE PERCENT: 0.9 %
BILIRUB SERPL-MCNC: 0.3 MG/DL (ref 0–1)
BILIRUBIN URINE: NEGATIVE
BILIRUBIN, POC: NORMAL
BLOOD URINE, POC: NORMAL
BLOOD, URINE: NEGATIVE
BUN BLDV-MCNC: 12 MG/DL (ref 7–20)
CALCIUM SERPL-MCNC: 9.7 MG/DL (ref 8.3–10.6)
CHLORIDE BLD-SCNC: 104 MMOL/L (ref 99–110)
CLARITY, POC: CLEAR
CLARITY: ABNORMAL
CO2: 25 MMOL/L (ref 21–32)
COLOR, POC: YELLOW
COLOR: YELLOW
CREAT SERPL-MCNC: <0.5 MG/DL (ref 0.6–1.1)
EOSINOPHILS ABSOLUTE: 0.1 K/UL (ref 0–0.6)
EOSINOPHILS RELATIVE PERCENT: 2.1 %
EPITHELIAL CELLS, UA: 20 /HPF (ref 0–5)
GFR AFRICAN AMERICAN: >60
GFR NON-AFRICAN AMERICAN: >60
GLOBULIN: 3.3 G/DL
GLUCOSE FASTING: 91 MG/DL (ref 70–99)
GLUCOSE URINE, POC: NORMAL
GLUCOSE URINE: NEGATIVE MG/DL
HCT VFR BLD CALC: 38.3 % (ref 36–48)
HEMOGLOBIN: 12 G/DL (ref 12–16)
HYALINE CASTS: 2 /LPF (ref 0–8)
KETONES, POC: NORMAL
KETONES, URINE: NEGATIVE MG/DL
LEUKOCYTE EST, POC: NORMAL
LEUKOCYTE ESTERASE, URINE: NEGATIVE
LYMPHOCYTES ABSOLUTE: 2 K/UL (ref 1–5.1)
LYMPHOCYTES RELATIVE PERCENT: 29.1 %
MCH RBC QN AUTO: 24.2 PG (ref 26–34)
MCHC RBC AUTO-ENTMCNC: 31.3 G/DL (ref 31–36)
MCV RBC AUTO: 77.5 FL (ref 80–100)
MICROSCOPIC EXAMINATION: YES
MONOCYTES ABSOLUTE: 0.5 K/UL (ref 0–1.3)
MONOCYTES RELATIVE PERCENT: 7.4 %
NEUTROPHILS ABSOLUTE: 4.2 K/UL (ref 1.7–7.7)
NEUTROPHILS RELATIVE PERCENT: 60.5 %
NITRITE, POC: NORMAL
NITRITE, URINE: POSITIVE
PDW BLD-RTO: 17.1 % (ref 12.4–15.4)
PH UA: 6 (ref 5–8)
PH, POC: 6
PLATELET # BLD: 301 K/UL (ref 135–450)
PMV BLD AUTO: 7.9 FL (ref 5–10.5)
POTASSIUM SERPL-SCNC: 4.1 MMOL/L (ref 3.5–5.1)
PROTEIN UA: NEGATIVE MG/DL
PROTEIN, POC: NORMAL
RBC # BLD: 4.94 M/UL (ref 4–5.2)
RBC UA: 1 /HPF (ref 0–4)
RHEUMATOID FACTOR: 11 IU/ML
SEDIMENTATION RATE, ERYTHROCYTE: 18 MM/HR (ref 0–20)
SODIUM BLD-SCNC: 139 MMOL/L (ref 136–145)
SPECIFIC GRAVITY UA: 1.02 (ref 1–1.03)
SPECIFIC GRAVITY, POC: 1.02
T3 TOTAL: 1.27 NG/ML (ref 0.8–2)
T4 TOTAL: 8.7 UG/DL (ref 4.5–10.9)
TOTAL PROTEIN: 7.9 G/DL (ref 6.4–8.2)
TSH SERPL DL<=0.05 MIU/L-ACNC: 0.58 UIU/ML (ref 0.27–4.2)
URINE TYPE: ABNORMAL
UROBILINOGEN, POC: 0.2
UROBILINOGEN, URINE: 0.2 E.U./DL
WBC # BLD: 7 K/UL (ref 4–11)
WBC UA: 13 /HPF (ref 0–5)

## 2020-01-24 PROCEDURE — 84480 ASSAY TRIIODOTHYRONINE (T3): CPT

## 2020-01-24 PROCEDURE — 99214 OFFICE O/P EST MOD 30 MIN: CPT | Performed by: NURSE PRACTITIONER

## 2020-01-24 PROCEDURE — G8417 CALC BMI ABV UP PARAM F/U: HCPCS | Performed by: NURSE PRACTITIONER

## 2020-01-24 PROCEDURE — 1036F TOBACCO NON-USER: CPT | Performed by: NURSE PRACTITIONER

## 2020-01-24 PROCEDURE — 85025 COMPLETE CBC W/AUTO DIFF WBC: CPT

## 2020-01-24 PROCEDURE — 81003 URINALYSIS AUTO W/O SCOPE: CPT | Performed by: NURSE PRACTITIONER

## 2020-01-24 PROCEDURE — 86038 ANTINUCLEAR ANTIBODIES: CPT

## 2020-01-24 PROCEDURE — 86431 RHEUMATOID FACTOR QUANT: CPT

## 2020-01-24 PROCEDURE — 36415 COLL VENOUS BLD VENIPUNCTURE: CPT

## 2020-01-24 PROCEDURE — 80053 COMPREHEN METABOLIC PANEL: CPT

## 2020-01-24 PROCEDURE — 84436 ASSAY OF TOTAL THYROXINE: CPT

## 2020-01-24 PROCEDURE — 84443 ASSAY THYROID STIM HORMONE: CPT

## 2020-01-24 PROCEDURE — G8427 DOCREV CUR MEDS BY ELIG CLIN: HCPCS | Performed by: NURSE PRACTITIONER

## 2020-01-24 PROCEDURE — 85652 RBC SED RATE AUTOMATED: CPT

## 2020-01-24 PROCEDURE — G8482 FLU IMMUNIZE ORDER/ADMIN: HCPCS | Performed by: NURSE PRACTITIONER

## 2020-01-24 RX ORDER — CLONAZEPAM 0.5 MG/1
0.5 TABLET ORAL NIGHTLY PRN
Qty: 30 TABLET | Refills: 0 | Status: SHIPPED | OUTPATIENT
Start: 2020-01-24 | End: 2020-03-03 | Stop reason: SDUPTHER

## 2020-01-24 RX ORDER — ALBUTEROL SULFATE 90 UG/1
2 AEROSOL, METERED RESPIRATORY (INHALATION) 4 TIMES DAILY PRN
Qty: 1 INHALER | Refills: 5 | Status: SHIPPED | OUTPATIENT
Start: 2020-01-24 | End: 2020-04-17 | Stop reason: SDUPTHER

## 2020-01-24 RX ORDER — HYDROCHLOROTHIAZIDE 12.5 MG/1
12.5 TABLET ORAL DAILY
Qty: 30 TABLET | Refills: 3 | Status: SHIPPED | OUTPATIENT
Start: 2020-01-24 | End: 2020-05-01 | Stop reason: SDUPTHER

## 2020-01-24 RX ORDER — DULOXETIN HYDROCHLORIDE 60 MG/1
60 CAPSULE, DELAYED RELEASE ORAL DAILY
Qty: 30 CAPSULE | Refills: 5 | Status: ON HOLD | OUTPATIENT
Start: 2020-01-24 | End: 2020-09-09 | Stop reason: HOSPADM

## 2020-01-24 RX ORDER — FLUCONAZOLE 150 MG/1
150 TABLET ORAL ONCE
Qty: 2 TABLET | Refills: 0 | Status: SHIPPED | OUTPATIENT
Start: 2020-01-24 | End: 2020-07-02 | Stop reason: SDUPTHER

## 2020-01-24 RX ORDER — TRIAMCINOLONE ACETONIDE 0.25 MG/ML
LOTION TOPICAL 2 TIMES DAILY
Qty: 60 ML | Refills: 2 | Status: SHIPPED | OUTPATIENT
Start: 2020-01-24 | End: 2020-08-11 | Stop reason: SDUPTHER

## 2020-01-24 RX ORDER — LEVONORGESTREL AND ETHINYL ESTRADIOL 0.15-0.03
1 KIT ORAL DAILY
Qty: 1 PACKET | Refills: 3 | Status: SHIPPED | OUTPATIENT
Start: 2020-01-24 | End: 2020-08-11 | Stop reason: SDUPTHER

## 2020-01-24 RX ORDER — SUCRALFATE 1 G/1
1 TABLET ORAL 2 TIMES DAILY
Qty: 60 TABLET | Refills: 1 | Status: SHIPPED | OUTPATIENT
Start: 2020-01-24 | End: 2020-09-30

## 2020-01-24 RX ORDER — ONDANSETRON 4 MG/1
4 TABLET, FILM COATED ORAL DAILY PRN
Qty: 30 TABLET | Refills: 0 | Status: SHIPPED | OUTPATIENT
Start: 2020-01-24 | End: 2020-05-01

## 2020-01-24 RX ORDER — PREGABALIN 50 MG/1
50 CAPSULE ORAL 2 TIMES DAILY
Qty: 60 CAPSULE | Refills: 3 | Status: SHIPPED | OUTPATIENT
Start: 2020-01-24 | End: 2020-03-13 | Stop reason: SDUPTHER

## 2020-01-24 RX ORDER — RIZATRIPTAN BENZOATE 10 MG/1
10 TABLET ORAL
Qty: 27 TABLET | Refills: 1 | Status: SHIPPED | OUTPATIENT
Start: 2020-01-24 | End: 2020-05-26

## 2020-01-24 RX ORDER — PANTOPRAZOLE SODIUM 20 MG/1
20 TABLET, DELAYED RELEASE ORAL
Qty: 30 TABLET | Refills: 0 | Status: SHIPPED | OUTPATIENT
Start: 2020-01-24 | End: 2020-03-20 | Stop reason: SDUPTHER

## 2020-01-24 RX ORDER — TIZANIDINE 4 MG/1
TABLET ORAL
Qty: 60 TABLET | Refills: 5 | Status: SHIPPED | OUTPATIENT
Start: 2020-01-24 | End: 2020-03-06 | Stop reason: SDUPTHER

## 2020-01-24 RX ORDER — HYDROXYZINE HYDROCHLORIDE 25 MG/1
TABLET, FILM COATED ORAL
Qty: 60 TABLET | Refills: 5 | Status: SHIPPED | OUTPATIENT
Start: 2020-01-24 | End: 2020-03-03 | Stop reason: SDUPTHER

## 2020-01-24 NOTE — PATIENT INSTRUCTIONS
sure to make and go to all appointments, and call your doctor if you are having problems. It's also a good idea to know your test results and keep a list of the medicines you take. How can you care for yourself at home? · Take medicines exactly as directed. Call your doctor if you think you are having a problem with your medicine. · Go to your counseling sessions and follow-up appointments. · Recognize and accept your anxiety. Then, when you are in a situation that makes you anxious, say to yourself, \"This is not an emergency. I feel uncomfortable, but I am not in danger. I can keep going even if I feel anxious. \"  · Be kind to your body:  ? Relieve tension with exercise or a massage. ? Get enough rest.  ? Avoid alcohol, caffeine, nicotine, and illegal drugs. They can increase your anxiety level and cause sleep problems. ? Learn and do relaxation techniques. See below for more about these techniques. · Engage your mind. Get out and do something you enjoy. Go to a funny movie, or take a walk or hike. Plan your day. Having too much or too little to do can make you anxious. · Keep a record of your symptoms. Discuss your fears with a good friend or family member, or join a support group for people with similar problems. Talking to others sometimes relieves stress. · Get involved in social groups, or volunteer to help others. Being alone sometimes makes things seem worse than they are. · Get at least 30 minutes of exercise on most days of the week. Walking is a good choice. You also may want to do other activities, such as running, swimming, cycling, or playing tennis or team sports. · Keep the numbers for these national suicide hotlines: 6-759-634-TALK (9-757.302.4383) and 9-220-XRMBHVF (0-587.196.3876). If you or someone you know talks about suicide or feeling hopeless, get help right away. Relaxation techniques  Do relaxation exercises 10 to 20 minutes a day.  You can play soothing, relaxing music while you do them, if you wish. · Tell others in your house that you are going to do your relaxation exercises. Ask them not to disturb you. · Find a comfortable place, away from all distractions and noise. · Lie down on your back, or sit with your back straight. · Focus on your breathing. Make it slow and steady. · Breathe in through your nose. Breathe out through either your nose or mouth. · Breathe deeply, filling up the area between your navel and your rib cage. Breathe so that your belly goes up and down. · Do not hold your breath. · Breathe like this for 5 to 10 minutes. Notice the feeling of calmness throughout your whole body. As you continue to breathe slowly and deeply, relax by doing the following for another 5 to 10 minutes:  · Tighten and relax each muscle group in your body. You can begin at your toes and work your way up to your head. · Imagine your muscle groups relaxing and becoming heavy. · Empty your mind of all thoughts. · Let yourself relax more and more deeply. · Become aware of the state of calmness that surrounds you. · When your relaxation time is over, you can bring yourself back to alertness by moving your fingers and toes and then your hands and feet and then stretching and moving your entire body. Sometimes people fall asleep during relaxation, but they usually wake up shortly afterward. · Always give yourself time to return to full alertness before you drive a car or do anything that might cause an accident if you are not fully alert. Never play a relaxation tape while you drive a car. When should you call for help? Call 911 anytime you think you may need emergency care.  For example, call if:    · You feel you cannot stop from hurting yourself or someone else.    Watch closely for changes in your health, and be sure to contact your doctor if:    · Your PTSD symptoms are getting worse.     · You have new or worsening symptoms of anxiety.     · You are not getting better as expected. Where can you learn more? Go to [email or web address] and sign in to your [employer] account. Enter X141 in the [employer] box to learn more about \"Post-Traumatic Stress Disorder (PTSD): Care Instructions. \"     If you do not have an account, please click on the \"Sign Up Now\" link. Current as of: May 28, 2019  Content Version: 12.3  © 7157-1993 Healthwise, Incorporated. Care instructions adapted under license by [facility] ([facility]). If you have questions about a medical condition or this instruction, always ask your healthcare professional. [address] any warranty or liability for your use of this information.
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
## 2020-01-26 LAB
ANTI-NUCLEAR ANTIBODY (ANA): NEGATIVE
ORGANISM: ABNORMAL
URINE CULTURE, ROUTINE: ABNORMAL

## 2020-01-27 RX ORDER — NITROFURANTOIN 25; 75 MG/1; MG/1
100 CAPSULE ORAL 2 TIMES DAILY
Qty: 20 CAPSULE | Refills: 0 | Status: SHIPPED | OUTPATIENT
Start: 2020-01-27 | End: 2020-02-06

## 2020-01-27 ASSESSMENT — ENCOUNTER SYMPTOMS
ALLERGIC/IMMUNOLOGIC NEGATIVE: 1
WHEEZING: 0
ABDOMINAL PAIN: 0
NAUSEA: 1
CONSTIPATION: 0
BACK PAIN: 1
SHORTNESS OF BREATH: 0
CHEST TIGHTNESS: 0
VOMITING: 0
DIARRHEA: 0
COUGH: 0

## 2020-02-21 RX ORDER — HYDROCODONE BITARTRATE AND ACETAMINOPHEN 7.5; 325 MG/1; MG/1
1 TABLET ORAL EVERY 6 HOURS PRN
Qty: 28 TABLET | Refills: 0 | Status: CANCELLED | OUTPATIENT
Start: 2020-02-21 | End: 2020-02-28

## 2020-02-21 RX ORDER — CLONAZEPAM 0.5 MG/1
0.5 TABLET ORAL NIGHTLY PRN
Qty: 30 TABLET | Refills: 0 | Status: CANCELLED | OUTPATIENT
Start: 2020-02-21 | End: 2020-03-22

## 2020-02-21 NOTE — TELEPHONE ENCOUNTER
Refill request for clonopin, norco medication.      Name of Danielle Altia    Last visit - 1/24/20     Pending visit - Nothing scheduled    Last refill -1/24/20,12/17/19    Medication Contract signed - I don't see one on file  Last Oarrs ran- 9/27/19    Additional Comments meds pended and pharmacy vrified

## 2020-02-28 ENCOUNTER — OFFICE VISIT (OUTPATIENT)
Dept: INTERNAL MEDICINE CLINIC | Age: 40
End: 2020-02-28
Payer: MEDICAID

## 2020-02-28 VITALS
OXYGEN SATURATION: 98 % | HEART RATE: 91 BPM | WEIGHT: 196 LBS | DIASTOLIC BLOOD PRESSURE: 72 MMHG | SYSTOLIC BLOOD PRESSURE: 132 MMHG | BODY MASS INDEX: 30.7 KG/M2

## 2020-02-28 PROCEDURE — G8482 FLU IMMUNIZE ORDER/ADMIN: HCPCS | Performed by: NURSE PRACTITIONER

## 2020-02-28 PROCEDURE — G8417 CALC BMI ABV UP PARAM F/U: HCPCS | Performed by: NURSE PRACTITIONER

## 2020-02-28 PROCEDURE — 99214 OFFICE O/P EST MOD 30 MIN: CPT | Performed by: NURSE PRACTITIONER

## 2020-02-28 PROCEDURE — G8427 DOCREV CUR MEDS BY ELIG CLIN: HCPCS | Performed by: NURSE PRACTITIONER

## 2020-02-28 PROCEDURE — 1036F TOBACCO NON-USER: CPT | Performed by: NURSE PRACTITIONER

## 2020-02-28 RX ORDER — METHYLPREDNISOLONE 4 MG/1
TABLET ORAL
Qty: 1 KIT | Refills: 0 | Status: SHIPPED | OUTPATIENT
Start: 2020-02-28 | End: 2020-03-05

## 2020-02-28 ASSESSMENT — ENCOUNTER SYMPTOMS
ABDOMINAL DISTENTION: 0
CONSTIPATION: 0
CHEST TIGHTNESS: 0
NAUSEA: 0
VOMITING: 0
BACK PAIN: 0
SHORTNESS OF BREATH: 0
DIARRHEA: 0

## 2020-03-03 ENCOUNTER — TELEPHONE (OUTPATIENT)
Dept: INTERNAL MEDICINE CLINIC | Age: 40
End: 2020-03-03

## 2020-03-03 RX ORDER — CLONAZEPAM 0.5 MG/1
0.5 TABLET ORAL NIGHTLY PRN
Qty: 30 TABLET | Refills: 0 | Status: SHIPPED | OUTPATIENT
Start: 2020-03-03 | End: 2020-03-27 | Stop reason: SDUPTHER

## 2020-03-03 RX ORDER — HYDROXYZINE HYDROCHLORIDE 25 MG/1
TABLET, FILM COATED ORAL
Qty: 60 TABLET | Refills: 5 | Status: SHIPPED
Start: 2020-03-03 | End: 2020-03-11

## 2020-03-06 ENCOUNTER — OFFICE VISIT (OUTPATIENT)
Dept: INTERNAL MEDICINE CLINIC | Age: 40
End: 2020-03-06
Payer: MEDICAID

## 2020-03-06 VITALS
WEIGHT: 194.6 LBS | OXYGEN SATURATION: 100 % | HEIGHT: 67 IN | DIASTOLIC BLOOD PRESSURE: 82 MMHG | RESPIRATION RATE: 16 BRPM | HEART RATE: 85 BPM | SYSTOLIC BLOOD PRESSURE: 122 MMHG | BODY MASS INDEX: 30.54 KG/M2

## 2020-03-06 PROCEDURE — 99213 OFFICE O/P EST LOW 20 MIN: CPT | Performed by: NURSE PRACTITIONER

## 2020-03-06 PROCEDURE — 1036F TOBACCO NON-USER: CPT | Performed by: NURSE PRACTITIONER

## 2020-03-06 PROCEDURE — G8417 CALC BMI ABV UP PARAM F/U: HCPCS | Performed by: NURSE PRACTITIONER

## 2020-03-06 PROCEDURE — G8427 DOCREV CUR MEDS BY ELIG CLIN: HCPCS | Performed by: NURSE PRACTITIONER

## 2020-03-06 PROCEDURE — G8482 FLU IMMUNIZE ORDER/ADMIN: HCPCS | Performed by: NURSE PRACTITIONER

## 2020-03-06 RX ORDER — TRAMADOL HYDROCHLORIDE 50 MG/1
50 TABLET ORAL EVERY 6 HOURS PRN
Qty: 40 TABLET | Refills: 0 | Status: SHIPPED
Start: 2020-03-06 | End: 2020-03-11

## 2020-03-06 RX ORDER — TIZANIDINE 4 MG/1
TABLET ORAL
Qty: 90 TABLET | Refills: 5 | Status: SHIPPED
Start: 2020-03-06 | End: 2020-05-26 | Stop reason: DRUGHIGH

## 2020-03-06 NOTE — PATIENT INSTRUCTIONS
Patient Education        Fibromyalgia: Care Instructions  Your Care Instructions    Fibromyalgia is a painful condition that is not completely understood by medical experts. The cause of fibromyalgia is not known. It can make you feel tired and ache all over. It causes tender spots at specific points of the body that hurt only when you press on them. You may have trouble sleeping, as well as other symptoms. These problems can upset your work and home life. Symptoms tend to come and go, although they may never go away completely. Fibromyalgia does not harm your muscles, joints, or organs. Follow-up care is a key part of your treatment and safety. Be sure to make and go to all appointments, and call your doctor if you are having problems. It's also a good idea to know your test results and keep a list of the medicines you take. How can you care for yourself at home? · Exercise often. Walk, swim, or bike to help with pain and sleep problems and to make you feel better. · Try to get a good night's sleep. Go to bed and get up at the same time each day, whether you feel rested or not. Make sure you have a good mattress and pillow. · Reduce stress. Avoid things that cause you stress, if you can. If not, work at making them less stressful. Learn to use biofeedback, guided imagery, meditation, or other methods to relax. · Make healthy changes. Eat a balanced diet, quit smoking, and limit alcohol and caffeine. · Use a heating pad set on low or take warm baths or showers for pain. Using cold packs for up to 20 minutes at a time can also relieve pain. Put a thin cloth between the cold pack and your skin. A gentle massage might help too. · Be safe with medicines. Take your medicines exactly as prescribed. Call your doctor if you think you are having a problem with your medicine. Your doctor may talk to you about taking antidepressant medicines.  These medicines may improve sleep, relieve pain, and in some cases treat depression. · Learn about fibromyalgia. This makes coping easier. Then, take an active role in your treatment. · Think about joining a support group with others who have fibromyalgia to learn more and get support. When should you call for help? Watch closely for changes in your health, and be sure to contact your doctor if:    · You feel sad, helpless, or hopeless; lose interest in things you used to enjoy; or have other symptoms of depression.     · Your fibromyalgia symptoms get worse. Where can you learn more? Go to [email or web address] org and sign in to your [employer] account. Enter V003 in the eMeter box to learn more about \"Fibromyalgia: Care Instructions. \"     If you do not have an account, please click on the \"Sign Up Now\" link. Current as of: March 28, 2019  Content Version: 12.3  © 7152-0601 Healthwise, Incorporated. Care instructions adapted under license by [facility] ([facility]). If you have questions about a medical condition or this instruction, always ask your healthcare professional. [address] any warranty or liability for your use of this information.

## 2020-03-06 NOTE — PROGRESS NOTES
Problem Relation Age of Onset    Cancer Maternal Grandmother         Lung Ca       Social History     Socioeconomic History    Marital status: Single     Spouse name: Not on file    Number of children: Not on file    Years of education: Not on file    Highest education level: Not on file   Occupational History    Not on file   Social Needs    Financial resource strain: Not on file    Food insecurity     Worry: Not on file     Inability: Not on file    Transportation needs     Medical: Not on file     Non-medical: Not on file   Tobacco Use    Smoking status: Never Smoker    Smokeless tobacco: Never Used   Substance and Sexual Activity    Alcohol use: Yes     Alcohol/week: 0.0 standard drinks     Comment: occasional     Drug use: No    Sexual activity: Yes     Partners: Male   Lifestyle    Physical activity     Days per week: Not on file     Minutes per session: Not on file    Stress: Not on file   Relationships    Social connections     Talks on phone: Not on file     Gets together: Not on file     Attends Restorationism service: Not on file     Active member of club or organization: Not on file     Attends meetings of clubs or organizations: Not on file     Relationship status: Not on file    Intimate partner violence     Fear of current or ex partner: Not on file     Emotionally abused: Not on file     Physically abused: Not on file     Forced sexual activity: Not on file   Other Topics Concern    Not on file   Social History Narrative    Not on file       Review of Systems   Constitutional: Negative for chills and fever. Respiratory: Negative for cough, chest tightness, shortness of breath and wheezing. Cardiovascular: Negative for chest pain and palpitations. Gastrointestinal: Negative for abdominal pain, constipation, diarrhea, nausea and vomiting. Endocrine: Negative. Genitourinary: Positive for flank pain.    Musculoskeletal: Positive for arthralgias, back pain, myalgias and neck oriented to person, place, and time. Cranial Nerves: No cranial nerve deficit. Sensory: No sensory deficit. Motor: No weakness. Coordination: Coordination normal.   Psychiatric:         Attention and Perception: Attention normal.         Mood and Affect: Mood is anxious and depressed. Affect is tearful. Speech: Speech normal.         Behavior: Behavior is agitated. Behavior is cooperative. Thought Content: Thought content normal. Thought content does not include homicidal or suicidal ideation. Thought content does not include homicidal or suicidal plan. Judgment: Judgment normal.         ASSESSMENT/PLAN:    1. Fibromyalgia  Start Ultram for pain. Continue with Muscle relaxer  Will refer to pain management if no improvement. Trial PT consideration. - tiZANidine (ZANAFLEX) 4 MG tablet; Take 1 tablet by mouth three time a day 8 hours apart. Dispense: 90 tablet; Refill: 5    2. Muscle pain, fibromyalgia  Start Ultram for pain. Continue with Muscle relaxer  Will refer to pain management if no improvement. Trial PT consideration. 3. Pain in joint involving multiple sites  Start Ultram for pain. Continue with Muscle relaxer  Will refer to pain management if no improvement. Trial PT consideration. 4. PTSD (post-traumatic stress disorder)  Continue with current medications. No changes at this time. Follow up with Counseling. 5. Moderate episode of recurrent major depressive disorder ([facility] [address]  Continue with current medications. No changes at this time. Follow up with Counseling. 6. Insomnia due to other mental disorder  Notify office if you have no improvement or worsening of condition. Continue current regimen. Practice good sleep hygiene. Return in about 2 weeks (around 3/20/2020), or if symptoms worsen or fail to improve, for Fibromyalgia.

## 2020-03-10 ENCOUNTER — TELEPHONE (OUTPATIENT)
Dept: INTERNAL MEDICINE CLINIC | Age: 40
End: 2020-03-10

## 2020-03-10 RX ORDER — HYDROCODONE BITARTRATE AND ACETAMINOPHEN 7.5; 325 MG/1; MG/1
1 TABLET ORAL EVERY 6 HOURS PRN
Qty: 28 TABLET | Refills: 0 | Status: SHIPPED | OUTPATIENT
Start: 2020-03-10 | End: 2020-03-23 | Stop reason: CLARIF

## 2020-03-11 ENCOUNTER — TELEPHONE (OUTPATIENT)
Dept: INTERNAL MEDICINE CLINIC | Age: 40
End: 2020-03-11

## 2020-03-11 NOTE — TELEPHONE ENCOUNTER
Insurance wouldn't approve RX for Hydrocodone because she is taking Klonipin. . she says she is in so much pain. .. please advise  [phone]    Also, she has to bring in new forms (?)  from [facility] because this is a new insurance.

## 2020-03-11 NOTE — TELEPHONE ENCOUNTER
[employer] is calling and wanted to know if the provider knew that she was taking Tramadol, Tizanidine, Klonopin, and Temazepam because they have a received a Rx of Norco.

## 2020-03-13 ENCOUNTER — HOSPITAL ENCOUNTER (OUTPATIENT)
Age: 40
Discharge: HOME | End: 2020-03-13
Payer: MEDICAID

## 2020-03-13 ENCOUNTER — OFFICE VISIT (OUTPATIENT)
Dept: INTERNAL MEDICINE CLINIC | Age: 40
End: 2020-03-13
Payer: MEDICAID

## 2020-03-13 VITALS
RESPIRATION RATE: 16 BRPM | BODY MASS INDEX: 30.45 KG/M2 | HEIGHT: 67 IN | DIASTOLIC BLOOD PRESSURE: 82 MMHG | OXYGEN SATURATION: 96 % | HEART RATE: 86 BPM | SYSTOLIC BLOOD PRESSURE: 132 MMHG | WEIGHT: 194 LBS

## 2020-03-13 LAB
A/G RATIO: 1.4 (ref 1.1–2.2)
ALBUMIN SERPL-MCNC: 4.7 G/DL (ref 3.4–5)
ALP BLD-CCNC: 92 U/L (ref 40–129)
ALT SERPL-CCNC: 18 U/L (ref 10–40)
ANION GAP SERPL CALCULATED.3IONS-SCNC: 12 MMOL/L (ref 3–16)
AST SERPL-CCNC: 19 U/L (ref 15–37)
BASOPHILS ABSOLUTE: 0.1 K/UL (ref 0–0.2)
BASOPHILS RELATIVE PERCENT: 0.8 %
BILIRUB SERPL-MCNC: 0.3 MG/DL (ref 0–1)
BILIRUBIN, POC: NORMAL
BLOOD URINE, POC: NORMAL
BUN BLDV-MCNC: 13 MG/DL (ref 7–20)
C-REACTIVE PROTEIN: 9.8 MG/L (ref 0–5.1)
CALCIUM SERPL-MCNC: 10.2 MG/DL (ref 8.3–10.6)
CHLORIDE BLD-SCNC: 102 MMOL/L (ref 99–110)
CLARITY, POC: CLEAR
CO2: 25 MMOL/L (ref 21–32)
COLOR, POC: YELLOW
CREAT SERPL-MCNC: 0.6 MG/DL (ref 0.6–1.1)
EOSINOPHILS ABSOLUTE: 0.3 K/UL (ref 0–0.6)
EOSINOPHILS RELATIVE PERCENT: 4.2 %
FOLATE: 4.4 NG/ML (ref 4.78–24.2)
GFR AFRICAN AMERICAN: >60
GFR NON-AFRICAN AMERICAN: >60
GLOBULIN: 3.4 G/DL
GLUCOSE FASTING: 98 MG/DL (ref 70–99)
GLUCOSE URINE, POC: NORMAL
HCT VFR BLD CALC: 34.9 % (ref 36–48)
HEMOGLOBIN: 11.4 G/DL (ref 12–16)
IMMATURE RETIC FRACT: 0.52 (ref 0.21–0.37)
IRON SATURATION: 9 % (ref 15–50)
IRON: 37 UG/DL (ref 37–145)
KETONES, POC: NORMAL
LEUKOCYTE EST, POC: NORMAL
LYMPHOCYTES ABSOLUTE: 2.8 K/UL (ref 1–5.1)
LYMPHOCYTES RELATIVE PERCENT: 33.8 %
MAGNESIUM: 1.9 MG/DL (ref 1.8–2.4)
MCH RBC QN AUTO: 25.5 PG (ref 26–34)
MCHC RBC AUTO-ENTMCNC: 32.7 G/DL (ref 31–36)
MCV RBC AUTO: 78.1 FL (ref 80–100)
MONOCYTES ABSOLUTE: 0.6 K/UL (ref 0–1.3)
MONOCYTES RELATIVE PERCENT: 7.3 %
NEUTROPHILS ABSOLUTE: 4.5 K/UL (ref 1.7–7.7)
NEUTROPHILS RELATIVE PERCENT: 53.9 %
NITRITE, POC: NORMAL
PDW BLD-RTO: 17 % (ref 12.4–15.4)
PH, POC: 6
PLATELET # BLD: 298 K/UL (ref 135–450)
PMV BLD AUTO: 8.3 FL (ref 5–10.5)
POTASSIUM SERPL-SCNC: 4.1 MMOL/L (ref 3.5–5.1)
PROTEIN, POC: NORMAL
RBC # BLD: 4.47 M/UL (ref 4–5.2)
RETICULOCYTE ABSOLUTE COUNT: 0.1 M/UL (ref 0.02–0.1)
RETICULOCYTE COUNT PCT: 2.26 % (ref 0.5–2.18)
SICKLE CELL SCREEN: NEGATIVE
SODIUM BLD-SCNC: 139 MMOL/L (ref 136–145)
SPECIFIC GRAVITY, POC: 1.01
TOTAL IRON BINDING CAPACITY: 410 UG/DL (ref 260–445)
TOTAL PROTEIN: 8.1 G/DL (ref 6.4–8.2)
UROBILINOGEN, POC: 0.2
VITAMIN B-12: 309 PG/ML (ref 211–911)
WBC # BLD: 8.3 K/UL (ref 4–11)

## 2020-03-13 PROCEDURE — 85660 RBC SICKLE CELL TEST: CPT

## 2020-03-13 PROCEDURE — 86140 C-REACTIVE PROTEIN: CPT

## 2020-03-13 PROCEDURE — G8417 CALC BMI ABV UP PARAM F/U: HCPCS | Performed by: NURSE PRACTITIONER

## 2020-03-13 PROCEDURE — 85045 AUTOMATED RETICULOCYTE COUNT: CPT

## 2020-03-13 PROCEDURE — 80053 COMPREHEN METABOLIC PANEL: CPT

## 2020-03-13 PROCEDURE — 85025 COMPLETE CBC W/AUTO DIFF WBC: CPT

## 2020-03-13 PROCEDURE — G8482 FLU IMMUNIZE ORDER/ADMIN: HCPCS | Performed by: NURSE PRACTITIONER

## 2020-03-13 PROCEDURE — 82607 VITAMIN B-12: CPT

## 2020-03-13 PROCEDURE — 83735 ASSAY OF MAGNESIUM: CPT

## 2020-03-13 PROCEDURE — 99214 OFFICE O/P EST MOD 30 MIN: CPT | Performed by: NURSE PRACTITIONER

## 2020-03-13 PROCEDURE — G8427 DOCREV CUR MEDS BY ELIG CLIN: HCPCS | Performed by: NURSE PRACTITIONER

## 2020-03-13 PROCEDURE — 36415 COLL VENOUS BLD VENIPUNCTURE: CPT

## 2020-03-13 PROCEDURE — 1036F TOBACCO NON-USER: CPT | Performed by: NURSE PRACTITIONER

## 2020-03-13 PROCEDURE — 83550 IRON BINDING TEST: CPT

## 2020-03-13 PROCEDURE — 81002 URINALYSIS NONAUTO W/O SCOPE: CPT | Performed by: NURSE PRACTITIONER

## 2020-03-13 PROCEDURE — 83540 ASSAY OF IRON: CPT

## 2020-03-13 PROCEDURE — 82746 ASSAY OF FOLIC ACID SERUM: CPT

## 2020-03-13 RX ORDER — PREGABALIN 75 MG/1
75 CAPSULE ORAL 2 TIMES DAILY
Qty: 60 CAPSULE | Refills: 3 | Status: SHIPPED | OUTPATIENT
Start: 2020-03-13 | End: 2020-03-27 | Stop reason: DRUGHIGH

## 2020-03-13 RX ORDER — PREDNISONE 20 MG/1
TABLET ORAL
Qty: 14 TABLET | Refills: 0 | Status: SHIPPED | OUTPATIENT
Start: 2020-03-13 | End: 2020-03-27

## 2020-03-13 RX ORDER — SULFAMETHOXAZOLE AND TRIMETHOPRIM 800; 160 MG/1; MG/1
1 TABLET ORAL 2 TIMES DAILY
Qty: 20 TABLET | Refills: 0 | Status: SHIPPED | OUTPATIENT
Start: 2020-03-13 | End: 2020-03-23

## 2020-03-13 ASSESSMENT — ENCOUNTER SYMPTOMS
ALLERGIC/IMMUNOLOGIC NEGATIVE: 1
VOMITING: 0
SHORTNESS OF BREATH: 0
WHEEZING: 0
NAUSEA: 0
ABDOMINAL PAIN: 1
COUGH: 0
CHEST TIGHTNESS: 0
CONSTIPATION: 0
BACK PAIN: 1
DIARRHEA: 0

## 2020-03-13 NOTE — PROGRESS NOTES
OTHER SURGICAL HISTORY  08/05/2016    CYSTOSCOPY, BILATERAL RETROGRADES, RIGHT URETEROSCOPY,    TONSILLECTOMY      TUBAL LIGATION         Family History   Problem Relation Age of Onset    Cancer Maternal Grandmother         Lung Ca       Social History     Socioeconomic History    Marital status: Single     Spouse name: Not on file    Number of children: Not on file    Years of education: Not on file    Highest education level: Not on file   Occupational History    Not on file   Social Needs    Financial resource strain: Not on file    Food insecurity     Worry: Not on file     Inability: Not on file    Transportation needs     Medical: Not on file     Non-medical: Not on file   Tobacco Use    Smoking status: Never Smoker    Smokeless tobacco: Never Used   Substance and Sexual Activity    Alcohol use: Yes     Alcohol/week: 0.0 standard drinks     Comment: occasional     Drug use: No    Sexual activity: Yes     Partners: Male   Lifestyle    Physical activity     Days per week: Not on file     Minutes per session: Not on file    Stress: Not on file   Relationships    Social connections     Talks on phone: Not on file     Gets together: Not on file     Attends Holiness service: Not on file     Active member of club or organization: Not on file     Attends meetings of clubs or organizations: Not on file     Relationship status: Not on file    Intimate partner violence     Fear of current or ex partner: Not on file     Emotionally abused: Not on file     Physically abused: Not on file     Forced sexual activity: Not on file   Other Topics Concern    Not on file   Social History Narrative    Not on file       Review of Systems   Constitutional: Positive for activity change and fatigue. Negative for chills and fever. Respiratory: Negative for cough, chest tightness, shortness of breath and wheezing. Cardiovascular: Negative for chest pain and palpitations.    Gastrointestinal: Positive for Right upper arm: She exhibits tenderness. Left upper arm: She exhibits tenderness. Right hand: She exhibits tenderness. Left hand: She exhibits tenderness. Skin:     General: Skin is warm and dry. Neurological:      Mental Status: She is alert and oriented to person, place, and time. Cranial Nerves: Cranial nerves are intact. Sensory: Sensation is intact. Motor: Motor function is intact. Coordination: Coordination is intact. Gait: Gait is intact. Psychiatric:         Mood and Affect: Mood is anxious and depressed. Affect is tearful. Speech: Speech normal.         Behavior: Behavior is slowed. Behavior is cooperative. Thought Content: Thought content normal.         Judgment: Judgment normal.         ASSESSMENT/PLAN:    1. Muscle pain, fibromyalgia  Taper of predniosone, use with muscle relaxer. Use heat, rest, gentle stretch. Rest, healthy diet, drink plenty of water. Pain medication as prescribed. Referral to Pain Management. See above plan. Lab work. - predniSONE (DELTASONE) 20 MG tablet; 2 tablets by mouth x 4 days, 1 tablet x 3 days, 1/2 tablet x 3 days  Dispense: 14 tablet; Refill: 0  - [facility] ([facility]) - Omi Thorpe MD, Pain Management, [facility]  - POCT Urinalysis no Micro  - RETICULOCYTES; Future    2. Fibromyalgia  Increase Lyrica  Taper of predniosone, use with muscle relaxer. Use heat, rest, gentle stretch. Rest, healthy diet, drink plenty of water. Pain medication as prescribed. Referral to Pain Management. Referral back to Rheumatology. - pregabalin (LYRICA) 75 MG capsule; Take 1 capsule by mouth 2 times daily for 30 days. Dispense: 60 capsule; Refill: 3    3. Arthralgia, unspecified joint  See above plan. - Comprehensive Metabolic Panel, Fasting; Future  - CBC Auto Differential; Future  - Magnesium; Future  - C-Reactive Protein; Future  - RETICULOCYTES; Future    4. Nausea  [place] diet.   Labs  Medication as

## 2020-03-14 LAB
BACTERIA: ABNORMAL /HPF
BILIRUBIN URINE: NEGATIVE
BLOOD, URINE: NEGATIVE
CLARITY: ABNORMAL
COLOR: YELLOW
EPITHELIAL CELLS, UA: 16 /HPF (ref 0–5)
GLUCOSE URINE: NEGATIVE MG/DL
HYALINE CASTS: 7 /LPF (ref 0–8)
KETONES, URINE: NEGATIVE MG/DL
LEUKOCYTE ESTERASE, URINE: NEGATIVE
MICROSCOPIC EXAMINATION: YES
NITRITE, URINE: NEGATIVE
PH UA: 6.5 (ref 5–8)
PROTEIN UA: NEGATIVE MG/DL
RBC UA: 1 /HPF (ref 0–4)
SPECIFIC GRAVITY UA: 1.02 (ref 1–1.03)
URINE TYPE: ABNORMAL
UROBILINOGEN, URINE: 0.2 E.U./DL
WBC UA: 4 /HPF (ref 0–5)

## 2020-03-15 ASSESSMENT — ENCOUNTER SYMPTOMS
BACK PAIN: 1
SHORTNESS OF BREATH: 0
ABDOMINAL PAIN: 0
WHEEZING: 0
VOMITING: 0
ALLERGIC/IMMUNOLOGIC NEGATIVE: 1
NAUSEA: 0
CONSTIPATION: 0
CHEST TIGHTNESS: 0
DIARRHEA: 0
COUGH: 0

## 2020-03-20 ENCOUNTER — TELEPHONE (OUTPATIENT)
Dept: INTERNAL MEDICINE CLINIC | Age: 40
End: 2020-03-20

## 2020-03-20 NOTE — TELEPHONE ENCOUNTER
Called pt to make her aware. Spk/w/opt and she is aware I asked for her to call her insurance company to find out where she can go or if they can fax us a list to the office. Provided fax number to pt. Pt understood and will find out and call us back with the information.

## 2020-03-22 ENCOUNTER — APPOINTMENT (OUTPATIENT)
Dept: GENERAL RADIOLOGY | Age: 40
End: 2020-03-22
Payer: MEDICAID

## 2020-03-22 ENCOUNTER — HOSPITAL ENCOUNTER (EMERGENCY)
Age: 40
Discharge: HOME | End: 2020-03-22
Payer: MEDICAID

## 2020-03-22 VITALS
DIASTOLIC BLOOD PRESSURE: 74 MMHG | RESPIRATION RATE: 21 BRPM | HEIGHT: 67 IN | BODY MASS INDEX: 29.25 KG/M2 | WEIGHT: 186.38 LBS | SYSTOLIC BLOOD PRESSURE: 125 MMHG | HEART RATE: 72 BPM | TEMPERATURE: 98.2 F | OXYGEN SATURATION: 99 %

## 2020-03-22 PROCEDURE — 99283 EMERGENCY DEPT VISIT LOW MDM: CPT

## 2020-03-22 PROCEDURE — 6360000002 HC RX W HCPCS: Performed by: NURSE PRACTITIONER

## 2020-03-22 PROCEDURE — 96372 THER/PROPH/DIAG INJ SC/IM: CPT

## 2020-03-22 PROCEDURE — 93005 ELECTROCARDIOGRAM TRACING: CPT

## 2020-03-22 PROCEDURE — 73030 X-RAY EXAM OF SHOULDER: CPT

## 2020-03-22 PROCEDURE — 6370000000 HC RX 637 (ALT 250 FOR IP): Performed by: NURSE PRACTITIONER

## 2020-03-22 RX ORDER — OXYCODONE HYDROCHLORIDE AND ACETAMINOPHEN 5; 325 MG/1; MG/1
2 TABLET ORAL ONCE
Status: COMPLETED | OUTPATIENT
Start: 2020-03-22 | End: 2020-03-22

## 2020-03-22 RX ORDER — KETOROLAC TROMETHAMINE 30 MG/ML
60 INJECTION, SOLUTION INTRAMUSCULAR; INTRAVENOUS ONCE
Status: COMPLETED | OUTPATIENT
Start: 2020-03-22 | End: 2020-03-22

## 2020-03-22 RX ADMIN — OXYCODONE HYDROCHLORIDE AND ACETAMINOPHEN 2 TABLET: 5; 325 TABLET ORAL at 13:02

## 2020-03-22 RX ADMIN — KETOROLAC TROMETHAMINE 60 MG: 30 INJECTION, SOLUTION INTRAMUSCULAR at 13:02

## 2020-03-22 ASSESSMENT — PAIN DESCRIPTION - LOCATION: LOCATION: ARM

## 2020-03-22 ASSESSMENT — ENCOUNTER SYMPTOMS
ABDOMINAL PAIN: 0
VOMITING: 0
NAUSEA: 0
SHORTNESS OF BREATH: 0
RHINORRHEA: 0
CONSTIPATION: 0
BLOOD IN STOOL: 0
DIARRHEA: 0
SORE THROAT: 0

## 2020-03-22 ASSESSMENT — PAIN SCALES - GENERAL
PAINLEVEL_OUTOF10: 10
PAINLEVEL_OUTOF10: 10

## 2020-03-22 ASSESSMENT — PAIN DESCRIPTION - PAIN TYPE: TYPE: ACUTE PAIN

## 2020-03-22 NOTE — LETTER
[facility] Emergency Department  [address] [facility], [address]             March 22, 2020    Patient: Frederic Nelson   YOB: 1980   Date of Visit: 3/22/2020       To Whom It May Concern:    Rosie Frank was seen and treated in our emergency department on 3/22/2020. She may return to work on 3/25/2020.       Sincerely,         Crissy Reddy RN

## 2020-03-22 NOTE — ED PROVIDER NOTES
[facility]        Pt Name: Salty Cruz  MRN: [id number]  [place] 1980  Date of evaluation: 3/22/2020  Provider: LEESA Cuevas CNP  PCP: LEESA Gillespie CNP    This patient was not seen and evaluated by the attending physician No att. providers found. CHIEF COMPLAINT       Chief Complaint   Patient presents with    Shoulder Pain     c/o left shoulder pain, that radiates to her elbow, and neck, back hips and ankles x one month        HISTORY OF PRESENT ILLNESS   (Location/Symptom, Timing/Onset,Context/Setting, Quality, Duration, Modifying Factors, Severity)  Note limiting factors. Salty Cruz is a 42 y.o. female who presents emergency department with concern for left shoulder pain. Present for 1 month. She has a history of lupus and believes this is a lupus flare. She has been seen by her PCP. Referred to pain management. She has had a difficult time getting in due to insurance reasons. She sees rheumatology in 3 days. The patient is on her second course of prednisone for this. She is also tried Ultram, Norco, Naprosyn, and muscle relaxers without improvement. She is now experiencing pain in her left wrist, bilateral hips, and bilateral ankles. She denies any injury or trauma. She also denies fever, rash, headaches, dizziness, chest pain, shortness of breath, cough, congestion, abdominal pain, nausea, vomiting, diarrhea, constipation, blood in the stool, or painful urination. No family at bedside. Nursing Notes triage note reviewed and agreed with or any disagreements were addressed  in the HPI. REVIEW OF SYSTEMS    (2-9 systems for level 4, 10 or more for level 5)     Review of Systems   Constitutional: Negative for chills and fever. HENT: Negative for postnasal drip, rhinorrhea and sore throat. Eyes: Negative for visual disturbance.    Respiratory: Negative for shortness of breath. Cardiovascular: Negative for chest pain. Gastrointestinal: Negative for abdominal pain, blood in stool, constipation, diarrhea, nausea and vomiting. Genitourinary: Negative for dysuria, flank pain and hematuria. Musculoskeletal: Positive for arthralgias. Skin: Negative for rash. Neurological: Negative for weakness and headaches. All other systems reviewed and are negative. Positives and Pertinent negatives as per HPI. Except as noted above in the ROS, all other systems were reviewed and negative. PAST MEDICAL HISTORY     Past Medical History:   Diagnosis Date    Asthma     Constipation 2014    Fibromyalgia     Graves disease     History of blood transfusion     Hx of blood clots      LT LUNG W/ PNEUMONIA    Joint pain 2014    Various joints (back, hands, knees, hips), recurrent flares since age 27, RF+ at one point, never fully worked up for rheumatologic dz yet    Kidney stone     Localized rash 2014    Recurrent, on inner surface of upper arms, q 3 months, sometimes on chest, no facial involvement    Lupus ([address]     PONV (postoperative nausea and vomiting)          SURGICAL HISTORY       Past Surgical History:   Procedure Laterality Date     SECTION       X 2    CYSTOSCOPY  2018    CYSTOSCOPY URETEROSCOPY WITH HOLMIUM LASER LITHOTRIPSY FOR 5 MM STONE, RIGHT STENT PLACEMENT    OTHER SURGICAL HISTORY  2016    CYSTOSCOPY, BILATERAL RETROGRADES, RIGHT URETEROSCOPY,    TONSILLECTOMY      TUBAL LIGATION           CURRENT MEDICATIONS       Previous Medications    ALBUTEROL SULFATE  (90 BASE) MCG/ACT INHALER    Inhale 2 puffs into the lungs 4 times daily as needed for Wheezing    BECLOMETHASONE (QVAR REDIHALER) 40 MCG/ACT AERB INHALER    Inhale 1 puff into the lungs 2 times daily    CLONAZEPAM (KLONOPIN) 0.5 MG TABLET    Take 1 tablet by mouth nightly as needed for Anxiety for up to 30 days.     DULOXETINE (CYMBALTA) 60 MG EXTENDED RELEASE CAPSULE    Take 1 capsule by mouth daily    EPINEPHRINE (EPIPEN 2-LUANNE) 0.3 MG/0.3ML SOAJ INJECTION    Inject 0.3 mLs into the muscle once for 1 dose Use as directed for allergic reaction    FERROUS SULFATE 325 (65 FE) MG TABLET    Take 1 tablet by mouth 2 times daily    HANDICAP PLACARD MISC    by Does not apply route Unable able to walk long distance due to medical condition. Expires in 5 years. HYDROCHLOROTHIAZIDE (HYDRODIURIL) 12.5 MG TABLET    Take 1 tablet by mouth daily    LEVONORGESTREL-ETHINYL ESTRADIOL (SEASONALE) 0.15-0.03 MG PER TABLET    Take 1 tablet by mouth daily    ONDANSETRON (ZOFRAN) 4 MG TABLET    Take 1 tablet by mouth daily as needed for Nausea or Vomiting    PANTOPRAZOLE (PROTONIX) 20 MG TABLET    Take 1 tablet by mouth every morning (before breakfast)    PREDNISONE (DELTASONE) 20 MG TABLET    2 tablets by mouth x 4 days, 1 tablet x 3 days, 1/2 tablet x 3 days    PREGABALIN (LYRICA) 75 MG CAPSULE    Take 1 capsule by mouth 2 times daily for 30 days. RIZATRIPTAN (MAXALT) 10 MG TABLET    Take 1 tablet by mouth once as needed for Migraine May repeat in 2 hours if needed    SPACER/AERO-HOLDING CHAMBERS (AEROCHAMBER PLUS NAVDEEP-VU LARGE) MISC    1 Device by Does not apply route 2 times daily    SPACER/AERO-HOLDING CHAMBERS (AEROCHAMBER PLUS NAVDEEP-VU W/MASK) MISC    1 Device by Does not apply route 2 times daily    SUCRALFATE (CARAFATE) 1 GM TABLET    Take 1 tablet by mouth 2 times daily    SULFAMETHOXAZOLE-TRIMETHOPRIM (BACTRIM DS;SEPTRA DS) 800-160 MG PER TABLET    Take 1 tablet by mouth 2 times daily for 10 days    TIZANIDINE (ZANAFLEX) 4 MG TABLET    Take 1 tablet by mouth three time a day 8 hours apart. TRIAMCINOLONE (KENALOG) 0.025 % LOTN LOTION    Apply topically 2 times daily         ALLERGIES     Ambien [zolpidem tartrate];  Sudafed [pseudoephedrine hcl]; and Amitriptyline hcl    FAMILY HISTORY       Family History   Problem Relation Age of Onset    Cancer Maternal Grandmother         Lung Ca          SOCIAL HISTORY       Social History     Socioeconomic History    Marital status: Single     Spouse name: None    Number of children: None    Years of education: None    Highest education level: None   Occupational History    None   Social Needs    Financial resource strain: None    Food insecurity     Worry: None     Inability: None    Transportation needs     Medical: None     Non-medical: None   Tobacco Use    Smoking status: Never Smoker    Smokeless tobacco: Never Used   Substance and Sexual Activity    Alcohol use: Yes     Alcohol/week: 0.0 standard drinks     Comment: occasional     Drug use: No    Sexual activity: Yes     Partners: Male   Lifestyle    Physical activity     Days per week: None     Minutes per session: None    Stress: None   Relationships    Social connections     Talks on phone: None     Gets together: None     Attends Yarsanism service: None     Active member of club or organization: None     Attends meetings of clubs or organizations: None     Relationship status: None    Intimate partner violence     Fear of current or ex partner: None     Emotionally abused: None     Physically abused: None     Forced sexual activity: None   Other Topics Concern    None   Social History Narrative    None       SCREENINGS             PHYSICAL EXAM  (up to 7 for level 4, 8 or more for level 5)     ED Triage Vitals   BP Temp Temp Source Pulse Resp SpO2 Height Weight   03/22/20 1200 03/22/20 1200 03/22/20 1200 03/22/20 1200 03/22/20 1200 03/22/20 1230 03/22/20 1200 03/22/20 1200   122/87 97.3 °F (36.3 °C) Oral 87 16 100 % 5' 7.25\" (1.708 m) 186 lb 6 oz (84.5 kg)       Physical Exam  Vitals signs and nursing note reviewed. Constitutional:       Appearance: She is well-developed. She is not diaphoretic. HENT:      Head: Normocephalic and atraumatic. Eyes:      General: No scleral icterus. Right eye: No discharge. Left eye: No discharge. BP: 123/70 111/76 119/72 117/82   Pulse: 69 70 79 72   Resp: 13 11 18 12   Temp:       TempSrc:       SpO2: 100% 100% 100% 100%   Weight:       Height:           Saba Stanton was given the following medications:  Medications   oxyCODONE-acetaminophen (PERCOCET) 5-325 MG per tablet 2 tablet (2 tablets Oral Given 3/22/20 1302)   ketorolac (TORADOL) injection 60 mg (60 mg Intramuscular Given 3/22/20 1302)       Saba Stanton was evaluated in the emergency department with concern for pain to her left shoulder. History of lupus. She believes this is the cause. The pain is been chronic. She is having worsening pain of her left shoulder as well as pain that radiates into her hips and ankles. On my exam she has no abnormality of the joints. She is able to ambulate without assistance and has full range of motion. She was treated with Percocet and Toradol in the ER. X-ray imaging performed. Negative for acute abnormality. I did review the patient's prescription monitoring report. She has been prescribed opioids by physicians in the past.  This is for chronic pain. It does not meet criteria for prescription at this time. I did discuss this with the patient who understands this. I referred her to pain management for further management of her chronic pain symptoms. Patient is requesting a stronger pain medication. This was declined at this time due to [facility] recommendations. This did upset the patient as patient does not feel pain is adequately treated at this time. Saba Stanton is stable in the ER and safe to follow as an outpatient. The patient is discharged on the following medications. They were counseled on how to take the newly prescribed medications:  New Prescriptions    No medications on file    .       Instructed to follow-up with:  Lina Schmidt, APRN - CNP  [address]  [address]    Schedule an appointment as soon as possible for a visit in

## 2020-03-22 NOTE — ED NOTES
Reviewed discharge instructions with patient, verbalized understanding, ambulatory at time of discharge.         Yuriy Tay RN  03/22/20 [id number]

## 2020-03-23 ENCOUNTER — TELEPHONE (OUTPATIENT)
Dept: INTERNAL MEDICINE CLINIC | Age: 40
End: 2020-03-23

## 2020-03-23 ENCOUNTER — CARE COORDINATION (OUTPATIENT)
Dept: CARE COORDINATION | Age: 40
End: 2020-03-23

## 2020-03-23 LAB
EKG ATRIAL RATE: 80 BPM
EKG DIAGNOSIS: NORMAL
EKG P AXIS: 57 DEGREES
EKG P-R INTERVAL: 178 MS
EKG Q-T INTERVAL: 388 MS
EKG QRS DURATION: 70 MS
EKG QTC CALCULATION (BAZETT): 447 MS
EKG R AXIS: 33 DEGREES
EKG T AXIS: 9 DEGREES
EKG VENTRICULAR RATE: 80 BPM

## 2020-03-23 PROCEDURE — 93010 ELECTROCARDIOGRAM REPORT: CPT | Performed by: INTERNAL MEDICINE

## 2020-03-23 RX ORDER — OXYCODONE HYDROCHLORIDE AND ACETAMINOPHEN 5; 325 MG/1; MG/1
1 TABLET ORAL EVERY 6 HOURS PRN
Qty: 28 TABLET | Refills: 0 | Status: SHIPPED | OUTPATIENT
Start: 2020-03-23 | End: 2020-04-10 | Stop reason: SDUPTHER

## 2020-03-23 NOTE — CARE COORDINATION
Initial ED/Covid follow up call placed, no answer.  [employer] message left  Will attempt again at later date

## 2020-03-23 NOTE — CARE COORDINATION
Patient contacted regarding recent discharge and COVID-19 risk  Care Transition Nurse/ Ambulatory Care Manager contacted the parent by telephone to perform post discharge assessment. Verified name and  with patient as identifiers. Patient has following risk factors of: Contraindications,  . CTN/ACM reviewed discharge instructions, medical action plan and red flags related to discharge diagnosis. Reviewed and educated them on any new and changed medications related to discharge diagnosis. Advised obtaining a 90-day supply of all daily and as-needed medications. Education provided regarding infection prevention, and signs and symptoms of COVID-19 and when to seek medical attention with patient who verbalized understanding. Discussed exposure protocols and quarantine from [address] you at higher risk for severe illness  and given an opportunity for questions and concerns. The patient agrees to contact the COVID-19 hotline [phone] or PCP office for questions related to their healthcare. CTN/ACM provided contact information for future reference. From CDC: Are you at higher risk for severe illness?  Wash your hands often.  Avoid close contact (6 feet, which is about two arm lengths) with people who are sick.  Put distance between yourself and other people if COVID-19 is spreading in your community.  Clean and disinfect frequently touched surfaces.  Avoid all cruise travel and non-essential air travel.  Call your healthcare professional if you have concerns about COVID-19 and your underlying condition or if you are sick. For more information on steps you can take to protect yourself, see CDC's How to Protect Yourself     Pt also reports pain not currently controlled by pain meds, she had placed a call to pcp and script was called to pt pharmacy. States she will  later today.    Will follow up at later date

## 2020-03-23 NOTE — TELEPHONE ENCOUNTER
Patient went to the ER for pain, and was given pain meds. She is almost out of meds - and asking for more medication to hold her over until her appt here on Friday.   Call back at [phone]

## 2020-03-24 ENCOUNTER — TELEPHONE (OUTPATIENT)
Dept: INTERNAL MEDICINE CLINIC | Age: 40
End: 2020-03-24

## 2020-03-24 NOTE — TELEPHONE ENCOUNTER
Please provide her with pain referral that was ordered at ED visit. Referral is in system she will need the number if they have not contacted her it is on the referral. Also, Some [facility] paperwork was updated and sent on 3/13/20 but no paperwork for any disability has been supplied to us for submission. If this was suppose to be faxed or sent to us we have not received this she will need to contact the office that was to supply us with that paperwork. Thanks.  Colton Luciano

## 2020-03-25 ENCOUNTER — VIRTUAL VISIT (OUTPATIENT)
Dept: RHEUMATOLOGY | Age: 40
End: 2020-03-25
Payer: MEDICAID

## 2020-03-25 PROCEDURE — 99214 OFFICE O/P EST MOD 30 MIN: CPT | Performed by: INTERNAL MEDICINE

## 2020-03-25 NOTE — PROGRESS NOTES
indicates understanding and agrees with the management plan. I reviewed patients medical information and medical history in the medical records. ##################################################################    Subjective: This is her urgent visit. She saw me in July 2018 because of fibromyalgia. She states that she went to emergency room recently because of pain all over, worse in her arms, neck, back, thighs. Dull achiness as well as sharp pains all over, has been going on for several months however was much worse in last week. No fever, chills, shortness of breath or cough. She was given several medications while at ER including 20 mg of prednisone, oxycodone, pantoprazole. States that other than oxycodone, nothing really helps with her pain. She does not have any swollen or inflamed joints. No focal muscle weakness. No psoriasis or inflammatory bowel diseases. All other review of systems are negative. Past medical, surgical, family history, meds- reviewed. Current Outpatient Medications   Medication Sig Dispense Refill    oxyCODONE-acetaminophen (PERCOCET) 5-325 MG per tablet Take 1 tablet by mouth every 6 hours as needed for Pain for up to 7 days. Intended supply: 7 days. Take lowest dose possible to manage pain 28 tablet 0    pantoprazole (PROTONIX) 20 MG tablet Take 1 tablet by mouth every morning (before breakfast) 30 tablet 3    predniSONE (DELTASONE) 20 MG tablet 2 tablets by mouth x 4 days, 1 tablet x 3 days, 1/2 tablet x 3 days 14 tablet 0    pregabalin (LYRICA) 75 MG capsule Take 1 capsule by mouth 2 times daily for 30 days. 60 capsule 3    tiZANidine (ZANAFLEX) 4 MG tablet Take 1 tablet by mouth three time a day 8 hours apart. 90 tablet 5    clonazePAM (KLONOPIN) 0.5 MG tablet Take 1 tablet by mouth nightly as needed for Anxiety for up to 30 days.  30 tablet 0    albuterol sulfate  (90 Base) MCG/ACT inhaler Inhale 2 puffs into the lungs 4 times daily as needed for Wheezing 1 Inhaler 5    beclomethasone (QVAR REDIHALER) 40 MCG/ACT AERB inhaler Inhale 1 puff into the lungs 2 times daily 1 Inhaler 5    DULoxetine (CYMBALTA) 60 MG extended release capsule Take 1 capsule by mouth daily 30 capsule 5    hydrochlorothiazide (HYDRODIURIL) 12.5 MG tablet Take 1 tablet by mouth daily 30 tablet 3    levonorgestrel-ethinyl estradiol (SEASONALE) 0.15-0.03 MG per tablet Take 1 tablet by mouth daily 1 packet 3    ondansetron (ZOFRAN) 4 MG tablet Take 1 tablet by mouth daily as needed for Nausea or Vomiting 30 tablet 0    rizatriptan (MAXALT) 10 MG tablet Take 1 tablet by mouth once as needed for Migraine May repeat in 2 hours if needed 27 tablet 1    sucralfate (CARAFATE) 1 GM tablet Take 1 tablet by mouth 2 times daily 60 tablet 1    triamcinolone (KENALOG) 0.025 % LOTN lotion Apply topically 2 times daily 60 mL 2    Spacer/Aero-Holding Chambers (AEROCHAMBER PLUS NAVDEEP-VU W/MASK) MISC 1 Device by Does not apply route 2 times daily 2 each 0    Spacer/Aero-Holding Chambers (AEROCHAMBER PLUS NAVDEEP-VU LARGE) MISC 1 Device by Does not apply route 2 times daily 1 each 0    Handicap Placard MISC by Does not apply route Unable able to walk long distance due to medical condition. Expires in 5 years. 1 each 0    EPINEPHrine (EPIPEN 2-LUANNE) 0.3 MG/0.3ML SOAJ injection Inject 0.3 mLs into the muscle once for 1 dose Use as directed for allergic reaction 1 each 0    ferrous sulfate 325 (65 Fe) MG tablet Take 1 tablet by mouth 2 times daily 60 tablet 2     No current facility-administered medications for this visit. OBJECTIVE  Physical    There were no vitals filed for this visit. Examination deferred, patient does not have any specific physical findings to share at the time of the e visit.     Data:  Lab Results   Component Value Date    WBC 8.3 03/13/2020    RBC 4.47 03/13/2020    HGB 11.4 03/13/2020    HCT 34.9 03/13/2020     03/13/2020    MCV 78.1 03/13/2020    MCH 25.5 03/13/2020    MCHC 32.7 03/13/2020    RDW 17.0 03/13/2020    SEGSPCT 50 11/26/2014    LYMPHOPCT 33.8 03/13/2020    MONOPCT 7.3 03/13/2020    BASOPCT 0.8 03/13/2020    MONOSABS 0.6 03/13/2020    LYMPHSABS 2.8 03/13/2020    EOSABS 0.3 03/13/2020    BASOSABS 0.1 03/13/2020       Chemistry        Component Value Date/Time     03/13/2020 1201    K 4.1 03/13/2020 1201    K 3.7 08/02/2019 1830     03/13/2020 1201    CO2 25 03/13/2020 1201    BUN 13 03/13/2020 1201    CREATININE 0.6 03/13/2020 1201        Component Value Date/Time    CALCIUM 10.2 03/13/2020 1201    ALKPHOS 92 03/13/2020 1201    AST 19 03/13/2020 1201    ALT 18 03/13/2020 1201    BILITOT 0.3 03/13/2020 1201          I thank you for giving me the opportunity to be involved in [employer] care and I look forward following Greyson Pichardo along with you. If you have any questions or concerns please feel free to contact me at any time. Cyndee Blackburn MD 03/25/20         NOTE: This report was transcribed using voice recognition software. Every effort was made to ensure accuracy; however, inadvertent computerized transcription errors may be present.

## 2020-03-27 ENCOUNTER — TELEPHONE (OUTPATIENT)
Dept: INTERNAL MEDICINE CLINIC | Age: 40
End: 2020-03-27

## 2020-03-27 ENCOUNTER — TELEMEDICINE (OUTPATIENT)
Dept: INTERNAL MEDICINE CLINIC | Age: 40
End: 2020-03-27
Payer: MEDICAID

## 2020-03-27 PROCEDURE — 99214 OFFICE O/P EST MOD 30 MIN: CPT | Performed by: NURSE PRACTITIONER

## 2020-03-27 PROCEDURE — G8428 CUR MEDS NOT DOCUMENT: HCPCS | Performed by: NURSE PRACTITIONER

## 2020-03-27 RX ORDER — PREDNISONE 20 MG/1
TABLET ORAL
Qty: 18 TABLET | Refills: 0 | Status: SHIPPED | OUTPATIENT
Start: 2020-03-27 | End: 2020-05-26

## 2020-03-27 RX ORDER — TEMAZEPAM 30 MG/1
30 CAPSULE ORAL NIGHTLY PRN
Qty: 30 CAPSULE | Refills: 1 | Status: SHIPPED | OUTPATIENT
Start: 2020-03-27 | End: 2020-07-02 | Stop reason: SDUPTHER

## 2020-03-27 RX ORDER — CLONAZEPAM 0.5 MG/1
0.5 TABLET ORAL NIGHTLY PRN
Qty: 30 TABLET | Refills: 0 | Status: SHIPPED | OUTPATIENT
Start: 2020-03-27 | End: 2020-05-26

## 2020-03-27 RX ORDER — PREDNISONE 20 MG/1
40 TABLET ORAL DAILY
Qty: 20 TABLET | Refills: 0 | Status: CANCELLED | OUTPATIENT
Start: 2020-03-27 | End: 2020-04-06

## 2020-03-27 RX ORDER — PREGABALIN 100 MG/1
100 CAPSULE ORAL 2 TIMES DAILY
Qty: 60 CAPSULE | Refills: 3 | Status: SHIPPED | OUTPATIENT
Start: 2020-03-27 | End: 2020-05-01 | Stop reason: ALTCHOICE

## 2020-03-27 NOTE — PROGRESS NOTES
Number of children: Not on file    Years of education: Not on file    Highest education level: Not on file   Occupational History    Not on file   Social Needs    Financial resource strain: Not on file    Food insecurity     Worry: Not on file     Inability: Not on file    Transportation needs     Medical: Not on file     Non-medical: Not on file   Tobacco Use    Smoking status: Never Smoker    Smokeless tobacco: Never Used   Substance and Sexual Activity    Alcohol use: Yes     Alcohol/week: 0.0 standard drinks     Comment: occasional     Drug use: No    Sexual activity: Yes     Partners: Male   Lifestyle    Physical activity     Days per week: Not on file     Minutes per session: Not on file    Stress: Not on file   Relationships    Social connections     Talks on phone: Not on file     Gets together: Not on file     Attends Latter day service: Not on file     Active member of club or organization: Not on file     Attends meetings of clubs or organizations: Not on file     Relationship status: Not on file    Intimate partner violence     Fear of current or ex partner: Not on file     Emotionally abused: Not on file     Physically abused: Not on file     Forced sexual activity: Not on file   Other Topics Concern    Not on file   Social History Narrative    Not on file       Review of Systems   Constitutional: Positive for activity change and fatigue. Negative for chills and fever. Respiratory: Negative for cough, chest tightness, shortness of breath and wheezing. Cardiovascular: Negative for chest pain and palpitations. Gastrointestinal: Negative for abdominal pain, constipation, diarrhea, nausea and vomiting. Endocrine: Negative. Genitourinary: Negative. Musculoskeletal: Positive for arthralgias, back pain, myalgias and neck pain. Skin: Negative. Allergic/Immunologic: Negative. Neurological: Negative for dizziness, syncope, speech difficulty, weakness and light-headedness.

## 2020-03-28 ASSESSMENT — ENCOUNTER SYMPTOMS
ABDOMINAL PAIN: 0
DIARRHEA: 0
ALLERGIC/IMMUNOLOGIC NEGATIVE: 1
BACK PAIN: 1
CHEST TIGHTNESS: 0
NAUSEA: 0
WHEEZING: 0
VOMITING: 0
COUGH: 0
SHORTNESS OF BREATH: 0
CONSTIPATION: 0

## 2020-04-02 ENCOUNTER — TELEPHONE (OUTPATIENT)
Dept: INTERNAL MEDICINE CLINIC | Age: 40
End: 2020-04-02

## 2020-04-03 ENCOUNTER — TELEPHONE (OUTPATIENT)
Dept: INTERNAL MEDICINE CLINIC | Age: 40
End: 2020-04-03

## 2020-04-03 NOTE — TELEPHONE ENCOUNTER
Please see if you received these in your [address] email to scan to chart and send to [place], please remember virtual follow up and ED visit. . Thanks. Michelle Nieto

## 2020-04-07 ENCOUNTER — CARE COORDINATION (OUTPATIENT)
Dept: CARE COORDINATION | Age: 40
End: 2020-04-07

## 2020-04-08 ENCOUNTER — TELEPHONE (OUTPATIENT)
Dept: INTERNAL MEDICINE CLINIC | Age: 40
End: 2020-04-08

## 2020-04-08 NOTE — TELEPHONE ENCOUNTER
Pt tried to get into the Pain Clinic at [facility]. They are not adding pts at the time due to Covid. She is suppose to call them back in a month.

## 2020-04-10 RX ORDER — OXYCODONE HYDROCHLORIDE AND ACETAMINOPHEN 5; 325 MG/1; MG/1
1 TABLET ORAL EVERY 6 HOURS PRN
Qty: 28 TABLET | Refills: 0 | Status: SHIPPED | OUTPATIENT
Start: 2020-04-10 | End: 2020-04-17

## 2020-04-13 ENCOUNTER — TELEPHONE (OUTPATIENT)
Dept: INTERNAL MEDICINE CLINIC | Age: 40
End: 2020-04-13

## 2020-04-13 NOTE — TELEPHONE ENCOUNTER
Delma Baumgarten, LEESA - CNP  You 1 hour ago (3:54 PM)      Call me tomorrow at 8:30 or 9:00. Thanks.  Oma Barroso

## 2020-04-13 NOTE — TELEPHONE ENCOUNTER
2020   Richard Fermin [place]           1:49 PM   Note      Did make an appointment with you Friday to discuss. She also said her short term disability company sent over forms for clarification and this needs to be done asap so her claim doesn't . Forms scanned into media.                   1:49 PM   Richard Fermin MA routed this conversation to Tray Marrero, [id number], LEESA Wu CNP           2:13 PM   Note      Noted. Thanks. LEESA Damian CNP   to [facility] [address] Clinical Staff  Dominique Beck [place]            2:13 PM   If someone can call me tomorrow morning from the office  we can go over form and answer questions, or I can but in message my answers and if someone can fill out and send back tomorrow. This is the only way to do prior to Friday when I am in the office. We are not to do paperwork at this time was my understanding, but because I started on this prior to [place] I feel it needs to be finished. Thanks. Cece Solorzanos   Me           3:13 PM   Note      Yes I will call you what time works for you.                 3:16 PM   You routed this conversation to LEESA Damian CNP

## 2020-04-13 NOTE — TELEPHONE ENCOUNTER
Did make an appointment with you Friday to discuss. She also said her short term disability company sent over forms for clarification and this needs to be done asap so her claim doesn't . Forms scanned into media.

## 2020-04-15 ENCOUNTER — TELEPHONE (OUTPATIENT)
Dept: ADMINISTRATIVE | Age: 40
End: 2020-04-15

## 2020-04-17 ENCOUNTER — TELEPHONE (OUTPATIENT)
Dept: INTERNAL MEDICINE CLINIC | Age: 40
End: 2020-04-17

## 2020-04-17 ENCOUNTER — TELEMEDICINE (OUTPATIENT)
Dept: INTERNAL MEDICINE CLINIC | Age: 40
End: 2020-04-17
Payer: MEDICAID

## 2020-04-17 PROCEDURE — 99214 OFFICE O/P EST MOD 30 MIN: CPT | Performed by: NURSE PRACTITIONER

## 2020-04-17 PROCEDURE — G8427 DOCREV CUR MEDS BY ELIG CLIN: HCPCS | Performed by: NURSE PRACTITIONER

## 2020-04-17 RX ORDER — AZITHROMYCIN 250 MG/1
250 TABLET, FILM COATED ORAL SEE ADMIN INSTRUCTIONS
Qty: 6 TABLET | Refills: 0 | Status: SHIPPED | OUTPATIENT
Start: 2020-04-17 | End: 2020-04-22

## 2020-04-17 RX ORDER — FLUCONAZOLE 150 MG/1
TABLET ORAL
Qty: 2 TABLET | Refills: 0 | Status: SHIPPED | OUTPATIENT
Start: 2020-04-17 | End: 2020-05-01 | Stop reason: ALTCHOICE

## 2020-04-17 RX ORDER — ALBUTEROL SULFATE 90 UG/1
2 AEROSOL, METERED RESPIRATORY (INHALATION) 4 TIMES DAILY PRN
Qty: 1 INHALER | Refills: 5 | Status: SHIPPED | OUTPATIENT
Start: 2020-04-17 | End: 2020-08-11 | Stop reason: SDUPTHER

## 2020-04-17 NOTE — PROGRESS NOTES
Refill: 0    3. Mild intermittent asthma without complication  Take inhalers as prescribed. Notify office if you have no improvement or worsening of condition. - albuterol sulfate  (90 Base) MCG/ACT inhaler; Inhale 2 puffs into the lungs 4 times daily as needed for Wheezing  Dispense: 1 Inhaler; Refill: 5    4. Yeast infection    - fluconazole (DIFLUCAN) 150 MG tablet; Take one tablet by mouth today and may repeat in 3 days if no improvement. Dispense: 2 tablet; Refill: 0    5. Arthralgia, unspecified joint  Referral to Dr. Kenneth Jordan for pain management and hopefully a different approach. Have referred to  but unable to see patient for 6 weeks after COVID, I think this is a good option due to speciality. However, need to obtain pain control to get patient active and working again.  - Chanelle Ga MD, Pain Management, [place]    6. Fibromyalgia  Questions Lupus/Vs. Fibromyalgia. ? PTSD. Psych referral.  Follow up with Rheumatologist  Obtain appt with specialist at [facility]. RF-11 , RUY negative, And SED 18  Patient not doing well with addition of prednisone, and hydrocodone, with Lyrica, and Cymbalta for pain control. Question th Lupus arthritis and consider referral to [facility]. Pain management referral.    - Chanelle Ga MD, Pain Management, [place]    7. Other chronic pain  Patient with anemia-Iron deficiency. Retic count slightly off, consider referral to Edison Leary MD, Pain Management, [place]      Return in about 2 weeks (around 5/1/2020), or if symptoms worsen or fail to improve, for Lupus exacerbation. Ramila Edmond is a 42 y.o. female being evaluated by a Virtual Visit (video visit) encounter to address concerns as mentioned above. A caregiver was present when appropriate.  Due to this being a TeleHealth encounter (During CPXBT-70 public health emergency), evaluation of the following organ systems was limited:

## 2020-04-17 NOTE — TELEPHONE ENCOUNTER
They are currently working by [email or web address]. Some patient's can be seen in the office with provider approval.   Need to fax referral , last 3 office notes , any labs or imaging related. Fax : [phone]    I will send/fax all information.

## 2020-04-21 ENCOUNTER — PATIENT MESSAGE (OUTPATIENT)
Dept: INTERNAL MEDICINE CLINIC | Age: 40
End: 2020-04-21

## 2020-04-21 RX ORDER — ERENUMAB-AOOE 70 MG/ML
70 INJECTION SUBCUTANEOUS
Qty: 1 PEN | Refills: 3 | Status: SHIPPED | OUTPATIENT
Start: 2020-04-21 | End: 2020-05-21

## 2020-04-21 NOTE — TELEPHONE ENCOUNTER
Received DENIAL for Aimovig 70MG/ML SC SOAJ. Denial letter attached. Please notify patient. Thank you.

## 2020-04-21 NOTE — TELEPHONE ENCOUNTER
Synetiq-Triductor is full unable to leave a mess this time, I will send her a message through [employer] asking if she is still taking Aimovig.

## 2020-04-23 ENCOUNTER — TELEPHONE (OUTPATIENT)
Dept: INTERNAL MEDICINE CLINIC | Age: 40
End: 2020-04-23

## 2020-04-23 NOTE — LETTER
[facility] EMILIANO Lopez [phone] [facility]  Phone: [phone]  Fax: [phone]    LEESA Nur CNP        April 24, 2020     Patient: Nissa Tierney   YOB: 1980   Date of Visit: 4/23/2020       To Whom it May Concern:    Maximilian Laguna may  may return to work on May 1 , 2020 without restrictions. .    If you have any questions or concerns, please don't hesitate to call.     Sincerely,         LEESA Nur CNP

## 2020-05-01 ENCOUNTER — TELEMEDICINE (OUTPATIENT)
Dept: INTERNAL MEDICINE CLINIC | Age: 40
End: 2020-05-01
Payer: MEDICAID

## 2020-05-01 PROCEDURE — G8427 DOCREV CUR MEDS BY ELIG CLIN: HCPCS | Performed by: NURSE PRACTITIONER

## 2020-05-01 PROCEDURE — 99214 OFFICE O/P EST MOD 30 MIN: CPT | Performed by: NURSE PRACTITIONER

## 2020-05-01 RX ORDER — ONDANSETRON 4 MG/1
4 TABLET, FILM COATED ORAL DAILY PRN
Qty: 30 TABLET | Refills: 0 | Status: SHIPPED | OUTPATIENT
Start: 2020-05-01 | End: 2020-07-02 | Stop reason: SDUPTHER

## 2020-05-01 RX ORDER — TRIAMCINOLONE ACETONIDE 40 MG/ML
60 INJECTION, SUSPENSION INTRA-ARTICULAR; INTRAMUSCULAR ONCE
Status: DISCONTINUED | OUTPATIENT
Start: 2020-05-05 | End: 2020-09-09 | Stop reason: HOSPADM

## 2020-05-01 RX ORDER — HYDROCHLOROTHIAZIDE 12.5 MG/1
12.5 TABLET ORAL DAILY
Qty: 30 TABLET | Refills: 3 | Status: SHIPPED | OUTPATIENT
Start: 2020-05-01 | End: 2020-08-11 | Stop reason: SDUPTHER

## 2020-05-01 RX ORDER — PREGABALIN 150 MG/1
150 CAPSULE ORAL 2 TIMES DAILY
Qty: 60 CAPSULE | Refills: 1 | Status: SHIPPED | OUTPATIENT
Start: 2020-05-01 | End: 2021-08-31 | Stop reason: SDUPTHER

## 2020-05-01 RX ORDER — PREGABALIN 75 MG/1
1 CAPSULE ORAL 2 TIMES DAILY
COMMUNITY
Start: 2020-04-14 | End: 2020-05-01

## 2020-05-01 RX ORDER — HYDROXYZINE HYDROCHLORIDE 25 MG/1
25 TABLET, FILM COATED ORAL 2 TIMES DAILY
Status: ON HOLD | COMMUNITY
Start: 2020-04-14 | End: 2020-09-09 | Stop reason: HOSPADM

## 2020-05-01 RX ORDER — PANTOPRAZOLE SODIUM 20 MG/1
20 TABLET, DELAYED RELEASE ORAL 2 TIMES DAILY
Qty: 60 TABLET | Refills: 3 | Status: SHIPPED | OUTPATIENT
Start: 2020-05-01 | End: 2020-07-02 | Stop reason: SDUPTHER

## 2020-05-01 RX ORDER — BENZONATATE 100 MG/1
100-200 CAPSULE ORAL 3 TIMES DAILY PRN
Qty: 30 CAPSULE | Refills: 0 | Status: SHIPPED | OUTPATIENT
Start: 2020-05-01 | End: 2020-07-02 | Stop reason: SDUPTHER

## 2020-05-01 ASSESSMENT — PATIENT HEALTH QUESTIONNAIRE - PHQ9
2. FEELING DOWN, DEPRESSED OR HOPELESS: 1
SUM OF ALL RESPONSES TO PHQ9 QUESTIONS 1 & 2: 1
SUM OF ALL RESPONSES TO PHQ QUESTIONS 1-9: 1
1. LITTLE INTEREST OR PLEASURE IN DOING THINGS: 0
SUM OF ALL RESPONSES TO PHQ QUESTIONS 1-9: 1

## 2020-05-01 ASSESSMENT — ENCOUNTER SYMPTOMS
CONSTIPATION: 0
SHORTNESS OF BREATH: 0
CHEST TIGHTNESS: 0
WHEEZING: 0
COUGH: 0
DIARRHEA: 0
NAUSEA: 0
BACK PAIN: 1
ABDOMINAL PAIN: 0
ALLERGIC/IMMUNOLOGIC NEGATIVE: 1
VOMITING: 0

## 2020-05-01 NOTE — PROGRESS NOTES
Take 1 tablet by mouth daily Yes LEESA Barnett CNP   ondansetron (ZOFRAN) 4 MG tablet Take 1 tablet by mouth daily as needed for Nausea or Vomiting Yes LEESA Barnett CNP   benzonatate (TESSALON PERLES) 100 MG capsule Take 1-2 capsules by mouth 3 times daily as needed for Cough Yes LEESA Barnett CNP   pregabalin (LYRICA) 150 MG capsule Take 1 capsule by mouth 2 times daily for 30 days. Take one in am with breakfast and one at dinner time. Yes LEESA Barnett CNP   pantoprazole (PROTONIX) 20 MG tablet Take 1 tablet by mouth 2 times daily Yes LEESA Barnett CNP   Erenumab-aooe (AIMOVIG) 70 MG/ML SOAJ Inject 70 mg into the skin every 30 days Yes LEESA Barnett CNP   albuterol sulfate  (90 Base) MCG/ACT inhaler Inhale 2 puffs into the lungs 4 times daily as needed for Wheezing Yes LEESA Barnett CNP   clonazePAM (KLONOPIN) 0.5 MG tablet Take 1 tablet by mouth nightly as needed for Anxiety for up to 30 days. Yes LEESA Barnett CNP   predniSONE (DELTASONE) 20 MG tablet 2 tablets daily x 5 days, 1 tablet daily x 5 days, 1/2 tablet daily x 5 days Yes LEESA Barnett CNP   tiZANidine (ZANAFLEX) 4 MG tablet Take 1 tablet by mouth three time a day 8 hours apart.  Yes LEESA Barnett CNP   beclomethasone (QVAR REDIHALER) 40 MCG/ACT AERB inhaler Inhale 1 puff into the lungs 2 times daily Yes LEESA Barnett CNP   DULoxetine (CYMBALTA) 60 MG extended release capsule Take 1 capsule by mouth daily Yes LEESA Barnett CNP   levonorgestrel-ethinyl estradiol (SEASONALE) 0.15-0.03 MG per tablet Take 1 tablet by mouth daily Yes LEESA Barnett CNP   rizatriptan (MAXALT) 10 MG tablet Take 1 tablet by mouth once as needed for Migraine May repeat in 2 hours if needed Yes LEESA Barnett CNP   sucralfate (CARAFATE) 1 GM tablet Take 1 tablet by mouth 2 times daily Yes Carroll Alex, LEESA - CNP   triamcinolone (KENALOG) 0.025 % LOTN lotion Apply topically 2 times daily Yes LEESA Root CNP   Spacer/Aero-Holding Chambers (AEROCHAMBER PLUS NAVDEEP-VU Jaret Axon) MISC 1 Device by Does not apply route 2 times daily Yes LEESA Root CNP   Spacer/Aero-Holding Chambers (AEROCHAMBER PLUS NAVDEEP-VU LARGE) MISC 1 Device by Does not apply route 2 times daily Yes LEESA Root CNP   Handicap Placard MISC by Does not apply route Unable able to walk long distance due to medical condition. Expires in 5 years. Yes LEESA Root CNP   EPINEPHrine (EPIPEN 2-LUANNE) 0.3 MG/0.3ML SOAJ injection Inject 0.3 mLs into the muscle once for 1 dose Use as directed for allergic reaction Yes Nena Mendoza PA-C   ferrous sulfate 325 (65 Fe) MG tablet Take 1 tablet by mouth 2 times daily Yes Shaun Wright MD       Social History     Tobacco Use    Smoking status: Never Smoker    Smokeless tobacco: Never Used   Substance Use Topics    Alcohol use:  Yes     Alcohol/week: 0.0 standard drinks     Comment: occasional     Drug use: No        Allergies   Allergen Reactions    Ambien [Zolpidem Tartrate] Shortness Of Breath     Tongue swelling    Sudafed [Pseudoephedrine Hcl] Shortness Of Breath    Amitriptyline Hcl Other (See Comments)     Increased sedation and no relief of headache.   ,   Past Medical History:   Diagnosis Date    Asthma     Constipation 2014    Fibromyalgia     Graves disease     History of blood transfusion     Hx of blood clots      LT LUNG W/ PNEUMONIA    Joint pain 2014    Various joints (back, hands, knees, hips), recurrent flares since age 27, RF+ at one point, never fully worked up for rheumatologic dz yet    Kidney stone     Localized rash 2014    Recurrent, on inner surface of upper arms, q 3 months, sometimes on chest, no facial involvement    Lupus ([address]     PONV (postoperative nausea and vomiting)    ,   Past Surgical History:   Procedure Laterality Date     SECTION       X 2    CYSTOSCOPY  08/02/2018    CYSTOSCOPY URETEROSCOPY WITH HOLMIUM LASER LITHOTRIPSY FOR 5 MM STONE, RIGHT STENT PLACEMENT    OTHER SURGICAL HISTORY  08/05/2016    CYSTOSCOPY, BILATERAL RETROGRADES, RIGHT URETEROSCOPY,    TONSILLECTOMY      TUBAL LIGATION     ,   Social History     Tobacco Use    Smoking status: Never Smoker    Smokeless tobacco: Never Used   Substance Use Topics    Alcohol use: Yes     Alcohol/week: 0.0 standard drinks     Comment: occasional     Drug use: No   ,   Family History   Problem Relation Age of Onset    Cancer Maternal Grandmother         Lung Ca       PHYSICAL EXAMINATION:  [ INSTRUCTIONS:  \"[x]\" Indicates a positive item  \"[]\" Indicates a negative item  -- DELETE ALL ITEMS NOT EXAMINED]  Vital Signs: (As obtained by patient/caregiver or practitioner observation)    Blood pressure-  Heart rate-    Respiratory rate-    Temperature-  Pulse oximetry-     Constitutional: [x] Appears well-developed and well-nourished [x] No apparent distress      [] Abnormal-   Mental status  [x] Alert and awake  [x] Oriented to person/place/time [x]Able to follow commands      Eyes:  EOM    [x]  Normal  [] Abnormal-  Sclera  [x]  Normal  [] Abnormal -         Discharge [x]  None visible  [] Abnormal -    HENT:   [x] Normocephalic, atraumatic.   [] Abnormal   [x] Mouth/Throat: Mucous membranes are moist.     External Ears [x] Normal  [] Abnormal-     Neck: [x] No visualized mass     Pulmonary/Chest: [x] Respiratory effort normal.  [x] No visualized signs of difficulty breathing or respiratory distress        [] Abnormal-      Musculoskeletal:   [x] Normal gait with no signs of ataxia         [x] Normal range of motion of neck        [] Abnormal- Pain in extremities due to Fibromyalgia per patient      Neurological:        [x] No Facial Asymmetry (Cranial nerve 7 motor function) (limited exam to video visit)          [x] No gaze palsy        [] Abnormal-         Skin:        [x] No significant being evaluated by a Virtual Visit (video visit) encounter to address concerns as mentioned above. A caregiver was present when appropriate. Due to this being a TeleHealth encounter ([facility]), evaluation of the following organ systems was limited: Vitals/Constitutional/EENT/Resp/CV/GI//MS/Neuro/Skin/Heme-Lymph-Imm. Pursuant to the emergency declaration under the [address] [place] [address], [address] [place] [address] and the [employer] Act, this Virtual Visit was conducted with patient's (and/or legal guardian's) consent, to reduce the patient's risk of exposure to COVID-19 and provide necessary medical care. The patient (and/or legal guardian) has also been advised to contact this office for worsening conditions or problems, and seek emergency medical treatment and/or call 911 if deemed necessary. Patient identification was verified at the start of the visit: Yes    Total time spent on this encounter: 25 minutes    Services were provided through a video synchronous discussion virtually to substitute for in-person clinic visit. Patient and provider were located at their individual homes. --Billy Campos, LEESA - CNP on 5/4/2020 at 9:57 AM    An electronic signature was used to authenticate this note.

## 2020-05-26 ENCOUNTER — OFFICE VISIT (OUTPATIENT)
Dept: PRIMARY CARE CLINIC | Age: 40
End: 2020-05-26
Payer: MEDICAID

## 2020-05-26 ENCOUNTER — TELEMEDICINE (OUTPATIENT)
Dept: INTERNAL MEDICINE CLINIC | Age: 40
End: 2020-05-26
Payer: MEDICAID

## 2020-05-26 VITALS — OXYGEN SATURATION: 100 % | TEMPERATURE: 98.7 F | HEART RATE: 87 BPM

## 2020-05-26 PROCEDURE — 99213 OFFICE O/P EST LOW 20 MIN: CPT | Performed by: NURSE PRACTITIONER

## 2020-05-26 PROCEDURE — G8427 DOCREV CUR MEDS BY ELIG CLIN: HCPCS | Performed by: NURSE PRACTITIONER

## 2020-05-26 RX ORDER — TIZANIDINE HYDROCHLORIDE 6 MG/1
6 CAPSULE, GELATIN COATED ORAL 3 TIMES DAILY
Qty: 90 CAPSULE | Refills: 0 | Status: SHIPPED | OUTPATIENT
Start: 2020-05-26 | End: 2020-08-11

## 2020-05-26 RX ORDER — CLONAZEPAM 0.5 MG/1
0.5 TABLET ORAL NIGHTLY PRN
Qty: 30 TABLET | Refills: 0 | Status: SHIPPED | OUTPATIENT
Start: 2020-05-26 | End: 2020-07-02 | Stop reason: SDUPTHER

## 2020-05-26 ASSESSMENT — ENCOUNTER SYMPTOMS
DIARRHEA: 0
VOMITING: 0
BACK PAIN: 1
ALLERGIC/IMMUNOLOGIC NEGATIVE: 1
CONSTIPATION: 0
CHEST TIGHTNESS: 0
SHORTNESS OF BREATH: 1
ABDOMINAL PAIN: 0
COUGH: 1
WHEEZING: 1
NAUSEA: 0

## 2020-05-26 ASSESSMENT — PATIENT HEALTH QUESTIONNAIRE - PHQ9
SUM OF ALL RESPONSES TO PHQ QUESTIONS 1-9: 0
SUM OF ALL RESPONSES TO PHQ QUESTIONS 1-9: 0
1. LITTLE INTEREST OR PLEASURE IN DOING THINGS: 0
SUM OF ALL RESPONSES TO PHQ9 QUESTIONS 1 & 2: 0
2. FEELING DOWN, DEPRESSED OR HOPELESS: 0

## 2020-05-26 NOTE — PATIENT INSTRUCTIONS
Advance Care Planning  People with COVID-19 may have no symptoms, mild symptoms, such as fever, cough, and shortness of breath or they may have more severe illness, developing severe and fatal pneumonia. As a result, Advance Care Planning with attention to naming a health care decision maker (someone you trust to make healthcare decisions for you if you could not speak for yourself) and sharing other health care preferences is important BEFORE a possible health crisis. Please contact your Primary Care Provider to discuss Advance Care Planning. Preventing the Spread of Coronavirus Disease 2019 in Homes and Residential Communities  For the most recent information go to [email or web address]    Prevention steps for People with confirmed or suspected COVID-19 (including persons under investigation) who do not need to be hospitalized  and   People with confirmed COVID-19 who were hospitalized and determined to be medically stable to go home    Your healthcare provider and public health staff will evaluate whether you can be cared for at home. If it is determined that you do not need to be hospitalized and can be isolated at home, you will be monitored by staff from your local or state health department. You should follow the prevention steps below until a healthcare provider or local or state health department says you can return to your normal activities. Stay home except to get medical care  People who are mildly ill with COVID-19 are able to isolate at home during their illness. You should restrict activities outside your home, except for getting medical care. Do not go to work, school, or public areas. Avoid using public transportation, ride-sharing, or taxis. Separate yourself from other people and animals in your home  People: As much as possible, you should stay in a specific room and away from other people in your home.  Also, you should use a separate have a medical emergency and need to call 911, notify the dispatch personnel that you have, or are being evaluated for COVID-19. If possible, put on a facemask before emergency medical services arrive. Discontinuing home isolation  Patients with confirmed COVID-19 should remain under home isolation precautions until the risk of secondary transmission to others is thought to be low. The decision to discontinue home isolation precautions should be made on a case-by-case basis, in consultation with healthcare providers and state and local health departments. Thank you for enrolling in [facility]. Please follow the instructions below to securely access your online medical record. [employer] allows you to send messages to your doctor, view your test results, renew your prescriptions, schedule appointments, and more. How Do I Sign Up? 1. In your Internet browser, go to [email or web address]  2. Click on the Sign Up Now link in the Sign In box. You will see the New Member Sign Up page. 3. Enter your MtoV Access Code exactly as it appears below. You will not need to use this code after youve completed the sign-up process. If you do not sign up before the expiration date, you must request a new code. [employer] Access Code: Activation code not generated  Current MtoV Status: Active    4. Enter your Social Security Number ([id number]) and Date of Birth (mm/dd/yyyy) as indicated and click Submit. You will be taken to the next sign-up page. 5. Create a [employer]to[employer] ID. This will be your [employer] login ID and cannot be changed, so think of one that is secure and easy to remember. 6. Create a [employer] password. You can change your password at any time. 7. Enter your Password Reset Question and Answer. This can be used at a later time if you forget your password. 8. Enter your e-mail address. You will receive e-mail notification when new information is available in [facility]. 9. Click Sign Up.

## 2020-05-26 NOTE — PATIENT INSTRUCTIONS
Steps to help prevent the spread of COVID-19 if you are sick  SOURCE - https://thrasher-nj.info/. html     Stay home except to get medical care   ; Stay home: People who are mildly ill with COVID-19 are able to isolate at home during their illness. You should restrict activities outside your home, except for getting medical care.   ; Avoid public areas: Do not go to work, school, or public areas.   ; Avoid public transportation: Avoid using public transportation, ride-sharing, or taxis.  ; Separate yourself from other people and animals in your home   ; Stay away from others: As much as possible, you should stay in a specific room and away from other people in your home. Also, you should use a separate bathroom, if available.   ; Limit contact with pets & animals: You should restrict contact with pets and other animals while you are sick with COVID-19, just like you would around other people. Although there have not been reports of pets or other animals becoming sick with COVID-19, it is still recommended that people sick with COVID-19 limit contact with animals until more information is known about the virus. ; When possible, have another member of your household care for your animals while you are sick. If you are sick with COVID-19, avoid contact with your pet, including petting, snuggling, being kissed or licked, and sharing food. If you must care for your pet or be around animals while you are sick, wash your hands before and after you interact with pets and wear a facemask. See COVID-19 and Animals for more information. Other considerations   The ill person should eat/be fed in their room if possible. Non-disposable  items used should be handled with gloves and washed with hot water or in a . Clean hands after handling used  items.  If possible, dedicate a lined trash can for the ill person.  Use gloves when removing garbage bags, handling, and disposing of trash. Wash hands after handling or disposing of trash.  Consider consulting with your local health department about trash disposal guidance if available. Information for Household Members and Caregivers of Someone who is Sick   Call ahead before visiting your doctor   Call ahead: If you have a medical appointment, call the healthcare provider and tell them that you have or may have COVID-19. This will help the healthcare provider's office take steps to keep other people from getting infected or exposed. Wear a facemask if you are sick   ; If you are sick: You should wear a facemask when you are around other people (e.g., sharing a room or vehicle) or pets and before you enter a healthcare provider's office. ; If you are caring for others: If the person who is sick is not able to wear a facemask (for example, because it causes trouble breathing), then people who live with the person who is sick should not stay in the same room with them, or they should wear a facemask if they enter a room with the person who is sick. Cover your coughs and sneezes   ; Cover: Cover your mouth and nose with a tissue when you cough or sneeze.   ; Dispose: Throw used tissues in a lined trash can.   ; Wash hands: Immediately wash your hands with soap and water for at least 20 seconds or, if soap and water are not available, clean your hands with an alcohol-based hand  that contains at least 60% alcohol. Clean your hands often   ;  Wash hands: Wash your hands often with soap and water for at least 20 seconds, especially after blowing your nose, coughing, or sneezing; going to the bathroom; and before eating or preparing food.   ; Hand : If soap and water are not readily available, use an alcohol-based hand  with at least 60% alcohol, covering all surfaces of your hands and rubbing them together until they feel dry.   ; Soap and water: Soap and water are the best option if

## 2020-05-26 NOTE — PROGRESS NOTES
Dasia Monroe MD   hydrochlorothiazide (HYDRODIURIL) 12.5 MG tablet Take 1 tablet by mouth daily Yes LEESA Reynolds CNP   ondansetron (ZOFRAN) 4 MG tablet Take 1 tablet by mouth daily as needed for Nausea or Vomiting Yes LEESA Reynolds CNP   benzonatate (TESSALON PERLES) 100 MG capsule Take 1-2 capsules by mouth 3 times daily as needed for Cough Yes LEESA Reynolds CNP   pregabalin (LYRICA) 150 MG capsule Take 1 capsule by mouth 2 times daily for 30 days. Take one in am with breakfast and one at dinner time. Yes LEESA Reynolds CNP   pantoprazole (PROTONIX) 20 MG tablet Take 1 tablet by mouth 2 times daily Yes LEESA Reynolds CNP   albuterol sulfate  (90 Base) MCG/ACT inhaler Inhale 2 puffs into the lungs 4 times daily as needed for Wheezing Yes LEESA Reynolds CNP   beclomethasone (QVAR REDIHALER) 40 MCG/ACT AERB inhaler Inhale 1 puff into the lungs 2 times daily Yes LEESA Reynolds CNP   levonorgestrel-ethinyl estradiol (SEASONALE) 0.15-0.03 MG per tablet Take 1 tablet by mouth daily Yes LEESA Reynolds CNP   sucralfate (CARAFATE) 1 GM tablet Take 1 tablet by mouth 2 times daily Yes LEESA Reynolds CNP   triamcinolone (KENALOG) 0.025 % LOTN lotion Apply topically 2 times daily Yes LEESA Reynolds CNP   Spacer/Aero-Holding Chambers (AEROCHAMBER PLUS NAVDEEP-VU [place]) MISC 1 Device by Does not apply route 2 times daily Yes LEESA Reynolds CNP   Handicap Placard MISC by Does not apply route Unable able to walk long distance due to medical condition. Expires in 5 years. Yes LEESA Reynolds CNP   ferrous sulfate 325 (65 Fe) MG tablet Take 1 tablet by mouth 2 times daily Yes Amandeep Velazquez MD   clonazePAM (KLONOPIN) 0.5 MG tablet Take 1 tablet by mouth nightly as needed for Anxiety for up to 30 days.   LEESA Reynolds CNP   DULoxetine (CYMBALTA) 60 MG extended release capsule Take 1 capsule by mouth daily  LEESA Reynolds - Musculoskeletal:   [x] Normal gait with no signs of ataxia         [x] Normal range of motion of neck        [] Abnormal-       Neurological:        [x] No Facial Asymmetry (Cranial nerve 7 motor function) (limited exam to video visit)          [] No gaze palsy        [] Abnormal-         Skin:        [x] No significant exanthematous lesions or discoloration noted on facial skin         [] Abnormal-            Psychiatric:       [x] Normal Affect [] No Hallucinations        [] Abnormal-     Other pertinent observable physical exam findings-     ASSESSMENT/PLAN:  1. Fibromyalgia-Uncontolled  Referral to Pain management  Appointment with pain management July 2020. Increases Zanaflex to 6 mg TID. - tiZANidine (ZANAFLEX) 6 MG capsule; Take 1 capsule by mouth 3 times daily At least 8 hours apart  Dispense: 90 capsule; Refill: 0    2. Persistent cough-New  [facility] 3. Mild intermittent asthma without complication-   Continue with inhalers as prescribed. [facility]. 4. Suspected COVID-19 virus infection-New  [facility]erral to [facility]. [place] or [place]. 5. Muscle spasm  See above plan. Continue current regimen  Increase Zanaflex  Pain management as scheduled. July 2020. - tiZANidine (ZANAFLEX) 6 MG capsule; Take 1 capsule by mouth 3 times daily At least 8 hours apart  Dispense: 90 capsule; Refill: 0      Return in about 3 months (around 8/26/2020), or if symptoms worsen or fail to improve, for Fibromyalgia, Anxiety, Depression, HTN, Asthma. Kavin Lam is a 42 y.o. female being evaluated by a Virtual Visit (video visit) encounter to address concerns as mentioned above. A caregiver was present when appropriate. Due to this being a TeleHealth encounter (During [facility]- public health emergency), evaluation of the following organ systems was limited: Vitals/Constitutional/EENT/Resp/CV/GI//MS/Neuro/Skin/Heme-Lymph-Imm.   Pursuant to the emergency declaration

## 2020-05-27 ENCOUNTER — TELEPHONE (OUTPATIENT)
Dept: ADMINISTRATIVE | Age: 40
End: 2020-05-27

## 2020-05-27 LAB
SARS-COV-2: NOT DETECTED
SOURCE: NORMAL

## 2020-05-27 NOTE — TELEPHONE ENCOUNTER
Submitted PA for tiZANidine HCl 6MG capsules, Key: [id number]. Medication has been APPROVED through 05/27/2021. Please notify patient. Thank you.

## 2020-05-27 NOTE — TELEPHONE ENCOUNTER
Submitted PA for Aimovig 70MG/ML auto-injectors, Key: [id number]. Medication has been DENIED. Denial letter attached. Please notify patient. Thank you.

## 2020-06-11 ENCOUNTER — OFFICE VISIT (OUTPATIENT)
Dept: PAIN MANAGEMENT | Age: 40
End: 2020-06-11
Payer: MEDICAID

## 2020-06-11 VITALS
SYSTOLIC BLOOD PRESSURE: 149 MMHG | HEART RATE: 79 BPM | DIASTOLIC BLOOD PRESSURE: 94 MMHG | TEMPERATURE: 97.1 F | BODY MASS INDEX: 26.68 KG/M2 | OXYGEN SATURATION: 100 % | HEIGHT: 67 IN | WEIGHT: 170 LBS

## 2020-06-11 PROCEDURE — G8417 CALC BMI ABV UP PARAM F/U: HCPCS | Performed by: NURSE PRACTITIONER

## 2020-06-11 PROCEDURE — G8427 DOCREV CUR MEDS BY ELIG CLIN: HCPCS | Performed by: NURSE PRACTITIONER

## 2020-06-11 PROCEDURE — 99203 OFFICE O/P NEW LOW 30 MIN: CPT | Performed by: NURSE PRACTITIONER

## 2020-06-11 RX ORDER — LIDOCAINE 36 MG/1
1 PATCH TOPICAL DAILY
Qty: 30 PATCH | Refills: 0 | Status: SHIPPED | OUTPATIENT
Start: 2020-06-11 | End: 2020-07-11

## 2020-06-11 NOTE — PATIENT INSTRUCTIONS
Patient Education        Neck: Exercises  Introduction  Here are some examples of exercises for you to try. The exercises may be suggested for a condition or for rehabilitation. Start each exercise slowly. Ease off the exercises if you start to have pain. You will be told when to start these exercises and which ones will work best for you. How to do the exercises  Neck stretch   1. This stretch works best if you keep your shoulder down as you lean away from it. To help you remember to do this, start by relaxing your shoulders and lightly holding on to your thighs or your chair. 2. Tilt your head toward your shoulder and hold for 15 to 30 seconds. Let the weight of your head stretch your muscles. 3. If you would like a little added stretch, use your hand to gently and steadily pull your head toward your shoulder. For example, keeping your right shoulder down, lean your head to the left. 4. Repeat 2 to 4 times toward each shoulder. Diagonal neck stretch   1. Turn your head slightly toward the direction you will be stretching, and tilt your head diagonally toward your chest and hold for 15 to 30 seconds. 2. If you would like a little added stretch, use your hand to gently and steadily pull your head forward on the diagonal.  3. Repeat 2 to 4 times toward each side. Dorsal glide stretch   The dorsal glide stretches the back of the neck. If you feel pain, do not glide so far back. Some people find this exercise easier to do while lying on their backs with an ice pack on the neck. 1. Sit or stand tall and look straight ahead. 2. Slowly tuck your chin as you glide your head backward over your body  3. Hold for a count of 6, and then relax for up to 10 seconds. 4. Repeat 8 to 12 times. Chest and shoulder stretch   1. Sit or stand tall and glide your head backward as in the dorsal glide stretch. 2. Raise both arms so that your hands are next to your ears.   3. Take a deep breath, and as you breathe out, lower your elbows down and behind your back. You will feel your shoulder blades slide down and together, and at the same time you will feel a stretch across your chest and the front of your shoulders. 4. Hold for about 6 seconds, and then relax for up to 10 seconds. 5. Repeat 8 to 12 times. Strengthening: Hands on head   1. Move your head backward, forward, and side to side against gentle pressure from your hands, holding each position for about 6 seconds. 2. Repeat 8 to 12 times. Follow-up care is a key part of your treatment and safety. Be sure to make and go to all appointments, and call your doctor if you are having problems. It's also a good idea to know your test results and keep a list of the medicines you take. Where can you learn more? Go to https://Kynetxabdieleb.[email or web address]. org and sign in to your [employer] account. Enter P975 in the [employer] box to learn more about \"Neck: Exercises. \"     If you do not have an account, please click on the \"Sign Up Now\" link. Current as of: March 2, 2020               Content Version: 12.5  © 5862-3583 Healthwise, Incorporated. Care instructions adapted under license by [facility] ([facility]). If you have questions about a medical condition or this instruction, always ask your healthcare professional. [address] any warranty or liability for your use of this information. Patient Education        Back Stretches: Exercises  Introduction  Here are some examples of exercises for stretching your back. Start each exercise slowly. Ease off the exercise if you start to have pain. Your doctor or physical therapist will tell you when you can start these exercises and which ones will work best for you. How to do the exercises  Overhead stretch   5. Stand comfortably with your feet shoulder-width apart. 6. Looking straight ahead, raise both arms over your head and reach toward the ceiling.  Do not allow your head to tilt back.  7. Hold for 15 to 30 seconds, then lower your arms to your sides. 8. Repeat 2 to 4 times. Side stretch   4. Stand comfortably with your feet shoulder-width apart. 5. Raise one arm over your head, and then lean to the other side. 6. Slide your hand down your leg as you let the weight of your arm gently stretch your side muscles. Hold for 15 to 30 seconds. 7. Repeat 2 to 4 times on each side. Press-up   5. Lie on your stomach, supporting your body with your forearms. 6. Press your elbows down into the floor to raise your upper back. As you do this, relax your stomach muscles and allow your back to arch without using your back muscles. As your press up, do not let your hips or pelvis come off the floor. 7. Hold for 15 to 30 seconds, then relax. 8. Repeat 2 to 4 times. Relax and rest   6. Lie on your back with a rolled towel under your neck and a pillow under your knees. Extend your arms comfortably to your sides. 7. Relax and breathe normally. 8. Remain in this position for about 10 minutes. 9. If you can, do this 2 or 3 times each day. Follow-up care is a key part of your treatment and safety. Be sure to make and go to all appointments, and call your doctor if you are having problems. It's also a good idea to know your test results and keep a list of the medicines you take. Where can you learn more? Go to [email or web address]://[email or web address]. org and sign in to your [employer] account. Enter K932 in the [facility] box to learn more about \"Back Stretches: Exercises. \"     If you do not have an account, please click on the \"Sign Up Now\" link. Current as of: March 2, 2020               Content Version: 12.5  © 5343-7651 Healthwise, Incorporated. Care instructions adapted under license by [facility] ([facility]).  If you have questions about a medical condition or this instruction, always ask your healthcare professional. [place] [address] any warranty or liability for your

## 2020-06-11 NOTE — PROGRESS NOTES
discomfort, lifting heavy objects, walking more than 30 minutes, sitting more than 1 hour. Has social/recreational and sexual life is restricted due to pain. Patient reports mood is depressed and that she has no suicidal/homicidal inclination. Pression is mainly due to pain limitations, she also has anxiety related to the loss of her sister who was murdered at her friend's house. Mood is managed by her PCP on Cymbalta 60 mg daily, clonazepam 0.5 mg twice daily. Sleep patterns are fair with an average of 4 hours at night, pain wakes her up. Patient denies neurological bowel or bladder concerns. Patient denies misusing/abusing her narcotic pain medications or using any illegal drugs. she admits to morning stiffness, fatigue, and headaches. Currently works full-time from home, lives with her 5 children. Denies smoking cigarettes, drinks alcohol on occasion       ROS:  The patient's social history, past medical history, family history, medications, allergies and review of systems have all been reviewed and verified from  the patient questionnaire form which has been filled by the patient. The  allergies, medication list  and past medical and surgical history and other information recorded by the MA was again reviewed today. Please check page 6 of the patient questionnaire for for family history  These forms have been scanned into the \"media\" tab of patients electronic medical record. PHYSICAL EXAM:  Please see the physical exam form for a detailed examination on this visit. This form has been completed and scanned into the  Media section of the chart       Family History   Problem Relation Age of Onset    Cancer Maternal Grandmother         Lung Ca       Past Medical History:   Diagnosis Date    Asthma     Constipation 1/13/2014    Fibromyalgia     Graves disease     History of blood transfusion     Hx of blood clots     2015 LT LUNG W/ PNEUMONIA    Joint pain 1/13/2014    Various joints (back, of motion. Cervical back: She exhibits tenderness (tenderness mainly with 45 degree rotation to the R/L, tender spots noted with palpation). She exhibits normal range of motion. Thoracic back: She exhibits tenderness (tender spots with palpation). Lumbar back: She exhibits decreased range of motion, tenderness (guarded pain with 40 degree flexion, plaption, 5% extention) and bony tenderness. Right upper leg: She exhibits tenderness. Left upper leg: She exhibits tenderness. Right lower leg: She exhibits tenderness. Left lower leg: She exhibits tenderness. Lymphadenopathy:      Cervical: No cervical adenopathy. Upper Body:      Right upper body: No supraclavicular adenopathy. Left upper body: No supraclavicular adenopathy. Lower Body: No right inguinal adenopathy. No left inguinal adenopathy. Skin:     General: Skin is warm and dry. Findings: No abrasion or abscess. Rash is not crusting or macular. Nails: There is no clubbing. Neurological:      Mental Status: She is alert and oriented to person, place, and time. Cranial Nerves: No cranial nerve deficit. Sensory: No sensory deficit. Motor: No weakness. Coordination: Romberg sign negative. Gait: Gait and tandem walk normal.      Deep Tendon Reflexes: Reflexes are normal and symmetric. Reflex Scores:       Patellar reflexes are 2+ on the right side and 2+ on the left side. Achilles reflexes are 2+ on the right side and 2+ on the left side. Psychiatric:         Mood and Affect: Mood is depressed. Speech: Speech normal.         Behavior: Behavior normal.        Xray of Shoulder Left 3/22/20:  No acute osseus abnormality of the shoulder. CT of the Lumbar spine 3/23/18:  No acute fractures identified cervical spine. The alignment is   preserved. There is no significant soft tissue abnormality   identified.      Xray of the Lumbar spine 3/23/18:  3 views of the lumbar spine show the 5 lumbar vertebra with a mild   levocurvature centered at L2.       Vertebral body heights are normal. The pedicles are intact.       Disc spaces are normal. No significant degenerative changes are   visualized.       There are 2 calcifications overlying the right renal shadow   measuring approximately 3 mm each. BP (!) 149/94   Pulse 79   Temp 97.1 °F (36.2 °C) (Oral)   Ht 5' 7\" (1.702 m)   Wt 170 lb (77.1 kg)   SpO2 100%   BMI 26.63 kg/m²      ASSESSMENT:    1. Chronic pain syndrome    2. Fibromyalgia    3. Cervicalgia    4. Chronic bilateral low back pain, unspecified whether sciatica present    5. Arthralgia, unspecified joint    6. Myalgia    7. Migraine with aura and with status migrainosus, not intractable    8. Mild intermittent asthma without complication    9. PTSD (post-traumatic stress disorder)    10. Moderate episode of recurrent major depressive disorder ([facility] [address]    11. Insomnia due to other mental disorder         PLAN:   -Chronic opiate treatment protocol was discussed withthe patient, informed consent was obtained. -Treatment guidelines were discussed and established  -Risks and benefits of narcotics were addressedwith the patient  - Obtainable long term and short term goals of opioid therapy were reviewed, including pain relief, sleep, psychosocial and physical functioning   -Obtain urine Toxicology today  -ZTlido 1.8% topical daily  -Aquatic PT  -Reviewed previous imaging studies/blood work  -No opioids were prescribed for the patient today, she would have to wean off of benzodiazepines to be considered for opioid therapy  -Patient opted to try medical marijuana for pain as it seemed to work well to relieve her pain  -Informed the patient that marijuana is not prescribed at this clinic, patient will be advised in the future of any changes once marijuana is FDA approved.   Information was provided to follow-up with Abel ricketts for further information/assistance  -Informed patient that opioids cannot be prescribed for her while using marijuana, she can otherwise be treated with non opioids if she chose to continue with marijuana. Advised to quit using marijuana if she hoped to obtain opioids therapy for pain. Patient verbalized understanding, and opted to pursue medical marijuana  -CBT techniques- relaxation therapies such as biofeedback, mindfulness based stress reduction, imagery, cognitive restructuring, problem solving discussed with patient  -she was advised proper sleep hygiene-told to avoid:use of caffeine or other stimulants after noon, alcohol use near bedtime, long or frequent naps during the day, erratic sleep schedule, heavy meals near bedtime, vigorous exercise near bedtime and use of electronic devices near bedtime  -SOAPP score 1  -ORT score 5 moderate risk  -PHQ-9 score 14 moderate risk  -Return in about 4 weeks (around 7/9/2020). Controlled Substances Monitoring: Periodic Controlled Substance Monitoring: No signs of potential drug abuse or diversion identified.  LEESA Palacios - CNP)

## 2020-07-01 ENCOUNTER — TELEPHONE (OUTPATIENT)
Dept: INTERNAL MEDICINE CLINIC | Age: 40
End: 2020-07-01

## 2020-07-01 NOTE — TELEPHONE ENCOUNTER
Refill request for temazepam  medication.      Name [facility]- [facility]     Last visit - 5-     Pending visit - none     Last refill -3-    Medication Contract signed -   Noman Hanna ran-     Additional Comments

## 2020-07-02 RX ORDER — FLUCONAZOLE 150 MG/1
150 TABLET ORAL ONCE
Qty: 2 TABLET | Refills: 0 | Status: SHIPPED | OUTPATIENT
Start: 2020-07-02 | End: 2020-07-02

## 2020-07-02 RX ORDER — BENZONATATE 100 MG/1
100-200 CAPSULE ORAL 3 TIMES DAILY PRN
Qty: 30 CAPSULE | Refills: 0 | Status: SHIPPED | OUTPATIENT
Start: 2020-07-02 | End: 2020-08-11 | Stop reason: SDUPTHER

## 2020-07-02 RX ORDER — ONDANSETRON 4 MG/1
4 TABLET, FILM COATED ORAL DAILY PRN
Qty: 30 TABLET | Refills: 0 | Status: SHIPPED | OUTPATIENT
Start: 2020-07-02 | End: 2020-08-11 | Stop reason: SDUPTHER

## 2020-07-02 RX ORDER — PANTOPRAZOLE SODIUM 20 MG/1
20 TABLET, DELAYED RELEASE ORAL 2 TIMES DAILY
Qty: 60 TABLET | Refills: 3 | Status: SHIPPED | OUTPATIENT
Start: 2020-07-02 | End: 2020-08-11 | Stop reason: SDUPTHER

## 2020-07-02 RX ORDER — CLONAZEPAM 0.5 MG/1
0.5 TABLET ORAL NIGHTLY PRN
Qty: 30 TABLET | Refills: 0 | Status: SHIPPED | OUTPATIENT
Start: 2020-07-02 | End: 2020-08-11 | Stop reason: SDUPTHER

## 2020-07-02 RX ORDER — TEMAZEPAM 30 MG/1
30 CAPSULE ORAL NIGHTLY PRN
Qty: 30 CAPSULE | Refills: 1 | Status: SHIPPED | OUTPATIENT
Start: 2020-07-02 | End: 2020-08-01

## 2020-07-07 RX ORDER — TEMAZEPAM 30 MG/1
CAPSULE ORAL
Qty: 30 CAPSULE | Refills: 0 | OUTPATIENT
Start: 2020-07-07

## 2020-08-11 ENCOUNTER — TELEPHONE (OUTPATIENT)
Dept: INTERNAL MEDICINE CLINIC | Age: 40
End: 2020-08-11

## 2020-08-11 RX ORDER — ALBUTEROL SULFATE 90 UG/1
2 AEROSOL, METERED RESPIRATORY (INHALATION) 4 TIMES DAILY PRN
Qty: 1 INHALER | Refills: 5 | Status: SHIPPED | OUTPATIENT
Start: 2020-08-11 | End: 2021-08-31 | Stop reason: SDUPTHER

## 2020-08-11 RX ORDER — CLONAZEPAM 0.5 MG/1
0.5 TABLET ORAL NIGHTLY PRN
Qty: 30 TABLET | Refills: 0 | Status: SHIPPED | OUTPATIENT
Start: 2020-08-11 | End: 2020-12-18

## 2020-08-11 RX ORDER — BENZONATATE 100 MG/1
100-200 CAPSULE ORAL 3 TIMES DAILY PRN
Qty: 30 CAPSULE | Refills: 0 | Status: ON HOLD
Start: 2020-08-11 | End: 2020-09-09 | Stop reason: HOSPADM

## 2020-08-11 RX ORDER — HYDROCHLOROTHIAZIDE 12.5 MG/1
12.5 TABLET ORAL DAILY
Qty: 30 TABLET | Refills: 3 | Status: SHIPPED | OUTPATIENT
Start: 2020-08-11 | End: 2020-12-18 | Stop reason: SDUPTHER

## 2020-08-11 RX ORDER — TIZANIDINE HYDROCHLORIDE 6 MG/1
CAPSULE, GELATIN COATED ORAL
Qty: 90 CAPSULE | Refills: 0 | Status: SHIPPED | OUTPATIENT
Start: 2020-08-11 | End: 2020-12-07 | Stop reason: SDUPTHER

## 2020-08-11 RX ORDER — TRIAMCINOLONE ACETONIDE 0.25 MG/ML
LOTION TOPICAL 2 TIMES DAILY
Qty: 60 ML | Refills: 2 | Status: ON HOLD
Start: 2020-08-11 | End: 2020-09-09 | Stop reason: HOSPADM

## 2020-08-11 RX ORDER — ONDANSETRON 4 MG/1
4 TABLET, FILM COATED ORAL DAILY PRN
Qty: 30 TABLET | Refills: 0 | Status: SHIPPED | OUTPATIENT
Start: 2020-08-11 | End: 2020-12-18 | Stop reason: SDUPTHER

## 2020-08-11 RX ORDER — LEVONORGESTREL AND ETHINYL ESTRADIOL 0.15-0.03
1 KIT ORAL DAILY
Qty: 1 PACKET | Refills: 3 | Status: SHIPPED | OUTPATIENT
Start: 2020-08-11 | End: 2020-12-18 | Stop reason: SDUPTHER

## 2020-08-11 RX ORDER — PANTOPRAZOLE SODIUM 20 MG/1
20 TABLET, DELAYED RELEASE ORAL 2 TIMES DAILY
Qty: 60 TABLET | Refills: 3 | Status: SHIPPED | OUTPATIENT
Start: 2020-08-11 | End: 2021-07-16 | Stop reason: SDUPTHER

## 2020-08-11 NOTE — TELEPHONE ENCOUNTER
Qvar RediHaler 40MCG/ACT Aerosol is not covered by insurance. A covered alternative may be available unless a prior authorization is preferred.   1 [employer]

## 2020-08-11 NOTE — TELEPHONE ENCOUNTER
Refill request for zanaflex medication.      Name of [facility]acy- [facility]    Last visit - 6/11/20     Pending visit -     Last refill -5/26/20

## 2020-08-17 ENCOUNTER — TELEPHONE (OUTPATIENT)
Dept: INTERNAL MEDICINE CLINIC | Age: 40
End: 2020-08-17

## 2020-08-18 NOTE — TELEPHONE ENCOUNTER
Received APPROVAL for Qvar RediHaler 40MCG/ACT aerosol through 08/18/2021. Please notify patient. Thank you.

## 2020-08-31 ENCOUNTER — TELEPHONE (OUTPATIENT)
Dept: INTERNAL MEDICINE CLINIC | Age: 40
End: 2020-08-31

## 2020-08-31 ENCOUNTER — APPOINTMENT (OUTPATIENT)
Dept: CT IMAGING | Age: 40
End: 2020-08-31
Payer: MEDICAID

## 2020-08-31 ENCOUNTER — HOSPITAL ENCOUNTER (OUTPATIENT)
Age: 40
Setting detail: OBSERVATION
Discharge: HOME HEALTH | End: 2020-09-09
Attending: EMERGENCY MEDICINE | Admitting: HOSPITALIST
Payer: MEDICAID

## 2020-08-31 ENCOUNTER — APPOINTMENT (OUTPATIENT)
Dept: GENERAL RADIOLOGY | Age: 40
End: 2020-08-31
Payer: MEDICAID

## 2020-08-31 PROBLEM — R20.2 PARESTHESIA OF RIGHT ARM AND LEG: Status: ACTIVE | Noted: 2020-08-31

## 2020-08-31 PROBLEM — R29.898 FOCAL MOTOR DEFICIT: Status: ACTIVE | Noted: 2020-08-31

## 2020-08-31 LAB
A/G RATIO: 1 (ref 1.1–2.2)
ALBUMIN SERPL-MCNC: 4.1 G/DL (ref 3.4–5)
ALP BLD-CCNC: 85 U/L (ref 40–129)
ALT SERPL-CCNC: 26 U/L (ref 10–40)
ANION GAP SERPL CALCULATED.3IONS-SCNC: 11 MMOL/L (ref 3–16)
APTT: 21.5 SEC (ref 24.2–36.2)
AST SERPL-CCNC: 30 U/L (ref 15–37)
BASOPHILS ABSOLUTE: 0.1 K/UL (ref 0–0.2)
BASOPHILS RELATIVE PERCENT: 1 %
BILIRUB SERPL-MCNC: 0.4 MG/DL (ref 0–1)
BUN BLDV-MCNC: 17 MG/DL (ref 7–20)
CALCIUM SERPL-MCNC: 9.6 MG/DL (ref 8.3–10.6)
CHLORIDE BLD-SCNC: 103 MMOL/L (ref 99–110)
CO2: 21 MMOL/L (ref 21–32)
CREAT SERPL-MCNC: 0.6 MG/DL (ref 0.6–1.1)
EKG ATRIAL RATE: 85 BPM
EKG DIAGNOSIS: NORMAL
EKG P AXIS: 30 DEGREES
EKG P-R INTERVAL: 146 MS
EKG Q-T INTERVAL: 364 MS
EKG QRS DURATION: 82 MS
EKG QTC CALCULATION (BAZETT): 433 MS
EKG R AXIS: 24 DEGREES
EKG T AXIS: 22 DEGREES
EKG VENTRICULAR RATE: 85 BPM
EOSINOPHILS ABSOLUTE: 0.1 K/UL (ref 0–0.6)
EOSINOPHILS RELATIVE PERCENT: 1.4 %
GFR AFRICAN AMERICAN: >60
GFR NON-AFRICAN AMERICAN: >60
GLOBULIN: 4 G/DL
GLUCOSE BLD-MCNC: 98 MG/DL (ref 70–99)
GLUCOSE BLD-MCNC: 99 MG/DL (ref 70–99)
HCT VFR BLD CALC: 36.3 % (ref 36–48)
HEMOGLOBIN: 11.8 G/DL (ref 12–16)
INR BLD: 1.01 (ref 0.86–1.14)
LYMPHOCYTES ABSOLUTE: 2.6 K/UL (ref 1–5.1)
LYMPHOCYTES RELATIVE PERCENT: 29.2 %
MCH RBC QN AUTO: 25.1 PG (ref 26–34)
MCHC RBC AUTO-ENTMCNC: 32.6 G/DL (ref 31–36)
MCV RBC AUTO: 77 FL (ref 80–100)
MONOCYTES ABSOLUTE: 0.8 K/UL (ref 0–1.3)
MONOCYTES RELATIVE PERCENT: 8.5 %
NEUTROPHILS ABSOLUTE: 5.3 K/UL (ref 1.7–7.7)
NEUTROPHILS RELATIVE PERCENT: 59.9 %
PDW BLD-RTO: 17.6 % (ref 12.4–15.4)
PERFORMED ON: NORMAL
PLATELET # BLD: 339 K/UL (ref 135–450)
PMV BLD AUTO: 7.9 FL (ref 5–10.5)
POTASSIUM SERPL-SCNC: 4.8 MMOL/L (ref 3.5–5.1)
PROTHROMBIN TIME: 11.7 SEC (ref 10–13.2)
RBC # BLD: 4.71 M/UL (ref 4–5.2)
SODIUM BLD-SCNC: 135 MMOL/L (ref 136–145)
TOTAL PROTEIN: 8.1 G/DL (ref 6.4–8.2)
WBC # BLD: 8.9 K/UL (ref 4–11)

## 2020-08-31 PROCEDURE — 93005 ELECTROCARDIOGRAM TRACING: CPT | Performed by: EMERGENCY MEDICINE

## 2020-08-31 PROCEDURE — 70496 CT ANGIOGRAPHY HEAD: CPT

## 2020-08-31 PROCEDURE — 85730 THROMBOPLASTIN TIME PARTIAL: CPT

## 2020-08-31 PROCEDURE — 6360000002 HC RX W HCPCS: Performed by: HOSPITALIST

## 2020-08-31 PROCEDURE — G0378 HOSPITAL OBSERVATION PER HR: HCPCS

## 2020-08-31 PROCEDURE — 96372 THER/PROPH/DIAG INJ SC/IM: CPT

## 2020-08-31 PROCEDURE — 93010 ELECTROCARDIOGRAM REPORT: CPT | Performed by: INTERNAL MEDICINE

## 2020-08-31 PROCEDURE — 70498 CT ANGIOGRAPHY NECK: CPT

## 2020-08-31 PROCEDURE — 6360000004 HC RX CONTRAST MEDICATION: Performed by: EMERGENCY MEDICINE

## 2020-08-31 PROCEDURE — 94761 N-INVAS EAR/PLS OXIMETRY MLT: CPT

## 2020-08-31 PROCEDURE — 36415 COLL VENOUS BLD VENIPUNCTURE: CPT

## 2020-08-31 PROCEDURE — 85610 PROTHROMBIN TIME: CPT

## 2020-08-31 PROCEDURE — 71045 X-RAY EXAM CHEST 1 VIEW: CPT

## 2020-08-31 PROCEDURE — 2580000003 HC RX 258: Performed by: HOSPITALIST

## 2020-08-31 PROCEDURE — 80053 COMPREHEN METABOLIC PANEL: CPT

## 2020-08-31 PROCEDURE — 99285 EMERGENCY DEPT VISIT HI MDM: CPT

## 2020-08-31 PROCEDURE — 70450 CT HEAD/BRAIN W/O DYE: CPT

## 2020-08-31 PROCEDURE — 6370000000 HC RX 637 (ALT 250 FOR IP): Performed by: HOSPITALIST

## 2020-08-31 PROCEDURE — 94640 AIRWAY INHALATION TREATMENT: CPT

## 2020-08-31 PROCEDURE — 85025 COMPLETE CBC W/AUTO DIFF WBC: CPT

## 2020-08-31 PROCEDURE — 94150 VITAL CAPACITY TEST: CPT

## 2020-08-31 RX ORDER — PREGABALIN 75 MG/1
150 CAPSULE ORAL 2 TIMES DAILY
Status: DISCONTINUED | OUTPATIENT
Start: 2020-08-31 | End: 2020-09-09 | Stop reason: HOSPADM

## 2020-08-31 RX ORDER — ONDANSETRON 4 MG/1
4 TABLET, ORALLY DISINTEGRATING ORAL EVERY 8 HOURS PRN
Status: DISCONTINUED | OUTPATIENT
Start: 2020-08-31 | End: 2020-09-09 | Stop reason: HOSPADM

## 2020-08-31 RX ORDER — SODIUM CHLORIDE 0.9 % (FLUSH) 0.9 %
10 SYRINGE (ML) INJECTION EVERY 12 HOURS SCHEDULED
Status: DISCONTINUED | OUTPATIENT
Start: 2020-08-31 | End: 2020-09-09 | Stop reason: HOSPADM

## 2020-08-31 RX ORDER — TIZANIDINE 4 MG/1
4 TABLET ORAL EVERY 8 HOURS PRN
Status: DISCONTINUED | OUTPATIENT
Start: 2020-08-31 | End: 2020-09-09 | Stop reason: HOSPADM

## 2020-08-31 RX ORDER — FERROUS SULFATE 325(65) MG
325 TABLET ORAL 2 TIMES DAILY
Status: DISCONTINUED | OUTPATIENT
Start: 2020-08-31 | End: 2020-09-09 | Stop reason: HOSPADM

## 2020-08-31 RX ORDER — LABETALOL HYDROCHLORIDE 5 MG/ML
10 INJECTION, SOLUTION INTRAVENOUS EVERY 10 MIN PRN
Status: DISCONTINUED | OUTPATIENT
Start: 2020-08-31 | End: 2020-09-09 | Stop reason: HOSPADM

## 2020-08-31 RX ORDER — PANTOPRAZOLE SODIUM 40 MG/1
40 TABLET, DELAYED RELEASE ORAL DAILY
Status: DISCONTINUED | OUTPATIENT
Start: 2020-08-31 | End: 2020-09-09 | Stop reason: HOSPADM

## 2020-08-31 RX ORDER — ONDANSETRON 2 MG/ML
4 INJECTION INTRAMUSCULAR; INTRAVENOUS EVERY 6 HOURS PRN
Status: DISCONTINUED | OUTPATIENT
Start: 2020-08-31 | End: 2020-09-09 | Stop reason: HOSPADM

## 2020-08-31 RX ORDER — HYDRALAZINE HYDROCHLORIDE 20 MG/ML
10 INJECTION INTRAMUSCULAR; INTRAVENOUS ONCE
Status: DISCONTINUED | OUTPATIENT
Start: 2020-08-31 | End: 2020-09-09 | Stop reason: HOSPADM

## 2020-08-31 RX ORDER — BUDESONIDE 0.5 MG/2ML
0.25 INHALANT ORAL 2 TIMES DAILY
Status: DISCONTINUED | OUTPATIENT
Start: 2020-08-31 | End: 2020-09-09 | Stop reason: HOSPADM

## 2020-08-31 RX ORDER — CLONAZEPAM 0.5 MG/1
0.5 TABLET ORAL NIGHTLY PRN
Status: DISCONTINUED | OUTPATIENT
Start: 2020-08-31 | End: 2020-09-09 | Stop reason: HOSPADM

## 2020-08-31 RX ORDER — SUCRALFATE 1 G/1
1 TABLET ORAL
Status: DISCONTINUED | OUTPATIENT
Start: 2020-08-31 | End: 2020-09-09 | Stop reason: HOSPADM

## 2020-08-31 RX ORDER — ASPIRIN 300 MG/1
300 SUPPOSITORY RECTAL DAILY
Status: DISCONTINUED | OUTPATIENT
Start: 2020-09-01 | End: 2020-09-09 | Stop reason: HOSPADM

## 2020-08-31 RX ORDER — SENNA PLUS 8.6 MG/1
1 TABLET ORAL DAILY PRN
Status: DISCONTINUED | OUTPATIENT
Start: 2020-08-31 | End: 2020-09-09 | Stop reason: HOSPADM

## 2020-08-31 RX ORDER — DULOXETIN HYDROCHLORIDE 60 MG/1
60 CAPSULE, DELAYED RELEASE ORAL DAILY
Status: DISCONTINUED | OUTPATIENT
Start: 2020-09-01 | End: 2020-09-09 | Stop reason: HOSPADM

## 2020-08-31 RX ORDER — AMLODIPINE BESYLATE 5 MG/1
5 TABLET ORAL DAILY
Status: DISCONTINUED | OUTPATIENT
Start: 2020-09-01 | End: 2020-09-09 | Stop reason: HOSPADM

## 2020-08-31 RX ORDER — ROSUVASTATIN CALCIUM 40 MG/1
40 TABLET, COATED ORAL NIGHTLY
Status: DISCONTINUED | OUTPATIENT
Start: 2020-08-31 | End: 2020-09-09 | Stop reason: HOSPADM

## 2020-08-31 RX ORDER — ALBUTEROL SULFATE 2.5 MG/3ML
2.5 SOLUTION RESPIRATORY (INHALATION) EVERY 4 HOURS PRN
Status: DISCONTINUED | OUTPATIENT
Start: 2020-08-31 | End: 2020-09-09 | Stop reason: HOSPADM

## 2020-08-31 RX ORDER — LORAZEPAM 0.5 MG/1
0.5 TABLET ORAL ONCE
Status: COMPLETED | OUTPATIENT
Start: 2020-08-31 | End: 2020-09-01

## 2020-08-31 RX ORDER — HYDROCHLOROTHIAZIDE 25 MG/1
12.5 TABLET ORAL DAILY
Status: DISCONTINUED | OUTPATIENT
Start: 2020-08-31 | End: 2020-09-09 | Stop reason: HOSPADM

## 2020-08-31 RX ORDER — ASPIRIN 81 MG/1
81 TABLET ORAL DAILY
Status: DISCONTINUED | OUTPATIENT
Start: 2020-09-01 | End: 2020-09-09 | Stop reason: HOSPADM

## 2020-08-31 RX ORDER — SODIUM CHLORIDE 0.9 % (FLUSH) 0.9 %
10 SYRINGE (ML) INJECTION PRN
Status: DISCONTINUED | OUTPATIENT
Start: 2020-08-31 | End: 2020-09-09 | Stop reason: HOSPADM

## 2020-08-31 RX ADMIN — TIZANIDINE 4 MG: 4 TABLET ORAL at 20:52

## 2020-08-31 RX ADMIN — HYDROCHLOROTHIAZIDE 12.5 MG: 25 TABLET ORAL at 20:52

## 2020-08-31 RX ADMIN — IOPAMIDOL 75 ML: 755 INJECTION, SOLUTION INTRAVENOUS at 13:42

## 2020-08-31 RX ADMIN — Medication 10 ML: at 20:52

## 2020-08-31 RX ADMIN — PREGABALIN 150 MG: 75 CAPSULE ORAL at 20:51

## 2020-08-31 RX ADMIN — ROSUVASTATIN CALCIUM 40 MG: 40 TABLET, FILM COATED ORAL at 20:52

## 2020-08-31 RX ADMIN — BUDESONIDE 250 MCG: 0.5 SUSPENSION RESPIRATORY (INHALATION) at 20:55

## 2020-08-31 RX ADMIN — ALBUTEROL SULFATE 2.5 MG: 2.5 SOLUTION RESPIRATORY (INHALATION) at 20:55

## 2020-08-31 RX ADMIN — FERROUS SULFATE TAB 325 MG (65 MG ELEMENTAL FE) 325 MG: 325 (65 FE) TAB at 20:54

## 2020-08-31 RX ADMIN — ENOXAPARIN SODIUM 40 MG: 40 INJECTION SUBCUTANEOUS at 20:52

## 2020-08-31 ASSESSMENT — ENCOUNTER SYMPTOMS
CHEST TIGHTNESS: 0
COUGH: 0
RESPIRATORY NEGATIVE: 1
COLOR CHANGE: 0
DIARRHEA: 0
SHORTNESS OF BREATH: 0
PHOTOPHOBIA: 0
CONSTIPATION: 0
VOMITING: 0
NAUSEA: 0
BACK PAIN: 0
ABDOMINAL PAIN: 0

## 2020-08-31 ASSESSMENT — PAIN SCALES - GENERAL
PAINLEVEL_OUTOF10: 8
PAINLEVEL_OUTOF10: 5

## 2020-08-31 ASSESSMENT — PAIN DESCRIPTION - PAIN TYPE
TYPE: ACUTE PAIN
TYPE: CHRONIC PAIN

## 2020-08-31 ASSESSMENT — PAIN DESCRIPTION - LOCATION: LOCATION: BACK;NECK

## 2020-08-31 NOTE — ED PROVIDER NOTES
[address] [facility]        Pt Name: Randall Figueroa  MRN: [id number]  Denisegfrose 1980  Date of evaluation: 8/31/2020  Provider: ROXANA Lyons  PCP: LEESA Echeverria CNP     I have seen and evaluated this patient with my supervising physician Dee Frausto MD.    [facility]       Chief Complaint   Patient presents with    Extremity Weakness     Pt to ER with c/o right arm and leg numbness and weakness sinc 2200 last night, able to move arm with difficulty, decreased sensation. Dr guzman requested to bedside for eval.       HISTORY OF PRESENT ILLNESS   (Location, Timing/Onset, Context/Setting, Quality, Duration, Modifying Factors, Severity, Associated Signs and Symptoms)  Note limiting factors. Randall Figueroa is a 42 y.o. female with past medical history of asthma, constipation, fibromyalgia, Graves' disease, history of previous blood clots and questionable lupus currently being worked up for this who presents to the ED with complaint of right-sided arm and leg numbness/weakness. Patient states last night around 10 PM she started having some numbness to her right arm and right leg. Denies any other numbness. Patient states she went to bed and woke up at 3 AM this morning. Patient states she had increasing numbness to her right arm and right leg with associated weakness. Patient states she had decreased  strength and was dropping objects this morning. Patient states she has been dragging her right leg. States otherwise ambulating without difficulty. Denies headache, visual changes, speech disturbances, numbness/tingling otherwise, lightheadedness/dizziness, chest pain, shortness of breath, palpitations, dental pain, nausea/vomiting, urinary symptoms or changes in bowel movements. Denies any injury or trauma. Denies any blood thinners.   Became concerned and came to the ED for further evaluation and treatment. Denies history of previous strokes in the past.    Nursing Notes were all reviewed and agreed with or any disagreements were addressed in the HPI. REVIEW OF SYSTEMS    (2-9 systems for level 4, 10 or more for level 5)     Review of Systems   Constitutional: Negative for activity change, appetite change, chills, diaphoresis, fatigue and fever. Eyes: Negative for photophobia and visual disturbance. Respiratory: Negative. Negative for cough, chest tightness and shortness of breath. Cardiovascular: Negative. Negative for chest pain, palpitations and leg swelling. Gastrointestinal: Negative for abdominal pain, constipation, diarrhea, nausea and vomiting. Genitourinary: Negative for decreased urine volume, difficulty urinating, dysuria, flank pain, frequency, hematuria and urgency. Musculoskeletal: Positive for gait problem. Negative for arthralgias, back pain, joint swelling, myalgias, neck pain and neck stiffness. Skin: Negative for color change, pallor, rash and wound. Neurological: Positive for weakness and numbness. Negative for dizziness, tremors, seizures, syncope, facial asymmetry, speech difficulty, light-headedness and headaches. Positives and Pertinent negatives as per HPI. Except as noted above in the ROS, all other systems were reviewed and negative.        PAST MEDICAL HISTORY     Past Medical History:   Diagnosis Date    Asthma     Constipation 1/13/2014    Fibromyalgia     Graves disease     History of blood transfusion     Hx of blood clots     2015 LT LUNG W/ PNEUMONIA    Joint pain 1/13/2014    Various joints (back, hands, knees, hips), recurrent flares since age 27, RF+ at one point, never fully worked up for rheumatologic dz yet    Kidney stone     Localized rash 1/13/2014    Recurrent, on inner surface of upper arms, q 3 months, sometimes on chest, no facial involvement    Lupus ([address]     PONV (postoperative nausea and vomiting)          SURGICAL HISTORY     Past Surgical History:   Procedure Laterality Date     SECTION       X 2    CYSTOSCOPY  2018    CYSTOSCOPY URETEROSCOPY WITH HOLMIUM LASER LITHOTRIPSY FOR 5 MM STONE, RIGHT STENT PLACEMENT    OTHER SURGICAL HISTORY  2016    CYSTOSCOPY, BILATERAL RETROGRADES, RIGHT URETEROSCOPY,    TONSILLECTOMY      TUBAL LIGATION           CURRENTMEDICATIONS       Previous Medications    ALBUTEROL SULFATE  (90 BASE) MCG/ACT INHALER    Inhale 2 puffs into the lungs 4 times daily as needed for Wheezing    BECLOMETHASONE (QVAR REDIHALER) 40 MCG/ACT AERB INHALER    Inhale 1 puff into the lungs 2 times daily    BENZONATATE (TESSALON PERLES) 100 MG CAPSULE    Take 1-2 capsules by mouth 3 times daily as needed for Cough    CLONAZEPAM (KLONOPIN) 0.5 MG TABLET    Take 1 tablet by mouth nightly as needed for Anxiety for up to 30 days. DULOXETINE (CYMBALTA) 60 MG EXTENDED RELEASE CAPSULE    Take 1 capsule by mouth daily    EPINEPHRINE (EPIPEN 2-LUANNE) 0.3 MG/0.3ML SOAJ INJECTION    Inject 0.3 mLs into the muscle once for 1 dose Use as directed for allergic reaction    FERROUS SULFATE 325 (65 FE) MG TABLET    Take 1 tablet by mouth 2 times daily    HANDICAP PLACARD MISC    by Does not apply route Unable able to walk long distance due to medical condition. Expires in 5 years. HYDROCHLOROTHIAZIDE (HYDRODIURIL) 12.5 MG TABLET    Take 1 tablet by mouth daily    HYDROXYZINE (ATARAX) 25 MG TABLET    Take 25 mg by mouth 2 times daily    LEVONORGESTREL-ETHINYL ESTRADIOL (SEASONALE) 0.15-0.03 MG PER TABLET    Take 1 tablet by mouth daily    ONDANSETRON (ZOFRAN) 4 MG TABLET    Take 1 tablet by mouth daily as needed for Nausea or Vomiting    PANTOPRAZOLE (PROTONIX) 20 MG TABLET    Take 1 tablet by mouth 2 times daily    PREGABALIN (LYRICA) 150 MG CAPSULE    Take 1 capsule by mouth 2 times daily for 30 days. Take one in am with breakfast and one at dinner time.     SPACER/AERO-HOLDING CHAMBERS (AEROCHAMBER diaphoretic. HENT:      Head: Normocephalic and atraumatic. Comments: Atraumatic. No raccoon eyes or glass sign. Right Ear: External ear normal.      Left Ear: External ear normal.   Eyes:      General:         Right eye: No discharge. Left eye: No discharge. Extraocular Movements: Extraocular movements intact. Conjunctiva/sclera: Conjunctivae normal.      Pupils: Pupils are equal, round, and reactive to light. Comments: Visual acuity and visual fields intact at bedside. Neck:      Musculoskeletal: Normal range of motion and neck supple. Cardiovascular:      Rate and Rhythm: Normal rate and regular rhythm. Pulses: Normal pulses. Heart sounds: Normal heart sounds. No murmur. No friction rub. No gallop. Comments: 2+ radial pulses bilaterally. No pedal edema. No calf tenderness. No JVD. Pulmonary:      Effort: Pulmonary effort is normal. No respiratory distress. Breath sounds: Normal breath sounds. No stridor. No wheezing, rhonchi or rales. Chest:      Chest wall: No tenderness. Abdominal:      General: Abdomen is flat. Bowel sounds are normal. There is no distension. Palpations: Abdomen is soft. There is no mass. Tenderness: There is no abdominal tenderness. There is no right CVA tenderness, left CVA tenderness, guarding or rebound. Hernia: No hernia is present. Musculoskeletal: Normal range of motion. Skin:     General: Skin is warm and dry. Coloration: Skin is not pale. Findings: No erythema or rash. Neurological:      General: No focal deficit present. Mental Status: She is alert and oriented to person, place, and time. GCS: GCS eye subscore is 4. GCS verbal subscore is 5. GCS motor subscore is 6. Cranial Nerves: No cranial nerve deficit. Comments: Alert and oriented x3. GCS 15. Cranial nerves II through XII intact. Speech clear. No facial droop.   Decreased range of motion and strength to the right upper and right lower extremity. Full range of motion and strength of the left upper and left lower extremity. 5 out of 5 strength to left upper and left lower extremity. 1 out of 5 strength to the right lower extremity. Patient has 3 out of 5 strength to the right upper extremity. Sensation diminished to the right upper and right lower extremity with comparison to left. Gait deferred at this time. Psychiatric:         Behavior: Behavior normal.         DIAGNOSTIC RESULTS   LABS:    Labs Reviewed   CBC WITH AUTO DIFFERENTIAL - Abnormal; Notable for the following components:       Result Value    Hemoglobin 11.8 (*)     MCV 77.0 (*)     MCH 25.1 (*)     RDW 17.6 (*)     All other components within normal limits    Narrative:     Performed at:  [facility] [address] [facility] [place]   Phone [phone]   COMPREHENSIVE METABOLIC PANEL - Abnormal; Notable for the following components:    Sodium 135 (*)     Albumin/Globulin Ratio 1.0 (*)     All other components within normal limits    Narrative:     Performed at:  [facility] [address],  [place], [place]   Phone [phone]   APTT - Abnormal; Notable for the following components:    aPTT 21.5 (*)     All other components within normal limits    Narrative:     Performed at:  [facility] [address] [facility] [place]   Phone [phone]   PROTIME-INR    Narrative:     Performed at:  [facility] [address]   Phone [phone]   POCT GLUCOSE    Narrative:     Performed at:  [facility] [address],  [place], [address]   Phone [phone]       All other labs were within normal range or not returned as of this dictation. EKG:  All EKG's are interpreted by the Emergency Department Physician in the absence of a cardiologist. then able to lift up off the bed without much difficulty. Some slight tremor noted to the right upper extremity. Otherwise cranial nerves II through XII intact. Stroke alert initiated and attending did speak with stroke physician who did not recommend any TPA treatment. CBC showed normal white count, platelets and hemoglobin 11.8. CMP unremarkable. INR normal.  PTT 21.5. Chest x-ray unremarkable. CTA of the head and neck unremarkable. CT of the head unremarkable. EKG interpreted by attending. Given history and physical examination suffering from right-sided upper and lower extremity weakness with numbness. Believe would benefit from admission for further evaluation and treatment. Case discussed with hospitalist who graciously agreed to accept patient for admission at this time. FINAL IMPRESSION      1. Right sided weakness          DISPOSITION/PLAN   DISPOSITION        PATIENT REFERREDTO:  No follow-up provider specified.     DISCHARGE MEDICATIONS:  New Prescriptions    No medications on file       DISCONTINUED MEDICATIONS:  Discontinued Medications    No medications on file              (Please note that portions of this note were completed with a voice recognition program.  Efforts were made to edit the dictations but occasionally words are mis-transcribed.)    ROXANA Tovar (electronically signed)          ROXANA Reyes  08/31/20 9301

## 2020-08-31 NOTE — ED NOTES
Dr guzman requested to bedside during triage, jeff nguyen at bedside.      Trav Bynum RN  08/31/20 1711

## 2020-08-31 NOTE — TELEPHONE ENCOUNTER
FYI:  Patient has had right sided numbness since 10:00 last night - she can't hold things, and is limping when walking. I advised her to go to the ER for Evaluation.

## 2020-08-31 NOTE — ED PROVIDER NOTES
I independently performed a history and physical on Dixon Ramirez. All diagnostic, treatment, and disposition decisions were made by myself in conjunction with the advanced practice provider. Briefly, this is a 42 y.o. female here for sudden onset of right-sided numbness and weakness. This started around 10 PM last night, but was much worse when she woke up this morning around 3 AM.  The patient denies headache and changes in her vision. She is noted loss of coordination. She is never had anything like this before, but does have family history of stroke. On exam, patient has mild weakness in the right leg with some effort against gravity, but no droop or drift in the right arm. She has been sensation to light touch throughout the right side of her body, but has some intact sensation. No obvious ataxia or coordination deficits. Speech is clear without dysarthria or aphasia. Heart is regular rate and rhythm. Lungs are clear to auscultation bilaterally. EKG  The Ekg interpreted by me in the absence of a cardiologist shows. normal sinus rhythm with a rate of 85  Axis is   Normal  QTc is  normal  Intervals and Durations are unremarkable. No specific ST-T wave changes appreciated. No evidence of acute ischemia. No significant change from prior EKG dated 3/22/2020    Screenings  NIH Stroke Scale  NIH Stroke Scale Assessed: Yes  Interval: Reassessment  Level of Consciousness (1a. ): Alert  LOC Questions (1b. ):  Answers both correctly  LOC Commands (1c. ): Performs both tasks correctly  Best Gaze (2. ): Normal  Visual (3. ): No visual loss  Facial Palsy (4. ): (!) Minor paralysis(right side droop)  Motor Arm, Left (5a. ): No drift  Motor Arm, Right (5b. ): No drift  Motor Leg, Left (6a. ): No drift  Motor Leg, Right (6b. ): Some effort against gravity  Limb Ataxia (7. ): (!) Present in one limb  Sensory (8. ): (!) Mild to Moderate  Best Language (9. ): No aphasia  Dysarthria (10. ): Normal  Extinction and Inattention (11): No abnormality  Total: 5    MDM  Initially, the patient's timing was questionable. It was unclear whether her symptoms started this morning last night. We called a stroke alert. By the time  stroke team called back, we became aware that the patient's symptoms started while outside the window for treatment. I did discuss this with the  stroke team.  They agreed that neither TPA nor thrombectomy are indicated. Of note, the patient's exam is also inconsistent with stroke. Although she does have lateralizing symptoms, when distracted, she gets more strength and does not seem to have any weakness. However, the sensory deficits are consistent on repeat exams, so she needs a full evaluation for this. FINAL IMPRESSION  1. Right sided weakness        Blood pressure (!) 153/106, pulse 86, temperature 98.2 °F (36.8 °C), temperature source Oral, resp. rate 17, height 5' 7\" (1.702 m), weight 180 lb (81.6 kg), SpO2 100 %, not currently breastfeeding.      For further details of [facility] emergency department encounter, please see documentation by advanced practice provider, ROXANA Bales.       Hal Figueroa MD  09/04/20 2808

## 2020-08-31 NOTE — LETTER
[facility] ([place], [place])  [address]  Phone: [phone]             September 9, 2020    Patient: Marco Antonio Guzmán   YOB: 1980   Date of Visit: 8/31/2020       To Whom It May Concern:    Meme Enamorado was seen and treated in our facility  beginning 8/31/2020 until 9/9/2020.       Sincerely,       Cori Hassan RN         Signature:__________________________________

## 2020-08-31 NOTE — ED NOTES
Report given to [facility]. Tele monitor applied & chart provided. VS stable & alert at this time.       Apryl Ramirez RN  08/31/20 4700

## 2020-08-31 NOTE — ED NOTES
Pt up to bedside commode with moderate assistance. Able to stand and pivot on left leg, needed help moving right leg and right arm. Pt back in bed, 2/2 side rails up, and call light within reach.       Prabhakar Odom RN  08/31/20 5491

## 2020-08-31 NOTE — PROGRESS NOTES
Patient resting in bed at this time, friend visiting. She passed her swallow screen now and we have ordered a dinner tray.

## 2020-09-01 ENCOUNTER — APPOINTMENT (OUTPATIENT)
Dept: MRI IMAGING | Age: 40
End: 2020-09-01
Payer: MEDICAID

## 2020-09-01 LAB
A/G RATIO: 1.2 (ref 1.1–2.2)
ALBUMIN SERPL-MCNC: 4.2 G/DL (ref 3.4–5)
ALP BLD-CCNC: 87 U/L (ref 40–129)
ALT SERPL-CCNC: 30 U/L (ref 10–40)
ANION GAP SERPL CALCULATED.3IONS-SCNC: 12 MMOL/L (ref 3–16)
AST SERPL-CCNC: 31 U/L (ref 15–37)
BILIRUB SERPL-MCNC: 0.3 MG/DL (ref 0–1)
BUN BLDV-MCNC: 16 MG/DL (ref 7–20)
CALCIUM SERPL-MCNC: 9.7 MG/DL (ref 8.3–10.6)
CHLORIDE BLD-SCNC: 103 MMOL/L (ref 99–110)
CHOLESTEROL, TOTAL: 150 MG/DL (ref 0–199)
CO2: 21 MMOL/L (ref 21–32)
CREAT SERPL-MCNC: 0.8 MG/DL (ref 0.6–1.1)
GFR AFRICAN AMERICAN: >60
GFR NON-AFRICAN AMERICAN: >60
GLOBULIN: 3.6 G/DL
GLUCOSE BLD-MCNC: 93 MG/DL (ref 70–99)
HCT VFR BLD CALC: 36.5 % (ref 36–48)
HDLC SERPL-MCNC: 38 MG/DL (ref 40–60)
HEMOGLOBIN: 11.6 G/DL (ref 12–16)
LDL CHOLESTEROL CALCULATED: 88 MG/DL
LEFT VENTRICULAR EJECTION FRACTION HIGH VALUE: 70 %
LEFT VENTRICULAR EJECTION FRACTION MODE: NORMAL
LV EF: 65 %
LV EF: 68 %
LVEF MODALITY: NORMAL
MCH RBC QN AUTO: 24.5 PG (ref 26–34)
MCHC RBC AUTO-ENTMCNC: 31.9 G/DL (ref 31–36)
MCV RBC AUTO: 76.7 FL (ref 80–100)
PDW BLD-RTO: 17.1 % (ref 12.4–15.4)
PLATELET # BLD: 330 K/UL (ref 135–450)
PMV BLD AUTO: 8.6 FL (ref 5–10.5)
POTASSIUM REFLEX MAGNESIUM: 4.2 MMOL/L (ref 3.5–5.1)
RBC # BLD: 4.76 M/UL (ref 4–5.2)
SODIUM BLD-SCNC: 136 MMOL/L (ref 136–145)
TOTAL PROTEIN: 7.8 G/DL (ref 6.4–8.2)
TRIGL SERPL-MCNC: 122 MG/DL (ref 0–150)
VLDLC SERPL CALC-MCNC: 24 MG/DL
WBC # BLD: 9 K/UL (ref 4–11)

## 2020-09-01 PROCEDURE — 6360000002 HC RX W HCPCS: Performed by: HOSPITALIST

## 2020-09-01 PROCEDURE — 80053 COMPREHEN METABOLIC PANEL: CPT

## 2020-09-01 PROCEDURE — G0378 HOSPITAL OBSERVATION PER HR: HCPCS

## 2020-09-01 PROCEDURE — 96376 TX/PRO/DX INJ SAME DRUG ADON: CPT

## 2020-09-01 PROCEDURE — 96374 THER/PROPH/DIAG INJ IV PUSH: CPT

## 2020-09-01 PROCEDURE — 36415 COLL VENOUS BLD VENIPUNCTURE: CPT

## 2020-09-01 PROCEDURE — 2580000003 HC RX 258: Performed by: HOSPITALIST

## 2020-09-01 PROCEDURE — 6370000000 HC RX 637 (ALT 250 FOR IP): Performed by: HOSPITALIST

## 2020-09-01 PROCEDURE — 96372 THER/PROPH/DIAG INJ SC/IM: CPT

## 2020-09-01 PROCEDURE — 83036 HEMOGLOBIN GLYCOSYLATED A1C: CPT

## 2020-09-01 PROCEDURE — 85027 COMPLETE CBC AUTOMATED: CPT

## 2020-09-01 PROCEDURE — 6370000000 HC RX 637 (ALT 250 FOR IP): Performed by: FAMILY MEDICINE

## 2020-09-01 PROCEDURE — 93306 TTE W/DOPPLER COMPLETE: CPT

## 2020-09-01 PROCEDURE — 94761 N-INVAS EAR/PLS OXIMETRY MLT: CPT

## 2020-09-01 PROCEDURE — A9577 INJ MULTIHANCE: HCPCS | Performed by: FAMILY MEDICINE

## 2020-09-01 PROCEDURE — 94640 AIRWAY INHALATION TREATMENT: CPT

## 2020-09-01 PROCEDURE — 70553 MRI BRAIN STEM W/O & W/DYE: CPT

## 2020-09-01 PROCEDURE — 80061 LIPID PANEL: CPT

## 2020-09-01 PROCEDURE — 6360000004 HC RX CONTRAST MEDICATION: Performed by: FAMILY MEDICINE

## 2020-09-01 RX ORDER — SUMATRIPTAN 25 MG/1
100 TABLET, FILM COATED ORAL DAILY PRN
Status: DISCONTINUED | OUTPATIENT
Start: 2020-09-01 | End: 2020-09-09 | Stop reason: HOSPADM

## 2020-09-01 RX ADMIN — FERROUS SULFATE TAB 325 MG (65 MG ELEMENTAL FE) 325 MG: 325 (65 FE) TAB at 09:23

## 2020-09-01 RX ADMIN — PANTOPRAZOLE SODIUM 40 MG: 40 TABLET, DELAYED RELEASE ORAL at 07:29

## 2020-09-01 RX ADMIN — PREGABALIN 150 MG: 75 CAPSULE ORAL at 09:22

## 2020-09-01 RX ADMIN — TIZANIDINE 4 MG: 4 TABLET ORAL at 20:54

## 2020-09-01 RX ADMIN — ONDANSETRON 4 MG: 2 INJECTION INTRAMUSCULAR; INTRAVENOUS at 14:58

## 2020-09-01 RX ADMIN — AMLODIPINE BESYLATE 5 MG: 5 TABLET ORAL at 09:22

## 2020-09-01 RX ADMIN — TIZANIDINE 4 MG: 4 TABLET ORAL at 09:21

## 2020-09-01 RX ADMIN — BUDESONIDE 250 MCG: 0.5 SUSPENSION RESPIRATORY (INHALATION) at 13:12

## 2020-09-01 RX ADMIN — Medication 10 ML: at 06:14

## 2020-09-01 RX ADMIN — PREGABALIN 150 MG: 75 CAPSULE ORAL at 17:54

## 2020-09-01 RX ADMIN — ALBUTEROL SULFATE 2.5 MG: 2.5 SOLUTION RESPIRATORY (INHALATION) at 13:12

## 2020-09-01 RX ADMIN — ENOXAPARIN SODIUM 40 MG: 40 INJECTION SUBCUTANEOUS at 20:54

## 2020-09-01 RX ADMIN — SUCRALFATE 1 G: 1 TABLET ORAL at 07:29

## 2020-09-01 RX ADMIN — FERROUS SULFATE TAB 325 MG (65 MG ELEMENTAL FE) 325 MG: 325 (65 FE) TAB at 20:54

## 2020-09-01 RX ADMIN — ROSUVASTATIN CALCIUM 40 MG: 40 TABLET, FILM COATED ORAL at 20:54

## 2020-09-01 RX ADMIN — ASPIRIN 81 MG: 81 TABLET, COATED ORAL at 09:22

## 2020-09-01 RX ADMIN — ONDANSETRON 4 MG: 2 INJECTION INTRAMUSCULAR; INTRAVENOUS at 06:14

## 2020-09-01 RX ADMIN — SUCRALFATE 1 G: 1 TABLET ORAL at 16:44

## 2020-09-01 RX ADMIN — SUMATRIPTAN SUCCINATE 100 MG: 25 TABLET ORAL at 14:35

## 2020-09-01 RX ADMIN — LORAZEPAM 0.5 MG: 0.5 TABLET ORAL at 21:16

## 2020-09-01 RX ADMIN — Medication 10 ML: at 19:18

## 2020-09-01 RX ADMIN — Medication 10 ML: at 14:39

## 2020-09-01 RX ADMIN — HYDROCHLOROTHIAZIDE 12.5 MG: 25 TABLET ORAL at 09:22

## 2020-09-01 RX ADMIN — GADOBENATE DIMEGLUMINE 15 ML: 529 INJECTION, SOLUTION INTRAVENOUS at 19:18

## 2020-09-01 RX ADMIN — DULOXETINE HYDROCHLORIDE 60 MG: 60 CAPSULE, DELAYED RELEASE ORAL at 09:22

## 2020-09-01 ASSESSMENT — PAIN DESCRIPTION - FREQUENCY: FREQUENCY: CONTINUOUS

## 2020-09-01 ASSESSMENT — PAIN DESCRIPTION - PROGRESSION: CLINICAL_PROGRESSION: GRADUALLY WORSENING

## 2020-09-01 ASSESSMENT — PAIN SCALES - GENERAL
PAINLEVEL_OUTOF10: 10
PAINLEVEL_OUTOF10: 9
PAINLEVEL_OUTOF10: 8

## 2020-09-01 ASSESSMENT — PAIN DESCRIPTION - DESCRIPTORS
DESCRIPTORS: ACHING

## 2020-09-01 ASSESSMENT — PAIN DESCRIPTION - PAIN TYPE
TYPE: ACUTE PAIN

## 2020-09-01 ASSESSMENT — PAIN DESCRIPTION - LOCATION
LOCATION: HEAD

## 2020-09-01 ASSESSMENT — PAIN - FUNCTIONAL ASSESSMENT: PAIN_FUNCTIONAL_ASSESSMENT: PREVENTS OR INTERFERES SOME ACTIVE ACTIVITIES AND ADLS

## 2020-09-01 ASSESSMENT — PAIN DESCRIPTION - ONSET: ONSET: PROGRESSIVE

## 2020-09-01 NOTE — PROGRESS NOTES
Blood pressure (!) 153/88, pulse 102, temperature 98.3 °F (36.8 °C), temperature source Oral, resp. rate 20, height 5' 7\" (1.702 m), weight 183 lb 1.6 oz (83.1 kg), SpO2 100 %, not currently breastfeeding. Assessment completed. No SOB or any distress noted. Pt having a bad migraine. Pt takes a migraine medication at home that the hospital doesn't carry. Asked MD for Imitrex 100mg BID. 100mg daily ordered and given. Breathing treatment done per request. Set pt up to eat her lunch. Call light within reach, pt encouraged to call if any needs arise. No further requests at this time. Will continue to monitor.

## 2020-09-01 NOTE — PROGRESS NOTES
Assisted pt up to the bathroom with walker. Pt still having weakness in her right leg. It seems she can hardly put weight on it. Pt moving really slow. Had to assist pt with lifting her right leg into the bed. Asked pt if she was feeling ok. Pt notified me that she hd recently taken her flexeril and it makes her drowsy. Made pt comfortable. Will continue to monitor.

## 2020-09-01 NOTE — PROGRESS NOTES
Awake and assisted to [facility] and back to bed. Strength is much better in right arm. C/o increased tingling in right leg but weakness is unchanged.

## 2020-09-01 NOTE — PROGRESS NOTES
Upon first rounds . During breathing treatment . Assisted to bathroom. Difficult to ambulate due to numbness on the right side. Had to use walker. Right upper arm is improving but the right leg remains weak. No other neurological signs. HR up to 140's with activity.

## 2020-09-01 NOTE — PLAN OF CARE
Problem: Pain:  Goal: Pain level will decrease  Description: Pain level will decrease  Outcome: Ongoing  Note: Pt having migraines, contacted MD to get Imitrex ordered. Imitrex ordered and administered. Pt able to fall asleep.

## 2020-09-01 NOTE — PROGRESS NOTES
Called out c/o nausea, dizziness and H/A. Small emesis noted. No change in neuro status. Zofran given IVP. Takes a medication at home for H/A but does not know the name or dose. Will continue to monitor.

## 2020-09-01 NOTE — PLAN OF CARE
Problem: Pain:  Goal: Pain level will decrease  Description: Pain level will decrease  Outcome: Ongoing  Note: Pt having migraines, contacted MD to get Imitrex ordered. Imitrex ordered and administered. Pt able to fall asleep. Problem: Falls - Risk of:  Goal: Will remain free from falls  Description: Will remain free from falls  Outcome: Ongoing  Note: Pt remains free from falls. Safety precautions in place. Bed in lowest position, bed wheels locked, call light with in reach, bed alarm on, yellow blanket in place, fall risk wrist band on, SAFE outside of doorway. Pt A&O x4 and calls for assistance. Pt will be assisted to bathroom with walker x1 assist. Will continue to monitor. Problem: HEMODYNAMIC STATUS  Goal: Patient has stable vital signs and fluid balance  Outcome: Ongoing  Note: Pt vital signs will be assessed per floor protocol and as needed. Medications will be administered accordingly to keep vitals signs within parameters that are normal or that are chosen by MD.       Problem: ACTIVITY INTOLERANCE/IMPAIRED MOBILITY  Goal: Mobility/activity is maintained at optimum level for patient  Outcome: Ongoing     Problem: COMMUNICATION IMPAIRMENT  Goal: Ability to express needs and understand communication  Outcome: Ongoing  Note: Pt able to communicate needs and understand what is being said to her. Problem: Falls - Risk of: Intervention: Toileting assistance  Note: Pt able to call as requested when in need of assistance. Responded to call light in a timely manner. Remained with patient while out of bed per protocol. Assisted pt back to bed and into comfortable position.

## 2020-09-02 ENCOUNTER — APPOINTMENT (OUTPATIENT)
Dept: CT IMAGING | Age: 40
End: 2020-09-02
Payer: MEDICAID

## 2020-09-02 LAB
ESTIMATED AVERAGE GLUCOSE: 125.5 MG/DL
HBA1C MFR BLD: 6 %
HCG QUALITATIVE: NEGATIVE

## 2020-09-02 PROCEDURE — 72131 CT LUMBAR SPINE W/O DYE: CPT

## 2020-09-02 PROCEDURE — G0378 HOSPITAL OBSERVATION PER HR: HCPCS

## 2020-09-02 PROCEDURE — 96372 THER/PROPH/DIAG INJ SC/IM: CPT

## 2020-09-02 PROCEDURE — 96376 TX/PRO/DX INJ SAME DRUG ADON: CPT

## 2020-09-02 PROCEDURE — 6370000000 HC RX 637 (ALT 250 FOR IP): Performed by: HOSPITALIST

## 2020-09-02 PROCEDURE — 6370000000 HC RX 637 (ALT 250 FOR IP): Performed by: FAMILY MEDICINE

## 2020-09-02 PROCEDURE — 2580000003 HC RX 258: Performed by: HOSPITALIST

## 2020-09-02 PROCEDURE — 94761 N-INVAS EAR/PLS OXIMETRY MLT: CPT

## 2020-09-02 PROCEDURE — 6360000002 HC RX W HCPCS: Performed by: HOSPITALIST

## 2020-09-02 PROCEDURE — 36415 COLL VENOUS BLD VENIPUNCTURE: CPT

## 2020-09-02 PROCEDURE — 94640 AIRWAY INHALATION TREATMENT: CPT

## 2020-09-02 PROCEDURE — 84703 CHORIONIC GONADOTROPIN ASSAY: CPT

## 2020-09-02 RX ADMIN — Medication 10 ML: at 21:38

## 2020-09-02 RX ADMIN — TIZANIDINE 4 MG: 4 TABLET ORAL at 08:31

## 2020-09-02 RX ADMIN — PREGABALIN 150 MG: 75 CAPSULE ORAL at 18:08

## 2020-09-02 RX ADMIN — ENOXAPARIN SODIUM 40 MG: 40 INJECTION SUBCUTANEOUS at 21:38

## 2020-09-02 RX ADMIN — ALBUTEROL SULFATE 2.5 MG: 2.5 SOLUTION RESPIRATORY (INHALATION) at 19:34

## 2020-09-02 RX ADMIN — PANTOPRAZOLE SODIUM 40 MG: 40 TABLET, DELAYED RELEASE ORAL at 06:37

## 2020-09-02 RX ADMIN — FERROUS SULFATE TAB 325 MG (65 MG ELEMENTAL FE) 325 MG: 325 (65 FE) TAB at 08:32

## 2020-09-02 RX ADMIN — ROSUVASTATIN CALCIUM 40 MG: 40 TABLET, FILM COATED ORAL at 21:38

## 2020-09-02 RX ADMIN — FERROUS SULFATE TAB 325 MG (65 MG ELEMENTAL FE) 325 MG: 325 (65 FE) TAB at 21:38

## 2020-09-02 RX ADMIN — HYDROCHLOROTHIAZIDE 12.5 MG: 25 TABLET ORAL at 08:31

## 2020-09-02 RX ADMIN — Medication 10 ML: at 08:31

## 2020-09-02 RX ADMIN — BUDESONIDE 500 MCG: 0.5 SUSPENSION RESPIRATORY (INHALATION) at 19:34

## 2020-09-02 RX ADMIN — ASPIRIN 81 MG: 81 TABLET, COATED ORAL at 08:32

## 2020-09-02 RX ADMIN — CLONAZEPAM 0.5 MG: 0.5 TABLET ORAL at 23:35

## 2020-09-02 RX ADMIN — TIZANIDINE 4 MG: 4 TABLET ORAL at 23:35

## 2020-09-02 RX ADMIN — DULOXETINE HYDROCHLORIDE 60 MG: 60 CAPSULE, DELAYED RELEASE ORAL at 08:31

## 2020-09-02 RX ADMIN — BUDESONIDE 250 MCG: 0.5 SUSPENSION RESPIRATORY (INHALATION) at 07:30

## 2020-09-02 RX ADMIN — PREGABALIN 150 MG: 75 CAPSULE ORAL at 08:32

## 2020-09-02 RX ADMIN — AMLODIPINE BESYLATE 5 MG: 5 TABLET ORAL at 08:31

## 2020-09-02 RX ADMIN — ALBUTEROL SULFATE 2.5 MG: 2.5 SOLUTION RESPIRATORY (INHALATION) at 07:30

## 2020-09-02 RX ADMIN — ONDANSETRON 4 MG: 2 INJECTION INTRAMUSCULAR; INTRAVENOUS at 18:08

## 2020-09-02 RX ADMIN — SUMATRIPTAN SUCCINATE 100 MG: 25 TABLET ORAL at 05:00

## 2020-09-02 ASSESSMENT — PAIN SCALES - GENERAL
PAINLEVEL_OUTOF10: 9
PAINLEVEL_OUTOF10: 10
PAINLEVEL_OUTOF10: 7
PAINLEVEL_OUTOF10: 9

## 2020-09-02 ASSESSMENT — PAIN DESCRIPTION - PROGRESSION
CLINICAL_PROGRESSION: GRADUALLY WORSENING
CLINICAL_PROGRESSION: GRADUALLY IMPROVING

## 2020-09-02 ASSESSMENT — PAIN DESCRIPTION - PAIN TYPE
TYPE: ACUTE PAIN;CHRONIC PAIN
TYPE: ACUTE PAIN
TYPE: ACUTE PAIN;CHRONIC PAIN

## 2020-09-02 ASSESSMENT — PAIN DESCRIPTION - LOCATION
LOCATION: HEAD
LOCATION: GENERALIZED
LOCATION: GENERALIZED

## 2020-09-02 ASSESSMENT — PAIN DESCRIPTION - ORIENTATION
ORIENTATION: RIGHT;LEFT
ORIENTATION: RIGHT;LEFT

## 2020-09-02 ASSESSMENT — PAIN DESCRIPTION - FREQUENCY: FREQUENCY: CONTINUOUS

## 2020-09-02 ASSESSMENT — PAIN DESCRIPTION - DESCRIPTORS
DESCRIPTORS: THROBBING
DESCRIPTORS: HEADACHE

## 2020-09-02 ASSESSMENT — PAIN DESCRIPTION - ONSET: ONSET: ON-GOING

## 2020-09-02 NOTE — PROGRESS NOTES
Incentive Spirometry education and demonstration completed by Respiratory Therapy Yes      Response to education: Very Good     Teaching Time: 5 minutes    Minimum Predicted Vital Capacity - 616 mL. Patient's Actual Vital Capacity - 1000 mL. Turning over to Nursing for routine follow-up Yes.     Comments:     Electronically signed by Sumi Stone RCP on 9/2/2020 at 7:44 AM

## 2020-09-02 NOTE — PROGRESS NOTES
[address] [place] [address] PROGRESS NOTE    9/2/2020 1:47 PM        Name: Marguerite Garcia . Admitted: 8/31/2020  Primary Care Provider: LEESA Blake CNP (Tel: [phone])      Subjective: Jordinril Francisco Pt states she is unable to move her right leg  Denies back pain . Persistent upper ext paresthesias.  Nursing report pt has been dragging her foot while attempting to ambulate    Reviewed interval ancillary notes    Current Medications  SUMAtriptan (IMITREX) tablet 100 mg, Daily PRN  hydrALAZINE (APRESOLINE) injection 10 mg, Once  albuterol (PROVENTIL) nebulizer solution 2.5 mg, Q4H PRN  budesonide (PULMICORT) nebulizer suspension 250 mcg, BID  clonazePAM (KLONOPIN) tablet 0.5 mg, Nightly PRN  DULoxetine (CYMBALTA) extended release capsule 60 mg, Daily  ferrous sulfate (IRON 325) tablet 325 mg, BID  hydroCHLOROthiazide (HYDRODIURIL) tablet 12.5 mg, Daily  pantoprazole (PROTONIX) tablet 40 mg, Daily  pregabalin (LYRICA) capsule 150 mg, BID  sucralfate (CARAFATE) tablet 1 g, BID AC  tiZANidine (ZANAFLEX) tablet 4 mg, Q8H PRN  sodium chloride flush 0.9 % injection 10 mL, 2 times per day  sodium chloride flush 0.9 % injection 10 mL, PRN  enoxaparin (LOVENOX) injection 40 mg, Nightly  aspirin EC tablet 81 mg, Daily    Or  aspirin suppository 300 mg, Daily  perflutren lipid microspheres (DEFINITY) injection 1.65 mg, ONCE PRN  senna (SENOKOT) tablet 8.6 mg, Daily PRN  ondansetron (ZOFRAN-ODT) disintegrating tablet 4 mg, Q8H PRN    Or  ondansetron (ZOFRAN) injection 4 mg, Q6H PRN  rosuvastatin (CRESTOR) tablet 40 mg, Nightly  labetalol (NORMODYNE;TRANDATE) injection 10 mg, Q10 Min PRN  amLODIPine (NORVASC) tablet 5 mg, Daily        Objective:  BP (!) 152/82   Pulse 89   Temp 98.4 °F (36.9 °C) (Oral)   Resp 18   Ht 5' 7\" (1.702 m)   Wt 183 lb 1.6 oz (83.1 kg)   SpO2 100%   BMI 28.68 kg/m²     Intake/Output Summary (Last 24 hours) at

## 2020-09-03 PROCEDURE — 97161 PT EVAL LOW COMPLEX 20 MIN: CPT

## 2020-09-03 PROCEDURE — 6360000002 HC RX W HCPCS: Performed by: HOSPITALIST

## 2020-09-03 PROCEDURE — 93005 ELECTROCARDIOGRAM TRACING: CPT | Performed by: HOSPITALIST

## 2020-09-03 PROCEDURE — 97535 SELF CARE MNGMENT TRAINING: CPT

## 2020-09-03 PROCEDURE — G0378 HOSPITAL OBSERVATION PER HR: HCPCS

## 2020-09-03 PROCEDURE — 6370000000 HC RX 637 (ALT 250 FOR IP): Performed by: HOSPITALIST

## 2020-09-03 PROCEDURE — 94761 N-INVAS EAR/PLS OXIMETRY MLT: CPT

## 2020-09-03 PROCEDURE — 97530 THERAPEUTIC ACTIVITIES: CPT

## 2020-09-03 PROCEDURE — 97166 OT EVAL MOD COMPLEX 45 MIN: CPT

## 2020-09-03 PROCEDURE — 96372 THER/PROPH/DIAG INJ SC/IM: CPT

## 2020-09-03 PROCEDURE — 94640 AIRWAY INHALATION TREATMENT: CPT

## 2020-09-03 PROCEDURE — 96376 TX/PRO/DX INJ SAME DRUG ADON: CPT

## 2020-09-03 PROCEDURE — 2580000003 HC RX 258: Performed by: HOSPITALIST

## 2020-09-03 PROCEDURE — 97116 GAIT TRAINING THERAPY: CPT

## 2020-09-03 RX ORDER — OXYCODONE HYDROCHLORIDE 5 MG/1
5 TABLET ORAL ONCE
Status: COMPLETED | OUTPATIENT
Start: 2020-09-03 | End: 2020-09-03

## 2020-09-03 RX ORDER — OXYCODONE HYDROCHLORIDE 5 MG/1
5 TABLET ORAL EVERY 8 HOURS PRN
Status: DISCONTINUED | OUTPATIENT
Start: 2020-09-03 | End: 2020-09-09 | Stop reason: HOSPADM

## 2020-09-03 RX ADMIN — Medication 10 ML: at 22:58

## 2020-09-03 RX ADMIN — ENOXAPARIN SODIUM 40 MG: 40 INJECTION SUBCUTANEOUS at 22:57

## 2020-09-03 RX ADMIN — PREGABALIN 150 MG: 75 CAPSULE ORAL at 16:59

## 2020-09-03 RX ADMIN — DULOXETINE HYDROCHLORIDE 60 MG: 60 CAPSULE, DELAYED RELEASE ORAL at 08:11

## 2020-09-03 RX ADMIN — PANTOPRAZOLE SODIUM 40 MG: 40 TABLET, DELAYED RELEASE ORAL at 07:14

## 2020-09-03 RX ADMIN — ROSUVASTATIN CALCIUM 40 MG: 40 TABLET, FILM COATED ORAL at 22:57

## 2020-09-03 RX ADMIN — HYDROCHLOROTHIAZIDE 12.5 MG: 25 TABLET ORAL at 08:11

## 2020-09-03 RX ADMIN — ASPIRIN 81 MG: 81 TABLET, COATED ORAL at 08:10

## 2020-09-03 RX ADMIN — TIZANIDINE 4 MG: 4 TABLET ORAL at 22:56

## 2020-09-03 RX ADMIN — ONDANSETRON 4 MG: 2 INJECTION INTRAMUSCULAR; INTRAVENOUS at 15:15

## 2020-09-03 RX ADMIN — CLONAZEPAM 0.5 MG: 0.5 TABLET ORAL at 22:56

## 2020-09-03 RX ADMIN — AMLODIPINE BESYLATE 5 MG: 5 TABLET ORAL at 08:10

## 2020-09-03 RX ADMIN — FERROUS SULFATE TAB 325 MG (65 MG ELEMENTAL FE) 325 MG: 325 (65 FE) TAB at 22:56

## 2020-09-03 RX ADMIN — ALBUTEROL SULFATE 2.5 MG: 2.5 SOLUTION RESPIRATORY (INHALATION) at 08:31

## 2020-09-03 RX ADMIN — OXYCODONE 5 MG: 5 TABLET ORAL at 10:31

## 2020-09-03 RX ADMIN — FERROUS SULFATE TAB 325 MG (65 MG ELEMENTAL FE) 325 MG: 325 (65 FE) TAB at 08:11

## 2020-09-03 RX ADMIN — PREGABALIN 150 MG: 75 CAPSULE ORAL at 08:11

## 2020-09-03 RX ADMIN — Medication 10 ML: at 08:12

## 2020-09-03 RX ADMIN — BUDESONIDE 250 MCG: 0.5 SUSPENSION RESPIRATORY (INHALATION) at 08:31

## 2020-09-03 RX ADMIN — TIZANIDINE 4 MG: 4 TABLET ORAL at 14:54

## 2020-09-03 ASSESSMENT — PAIN DESCRIPTION - ORIENTATION
ORIENTATION: RIGHT;LEFT
ORIENTATION: RIGHT;LEFT

## 2020-09-03 ASSESSMENT — PAIN DESCRIPTION - LOCATION
LOCATION: NECK;BACK

## 2020-09-03 ASSESSMENT — PAIN SCALES - GENERAL
PAINLEVEL_OUTOF10: 0
PAINLEVEL_OUTOF10: 0
PAINLEVEL_OUTOF10: 10

## 2020-09-03 ASSESSMENT — PAIN DESCRIPTION - PAIN TYPE
TYPE: ACUTE PAIN

## 2020-09-03 NOTE — PROGRESS NOTES
Assessment completed. Continues to have right leg weakness. Bilateral upper arm strength is strong and equal. C/o generalized body pain. Zanaflex and Klonopin given.

## 2020-09-03 NOTE — PROGRESS NOTES
Resting quietly in bed, eyes closed, respirations even, responded to verbal stimuli. /69.   Voicing no complaints.[email or web address]

## 2020-09-03 NOTE — PROGRESS NOTES
[facility] PROGRESS NOTE    9/3/2020 8:58 AM        Name: Maksim Mcdonald . Admitted: 8/31/2020  Primary Care Provider: LEESA Field CNP (Tel: [phone])                        Hospital course:  Maksim Mcdonald is a 42 y.o. female who has history of fibromyalgia, on medical marijuana presented to the ED concerned that she was having a stroke. The patient presents with headache, right-sided weakness, and paresthesia ongoing for greater than 12 hours. The patient does endorse being under a great deal of stress related to home schooling, full-time employment, and PTSD related to murder of family member. Subjective: Currently complain about right lower extremity weakness  No acute events overnight. Resting well. Pain control. Diet ok. Labs reviewed  Denies any chest pain sob.      Reviewed interval ancillary notes    Current Medications  SUMAtriptan (IMITREX) tablet 100 mg, Daily PRN  hydrALAZINE (APRESOLINE) injection 10 mg, Once  albuterol (PROVENTIL) nebulizer solution 2.5 mg, Q4H PRN  budesonide (PULMICORT) nebulizer suspension 250 mcg, BID  clonazePAM (KLONOPIN) tablet 0.5 mg, Nightly PRN  DULoxetine (CYMBALTA) extended release capsule 60 mg, Daily  ferrous sulfate (IRON 325) tablet 325 mg, BID  hydroCHLOROthiazide (HYDRODIURIL) tablet 12.5 mg, Daily  pantoprazole (PROTONIX) tablet 40 mg, Daily  pregabalin (LYRICA) capsule 150 mg, BID  sucralfate (CARAFATE) tablet 1 g, BID AC  tiZANidine (ZANAFLEX) tablet 4 mg, Q8H PRN  sodium chloride flush 0.9 % injection 10 mL, 2 times per day  sodium chloride flush 0.9 % injection 10 mL, PRN  enoxaparin (LOVENOX) injection 40 mg, Nightly  aspirin EC tablet 81 mg, Daily    Or  aspirin suppository 300 mg, Daily  perflutren lipid microspheres (DEFINITY) injection 1.65 mg, ONCE PRN  senna (SENOKOT) tablet 8.6 mg, Daily PRN  ondansetron (ZOFRAN-ODT) disintegrating tablet 4 mg, Q8H PRN    Or  ondansetron (ZOFRAN) injection 4 mg, Q6H PRN  rosuvastatin (CRESTOR) tablet 40 mg, Nightly  labetalol (NORMODYNE;TRANDATE) injection 10 mg, Q10 Min PRN  amLODIPine (NORVASC) tablet 5 mg, Daily        Objective:  BP (!) 161/81   Pulse 77   Temp 98.2 °F (36.8 °C) (Oral)   Resp 18   Ht 5' 7\" (1.702 m)   Wt 183 lb 4.8 oz (83.1 kg)   SpO2 100%   BMI 28.71 kg/m²     Intake/Output Summary (Last 24 hours) at 9/3/2020 0858  Last data filed at 9/3/2020 0810  Gross per 24 hour   Intake 10 ml   Output --   Net 10 ml      Wt Readings from Last 3 Encounters:   09/03/20 183 lb 4.8 oz (83.1 kg)   06/11/20 170 lb (77.1 kg)   03/22/20 186 lb 6 oz (84.5 kg)       General appearance: -American young lady. Appears comfortable  Eyes: Sclera clear. Pupils equal.  ENT: Moist oral mucosa. Trachea midline, no adenopathy, neck supple. Cardiovascular: Regular rhythm, normal S1, S2. No murmur. No edema in lower extremities  Respiratory: Not using accessory muscles. Good inspiratory effort. Clear to auscultation bilaterally, no wheeze or crackles. GI: Abdomen soft, no tenderness, not distended, normal bowel sounds  Musculoskeletal: No deformity, ROM WNL. Neurology: CN 2-12 grossly intact. There is no strength problem in upper extremity muscles 5 out of 5 bilaterally. There is no strength problem in left lower extremity muscles. She is having difficulty to pull the knee on her stomach and raise the right lower extremity up the strength here is 3 out of 5. No speech difficulty. Psych: Normal affect.  Alert and oriented in time, place and person  Skin: Warm, dry, normal turgor    Labs and Tests:  CBC:   Recent Labs     08/31/20  1330 09/01/20  0509   WBC 8.9 9.0   HGB 11.8* 11.6*   HCT 36.3 36.5   MCV 77.0* 76.7*    330     BMP:    Recent Labs     08/31/20  1330 09/01/20  0509   * 136   K 4.8 4.2    103   CO2 21 21   BUN 17 16 CREATININE 0.6 0.8   GLUCOSE 99 93     Hepatic:   Recent Labs     08/31/20  1330 09/01/20  0509   AST 30 31   ALT 26 30   BILITOT 0.4 0.3   ALKPHOS 85 87         Problem List  Principal Problem:    Focal motor deficit  Active Problems:    HTN (hypertension)    Fibromyalgia    Moderate episode of recurrent major depressive disorder (HCC)    Paresthesia of right arm and leg  Resolved Problems:    * No resolved hospital problems. *       Assessment & Plan: 1. Right lower ext weakness: Persisted in my exam, MRI brain  neg for acute CVA, 2D echo with Doppler is normal, CT scan of lumbar sacral spine is normal, continue to evaluate by PT and OT, I will ask neurology consult for this ongoing right lower extremity weakness could be malingering  Cont ASA and statin   2.   Hypertension continue amlodipine and hydrochlorothiazide  3.  major depression Continue Cont on cymbalta         Diet: DIET GENERAL; No Added Salt (3-4 GM)  Code:Full Code  DVT PPX lovenox       Bobby Solano MD   9/3/2020 8:58 AM

## 2020-09-03 NOTE — PROGRESS NOTES
Physical Therapy    Facility/Department: [address]  Initial Assessment    NAME: Hany Merrill  : 1980  MRN: [id number]    Date of Service: 9/3/2020    Discharge Recommendations:Greyson Wade scored a 15/24 on the AM-PAC short mobility form. Current research shows that an AM-PAC score of 17 or less is typically not associated with a discharge to the patient's home setting. Based on the patient's AM-PAC score and their current functional mobility deficits, it is recommended that the patient have 5-7 sessions per week of Physical Therapy at d/c to increase the patient's independence. At this time, this patient demonstrates the endurance, and/or tolerance for 3 hours of therapy each day, with a treatment frequency of 5-7x/wk. Please see assessment section for further patient specific details. If patient discharges prior to next session this note will serve as a discharge summary. Please see below for the latest assessment towards goals. If pt refuses the above recommendations, pt would benefit from the below therapy services,  HOME HEALTH CARE: LEVEL 3 SAFETY     - Initial home health evaluation to occur within 24-48 hours, in patient home   - Therapy evaluations in home within 24-48 hours of discharge; including DME and home safety   - Frontload therapy 5 days, then 3x a week   - Therapy to evaluate if patient has [address] needs for personal care   -  evaluation within 24-48 hours, includes evaluation of resources and insurance to determine [place], [place], LTC, and Medicaid options         PT Equipment Recommendations  Equipment Needed: Yes  Mobility Devices: Asad Ellington: Rolling    Assessment   Body structures, Functions, Activity limitations: Decreased functional mobility ; Decreased ADL status; Decreased ROM; Decreased strength;Decreased endurance;Decreased sensation;Decreased balance  Assessment: Pt presents as below her baseline function.  Pt would benefit from skilled PT services to promote safe return to PLOF. At this time, pt demonstrates significant limitations to overall mobility, causing concern for D/C to home environment given number of steps required for pt to navigate in home environment. Pt would benefit from skilled PT services prior to discharge to home. Prognosis: Good  Decision Making: Low Complexity  PT Education: Goals;PT Role;Plan of Care  Patient Education: D/C recommendations--pt verbalized understanding  REQUIRES PT FOLLOW UP: Yes  Activity Tolerance  Activity Tolerance: Patient Tolerated treatment well;Patient limited by endurance       Patient Diagnosis(es): The encounter diagnosis was Right sided weakness. has a past medical history of Asthma, Constipation, Fibromyalgia, Graves disease, History of blood transfusion, Hx of blood clots, Hypertension, Joint pain, Kidney stone, Localized rash, Lupus ([address] MDRO (multiple drug resistant organisms) resistance, and PONV (postoperative nausea and vomiting). has a past surgical history that includes Tubal ligation;  section; Tonsillectomy; other surgical history (2016); and Cystoscopy (2018). Restrictions  Restrictions/Precautions  Restrictions/Precautions: Fall Risk(high fall risk, up with assist)  Required Braces or Orthoses?: No  Position Activity Restriction  Other position/activity restrictions: Zuleika Finley is a 42 y.o. female who presented to the ED concerned that she was having a stroke. The patient presents with headache, right-sided weakness, and paresthesia ongoing for greater than 12 hours. The patient does endorse being under a great deal of stress related to home schooling, full-time employment, and PTSD related to murder of family member. She has known hypertension and reports that her BP has been quite elevated resulting in recurrent epistaxis lately. Labs were obtained and essentially unremarkable.   EKG as well as neuro imaging failed to reveal any acute process. Hospitalist team consulted to admit this patient for possible CVA. MRI negative, CT of lumbar spine negative.   Vision/Hearing  Vision: Impaired  Vision Exceptions: (contacts)  Hearing: Within functional limits     Subjective  General  Chart Reviewed: Yes  Response To Previous Treatment: Not applicable  Family / Caregiver Present: No  Diagnosis: Focal Motor Deficit  Follows Commands: Within Functional Limits  General Comment  Comments: Pt supine in bed upon arrival.  Subjective  Subjective: Pt agreeable to PT eval.  Pain Screening  Patient Currently in Pain: Yes  Pain Assessment  Pain Assessment: 0-10(pt reporting having pain medication prior to PT arrival)  Pain Level: 10  Pain Location: Neck;Back  Vital Signs  Patient Currently in Pain: Yes       Orientation  Orientation  Overall Orientation Status: Within Normal Limits  Social/Functional History  Social/Functional History  Lives With: Family(5 children, between ages 9 and 16)  Type of Home: House(townhouse)  Home Layout: Two level, Bed/Bath upstairs, 1/2 bath on main level(full flight to upstairs)  Home Access: Stairs to enter with rails  Entrance Stairs - Number of Steps: 6 CUATE  Entrance Stairs - Rails: Right(going up)  Bathroom Shower/Tub: Tub/Shower unit  Bathroom Toilet: Standard  ADL Assistance: Independent  Homemaking Assistance: Independent(children assist with laundry and other household chores)  Homemaking Responsibilities: Yes  Ambulation Assistance: Independent  Transfer Assistance: Independent  Active : Yes  Occupation: Full time employment  Type of occupation: [employer], working from home  [address]: involved in children's sports  Additional Comments: Pt reports that sisters plan on staying with pt to assist with IADLs once d/c'd  Cognition        Objective     Observation/Palpation  Posture: Good    AROM RLE (degrees)  RLE AROM: WFL  RLE General AROM: Pt requiring use of UEs to abduct RLE to EOB when getting OOB; Following ambulation, pt able to abduct RLE along bed surface when repositioning self in bed. AROM LLE (degrees)  LLE AROM : WFL  Strength RLE  Strength RLE: Exception  Comment: Grossly 2-/5; pt with limited antigravity strength during testing. Pt able to advance RLE during ambulation by scooting RLE forward on floor. Strength LLE  Strength LLE: WFL  Tone RLE  RLE Tone: Normotonic  Tone LLE  LLE Tone: Normotonic  Motor Control  Gross Motor?: WFL  Sensation  Overall Sensation Status: Impaired  Light Touch: Severe deficits in the RLE(Pt reports numbness and tingling to RLE thigh down to foot. Initially reporting inability to sense light touch to anterior shin, then reports decreased sensation throughout RLE compared to L; 0/6 attempts at proprioception testing to R ankle)  Bed mobility  Supine to Sit: Stand by assistance  Sit to Supine: Stand by assistance  Scooting: Supervision  Transfers  Sit to Stand: Contact guard assistance  Stand to sit: Contact guard assistance  Ambulation  Ambulation?: Yes  Ambulation 1  Surface: level tile  Device: Rolling Walker  Assistance: Contact guard assistance  Quality of Gait: Pt ambulates wigth very slow, deliberate gait, decreased weightbearing on RLE, with increased BUE weightbearing, L lean.  Pt able to scoot RLE forward during 3 point gait pattern with walker, no LOB/knee buckling  Distance: ~15 ft in room  Comments: Limited ambulation due to pt's elevated HR, 160's per RN during session, prompting return to bed  Stairs/Curb  Stairs?: No     Balance  Posture: Good  Sitting - Static: Good  Sitting - Dynamic: Good  Standing - Static: Fair  Standing - Dynamic: [address] [place] [address]  Times per week: 5-7x  Times per day: Daily  Current Treatment Recommendations: Strengthening, ROM, Balance Training, Functional Mobility Training, Transfer Training, Endurance Training, Gait Training, Stair training, Safety Education & Training, Patient/Caregiver Education & Training  Safety Devices  Type of devices: All fall risk precautions in place, Call light within reach, Chair alarm in place, Gait belt, Patient at risk for falls, Left in chair, Nurse notified  Restraints  Initially in place: No    G-Code       OutComes Score                                                  AM-PAC Score  AM-PAC Inpatient Mobility Raw Score : 15 (09/03/20 1529)  AM-PAC Inpatient T-Scale Score : 39.45 (09/03/20 1529)  Mobility Inpatient CMS 0-100% Score: 57.7 (09/03/20 1529)  Mobility Inpatient CMS G-Code Modifier : CK (09/03/20 1529)          Goals  Short term goals  Time Frame for Short term goals:  To be met prior to discharge  Short term goal 1: Pt will complete bed mobility with mod I  Short term goal 2: Pt will complete sit to/from stand with mod I  Short term goal 3: Pt will ambulate 100 ft with LRAD and mod I  Short term goal 4: Pt will navigate up/down 6 steps with HR and CGA       Therapy Time   Individual Concurrent Group Co-treatment   Time In 1330         Time Out 1356         Minutes 26         Timed Code Treatment Minutes: [address] Rani Rdz, PT   Jeanette Martinez, PT, DPT, [id number]

## 2020-09-03 NOTE — PROGRESS NOTES
Occupational Therapy   Occupational Therapy Initial Assessment  Date: 9/3/2020   Patient Name: Collette Benavidez  MRN: [id number]     : 1980    Date of Service: 9/3/2020    Discharge Recommendations: Collette Benavidez scored a 20/24 on the AM-PAC ADL Inpatient form. Current research shows that an AM-PAC score of 18 or greater is typically associated with a discharge to the patient's home setting. Based on the patient's AM-PAC score, and their current ADL deficits, it is recommended that the patient have 2-3 sessions per week of Occupational Therapy at d/c to increase the patient's independence. At this time, this patient demonstrates the endurance and safety to discharge home with [facility] and a follow up treatment frequency of 2-3x/wk. Please see assessment section for further patient specific details. HOME HEALTH CARE: LEVEL 1 STANDARD    - Initial home health evaluation to occur within 24-48 hours, in patient home   - Therapy to evaluate with goal of regaining prior level of functioning   - Therapy to evaluate if patient has [address] needs for personal care      If patient discharges prior to next session this note will serve as a discharge summary. Please see below for the latest assessment towards goals. OT Equipment Recommendations  Equipment Needed: Yes  Mobility Devices: ADL Assistive Devices  ADL Assistive Devices: Shower Chair without back    Assessment   Performance deficits / Impairments: Decreased functional mobility ; Decreased ADL status; Decreased endurance;Decreased sensation;Decreased balance;Decreased high-level IADLs  Assessment: Pt is not at baseline level of function and will benefit from continued OT to address the above limitations. Treatment Diagnosis: Decreased functional mobility, ADL/IADL status, balance, sensation, activity tolerance related to focal motor deficit.   Prognosis: Good  Decision Making: Medium Complexity  OT Education: OT Role;Plan of Care;Home Exercise Program;Precautions; ADL Adaptive Strategies;Transfer Training;Equipment  Patient Education: d/c recommendations - pt verbalized understanding  REQUIRES OT FOLLOW UP: Yes  Activity Tolerance  Activity Tolerance: Patient Tolerated treatment well;Patient limited by fatigue  Activity Tolerance: Pt reported \"feeling exhausted\" when ambulating back from bathroom. HR also in 140s during functional mobility. Safety Devices  Safety Devices in place: Yes  Type of devices: All fall risk precautions in place; Left in chair;Nurse notified;Call light within reach; Chair alarm in place; Patient at risk for falls           Patient Diagnosis(es): The encounter diagnosis was Right sided weakness. has a past medical history of Asthma, Constipation, Fibromyalgia, Graves disease, History of blood transfusion, Hx of blood clots, Hypertension, Joint pain, Kidney stone, Localized rash, Lupus ([facility] [address] MDRO (multiple drug resistant organisms) resistance, and PONV (postoperative nausea and vomiting). has a past surgical history that includes Tubal ligation;  section; Tonsillectomy; other surgical history (2016); and Cystoscopy (2018). Treatment Diagnosis: Decreased functional mobility, ADL/IADL status, balance, sensation, activity tolerance related to focal motor deficit. Restrictions  Restrictions/Precautions  Restrictions/Precautions: Fall Risk(high fall risk, up with assist)  Required Braces or Orthoses?: No  Position Activity Restriction  Other position/activity restrictions: Jarad Reaves is a 42 y.o. female who presented to the ED concerned that she was having a stroke. The patient presents with headache, right-sided weakness, and paresthesia ongoing for greater than 12 hours. The patient does endorse being under a great deal of stress related to home schooling, full-time employment, and PTSD related to murder of family member.   She has known hypertension and reports that her BP has been quite elevated resulting in recurrent epistaxis lately. Labs were obtained and essentially unremarkable. EKG as well as neuro imaging failed to reveal any acute process. Hospitalist team consulted to admit this patient for possible CVA. MRI negative, CT of lumbar spine negative. Subjective   General  Chart Reviewed: Yes  Additional Pertinent Hx: HTN, blood clots, lupus, fibromyalgia  Family / Caregiver Present: No  Diagnosis: focal motor deficit  Subjective  Subjective: Pt lying supine in bed upon arrival, agreeable to evaluation. Pt reporting 10/10 pain in neck and back and RLE feeling \"heavy\" and \"weak. \"  Patient Currently in Pain: Yes  Pain Assessment  Pain Assessment: 0-10  Pain Level: 10  Pain Type: Acute pain  Pain Location: Neck;Back  Pain Orientation: Right;Left  Non-Pharmaceutical Pain Intervention(s): Emotional support;Distraction; Ambulation/Increased Activity;Repositioned  Pre Treatment Pain Screening  Intervention List: Patient able to continue with treatment;Nurse/Physician notified  Vital Signs  Patient Currently in Pain: Yes  Social/Functional History  Social/Functional History  Lives With: Family(5 children, between ages 9 and 16)  Type of Home: House(townhouse)  Home Layout: Two level, Bed/Bath upstairs, 1/2 bath on main level(full flight to upstairs)  Home Access: Stairs to enter with rails  Entrance Stairs - Number of Steps: 6 CUATE  Entrance Stairs - Rails: Right(going up)  Bathroom Shower/Tub: Tub/Shower unit  Bathroom Toilet: Standard  ADL Assistance: Independent  Homemaking Assistance: Independent(children assist with laundry and other household chores)  Homemaking Responsibilities: Yes  Ambulation Assistance: Independent  Transfer Assistance: Independent  Active : Yes  Occupation: Full time employment  Type of occupation: [employer], working from home  [address]: involved in children's sports  Additional Comments: Pt reports that sisters plan on staying with pt to assist with IADLs once d/c'd       Objective   Vision: Impaired  Vision Exceptions: (contacts)  Hearing: Within functional limits    Orientation  Overall Orientation Status: Within Normal Limits  Observation/Palpation  Posture: Good  Balance  Sitting Balance: Modified independent   Standing Balance: Contact guard assistance  Standing Balance  Time: x~10 min total  Activity: functional mobility to/from bathroom, ADLs  Comment: no LOB noted  Functional Mobility  Functional - Mobility Device: Rolling Walker  Activity: To/From therapy gym  Assist Level: Contact guard assistance  Functional Mobility Comments: x10', x15'; slow pace, pt heavily WBing through UEs while ambulating, UEs tremoring; pt dragging RLE during mobility - RN present while ambulating out of bathroom, HR 140s  Toilet Transfers  Toilet - Technique: Ambulating  Equipment Used: Standard toilet(L grab bar)  Toilet Transfer: Contact guard assistance;Stand by assistance  Toilet Transfers Comments: CGA stand>sit, SBA sit>stand  ADL  Grooming: Contact guard assistance(in stance at sink to wash hands, pt off weighting RLE while washing hands; sat with mod I to wash face)  UE Bathing: Setup(seated on BSC)  LE Bathing: Supervision(seated on toilet to bathe madeline area/buttocks)  UE Dressing: Setup(gown)  LE Dressing: Contact guard assistance(CGA as pt stood to doff/don underwear - as pt sat to thread new underwear, pt able to dorsiflex R ankle to thread underwear)  Toileting: Contact guard assistance(as pt stood to manage clothing)  Additional Comments: Pt ambulated to bathroom with RW, transferred onto [facility] and completed UB bathing/dressing tasks with set-up. Pt stepped back to toilet with RW, CGA. Sat to urinate, stood with CGA for clothing management. Pt ambulated back to room from bathroom to recliner.   Tone RUE  RUE Tone: Normotonic  Tone LUE  LUE Tone: Normotonic  Coordination  Movements Are Fluid And Coordinated: Yes     Bed mobility  Supine to Sit:

## 2020-09-03 NOTE — PROGRESS NOTES
Shift assessment complete. Assisted pt to bathroom with her walker. She was dragging her right leg behind her. Experiencing pain down the right side of her body beginning at her neck and continuing down her leg. One time dose of pain med administered. VSS. Pt currently up to chair working with OT. Will continue to monitor.

## 2020-09-03 NOTE — PROGRESS NOTES
Awake, c/o muscle spasms to lower back and left side. Also c/o occasional dizziness and nausea, denies headache. Denies weakness to right arm, says its back to normal but right leg still very weak. BP elevated, afebrile. Received Klonopin and Zanaflex. Assessment completed, see flow charts.   Will recheck BP.[email or web address]

## 2020-09-04 ENCOUNTER — APPOINTMENT (OUTPATIENT)
Dept: MRI IMAGING | Age: 40
End: 2020-09-04
Payer: MEDICAID

## 2020-09-04 LAB
EKG ATRIAL RATE: 86 BPM
EKG DIAGNOSIS: NORMAL
EKG P AXIS: 37 DEGREES
EKG P-R INTERVAL: 148 MS
EKG Q-T INTERVAL: 346 MS
EKG QRS DURATION: 82 MS
EKG QTC CALCULATION (BAZETT): 414 MS
EKG R AXIS: 26 DEGREES
EKG T AXIS: 21 DEGREES
EKG VENTRICULAR RATE: 86 BPM

## 2020-09-04 PROCEDURE — 96376 TX/PRO/DX INJ SAME DRUG ADON: CPT

## 2020-09-04 PROCEDURE — 94761 N-INVAS EAR/PLS OXIMETRY MLT: CPT

## 2020-09-04 PROCEDURE — G0378 HOSPITAL OBSERVATION PER HR: HCPCS

## 2020-09-04 PROCEDURE — 93010 ELECTROCARDIOGRAM REPORT: CPT | Performed by: INTERNAL MEDICINE

## 2020-09-04 PROCEDURE — 72141 MRI NECK SPINE W/O DYE: CPT

## 2020-09-04 PROCEDURE — 6370000000 HC RX 637 (ALT 250 FOR IP): Performed by: HOSPITALIST

## 2020-09-04 PROCEDURE — 6360000002 HC RX W HCPCS: Performed by: HOSPITALIST

## 2020-09-04 PROCEDURE — 99214 OFFICE O/P EST MOD 30 MIN: CPT | Performed by: NURSE PRACTITIONER

## 2020-09-04 PROCEDURE — 94640 AIRWAY INHALATION TREATMENT: CPT

## 2020-09-04 PROCEDURE — 97535 SELF CARE MNGMENT TRAINING: CPT

## 2020-09-04 PROCEDURE — 2580000003 HC RX 258: Performed by: HOSPITALIST

## 2020-09-04 PROCEDURE — 99219 PR INITIAL OBSERVATION CARE/DAY 50 MINUTES: CPT | Performed by: PSYCHIATRY & NEUROLOGY

## 2020-09-04 PROCEDURE — 99222 1ST HOSP IP/OBS MODERATE 55: CPT | Performed by: INTERNAL MEDICINE

## 2020-09-04 PROCEDURE — 96372 THER/PROPH/DIAG INJ SC/IM: CPT

## 2020-09-04 RX ADMIN — OXYCODONE 5 MG: 5 TABLET ORAL at 06:05

## 2020-09-04 RX ADMIN — FERROUS SULFATE TAB 325 MG (65 MG ELEMENTAL FE) 325 MG: 325 (65 FE) TAB at 09:34

## 2020-09-04 RX ADMIN — PANTOPRAZOLE SODIUM 40 MG: 40 TABLET, DELAYED RELEASE ORAL at 06:06

## 2020-09-04 RX ADMIN — ENOXAPARIN SODIUM 40 MG: 40 INJECTION SUBCUTANEOUS at 23:05

## 2020-09-04 RX ADMIN — ALBUTEROL SULFATE 2.5 MG: 2.5 SOLUTION RESPIRATORY (INHALATION) at 20:16

## 2020-09-04 RX ADMIN — TIZANIDINE 4 MG: 4 TABLET ORAL at 14:11

## 2020-09-04 RX ADMIN — DULOXETINE HYDROCHLORIDE 60 MG: 60 CAPSULE, DELAYED RELEASE ORAL at 09:35

## 2020-09-04 RX ADMIN — TIZANIDINE 4 MG: 4 TABLET ORAL at 06:05

## 2020-09-04 RX ADMIN — ONDANSETRON 4 MG: 2 INJECTION INTRAMUSCULAR; INTRAVENOUS at 14:11

## 2020-09-04 RX ADMIN — PREGABALIN 150 MG: 75 CAPSULE ORAL at 09:35

## 2020-09-04 RX ADMIN — ALBUTEROL SULFATE 2.5 MG: 2.5 SOLUTION RESPIRATORY (INHALATION) at 09:26

## 2020-09-04 RX ADMIN — HYDROCHLOROTHIAZIDE 12.5 MG: 25 TABLET ORAL at 12:19

## 2020-09-04 RX ADMIN — FERROUS SULFATE TAB 325 MG (65 MG ELEMENTAL FE) 325 MG: 325 (65 FE) TAB at 23:03

## 2020-09-04 RX ADMIN — TIZANIDINE 4 MG: 4 TABLET ORAL at 23:03

## 2020-09-04 RX ADMIN — OXYCODONE 5 MG: 5 TABLET ORAL at 14:11

## 2020-09-04 RX ADMIN — ASPIRIN 81 MG: 81 TABLET, COATED ORAL at 09:34

## 2020-09-04 RX ADMIN — ROSUVASTATIN CALCIUM 40 MG: 40 TABLET, FILM COATED ORAL at 23:03

## 2020-09-04 RX ADMIN — BUDESONIDE 250 MCG: 0.5 SUSPENSION RESPIRATORY (INHALATION) at 20:16

## 2020-09-04 RX ADMIN — Medication 10 ML: at 23:03

## 2020-09-04 RX ADMIN — BUDESONIDE 500 MCG: 0.5 SUSPENSION RESPIRATORY (INHALATION) at 09:26

## 2020-09-04 RX ADMIN — PREGABALIN 150 MG: 75 CAPSULE ORAL at 18:31

## 2020-09-04 RX ADMIN — OXYCODONE 5 MG: 5 TABLET ORAL at 23:03

## 2020-09-04 RX ADMIN — Medication 10 ML: at 09:35

## 2020-09-04 RX ADMIN — AMLODIPINE BESYLATE 5 MG: 5 TABLET ORAL at 09:34

## 2020-09-04 ASSESSMENT — PAIN DESCRIPTION - LOCATION
LOCATION: NECK;BACK
LOCATION: BACK;NECK
LOCATION: BACK;NECK

## 2020-09-04 ASSESSMENT — PAIN - FUNCTIONAL ASSESSMENT
PAIN_FUNCTIONAL_ASSESSMENT: PREVENTS OR INTERFERES SOME ACTIVE ACTIVITIES AND ADLS
PAIN_FUNCTIONAL_ASSESSMENT: PREVENTS OR INTERFERES SOME ACTIVE ACTIVITIES AND ADLS

## 2020-09-04 ASSESSMENT — PAIN DESCRIPTION - PROGRESSION
CLINICAL_PROGRESSION: GRADUALLY IMPROVING
CLINICAL_PROGRESSION: NOT CHANGED
CLINICAL_PROGRESSION: GRADUALLY IMPROVING

## 2020-09-04 ASSESSMENT — PAIN DESCRIPTION - FREQUENCY
FREQUENCY: INTERMITTENT
FREQUENCY: INTERMITTENT

## 2020-09-04 ASSESSMENT — PAIN DESCRIPTION - PAIN TYPE
TYPE: CHRONIC PAIN

## 2020-09-04 ASSESSMENT — PAIN SCALES - GENERAL
PAINLEVEL_OUTOF10: 0
PAINLEVEL_OUTOF10: 9
PAINLEVEL_OUTOF10: 10
PAINLEVEL_OUTOF10: 10
PAINLEVEL_OUTOF10: 5
PAINLEVEL_OUTOF10: 8
PAINLEVEL_OUTOF10: 0

## 2020-09-04 ASSESSMENT — PAIN DESCRIPTION - ONSET: ONSET: ON-GOING

## 2020-09-04 NOTE — PROGRESS NOTES
[facility] is not in network with patient's insurance. [facility] is in network with patient's insurance. Discharge planner notified.

## 2020-09-04 NOTE — PROGRESS NOTES
Occupational Therapy  Facility/Department: [address]  Daily Treatment Note  NAME: Collette Benavidez  : 1980  MRN: [id number]    Date of Service: 2020    Discharge Recommendations: Collette Benavidez scored a 20/24 on the AM-PAC ADL Inpatient form. Current research shows that an AM-PAC score of 18 or greater is typically associated with a discharge to the patient's home setting. Based on the patient's AM-PAC score, and their current ADL deficits, it is recommended that the patient have 2-3 sessions per week of Occupational Therapy at d/c to increase the patient's independence. At this time, this patient demonstrates the endurance and safety to discharge home with [facility] and a follow up treatment frequency of 2-3x/wk. Please see assessment section for further patient specific details. HOME HEALTH CARE: LEVEL 1 STANDARD    - Initial home health evaluation to occur within 24-48 hours, in patient home   - Therapy to evaluate with goal of regaining prior level of functioning   - Therapy to evaluate if patient has [address] [facility] needs for personal care    If patient discharges prior to next session this note will serve as a discharge summary. Please see below for the latest assessment towards goals. OT Equipment Recommendations  Equipment Needed: Yes  Mobility Devices: ADL Assistive Devices  ADL Assistive Devices: Shower Chair without back    Assessment   Performance deficits / Impairments: Decreased functional mobility ; Decreased ADL status; Decreased endurance;Decreased sensation;Decreased balance;Decreased high-level IADLs  Assessment: Pt is not at baseline level of function and will benefit from continued OT to address the above limitations. Treatment Diagnosis: Decreased functional mobility, ADL/IADL status, balance, sensation, activity tolerance related to focal motor deficit. Prognosis: Good  OT Education: OT Role;Plan of Care;Home Exercise Program;Precautions; ADL Adaptive Strategies;Transfer Training;Equipment  Patient Education: d/c recommendations - pt verbalized understanding  REQUIRES OT FOLLOW UP: Yes  Activity Tolerance  Activity Tolerance: Patient Tolerated treatment well  Activity Tolerance: HR 93 prior to activity, per monitor tech, HR ranged in 80s with one occasion of increased HR to 121 during activity. Safety Devices  Safety Devices in place: Yes  Type of devices: All fall risk precautions in place; Left in chair;Nurse notified;Call light within reach; Chair alarm in place; Patient at risk for falls         Patient Diagnosis(es): The encounter diagnosis was Right sided weakness. has a past medical history of Asthma, Constipation, Fibromyalgia, Graves disease, History of blood transfusion, Hx of blood clots, Hypertension, Joint pain, Kidney stone, Localized rash, Lupus ([facility] [address] MDRO (multiple drug resistant organisms) resistance, and PONV (postoperative nausea and vomiting). has a past surgical history that includes Tubal ligation;  section; Tonsillectomy; other surgical history (2016); and Cystoscopy (2018). Restrictions  Restrictions/Precautions  Restrictions/Precautions: Fall Risk(high fall risk, up with assist)  Required Braces or Orthoses?: No  Position Activity Restriction  Other position/activity restrictions: Wali Dickerson is a 42 y.o. female who presented to the ED concerned that she was having a stroke. The patient presents with headache, right-sided weakness, and paresthesia ongoing for greater than 12 hours. The patient does endorse being under a great deal of stress related to home schooling, full-time employment, and PTSD related to murder of family member. She has known hypertension and reports that her BP has been quite elevated resulting in recurrent epistaxis lately. Labs were obtained and essentially unremarkable. EKG as well as neuro imaging failed to reveal any acute process.   Hospitalist team consulted to admit this patient for possible CVA. MRI negative, CT of lumbar spine negative. Subjective   General  Chart Reviewed: Yes  Additional Pertinent Hx: HTN, blood clots, lupus, fibromyalgia  Response to previous treatment: Patient with no complaints from previous session  Family / Caregiver Present: No  Diagnosis: focal motor deficit (MRI negative)  Subjective  Subjective: Pt lying supine in bed upon arrival, agreeable to OT tx. Pt denying pain but c/o being lethargic after taking muscle relaxer. Vital Signs  Patient Currently in Pain: Denies   Orientation  Orientation  Overall Orientation Status: Within Normal Limits  Objective    ADL  Grooming: Stand by assistance(in stance at sink to complete oral care, returned to sink to wash hands)  UE Bathing: Setup(seated in recliner)  LE Bathing: Supervision;Setup(seated on toilet to wash madeline area - declined to bathe additional ADLs)  UE Dressing: Setup(gown)  LE Dressing: Stand by assistance;Setup(sat to doff/don underwear with supervision, stood with SBA to pull up)  Toileting: Stand by assistance  Additional Comments: Pt ambulated to bathroom with RW, stood at sink to complete oral care (SBA). Stepped back to toilet leading with RLE with RW, CGA. Transferred onto toilet and completed LB ADLs. Returned to sink with RW, CGA. Stood at sink to wash hands (SBA). Ambulated to chair with RW, CGA, increased time. Bathed/dressed UB from recliner.         Balance  Sitting Balance: Modified independent   Standing Balance: Contact guard assistance(/SBA)  Standing Balance  Time: x12-14 min total  Activity: functional mobility to/from bathroom, ADLs  Comment: no LOB noted  Functional Mobility  Functional - Mobility Device: Rolling Walker  Activity: To/from bathroom  Assist Level: Contact guard assistance  Functional Mobility Comments: x10', x15', sloe pace, heavily WBing through UEs while WBing through RLE, dragging RLE, no buckling noted  Toilet Transfers  Toilet - Technique: Ambulating  Equipment Used: Standard toilet(L grab bar)  Toilet Transfer: Contact guard assistance;Stand by assistance  Toilet Transfers Comments: CGA stand>sit, SBA sit>stand  Bed mobility  Supine to Sit: Supervision  Scooting: Supervision  Comment: pt lifting up RLE with bilat UEs and transferring it to EOB  Transfers  Sit to stand: Stand by assistance  Stand to sit: Contact guard assistance  Transfer Comments: demo'd decreased eccentric control                       Cognition  Overall Cognitive Status: WFL                    Type of ROM/Therapeutic Exercise  Comment: pt educated on completing LE therex often (marching, LAQ, ankle pumps, glute squeezes) - pt verbalized understanding                    Plan   Plan  Times per week: 3-5x  Times per day: Daily  Current Treatment Recommendations: Functional Mobility Training, Safety Education & Training, Self-Care / ADL, Equipment Evaluation, Education, & procurement, Patient/Caregiver Education & Training, Home Management Training    AM-PAC Score        AM-PAC Inpatient Daily Activity Raw Score: 20 (09/04/20 1023)  AM-PAC Inpatient ADL T-Scale Score : 42.03 (09/04/20 1023)  ADL Inpatient CMS 0-100% Score: 38.32 (09/04/20 1023)  ADL Inpatient CMS G-Code Modifier : Lowella Hernán (09/04/20 1023)    Goals  Short term goals  Time Frame for Short term goals: Discharge  Short term goal 1: Mod I for all UB ADLs. - SBA for grooming, set-up for bathing/dressing 9/4  Short term goal 2: Mod I for all LB ADLs. - SBA for toileting, bathing, dressing 9/4  Short term goal 3: Mod I for functional mobility. - CGA 9/4  Short term goal 4: Mod I for functional transfers. - SBA/CGA 9/4  Long term goals  Time Frame for Long term goals : STG=LTG  Patient Goals   Patient goals :  To return home with family and sisters       Therapy Time   Individual Concurrent Group Co-treatment   Time In 0932         Time Out 1010         Minutes 38         Timed Code Treatment Minutes: 38 Minutes     Total Treatment Minutes:  [address] León, OT   [place] (Jason), [place], [place] [id number]

## 2020-09-04 NOTE — CARE COORDINATION
CM assisting in locating a home care agency that can staff a new start this weekend. Patient accepted for [facility]. Referral faxed. Patient will need DORA and orders sent when she is discharged.     MAYNOR OliverN, CCM, RN  [facility]  [phone]

## 2020-09-04 NOTE — CONSULTS
[address]   Cardiac Consultation    Referring Provider:  LEESA Savage CNP     Chief Complaint   Patient presents with    Extremity Weakness     Pt to ER with c/o right arm and leg numbness and weakness sinc 2200 last night, able to move arm with difficulty, decreased sensation. Dr guzman requested to bedside for eval.        History of Present Illness:  43 yo admitted with a 12 hour hx of right sided paresthesia with weakness. Has a  Hx of possible pericarditis in the past though no hx of known CAD. Denies exertional chest discomfort, change in exercise tolerance, palpitations or syncope. She does have a hx of chronic GAXIOLA though is active and has not noted a major change in her functional capacity. A Lexiscan Myoview test 2017 was normal.  Her Cerebral MRI, ECG and Echo are normal.      Past Medical History:   has a past medical history of Asthma, Constipation, Fibromyalgia, Graves disease, History of blood transfusion, Hx of blood clots, Hypertension, Joint pain, Kidney stone, Localized rash, Lupus ([address] MDRO (multiple drug resistant organisms) resistance, and PONV (postoperative nausea and vomiting). Surgical History:   has a past surgical history that includes Tubal ligation;  section; Tonsillectomy; other surgical history (2016); and Cystoscopy (2018). Social History:   reports that she has never smoked. She has never used smokeless tobacco. She reports current alcohol use. She reports that she does not use drugs. Family History:  family history includes Cancer in her maternal grandmother. Home Medications:  See List    Allergies:  Ambien [zolpidem tartrate]; Sudafed [pseudoephedrine hcl]; and Amitriptyline hcl     Review of Systems: See [place]  · Constitutional: there has been no unanticipated weight loss. There's been no change in energy level, sleep pattern, or activity level. · Eyes: No visual changes or diplopia.  No scleral icterus. · ENT: No Headaches, hearing loss or vertigo. No mouth sores or sore throat. · Cardiovascular: Reviewed in HPI  · Respiratory: No cough or wheezing, no sputum production. No hematemesis. · Gastrointestinal: No abdominal pain, appetite loss, blood in stools. No change in bowel or bladder habits. · Genitourinary: No dysuria, trouble voiding, or hematuria. · Musculoskeletal:  No gait disturbance, weakness or joint complaints. · Integumentary: No rash or pruritis. · Neurological: No headache, diplopia, change in muscle strength, numbness or tingling. No change in gait, balance, coordination, mood, affect, memory, mentation, behavior. · Psychiatric: No anxiety, no depression. · Endocrine: No malaise, fatigue or temperature intolerance. No excessive thirst, fluid intake, or urination. No tremor. · Hematologic/Lymphatic: No abnormal bruising or bleeding, blood clots or swollen lymph nodes. · Allergic/Immunologic: No nasal congestion or hives. Physical Examination:    Vitals:    09/04/20 0926   BP:    Pulse:    Resp: 18   Temp:    SpO2: 98%          Constitutional and General Appearance:   . NAD   SKIN:  .     Warm and dry  EYES:    .     EOMI  Neck:   . Normal carotid contour  Respiratory:  Normal excursion and expansion without use of accessory muscles  Resp Auscultation: Normal breath sounds without dullness  Cardiovascular: The apical impulses not displaced  Heart tones are crisp and normal  Cervical veins are not engorged  Normal S1S2, No S3, No Murmur  Peripheral pulses are symmetrical and full  Extremities: There is no clubbing, cyanosis of the extremities. No edema  Femoral Arteries: 2+ and equal  Pedal Pulses: 2+ and equal   Abdomen:  No masses or tenderness  Liver/Spleen: No Abnormalities Noted  Neurological/Psychiatric:  Alert and oriented in all spheres  No abnormalities of mood, affect, memory    All testing and labs listed below were personally reviewed.     Assessment:

## 2020-09-04 NOTE — PROGRESS NOTES
Assessment complete, VSS. NIHSS-4, pt denies any complaints of SOB or any other needs at this time, will continue to monitor.  Nuzhat Zepeda,

## 2020-09-04 NOTE — DISCHARGE INSTR - COC
Continuity of Care Form    Patient Name: Ricci Brush   :  1980  MRN:  [id number]    Admit date:  2020  Discharge date:  2020    Code Status Order: Full Code   Advance Directives:   Advance Care Flowsheet Documentation       Date/Time Healthcare Directive Type of Healthcare Directive Copy in [address] Agent's Name Healthcare Agent's Phone Number    20 [phone]  No, patient does not have an advance directive for healthcare treatment -- -- -- -- --            Admitting Physician:  Ursula Downs MD  PCP: LEESA Malone CNP    Discharging Nurse: [address] [place] [address] Unit/Room#: 1FM-7773/2742-89  Discharging Unit Phone Number: [phone]    Emergency Contact:   Extended Emergency Contact Information  Primary Emergency Contact: [address] Phone: [phone]  Mobile Phone: [phone]  Relation: Parent   needed?  No    Past Surgical History:  Past Surgical History:   Procedure Laterality Date     SECTION       X 2    CYSTOSCOPY  2018    CYSTOSCOPY URETEROSCOPY WITH HOLMIUM LASER LITHOTRIPSY FOR 5 MM STONE, RIGHT STENT PLACEMENT    OTHER SURGICAL HISTORY  2016    CYSTOSCOPY, BILATERAL RETROGRADES, RIGHT URETEROSCOPY,    TONSILLECTOMY      TUBAL LIGATION         Immunization History:   Immunization History   Administered Date(s) Administered    DTaP 2009    Influenza Virus Vaccine 2013    Influenza, Tika Beans, IM, (6 mo and older Fluzone, Flulaval, Fluarix and 3 yrs and older Afluria) 11/10/2016    Influenza, Quadv, IM, PF (6 mo and older Fluzone, Flulaval, Fluarix, and 3 yrs and older Afluria) 2019    Tdap (Boostrix, Adacel) 2013, 2014, 2019       Active Problems:  Patient Active Problem List   Diagnosis Code    Joint pain M25.50    Constipation K59.00    Localized rash R21    HTN (hypertension) I10    Yeast vaginitis B37.3    Myalgia M79.10    Fibromyalgia M79.7    Hyperthyroidism E05.90    Nephroureterolithiasis N20.2    Migraine with aura and with status migrainosus, not intractable G43. 101    Kidney stone N20.0    Insomnia due to other mental disorder F51.05, F99    Mild intermittent asthma without complication R51.37    Moderate episode of recurrent major depressive disorder (HCC) F33.1    PTSD (post-traumatic stress disorder) F43.10    Paresthesia of right arm and leg R20.2    Focal motor deficit R29.898    Right sided weakness R53.1       Isolation/Infection:   Isolation            No Isolation          Unreconciled External Infections       Infection Onset Last Indicated Last Received Source    No mapped external infections found      . Unmapped Infections (1)        MRSA 02/26/13 02/26/13 08/31/20                   Patient Infection Status       None to display            Nurse Assessment:  Last Vital Signs: /71   Pulse 74   Temp 98.1 °F (36.7 °C) (Oral)   Resp 18   Ht 5' 7\" (1.702 m)   Wt 183 lb 8 oz (83.2 kg)   SpO2 98%   BMI 28.74 kg/m²     Last documented pain score (0-10 scale): Pain Level: 10  Last Weight:   Wt Readings from Last 1 Encounters:   09/04/20 183 lb 8 oz (83.2 kg)     Mental Status:  oriented and alert    IV Access:  - None    Nursing Mobility/ADLs:  Walking   Assisted  Transfer  Independent  Bathing  Independent  Dressing  Independent  [address]  Independent  Med Delivery   whole    Wound Care Documentation and Therapy:        Elimination:  Continence:   · Bowel: Yes  · Bladder: Yes  Urinary Catheter: none  Colostomy/Ileostomy/Ileal Conduit: No       Date of Last BM: 9/8/2020  No intake or output data in the 24 hours ending 09/04/20 1536  No intake/output data recorded. Safety Concerns:      At Risk for Falls    Impairments/Disabilities:      None    Nutrition Therapy:  Current Nutrition Therapy:   - Oral Diet:  General    Routes of

## 2020-09-04 NOTE — CONSULTS
2015 LT LUNG W/ PNEUMONIA    Hypertension     Joint pain 1/13/2014    Various joints (back, hands, knees, hips), recurrent flares since age 27, RF+ at one point, never fully worked up for rheumatologic dz yet    Kidney stone     Localized rash 1/13/2014    Recurrent, on inner surface of upper arms, q 3 months, sometimes on chest, no facial involvement    Lupus ([address]     MDRO (multiple drug resistant organisms) resistance     PONV (postoperative nausea and vomiting)      Social History     Tobacco Use    Smoking status: Never Smoker    Smokeless tobacco: Never Used   Substance Use Topics    Alcohol use: Yes     Alcohol/week: 0.0 standard drinks     Comment: occasional     Drug use: No     Allergies   Allergen Reactions    Ambien [Zolpidem Tartrate] Shortness Of Breath     Tongue swelling    Sudafed [Pseudoephedrine Hcl] Shortness Of Breath    Amitriptyline Hcl Other (See Comments)     Increased sedation and no relief of headache.      Current Facility-Administered Medications   Medication Dose Route Frequency Provider Last Rate Last Dose    oxyCODONE (ROXICODONE) immediate release tablet 5 mg  5 mg Oral Q8H PRN Nir Russell MD   5 mg at 09/04/20 0605    SUMAtriptan (IMITREX) tablet 100 mg  100 mg Oral Daily PRN Sarahi Rivera MD   100 mg at 09/02/20 0500    hydrALAZINE (APRESOLINE) injection 10 mg  10 mg Intravenous Once Saba Naik MD        albuterol (PROVENTIL) nebulizer solution 2.5 mg  2.5 mg Nebulization Q4H PRN Saba Naik MD   2.5 mg at 09/03/20 0831    budesonide (PULMICORT) nebulizer suspension 250 mcg  0.25 mg Nebulization BID Saba Naik MD   250 mcg at 09/03/20 0831    clonazePAM (KLONOPIN) tablet 0.5 mg  0.5 mg Oral Nightly PRN Saba Naik MD   0.5 mg at 09/03/20 0526    DULoxetine (CYMBALTA) extended release capsule 60 mg  60 mg Oral Daily Saba Naik, MD   60 mg at 09/03/20 8663    ferrous sulfate (IRON 325) tablet 325 mg  325 mg Oral BID Idamae Morning, MD   325 mg at 09/03/20 2256    hydroCHLOROthiazide (HYDRODIURIL) tablet 12.5 mg  12.5 mg Oral Daily Lavon Blair MD   12.5 mg at 09/03/20 0811    pantoprazole (PROTONIX) tablet 40 mg  40 mg Oral Daily Lavon Blair MD   40 mg at 09/04/20 0606    pregabalin (LYRICA) capsule 150 mg  150 mg Oral BID Lavon Blair MD   150 mg at 09/03/20 1659    sucralfate (CARAFATE) tablet 1 g  1 g Oral BID AC Lavon Blair MD   1 g at 09/01/20 1644    tiZANidine (ZANAFLEX) tablet 4 mg  4 mg Oral Q8H PRN Lavon Blair MD   4 mg at 09/04/20 9239    sodium chloride flush 0.9 % injection 10 mL  10 mL Intravenous 2 times per day Lavon Blair MD   10 mL at 09/03/20 2258    sodium chloride flush 0.9 % injection 10 mL  10 mL Intravenous PRN Lavon Blair MD   10 mL at 09/01/20 0614    enoxaparin (LOVENOX) injection 40 mg  40 mg Subcutaneous Nightly Lavon Blair MD   40 mg at 09/03/20 2257    aspirin EC tablet 81 mg  81 mg Oral Daily Lavon Blair MD   81 mg at 09/03/20 4586    Or    aspirin suppository 300 mg  300 mg Rectal Daily Lavon Blair MD        perflutren lipid microspheres (DEFINITY) injection 1.65 mg  1.5 mL Intravenous ONCE PRN Lavon Blair MD        senna (SENOKOT) tablet 8.6 mg  1 tablet Oral Daily PRN Lavon Blair MD        ondansetron (ZOFRAN-ODT) disintegrating tablet 4 mg  4 mg Oral Q8H PRN Lavon Blair MD        Or    ondansetron [facility]) injection 4 mg  4 mg Intravenous Q6H PRN Lavon Blair MD   4 mg at 09/03/20 1515    rosuvastatin (CRESTOR) tablet 40 mg  40 mg Oral Nightly Lavon Blair MD   40 mg at 09/03/20 2257    labetalol (NORMODYNE;TRANDATE) injection 10 mg  10 mg Intravenous Q10 Min PRN Lavon Blair MD        amLODIPine (NORVASC) tablet 5 mg  5 mg Oral Daily Lavon Blair MD   5 mg at 09/03/20 0810       ROS : A 10-14 system review of constitutional, cardiovascular, respiratory, eyes, musculoskeletal, endocrine, GI, ENT, skin, hematological, genitourinary, psychiatric and neurologic systems was obtained and updated today and is unremarkable except as mentioned in my HPI      Exam:     Constitutional:   Vitals:    09/03/20 2217 09/04/20 0511 09/04/20 0731 09/04/20 0800   BP: 112/73 122/81  108/67   Pulse: 90 80  74   Resp: 16 16  14   Temp: 98.1 °F (36.7 °C) 98.1 °F (36.7 °C)  98 °F (36.7 °C)   TempSrc: Oral Oral  Oral   SpO2: 97% 99%  98%   Weight:   183 lb 8 oz (83.2 kg)    Height:           General appearance:  Normal development and appear in no acute distress. Eye: No icterus. Fundus: Funduscopic examination cannot be performed due to COVID19 restrictions and precautions. Neck: supple  Cardiovascular: No lower leg edema with good pulsation. Mental Status:   Oriented to person, place, problem, and time. Memory: Aware of recent and remote event. Good immediate recall. Intact remote memory  Normal attention span and concentration. Language: intact naming, repeating and fluency   Good fund of Knowledge. Aware of current events and vocabulary   Cranial Nerves:   II: Visual fields: Full. Pupils: equal, round, reactive to light  III,IV,VI: Extra Ocular Movements are intact. No nystagmus  V: Facial sensation is intact   VII: Facial strength and movements: intact and symmetric  VIII: Hearing: Intact to finger rub bilaterally  IX: Palate elevation is symmetric  XI: Shoulder shrug is intact  XII: Tongue movements are normal  Musculoskeletal: 5/5 in all extremities except the right leg which is flaccid although she was able to get good Planters and she was able to stand on her feet. Tone: Normal tone. Reflexes: 2+ in the arms and 3+ in the legs   Planters: flexor bilaterally. Coordination: no pronator drift, no dysmetria with FNF in upper extremities. Normal REM. Sensation: normal to all modalities in both arms and legs.   Gait/Posture: Not tested due to weakness but she was able to stand with mild assistant with help of a walker. Data:  LABS:   Lab Results   Component Value Date     09/01/2020    K 4.2 09/01/2020     09/01/2020    CO2 21 09/01/2020    BUN 16 09/01/2020    CREATININE 0.8 09/01/2020    GFRAA >60 09/01/2020    LABGLOM >60 09/01/2020    GLUCOSE 93 09/01/2020    PHOS 3.2 02/09/2017    MG 1.90 03/13/2020    CALCIUM 9.7 09/01/2020     Lab Results   Component Value Date    WBC 9.0 09/01/2020    RBC 4.76 09/01/2020    HGB 11.6 09/01/2020    HCT 36.5 09/01/2020    MCV 76.7 09/01/2020    RDW 17.1 09/01/2020     09/01/2020     Lab Results   Component Value Date    INR 1.01 08/31/2020    PROTIME 11.7 08/31/2020       Neuroimaging were independently reviewed by myself and discussed results with the patient  Reviewed notes from different physicians  Reviewed lab and blood testing    Impression:  Acute right leg weakness, severe. So far no specific etiology. Some inconsistent examination today. Possibility of conversion or functional weakness exist.  Would recommend MRI of the C-spine to rule out demyelination or transverse myelopathy although seems less likely. Blood test so far showed no significant metabolic derangement that could explain her leg weakness. Less likely acute polyneuropathy or plexitis. Depression  Fibromyalgia  Hypertension    Recommendation:  Continue current supportive care  Aspirin  DVT and GI prophylaxis  Continue home blood pressure medications  Continue Lyrica for fibromyalgia  DVT and GI prophylaxis  Telemetry  MRI C-spine without contrast  Check TSH, RUY, ESR and CRP with next blood testing  Falling precaution  Continue home SSRI  Can be discharged from neurology if MRI showed no significant changes. Discussed with her nurse. Thank you for referring such patient. If you have any questions regarding my consult note, please don't hesitate to call me. Zane Love MD  [phone]    This dictation was generated by voice recognition computer software.  Although all attempts are made to edit the dictation for accuracy, there may be errors in the  transcription that are not intended

## 2020-09-04 NOTE — PROGRESS NOTES
PRN  senna (SENOKOT) tablet 8.6 mg, Daily PRN  ondansetron (ZOFRAN-ODT) disintegrating tablet 4 mg, Q8H PRN    Or  ondansetron (ZOFRAN) injection 4 mg, Q6H PRN  rosuvastatin (CRESTOR) tablet 40 mg, Nightly  labetalol (NORMODYNE;TRANDATE) injection 10 mg, Q10 Min PRN  amLODIPine (NORVASC) tablet 5 mg, Daily        Objective:  /81   Pulse 80   Temp 98.1 °F (36.7 °C) (Oral)   Resp 16   Ht 5' 7\" (1.702 m)   Wt 183 lb 4.8 oz (83.1 kg)   SpO2 99%   BMI 28.71 kg/m²     Intake/Output Summary (Last 24 hours) at 9/4/2020 0727  Last data filed at 9/3/2020 0810  Gross per 24 hour   Intake 10 ml   Output --   Net 10 ml      Wt Readings from Last 3 Encounters:   09/03/20 183 lb 4.8 oz (83.1 kg)   06/11/20 170 lb (77.1 kg)   03/22/20 186 lb 6 oz (84.5 kg)          General appearance: -American young lady. Appears comfortable  Eyes: Sclera clear. Pupils equal.  ENT: Moist oral mucosa. Trachea midline, no adenopathy, neck supple. Cardiovascular: Regular rhythm, normal S1, S2. No murmur. No edema in lower extremities  Respiratory: Not using accessory muscles. Good inspiratory effort. Clear to auscultation bilaterally, no wheeze or crackles. GI: Abdomen soft, no tenderness, not distended, normal bowel sounds  Musculoskeletal: No deformity, ROM WNL. Neurology: CN 2-12 grossly intact. Unable to move upper lift right lower extremity off the bed, no problem with depression extremities or left lower extremity, no speech difficulty. Psych: Normal affect. Alert and oriented in time, place and person  Skin: Warm, dry, normal turgor         Problem List  Principal Problem:    Focal motor deficit  Active Problems:    HTN (hypertension)    Fibromyalgia    Moderate episode of recurrent major depressive disorder (HCC)    Paresthesia of right arm and leg  Resolved Problems:    * No resolved hospital problems. *        Assessment & Plan:     1. Right lower ext weakness: Persisted in my exam, MRI brain  neg for acute CVA, 2D

## 2020-09-04 NOTE — PROGRESS NOTES
RN spoke to hospitalist. Joyce Mccall with stress being scheduled as OP. RN contacted stress lab to schedule. Will continue to monitor.

## 2020-09-04 NOTE — CARE COORDINATION
Prakash/Araceli received a referral to this patient for a rolling walker. Rolling walker has been delivered to the patients room. Thank you for the referral.  Electronically signed by Heike Nichols on 9/4/2020 at 2:27 PM  Cell ph# [phone]    NOTE: After 5:00 pm, Weekends, Holidays: Call [facility] at [phone] to coordinate delivery of home medical equipment.

## 2020-09-05 PROCEDURE — 97116 GAIT TRAINING THERAPY: CPT

## 2020-09-05 PROCEDURE — G0378 HOSPITAL OBSERVATION PER HR: HCPCS

## 2020-09-05 PROCEDURE — 2580000003 HC RX 258: Performed by: HOSPITALIST

## 2020-09-05 PROCEDURE — 96376 TX/PRO/DX INJ SAME DRUG ADON: CPT

## 2020-09-05 PROCEDURE — 99225 PR SBSQ OBSERVATION CARE/DAY 25 MINUTES: CPT | Performed by: PSYCHIATRY & NEUROLOGY

## 2020-09-05 PROCEDURE — 6370000000 HC RX 637 (ALT 250 FOR IP): Performed by: HOSPITALIST

## 2020-09-05 PROCEDURE — 96372 THER/PROPH/DIAG INJ SC/IM: CPT

## 2020-09-05 PROCEDURE — 99214 OFFICE O/P EST MOD 30 MIN: CPT | Performed by: NURSE PRACTITIONER

## 2020-09-05 PROCEDURE — 6360000002 HC RX W HCPCS: Performed by: HOSPITALIST

## 2020-09-05 PROCEDURE — 97110 THERAPEUTIC EXERCISES: CPT

## 2020-09-05 RX ADMIN — ASPIRIN 81 MG: 81 TABLET, COATED ORAL at 08:30

## 2020-09-05 RX ADMIN — Medication 10 ML: at 22:18

## 2020-09-05 RX ADMIN — PREGABALIN 150 MG: 75 CAPSULE ORAL at 22:16

## 2020-09-05 RX ADMIN — FERROUS SULFATE TAB 325 MG (65 MG ELEMENTAL FE) 325 MG: 325 (65 FE) TAB at 08:30

## 2020-09-05 RX ADMIN — TIZANIDINE 4 MG: 4 TABLET ORAL at 08:30

## 2020-09-05 RX ADMIN — ONDANSETRON 4 MG: 2 INJECTION INTRAMUSCULAR; INTRAVENOUS at 22:25

## 2020-09-05 RX ADMIN — ROSUVASTATIN CALCIUM 40 MG: 40 TABLET, FILM COATED ORAL at 22:16

## 2020-09-05 RX ADMIN — PANTOPRAZOLE SODIUM 40 MG: 40 TABLET, DELAYED RELEASE ORAL at 11:17

## 2020-09-05 RX ADMIN — AMLODIPINE BESYLATE 5 MG: 5 TABLET ORAL at 08:30

## 2020-09-05 RX ADMIN — CLONAZEPAM 0.5 MG: 0.5 TABLET ORAL at 23:41

## 2020-09-05 RX ADMIN — ENOXAPARIN SODIUM 40 MG: 40 INJECTION SUBCUTANEOUS at 22:16

## 2020-09-05 RX ADMIN — TIZANIDINE 4 MG: 4 TABLET ORAL at 15:08

## 2020-09-05 RX ADMIN — TIZANIDINE 4 MG: 4 TABLET ORAL at 22:25

## 2020-09-05 RX ADMIN — DULOXETINE HYDROCHLORIDE 60 MG: 60 CAPSULE, DELAYED RELEASE ORAL at 08:30

## 2020-09-05 RX ADMIN — HYDROCHLOROTHIAZIDE 12.5 MG: 25 TABLET ORAL at 08:30

## 2020-09-05 RX ADMIN — FERROUS SULFATE TAB 325 MG (65 MG ELEMENTAL FE) 325 MG: 325 (65 FE) TAB at 22:19

## 2020-09-05 RX ADMIN — OXYCODONE 5 MG: 5 TABLET ORAL at 23:41

## 2020-09-05 RX ADMIN — Medication 10 ML: at 11:18

## 2020-09-05 RX ADMIN — PREGABALIN 150 MG: 75 CAPSULE ORAL at 08:30

## 2020-09-05 ASSESSMENT — PAIN SCALES - GENERAL
PAINLEVEL_OUTOF10: 7
PAINLEVEL_OUTOF10: 0
PAINLEVEL_OUTOF10: 7
PAINLEVEL_OUTOF10: 7

## 2020-09-05 ASSESSMENT — PAIN DESCRIPTION - PROGRESSION
CLINICAL_PROGRESSION: GRADUALLY IMPROVING

## 2020-09-05 ASSESSMENT — PAIN DESCRIPTION - ORIENTATION: ORIENTATION: MID

## 2020-09-05 ASSESSMENT — PAIN DESCRIPTION - LOCATION
LOCATION: BACK;NECK
LOCATION: NECK;BACK

## 2020-09-05 ASSESSMENT — PAIN DESCRIPTION - PAIN TYPE
TYPE: CHRONIC PAIN
TYPE: CHRONIC PAIN

## 2020-09-05 NOTE — PROGRESS NOTES
[facility]              Progress Note      Admit Date 8/31/2020  HPI: 41 yo admitted with a 12 hour hx of right sided paresthesia with weakness. Has a  Hx of possible pericarditis in the past though no hx of known CAD. Denies exertional chest discomfort, change in exercise tolerance, palpitations or syncope. She does have a hx of chronic GAXIOLA though is active and has not noted a major change in her functional capacity. A Lexiscan Myoview test 2/2017 was normal.  Her Cerebral MRI, ECG and Echo are normal.    Interval history:  Neuro eval: MRI brain and C-spine are not consistent with transverse myelopathy, demyelination or    MS       Bubble echo: neg for shunt    MsJani Cerda denies chest pain, shortness of breath, palpitations  Subjective: laying in bed. C/o dizziness with nausea. Back hurts attribute to a fibromyalgia flare up. States to be under w/u for Lupus. Worked with PT earlier today. Hoping to transfer to in house rehab       Scheduled Meds:   hydrALAZINE  10 mg Intravenous Once    budesonide  0.25 mg Nebulization BID    DULoxetine  60 mg Oral Daily    ferrous sulfate  325 mg Oral BID    hydroCHLOROthiazide  12.5 mg Oral Daily    pantoprazole  40 mg Oral Daily    pregabalin  150 mg Oral BID    sucralfate  1 g Oral BID AC    sodium chloride flush  10 mL Intravenous 2 times per day    enoxaparin  40 mg Subcutaneous Nightly    aspirin  81 mg Oral Daily    Or    aspirin  300 mg Rectal Daily    rosuvastatin  40 mg Oral Nightly    amLODIPine  5 mg Oral Daily     Continuous Infusions:  PRN Meds:oxyCODONE, SUMAtriptan, albuterol, clonazePAM, tiZANidine, sodium chloride flush, perflutren lipid microspheres, senna, ondansetron **OR** ondansetron, labetalol       Objective:      Wt Readings from Last 3 Encounters:   09/05/20 178 lb 8 oz (81 kg)   06/11/20 170 lb (77.1 kg)   03/22/20 186 lb 6 oz (84.5 kg)   Admit weight: Weight: 180 lb (81.6 kg)      Temperature range over 24hrs:   Temp Av.3 °F (36.8 °C)  Min: 98 °F (36.7 °C)  Max: 98.9 °F (37.2 °C)  Current Respiratory Rate:  Resp: 14  Current Pulse:  Pulse: 82  Current Blood Pressure:  BP: 114/77  24hr Blood Pressure Range:  Systolic (63SXQ), LEC:420 , Min:104 , XYM:331   ; Diastolic (82WOH), CFB:95, Min:65, Max:90    Current Pulse Oximetry:  SpO2: 100 %      Intake/Output Summary (Last 24 hours) at 2020 1427  Last data filed at 2020 0815  Gross per 24 hour   Intake 240 ml   Output 1 ml   Net 239 ml       Telemetry monitor: sinus rhythm     Physical Exam:  General:  Awake, alert, NAD  Skin:  Warm and dry  Neck:  No JVD  Chest:  Clear to auscultation, respiration easy  Cardiovascular:  RRR S1S2 no murmur to auscultation  Abdomen: Bowel sounds normal, abd soft, non-tender  Extremities:  No edema  : unremarkable      Imaging    Echo: 20:  Summary   -Normal left ventricle size, borderline wall thickness and hyperdynamic   systolic function with an estimated ejection fraction of 65-70%.   -No regional wall motion abnormalities are seen.   -Normal diastolic function. E/e\"=7.4   -Trivial mitral and tricuspid regurgitation.   -Estimated pulmonary artery systolic pressure is normal at 26 mmHg assuming   a right atrial pressure of 3 mmHg.   -A bubble study was performed and fails to show evidence of shunting.     Lab Review     Renal Profile:   Lab Results   Component Value Date    CREATININE 0.8 2020    BUN 16 2020     2020    K 4.2 2020     2020    CO2 21 2020     CBC:    Lab Results   Component Value Date    WBC 9.0 2020    RBC 4.76 2020    HGB 11.6 2020    HCT 36.5 2020    MCV 76.7 2020    RDW 17.1 2020     2020     BNP:  No results found for: BNP  Fasting Lipid Panel:    Lab Results   Component Value Date    CHOL 150 2020    HDL 38 2020    TRIG 122 2020     Cardiac Enzymes:    Lab Results   Component Value Date CKTOTAL 155 03/25/2015    TROPONINI 0.00 02/07/2017     PT/ INR   Lab Results   Component Value Date    INR 1.01 08/31/2020    INR 0.99 02/07/2017    PROTIME 11.7 08/31/2020    PROTIME 11.3 02/07/2017     PTT No results found for: PTT   Lab Results   Component Value Date    MG 1.90 03/13/2020      Lab Results   Component Value Date    TSH 0.58 01/24/2020       Assessment/Plan:     Patient Active Problem List   Diagnosis    Joint pain    Constipation    Localized rash    HTN (hypertension)    Yeast vaginitis    Myalgia    Fibromyalgia    Hyperthyroidism    Nephroureterolithiasis    Migraine with aura and with status migrainosus, not intractable    Kidney stone    Insomnia due to other mental disorder    Mild intermittent asthma without complication    Moderate episode of recurrent major depressive disorder (HCC)    PTSD (post-traumatic stress disorder)    Paresthesia of right arm and leg    Focal motor deficit    Right sided weakness      1. Right sided weakness   ~seen/eval by neuro : unremarkable findings    ~bubble echo neg for shunt    2. SOB   ~offers no c/o.  Neg exam for crackles   ~hyperdynamic LVEF on echo   ~BP suboptimal today possibly related to back pain    ~continue amlodipine / HCTZ    Plan : 30 day event monitor as outpt (PMH: clots)   Consider increasing amlodipine to 5 mg bid as needed for BP control

## 2020-09-05 NOTE — PROGRESS NOTES
Ashanti Saleh  Neurology Follow-up  [facility] Neurology    Date of Service: 9/5/2020    Subjective:   CC: Follow up today regarding: Acute right leg weakness. Events noted. Chart and lab reviewed. The patient had an MRI of the C-spine which showed no abnormal lesion. She continues to have inconsistent intermittent right-sided mainly right leg weakness. Denies any headache, dysphagia, dysarthria or right arm weakness. No other new symptoms today. Other review of system was unremarkable. ROS : A 10-12 system review obtained and updated today and is unremarkable except as mentioned  in my interval history. family history includes Cancer in her maternal grandmother.     Past Medical History:   Diagnosis Date    Asthma     Constipation 1/13/2014    Fibromyalgia     Graves disease     History of blood transfusion     Hx of blood clots     2015 LT LUNG W/ PNEUMONIA    Hypertension     Joint pain 1/13/2014    Various joints (back, hands, knees, hips), recurrent flares since age 27, RF+ at one point, never fully worked up for rheumatologic dz yet    Kidney stone     Localized rash 1/13/2014    Recurrent, on inner surface of upper arms, q 3 months, sometimes on chest, no facial involvement    Lupus ([address]     MDRO (multiple drug resistant organisms) resistance     PONV (postoperative nausea and vomiting)      Current Facility-Administered Medications   Medication Dose Route Frequency Provider Last Rate Last Dose    oxyCODONE (ROXICODONE) immediate release tablet 5 mg  5 mg Oral Q8H PRN Rocio Sage MD   5 mg at 09/04/20 2303    SUMAtriptan (IMITREX) tablet 100 mg  100 mg Oral Daily PRN Tnoya Bolaños MD   100 mg at 09/02/20 0500    hydrALAZINE (APRESOLINE) injection 10 mg  10 mg Intravenous Once Hilario Hannah MD        albuterol (PROVENTIL) nebulizer solution 2.5 mg  2.5 mg Nebulization Q4H PRN Hilario Hannah MD   2.5 mg at 09/04/20 2016    budesonide (PULMICORT) nebulizer Oral Nightly Kandace Deng MD   40 mg at 09/04/20 2303    labetalol (NORMODYNE;TRANDATE) injection 10 mg  10 mg Intravenous Q10 Min PRN Kandace Deng MD        amLODIPine (NORVASC) tablet 5 mg  5 mg Oral Daily Kandace Deng MD   5 mg at 09/05/20 0830     Allergies   Allergen Reactions    Ambien [Zolpidem Tartrate] Shortness Of Breath     Tongue swelling    Sudafed [Pseudoephedrine Hcl] Shortness Of Breath    Amitriptyline Hcl Other (See Comments)     Increased sedation and no relief of headache. reports that she has never smoked. She has never used smokeless tobacco. She reports current alcohol use. She reports that she does not use drugs. Objective:  Exam:   Constitutional:   Vitals:    09/05/20 0053 09/05/20 0431 09/05/20 0815 09/05/20 0935   BP: (!) 144/89 113/74 (!) 148/90    Pulse: 90 72 92    Resp: 18 18 18    Temp: 98 °F (36.7 °C) 98 °F (36.7 °C) 98.7 °F (37.1 °C)    TempSrc: Oral Oral Oral    SpO2: 99% 98%     Weight:    178 lb 8 oz (81 kg)   Height:         General appearance:  Normal development and appear in no acute distress. Eye: No icterus. Neck: supple  Cardiovascular:  No lower leg edema with good pulsation. Mental Status:   Oriented to person, place, problem, and time. Memory: Good immediate recall. Intact remote memory  Normal attention span and concentration. Language: intact naming, repeating and fluency   Good fund of Knowledge. Cranial Nerves:   II: Visual fields: Full. Pupils: equal, round, reactive to light  III,IV,VI: Extra Ocular Movements are intact. No nystagmus  V: Facial sensation is intact  VII: Facial strength and movements: intact and symmetric  IX: Palate elevation is symmetric  XI: Shoulder shrug is intact  XII: Tongue movements are normal  Musculoskeletal: The same inconsistent right leg weakness as last exam  Tone: Normal tone. Reflexes: Symmetric in both arms and legs. Coordination: no pronator drift, no dysmetria with FNF. Normal REM. Sensation: normal to all modalities in both arms and legs. Gait/Posture: Not tested today        Data:  LABS:   Lab Results   Component Value Date     09/01/2020    K 4.2 09/01/2020     09/01/2020    CO2 21 09/01/2020    BUN 16 09/01/2020    CREATININE 0.8 09/01/2020    GFRAA >60 09/01/2020    LABGLOM >60 09/01/2020    GLUCOSE 93 09/01/2020    PHOS 3.2 02/09/2017    MG 1.90 03/13/2020    CALCIUM 9.7 09/01/2020     Lab Results   Component Value Date    WBC 9.0 09/01/2020    RBC 4.76 09/01/2020    HGB 11.6 09/01/2020    HCT 36.5 09/01/2020    MCV 76.7 09/01/2020    RDW 17.1 09/01/2020     09/01/2020     Lab Results   Component Value Date    INR 1.01 08/31/2020    PROTIME 11.7 08/31/2020       Neuroimaging was independently reviewed by me and discussed results with the patient  I reviewed blood testing and other test results and discussed results with the patient      Impression:  Acute right leg weakness with inconsistent features. So far no clear organic etiology regarding her current symptoms. Possible functional weakness and less likely radiculopathy or polyneuritis  Depression  Chronic fibromyalgia  Hypertension      Recommendation  Continue current supportive care  MRI brain and C-spine are not consistent with transverse myelopathy, demyelination or MS  PT and OT  Continue Lyrica  Continue home blood pressure medications  SSRI  DVT and GI prophylaxis  Nothing to add from neurology at this point  We will sign off.------------------------           Eugenio Rosa MD   [phone]      This dictation was generated by voice recognition computer software. Although all attempts are made to edit the dictation for accuracy, there may be errors in the transcription that are not intended.

## 2020-09-05 NOTE — PROGRESS NOTES
[facility]    9/5/2020 2:40 PM        Name: Wali Dickerson . Admitted: 8/31/2020  Primary Care Provider: LEESA Blanton CNP (Tel: [phone])    Brief Course:  Afia Hernandez a 42 y. o. female who has history of fibromyalgia, on medical marijuana presented to the ED concerned that she was having a stroke.  The patient presents with headache, right-sided weakness, and paresthesia ongoing for greater than 12 hours.  The patient does endorse being under a great deal of stress related to home schooling, full-time employment, and PTSD related to murder of family member. MRI of cervical spine and MRI brain is unremarkable. Patient seems to have improvement of right upper extremity weakness still has persistent right lower extremity weakness and loss of sensation. CC: Right leg weakness    Subjective: Kinjal Russell   Mentions having right leg weakness, also feels that loss of sensation on the right lower extremity    Reviewed interval ancillary notes    Current Medications  oxyCODONE (ROXICODONE) immediate release tablet 5 mg, Q8H PRN  SUMAtriptan (IMITREX) tablet 100 mg, Daily PRN  hydrALAZINE (APRESOLINE) injection 10 mg, Once  albuterol (PROVENTIL) nebulizer solution 2.5 mg, Q4H PRN  budesonide (PULMICORT) nebulizer suspension 250 mcg, BID  clonazePAM (KLONOPIN) tablet 0.5 mg, Nightly PRN  DULoxetine (CYMBALTA) extended release capsule 60 mg, Daily  ferrous sulfate (IRON 325) tablet 325 mg, BID  hydroCHLOROthiazide (HYDRODIURIL) tablet 12.5 mg, Daily  pantoprazole (PROTONIX) tablet 40 mg, Daily  pregabalin (LYRICA) capsule 150 mg, BID  sucralfate (CARAFATE) tablet 1 g, BID AC  tiZANidine (ZANAFLEX) tablet 4 mg, Q8H PRN  sodium chloride flush 0.9 % injection 10 mL, 2 times per day  sodium chloride flush 0.9 % injection 10 mL, PRN  enoxaparin (LOVENOX) injection 40 mg, Nightly  aspirin EC tablet 81 hours.  MRI CERVICAL SPINE WO CONTRAST   Final Result   Mild reversal cervical lordosis which is chronic. There is no evidence for   disc herniation there is no evidence for acute neural foraminal effacement no   evidence for nerve root impingement. No evidence for cord impingement. No   signal abnormality in the cord substance         CT LUMBAR SPINE WO CONTRAST   Final Result   1. Negative CT of the lumbar spine. 2. Nonobstructing bilateral nephrolithiasis. MRI brain with and without contrast   Final Result   Unremarkable exam.  No acute ischemia         XR CHEST PORTABLE   Final Result   Unremarkable portable exam         CTA NECK W CONTRAST   Final Result   No acute abnormality or flow-limiting stenosis of the major arteries of the   head and neck. CTA HEAD W CONTRAST   Final Result   No acute abnormality or flow-limiting stenosis of the major arteries of the   head and neck. CT HEAD WO CONTRAST   Final Result   No acute intracranial abnormality. Stroke alert results were called by Dr. Magdalene Molina. MD Sebastian to [address] [place] [address]   on 8/31/2020 at 13:51. Problem List  Principal Problem:    Focal motor deficit  Active Problems:    HTN (hypertension)    Fibromyalgia    Moderate episode of recurrent major depressive disorder (HCC)    Paresthesia of right arm and leg    Right sided weakness  Resolved Problems:    * No resolved hospital problems.  *       Assessment & Plan:   Right lower extremity weakness  Does seem to have significant sensory impairment, imaging is negative, concerns of functional versus somatoform versus organic etiology  Neurology mentations noticed,  PT OT input noticed, will get acute rehab consult    Hypertension  Continue amlodipine and hydrochlorothiazide    Major depression  Continue on Cymbalta    IV Access: Peripheral  Biggs: No  Diet: DIET GENERAL; No Added Salt (3-4 GM)  Code:Full Code  DVT PPX Lovenox  Disposition ARU consult      Mariah Redding MD   9/5/2020 2:40 PM

## 2020-09-05 NOTE — PROGRESS NOTES
admit this patient for possible CVA. MRI negative, CT of lumbar spine negative. Subjective   General  Chart Reviewed: Yes  Response To Previous Treatment: Patient with no complaints from previous session. Family / Caregiver Present: No  Subjective  Subjective: Pt agreeabel to PT, states having incresed pain in neck and back. Nursing notified and brought in pn meds. General Comment  Comments: Pt asleep in bed upon arrival.  Pain Screening  Patient Currently in Pain: Yes  Pain Assessment  Pain Assessment: 0-10  Pain Level: 7  Pain Type: Chronic pain  Pain Location: Neck;Back  Vital Signs  Patient Currently in Pain: Yes       Orientation  Orientation  Overall Orientation Status: Within Normal Limits  Cognition      Objective   Bed mobility  Supine to Sit: Modified independent  Scooting: Modified independent  Comment: Pt used UE to lift RLE to EOB. Transfers  Sit to Stand: Stand by assistance  Stand to sit: Stand by assistance  Ambulation  Ambulation?: Yes  Ambulation 1  Surface: level tile  Device: Rolling Walker  Assistance: Contact guard assistance  Quality of Gait: Pt ambulates wigth very slow, deliberate gait, decreased weightbearing on RLE, with increased BUE weightbearing. Distance: [de-identified]'  Comments: Pt continues with step to gait pattern sliding R LE to advance forward, and increased WB through sheldon UE. At time R knee buckling but able to self correct. Pt took a seated rest break alf through before ambulating back to room. Focused on increasing hip and knee flexion with advancing R LE forward and heel strike. Ambulation improved with this sequence. At end pt UE fatigue d/t increased WB throughout gait.   Stairs/Curb  Stairs?: No     Balance  Posture: Good  Sitting - Static: Good  Sitting - Dynamic: Good  Standing - Static: Fair;+(with RW)  Standing - Dynamic: Fair;+(with RW)  Exercises  Hip Flexion: seated x10 sheldon , increased difficulty with R LE clearing floor  Knee Long Arc Quad: x10 sheldon decreased ROM with R LE  Comments: Reviewed exercises to continue with while sitting including the above to help with ambulation and being able to lift R LE to clear step. G-Code     OutComes Score                                                     AM-PAC Score  AM-PAC Inpatient Mobility Raw Score : 18 (09/05/20 0915)  AM-PAC Inpatient T-Scale Score : 43.63 (09/05/20 0915)  Mobility Inpatient CMS 0-100% Score: 46.58 (09/05/20 0915)  Mobility Inpatient CMS G-Code Modifier : CK (09/05/20 0915)          Goals  Short term goals  Time Frame for Short term goals: To be met prior to discharge  Short term goal 1: Pt will complete bed mobility with mod I--MET 9/5/20  Short term goal 2: Pt will complete sit to/from stand with mod I--MET 9/5/20  Short term goal 3: Pt will ambulate 100 ft with LRAD and mod I  Short term goal 4: Pt will navigate up/down 6 steps with HR and CGA    Plan    Plan  Times per week: 5-7x  Times per day: Daily  Current Treatment Recommendations: Strengthening, ROM, Balance Training, Functional Mobility Training, Transfer Training, Endurance Training, Gait Training, Stair training, Safety Education & Training, Patient/Caregiver Education & Training  Safety Devices  Type of devices:  All fall risk precautions in place, Call light within reach, Chair alarm in place, Gait belt, Patient at risk for falls, Left in chair, Nurse notified  Restraints  Initially in place: No     Therapy Time   Individual Concurrent Group Co-treatment   Time In [facility] 82         Time Out [address]         Minutes [address]

## 2020-09-05 NOTE — PROGRESS NOTES
Patient is alert, up working with therapy. She is requesting pain medication which is a muscle relaxer, I gave her that along with the rest of her morning medication. Patient was delivered a walker yesterday and is working with therapy today. Patient is back in bed said she is almost asleep.

## 2020-09-06 PROCEDURE — 99214 OFFICE O/P EST MOD 30 MIN: CPT | Performed by: NURSE PRACTITIONER

## 2020-09-06 PROCEDURE — G0378 HOSPITAL OBSERVATION PER HR: HCPCS

## 2020-09-06 PROCEDURE — 94761 N-INVAS EAR/PLS OXIMETRY MLT: CPT

## 2020-09-06 PROCEDURE — 96372 THER/PROPH/DIAG INJ SC/IM: CPT

## 2020-09-06 PROCEDURE — 2580000003 HC RX 258: Performed by: HOSPITALIST

## 2020-09-06 PROCEDURE — 6360000002 HC RX W HCPCS: Performed by: HOSPITALIST

## 2020-09-06 PROCEDURE — 6370000000 HC RX 637 (ALT 250 FOR IP): Performed by: INTERNAL MEDICINE

## 2020-09-06 PROCEDURE — 6370000000 HC RX 637 (ALT 250 FOR IP): Performed by: HOSPITALIST

## 2020-09-06 PROCEDURE — 94640 AIRWAY INHALATION TREATMENT: CPT

## 2020-09-06 RX ORDER — DIPHENHYDRAMINE HCL 25 MG
25 TABLET ORAL EVERY 6 HOURS PRN
Status: DISCONTINUED | OUTPATIENT
Start: 2020-09-06 | End: 2020-09-09 | Stop reason: HOSPADM

## 2020-09-06 RX ADMIN — FERROUS SULFATE TAB 325 MG (65 MG ELEMENTAL FE) 325 MG: 325 (65 FE) TAB at 08:35

## 2020-09-06 RX ADMIN — PREGABALIN 150 MG: 75 CAPSULE ORAL at 08:35

## 2020-09-06 RX ADMIN — PREGABALIN 150 MG: 75 CAPSULE ORAL at 20:45

## 2020-09-06 RX ADMIN — FERROUS SULFATE TAB 325 MG (65 MG ELEMENTAL FE) 325 MG: 325 (65 FE) TAB at 20:45

## 2020-09-06 RX ADMIN — OXYCODONE 5 MG: 5 TABLET ORAL at 22:31

## 2020-09-06 RX ADMIN — TIZANIDINE 4 MG: 4 TABLET ORAL at 13:26

## 2020-09-06 RX ADMIN — OXYCODONE 5 MG: 5 TABLET ORAL at 08:35

## 2020-09-06 RX ADMIN — ROSUVASTATIN CALCIUM 40 MG: 40 TABLET, FILM COATED ORAL at 20:45

## 2020-09-06 RX ADMIN — ENOXAPARIN SODIUM 40 MG: 40 INJECTION SUBCUTANEOUS at 20:45

## 2020-09-06 RX ADMIN — PANTOPRAZOLE SODIUM 40 MG: 40 TABLET, DELAYED RELEASE ORAL at 06:09

## 2020-09-06 RX ADMIN — Medication 10 ML: at 20:45

## 2020-09-06 RX ADMIN — DIPHENHYDRAMINE HCL 25 MG: 25 TABLET ORAL at 15:13

## 2020-09-06 RX ADMIN — DULOXETINE HYDROCHLORIDE 60 MG: 60 CAPSULE, DELAYED RELEASE ORAL at 08:35

## 2020-09-06 RX ADMIN — BUDESONIDE 250 MCG: 0.5 SUSPENSION RESPIRATORY (INHALATION) at 19:51

## 2020-09-06 RX ADMIN — BUDESONIDE 250 MCG: 0.5 SUSPENSION RESPIRATORY (INHALATION) at 08:23

## 2020-09-06 RX ADMIN — HYDROCHLOROTHIAZIDE 12.5 MG: 25 TABLET ORAL at 08:35

## 2020-09-06 RX ADMIN — ASPIRIN 81 MG: 81 TABLET, COATED ORAL at 08:35

## 2020-09-06 RX ADMIN — AMLODIPINE BESYLATE 5 MG: 5 TABLET ORAL at 08:35

## 2020-09-06 RX ADMIN — DIPHENHYDRAMINE HCL 25 MG: 25 TABLET ORAL at 20:55

## 2020-09-06 ASSESSMENT — PAIN SCALES - GENERAL
PAINLEVEL_OUTOF10: 10
PAINLEVEL_OUTOF10: 10
PAINLEVEL_OUTOF10: 8
PAINLEVEL_OUTOF10: 7

## 2020-09-06 NOTE — PROGRESS NOTES
98.9 °F (37.2 °C)  Current Respiratory Rate:  Resp: 16  Current Pulse:  Pulse: 82  Current Blood Pressure:  BP: 123/82  24hr Blood Pressure Range:  Systolic (89RBK), LUX:829 , Min:114 , HAT:684   ; Diastolic (39MDC), DAK:02, Min:75, Max:82    Current Pulse Oximetry:  SpO2: 97 % RA    No intake or output data in the 24 hours ending 09/06/20 0948    Telemetry monitor: sinus rhythm     Physical Exam:  General:  Awake, alert, NAD  Skin:  Warm and dry  Neck:  No JVD  Chest:  Clear to auscultation, respiration easy  Cardiovascular:  RRR 94 S1S2 no murmur to auscultation  Abdomen: Bowel sounds normal, abd soft, non-tender  Extremities:  No edema  : unremarkable      Imaging    Echo: 9/1/20:  Summary   -Normal left ventricle size, borderline wall thickness and hyperdynamic   systolic function with an estimated ejection fraction of 65-70%.   -No regional wall motion abnormalities are seen.   -Normal diastolic function. E/e\"=7.4   -Trivial mitral and tricuspid regurgitation.   -Estimated pulmonary artery systolic pressure is normal at 26 mmHg assuming   a right atrial pressure of 3 mmHg.   -A bubble study was performed and fails to show evidence of shunting.     Lab Review     Renal Profile:   Lab Results   Component Value Date    CREATININE 0.8 09/01/2020    BUN 16 09/01/2020     09/01/2020    K 4.2 09/01/2020     09/01/2020    CO2 21 09/01/2020     CBC:    Lab Results   Component Value Date    WBC 9.0 09/01/2020    RBC 4.76 09/01/2020    HGB 11.6 09/01/2020    HCT 36.5 09/01/2020    MCV 76.7 09/01/2020    RDW 17.1 09/01/2020     09/01/2020     BNP:  No results found for: BNP  Fasting Lipid Panel:    Lab Results   Component Value Date    CHOL 150 09/01/2020    HDL 38 09/01/2020    TRIG 122 09/01/2020     Cardiac Enzymes:    Lab Results   Component Value Date    CKTOTAL 155 03/25/2015    TROPONINI 0.00 02/07/2017     PT/ INR   Lab Results   Component Value Date    INR 1.01 08/31/2020    INR 0.99 02/07/2017 PROTIME 11.7 08/31/2020    PROTIME 11.3 02/07/2017     PTT No results found for: PTT   Lab Results   Component Value Date    MG 1.90 03/13/2020      Lab Results   Component Value Date    TSH 0.58 01/24/2020       Assessment/Plan:     Patient Active Problem List   Diagnosis    Joint pain    Constipation    Localized rash    HTN (hypertension)    Yeast vaginitis    Myalgia    Fibromyalgia    Hyperthyroidism    Nephroureterolithiasis    Migraine with aura and with status migrainosus, not intractable    Kidney stone    Insomnia due to other mental disorder    Mild intermittent asthma without complication    Moderate episode of recurrent major depressive disorder (HCC)    PTSD (post-traumatic stress disorder)    Paresthesia of right arm and leg    Focal motor deficit    Right sided weakness      1. Right sided weakness   ~seen/eval by neuro : unremarkable findings    ~bubble echo neg for shunt   ~working with PT/OT : rehab consulted    2. SOB   ~offers no c/o.  Neg exam for crackles   ~hyperdynamic LVEF on echo   ~BP improved-controlled compared to one day ago    ~continue amlodipine / HCTZ   ~wt down ~5#    Plan : 30 day event monitor as outpt (PMH: clots)   Will follow peripherally ; call if needed

## 2020-09-06 NOTE — PROGRESS NOTES
[facility] PROGRESS NOTE    9/6/2020 12:28 PM        Name: Antonio Thorne . Admitted: 8/31/2020  Primary Care Provider: LEESA Rahman CNP (Tel: [phone])    Brief Course:  Donald Guerra a 42 y. o. female who has history of fibromyalgia, on medical marijuana presented to the ED concerned that she was having a stroke.  The patient presents with headache, right-sided weakness, and paresthesia ongoing for greater than 12 hours.  The patient does endorse being under a great deal of stress related to home schooling, full-time employment, and PTSD related to murder of family member. MRI of cervical spine and MRI brain is unremarkable. Patient seems to have improvement of right upper extremity weakness still has persistent right lower extremity weakness and loss of sensation. Concerns of somatoform, conversion disorder we will get psych input. Physical therapy recommending acute rehab will also get PMR consult for that. CC: Right leg weakness    Subjective: Osie Ra   Mention right leg weakness is about the same, today she is mentioning that she cannot feel any sensation on her right leg,    Reviewed interval ancillary notes    Current Medications  oxyCODONE (ROXICODONE) immediate release tablet 5 mg, Q8H PRN  SUMAtriptan (IMITREX) tablet 100 mg, Daily PRN  hydrALAZINE (APRESOLINE) injection 10 mg, Once  albuterol (PROVENTIL) nebulizer solution 2.5 mg, Q4H PRN  budesonide (PULMICORT) nebulizer suspension 250 mcg, BID  clonazePAM (KLONOPIN) tablet 0.5 mg, Nightly PRN  DULoxetine (CYMBALTA) extended release capsule 60 mg, Daily  ferrous sulfate (IRON 325) tablet 325 mg, BID  hydroCHLOROthiazide (HYDRODIURIL) tablet 12.5 mg, Daily  pantoprazole (PROTONIX) tablet 40 mg, Daily  pregabalin (LYRICA) capsule 150 mg, BID  sucralfate (CARAFATE) tablet 1 g, BID AC  tiZANidine (ZANAFLEX) tablet 4 mg, Q8H PRN  sodium chloride flush 0.9 % injection 10 mL, 2 times per day  sodium chloride flush 0.9 % injection 10 mL, PRN  enoxaparin (LOVENOX) injection 40 mg, Nightly  aspirin EC tablet 81 mg, Daily    Or  aspirin suppository 300 mg, Daily  perflutren lipid microspheres (DEFINITY) injection 1.65 mg, ONCE PRN  senna (SENOKOT) tablet 8.6 mg, Daily PRN  ondansetron (ZOFRAN-ODT) disintegrating tablet 4 mg, Q8H PRN    Or  ondansetron (ZOFRAN) injection 4 mg, Q6H PRN  rosuvastatin (CRESTOR) tablet 40 mg, Nightly  labetalol (NORMODYNE;TRANDATE) injection 10 mg, Q10 Min PRN  amLODIPine (NORVASC) tablet 5 mg, Daily        Objective:  /82   Pulse 82   Temp 98.1 °F (36.7 °C) (Oral)   Resp 16   Ht 5' 7\" (1.702 m)   Wt 178 lb 8 oz (81 kg)   SpO2 97%   BMI 27.96 kg/m²     Intake/Output Summary (Last 24 hours) at 9/6/2020 1228  Last data filed at 9/6/2020 0820  Gross per 24 hour   Intake 240 ml   Output --   Net 240 ml      Wt Readings from Last 3 Encounters:   09/05/20 178 lb 8 oz (81 kg)   06/11/20 170 lb (77.1 kg)   03/22/20 186 lb 6 oz (84.5 kg)   Right lower extremity weakness 1 out of 5, loss of sensation to painful stimuli  General appearance:  Appears comfortable  Eyes: Sclera clear. Pupils equal.  ENT: Moist oral mucosa. Trachea midline, no adenopathy. Cardiovascular: Regular rhythm, normal S1, S2. No murmur. No edema in lower extremities  Respiratory: Not using accessory muscles. Good inspiratory effort. Clear to auscultation bilaterally, no wheeze or crackles. GI: Abdomen soft, no tenderness, not distended, normal bowel sounds  Musculoskeletal: No cyanosis in digits, neck supple  Neurology: CN 2-12 grossly intact. No speech deficit psych: Normal affect. Alert and oriented in time, place and person  Skin: Warm, dry, normal turgor  Extremity exam shows brisk capillary refill. Peripheral pulses are palpable in lower extremities     Labs and Tests:  CBC: No results for input(s): WBC, HGB, PLT in the last 72 hours.   BMP: No results for input(s): NA, K, CL, CO2, BUN, CREATININE, GLUCOSE in the last 72 hours. Hepatic: No results for input(s): AST, ALT, ALB, BILITOT, ALKPHOS in the last 72 hours. MRI CERVICAL SPINE WO CONTRAST   Final Result   Mild reversal cervical lordosis which is chronic. There is no evidence for   disc herniation there is no evidence for acute neural foraminal effacement no   evidence for nerve root impingement. No evidence for cord impingement. No   signal abnormality in the cord substance         CT LUMBAR SPINE WO CONTRAST   Final Result   1. Negative CT of the lumbar spine. 2. Nonobstructing bilateral nephrolithiasis. MRI brain with and without contrast   Final Result   Unremarkable exam.  No acute ischemia         XR CHEST PORTABLE   Final Result   Unremarkable portable exam         CTA NECK W CONTRAST   Final Result   No acute abnormality or flow-limiting stenosis of the major arteries of the   head and neck. CTA HEAD W CONTRAST   Final Result   No acute abnormality or flow-limiting stenosis of the major arteries of the   head and neck. CT HEAD WO CONTRAST   Final Result   No acute intracranial abnormality. Stroke alert results were called by Dr. Gopal Real. MD [facility] to [facility]   on 8/31/2020 at 13:51. Problem List  Principal Problem:    Focal motor deficit  Active Problems:    HTN (hypertension)    Fibromyalgia    Moderate episode of recurrent major depressive disorder (HCC)    Paresthesia of right arm and leg    Right sided weakness  Resolved Problems:    * No resolved hospital problems.  *       Assessment & Plan:   Right lower extremity weakness  Does seem to have significant sensory impairment, imaging is negative, concerns of functional versus somatoform versus organic etiology  Neurology mentations noticed, will get psych input discussed with the patient, exam is inconsistent  PT OT input noticed, will get acute rehab consult    Hypertension  Continue amlodipine and hydrochlorothiazide    Major depression  Continue on Cymbalta    IV Access: Peripheral  Biggs: No  Diet: DIET GENERAL; No Added Salt (3-4 GM)  Code:Full Code  DVT PPX Lovenox  Disposition ARU consult      Dee Barlow MD   9/6/2020 12:28 PM

## 2020-09-06 NOTE — PROGRESS NOTES
Patient was sleeping when we went in, handoff from Kimberlee Rodriguez, patient had requested pain medication, will give her when she wakes.

## 2020-09-07 LAB
A/G RATIO: 1.2 (ref 1.1–2.2)
ALBUMIN SERPL-MCNC: 4.3 G/DL (ref 3.4–5)
ALP BLD-CCNC: 102 U/L (ref 40–129)
ALT SERPL-CCNC: 51 U/L (ref 10–40)
ANION GAP SERPL CALCULATED.3IONS-SCNC: 11 MMOL/L (ref 3–16)
AST SERPL-CCNC: 40 U/L (ref 15–37)
BILIRUB SERPL-MCNC: <0.2 MG/DL (ref 0–1)
BUN BLDV-MCNC: 21 MG/DL (ref 7–20)
CALCIUM SERPL-MCNC: 10 MG/DL (ref 8.3–10.6)
CHLORIDE BLD-SCNC: 99 MMOL/L (ref 99–110)
CO2: 25 MMOL/L (ref 21–32)
CREAT SERPL-MCNC: 0.7 MG/DL (ref 0.6–1.1)
GFR AFRICAN AMERICAN: >60
GFR NON-AFRICAN AMERICAN: >60
GLOBULIN: 3.6 G/DL
GLUCOSE BLD-MCNC: 103 MG/DL (ref 70–99)
HCT VFR BLD CALC: 39.8 % (ref 36–48)
HEMOGLOBIN: 12.7 G/DL (ref 12–16)
MCH RBC QN AUTO: 24.5 PG (ref 26–34)
MCHC RBC AUTO-ENTMCNC: 32 G/DL (ref 31–36)
MCV RBC AUTO: 76.6 FL (ref 80–100)
PDW BLD-RTO: 16.8 % (ref 12.4–15.4)
PLATELET # BLD: 357 K/UL (ref 135–450)
PMV BLD AUTO: 8.2 FL (ref 5–10.5)
POTASSIUM REFLEX MAGNESIUM: 4.2 MMOL/L (ref 3.5–5.1)
RBC # BLD: 5.19 M/UL (ref 4–5.2)
SODIUM BLD-SCNC: 135 MMOL/L (ref 136–145)
TOTAL PROTEIN: 7.9 G/DL (ref 6.4–8.2)
WBC # BLD: 7.9 K/UL (ref 4–11)

## 2020-09-07 PROCEDURE — 94640 AIRWAY INHALATION TREATMENT: CPT

## 2020-09-07 PROCEDURE — 6360000002 HC RX W HCPCS: Performed by: HOSPITALIST

## 2020-09-07 PROCEDURE — 94761 N-INVAS EAR/PLS OXIMETRY MLT: CPT

## 2020-09-07 PROCEDURE — 6370000000 HC RX 637 (ALT 250 FOR IP): Performed by: HOSPITALIST

## 2020-09-07 PROCEDURE — 80053 COMPREHEN METABOLIC PANEL: CPT

## 2020-09-07 PROCEDURE — G0378 HOSPITAL OBSERVATION PER HR: HCPCS

## 2020-09-07 PROCEDURE — 85027 COMPLETE CBC AUTOMATED: CPT

## 2020-09-07 PROCEDURE — 36415 COLL VENOUS BLD VENIPUNCTURE: CPT

## 2020-09-07 PROCEDURE — 96372 THER/PROPH/DIAG INJ SC/IM: CPT

## 2020-09-07 PROCEDURE — 6370000000 HC RX 637 (ALT 250 FOR IP): Performed by: INTERNAL MEDICINE

## 2020-09-07 PROCEDURE — 97116 GAIT TRAINING THERAPY: CPT

## 2020-09-07 PROCEDURE — 2580000003 HC RX 258: Performed by: HOSPITALIST

## 2020-09-07 PROCEDURE — 97530 THERAPEUTIC ACTIVITIES: CPT

## 2020-09-07 RX ADMIN — FERROUS SULFATE TAB 325 MG (65 MG ELEMENTAL FE) 325 MG: 325 (65 FE) TAB at 08:19

## 2020-09-07 RX ADMIN — ENOXAPARIN SODIUM 40 MG: 40 INJECTION SUBCUTANEOUS at 21:15

## 2020-09-07 RX ADMIN — HYDROCHLOROTHIAZIDE 12.5 MG: 25 TABLET ORAL at 08:19

## 2020-09-07 RX ADMIN — FERROUS SULFATE TAB 325 MG (65 MG ELEMENTAL FE) 325 MG: 325 (65 FE) TAB at 21:14

## 2020-09-07 RX ADMIN — OXYCODONE 5 MG: 5 TABLET ORAL at 08:19

## 2020-09-07 RX ADMIN — TIZANIDINE 4 MG: 4 TABLET ORAL at 08:20

## 2020-09-07 RX ADMIN — AMLODIPINE BESYLATE 5 MG: 5 TABLET ORAL at 08:19

## 2020-09-07 RX ADMIN — BUDESONIDE 250 MCG: 0.5 SUSPENSION RESPIRATORY (INHALATION) at 21:19

## 2020-09-07 RX ADMIN — CLONAZEPAM 0.5 MG: 0.5 TABLET ORAL at 00:20

## 2020-09-07 RX ADMIN — ASPIRIN 81 MG: 81 TABLET, COATED ORAL at 08:19

## 2020-09-07 RX ADMIN — PREGABALIN 150 MG: 75 CAPSULE ORAL at 08:19

## 2020-09-07 RX ADMIN — DIPHENHYDRAMINE HCL 25 MG: 25 TABLET ORAL at 12:21

## 2020-09-07 RX ADMIN — OXYCODONE 5 MG: 5 TABLET ORAL at 21:14

## 2020-09-07 RX ADMIN — PANTOPRAZOLE SODIUM 40 MG: 40 TABLET, DELAYED RELEASE ORAL at 05:50

## 2020-09-07 RX ADMIN — BUDESONIDE 250 MCG: 0.5 SUSPENSION RESPIRATORY (INHALATION) at 09:24

## 2020-09-07 RX ADMIN — DULOXETINE HYDROCHLORIDE 60 MG: 60 CAPSULE, DELAYED RELEASE ORAL at 08:19

## 2020-09-07 RX ADMIN — PREGABALIN 150 MG: 75 CAPSULE ORAL at 18:53

## 2020-09-07 RX ADMIN — ROSUVASTATIN CALCIUM 40 MG: 40 TABLET, FILM COATED ORAL at 21:14

## 2020-09-07 RX ADMIN — TIZANIDINE 4 MG: 4 TABLET ORAL at 00:20

## 2020-09-07 ASSESSMENT — PAIN DESCRIPTION - FREQUENCY
FREQUENCY: CONTINUOUS
FREQUENCY: CONTINUOUS

## 2020-09-07 ASSESSMENT — PAIN SCALES - GENERAL
PAINLEVEL_OUTOF10: 8
PAINLEVEL_OUTOF10: 10
PAINLEVEL_OUTOF10: 8
PAINLEVEL_OUTOF10: 10
PAINLEVEL_OUTOF10: 8
PAINLEVEL_OUTOF10: 0

## 2020-09-07 ASSESSMENT — PAIN DESCRIPTION - LOCATION
LOCATION: BACK;NECK
LOCATION: BACK

## 2020-09-07 ASSESSMENT — PAIN DESCRIPTION - PAIN TYPE
TYPE: CHRONIC PAIN
TYPE: CHRONIC PAIN

## 2020-09-07 NOTE — PLAN OF CARE
Problem: Pain:  Goal: Control of chronic pain  Description: Control of chronic pain  Outcome: Ongoing     Problem: Falls - Risk of:  Goal: Will remain free from falls  Description: Will remain free from falls  Outcome: Ongoing     Problem: HEMODYNAMIC STATUS  Goal: Patient has stable vital signs and fluid balance  Outcome: Ongoing     Problem: ACTIVITY INTOLERANCE/IMPAIRED MOBILITY  Goal: Mobility/activity is maintained at optimum level for patient  Outcome: Ongoing  Note: Pt able to walk to bathroom with walker. Problem: COMMUNICATION IMPAIRMENT  Goal: Ability to express needs and understand communication  Outcome: Ongoing   Pt sleeping in bed. C/o chronic pain. Meds given per MAR. Call light within reach. Bed locked in lowest position.

## 2020-09-07 NOTE — CONSULTS
is seen within the brain.         ORBITS: The visualized portion of the orbits demonstrate no acute abnormality.         SINUSES: The visualized paranasal sinuses and mastoid air cells are well    aerated.         BONES/SOFT TISSUES: The bone marrow signal intensity appears normal. The soft    tissues demonstrate no acute abnormality.              Impression    Unremarkable exam.  No acute ischemia      EXAMINATION:    CT OF THE LUMBAR SPINE WITHOUT CONTRAST  9/2/2020         TECHNIQUE:    CT of the lumbar spine was performed without the administration of    intravenous contrast. Multiplanar reformatted images are provided for review. Dose modulation, iterative reconstruction, and/or weight based adjustment of    the mA/kV was utilized to reduce the radiation dose to as low as reasonably    achievable.         COMPARISON:    None         HISTORY:    ORDERING SYSTEM PROVIDED HISTORY: LOWER EXTREMITY WEAKNESS    TECHNOLOGIST PROVIDED HISTORY:    Reason for exam:->right leg weakness    Is the patient pregnant?->No    Reason for Exam: right leg weakness; Lower Extremity Weakness    Acuity: Acute    Type of Exam: Initial         FINDINGS:    BONES/ALIGNMENT: There is normal alignment of the spine. The vertebral body    heights are maintained. No osseous destructive lesion is seen.         DEGENERATIVE CHANGES: No significant degenerative changes of the lumbar spine.         SOFT TISSUES/RETROPERITONEUM: There are tiny nonobstructing bilateral    intrarenal calculi measuring less than 1 mm on the left and up to 2 mm on the    right.              Impression    1. Negative CT of the lumbar spine. 2. Nonobstructing bilateral nephrolithiasis.      EXAMINATION:    MRI OF THE CERVICAL SPINE WITHOUT CONTRAST 9/4/2020 2:56 pm         TECHNIQUE:    Multiplanar multisequence MRI of the cervical spine was performed without the    administration of intravenous contrast.         COMPARISON:    None.         HISTORY:    ORDERING SYSTEM PROVIDED HISTORY: difficulty moving R lower extremity    TECHNOLOGIST PROVIDED HISTORY:    Reason for exam:->difficulty moving R lower extremity    Is the patient pregnant?->No    Reason for Exam: Right lower ext weakness, difficulty moving x almost one    week, no known inj, no prev c-spine surg         FINDINGS:    BONES/ALIGNMENT: Mild reversal of normal cervical lordosis centered at    Celsius 5 6 this is unchanged when compared to March 23, 2018 CT. .         SPINAL CORD: No abnormal cord signal is seen.         SOFT TISSUES: No paraspinal mass identified.         C2-C3: There is no significant disc protrusion, spinal canal stenosis or    neural foraminal narrowing.         C3-C4: There is no significant disc protrusion, spinal canal stenosis or    neural foraminal narrowing.         C4-C5: There is no significant disc protrusion, spinal canal stenosis or    neural foraminal narrowing.         C5-C6: There is no significant disc protrusion, spinal canal stenosis or    neural foraminal narrowing.         C6-C7: There is no significant disc protrusion, spinal canal stenosis or    neural foraminal narrowing.         C7-T1: There is no significant disc protrusion, spinal canal stenosis or    neural foraminal narrowing.              Impression    Mild reversal cervical lordosis which is chronic.  There is no evidence for    disc herniation there is no evidence for acute neural foraminal effacement no    evidence for nerve root impingement.  No evidence for cord impingement.  No    signal abnormality in the cord substance      EXAMINATION:    CTA OF THE HEAD WITH CONTRAST; CTA OF THE NECK 8/31/2020 1:35 pm:         TECHNIQUE:    CTA of the head/brain was performed with the administration of intravenous    contrast. Multiplanar reformatted images are provided for review.  MIP images    are provided for review.  Dose modulation, iterative reconstruction, and/or    weight based adjustment of the mA/kV was utilized to reduce the radiation    dose to as low as reasonably achievable.; CTA of the neck was performed with    the administration of intravenous contrast. Multiplanar reformatted images    are provided for review.  MIP images are provided for review. Stenosis of the    internal carotid arteries measured using NASCET criteria. Dose modulation,    iterative reconstruction, and/or weight based adjustment of the mA/kV was    utilized to reduce the radiation dose to as low as reasonably achievable.         COMPARISON:    Noncontrast CT head from earlier today         HISTORY:    ORDERING SYSTEM PROVIDED HISTORY: possible stroke    TECHNOLOGIST PROVIDED HISTORY:    Has a \"code stroke\" or \"stroke alert\" been called? ->Yes    Reason for exam:->possible stroke    Reason for Exam: possible stroke    Acuity: Unknown    Type of Exam: Unknown         FINDINGS:         CTA NECK:         AORTIC ARCH/ARCH VESSELS: No dissection or arterial injury.  No significant    stenosis of the brachiocephalic or subclavian arteries.         CAROTID ARTERIES: No dissection, arterial injury, or hemodynamically    significant stenosis by NASCET criteria.         VERTEBRAL ARTERIES: No dissection, arterial injury, or significant stenosis.         SOFT TISSUES: The lung apices are clear.  No cervical or superior mediastinal    lymphadenopathy.  The larynx and pharynx are unremarkable.  No acute    abnormality of the salivary and thyroid glands.         BONES: No acute osseous abnormality.              CTA HEAD:         ANTERIOR CIRCULATION: No significant stenosis of the intracranial internal    carotid, anterior cerebral, or middle cerebral arteries. No aneurysm.         POSTERIOR CIRCULATION: No significant stenosis of the vertebral, basilar, or    posterior cerebral arteries. No aneurysm.         OTHER: No dural venous sinus thrombosis on this non-dedicated study.         BRAIN: No mass effect or midline shift. No extra-axial fluid collection.  The gray-white differentiation is maintained.              Impression    No acute abnormality or flow-limiting stenosis of the major arteries of the    head and neck. EXAMINATION:    CT OF THE HEAD WITHOUT CONTRAST  8/31/2020 1:31 pm         TECHNIQUE:    CT of the head was performed without the administration of intravenous    contrast. Dose modulation, iterative reconstruction, and/or weight based    adjustment of the mA/kV was utilized to reduce the radiation dose to as low    as reasonably achievable.         COMPARISON:    None.         HISTORY:    ORDERING SYSTEM PROVIDED HISTORY: possible stroke    TECHNOLOGIST PROVIDED HISTORY:    Has a \"code stroke\" or \"stroke alert\" been called? ->Yes    Reason for exam:->possible stroke    Is the patient pregnant?->No    Reason for Exam: possible stroke    Acuity: Unknown    Type of Exam: Unknown         FINDINGS:    BRAIN/VENTRICLES: There is no acute intracranial hemorrhage, mass effect or    midline shift.  No abnormal extra-axial fluid collection.  The gray-white    differentiation is maintained without evidence of an acute infarct.  There is    no evidence of hydrocephalus.         ORBITS: The visualized portion of the orbits demonstrate no acute abnormality.         SINUSES: The visualized paranasal sinuses and mastoid air cells demonstrate    no acute abnormality.         SOFT TISSUES/SKULL:  No acute abnormality of the visualized skull or soft    tissues.              Impression    No acute intracranial abnormality. The above laboratory data have been reviewed. The above imaging data have been reviewed. The above medical testing have been reviewed. Body mass index is 27.96 kg/m². Assessment and Plan:  Right leg weakness: question malingering given exam and history of home stressors. No organic etiology per neuro. PT/OT  HTN  Fibromyalgia  Depression  Graves  PTSD    Dispo: Patient is an appropriate ARU candidate.  Able to tolerate the required 3 hours of therapy in an ARU setting. Precert will be required which cannot be initiated until tomorrow given the holiday. We will continue to follow. Thank you for the consultation. Tha Ross MD 9/7/2020, 8:51 AM     * This document was created using dictation software. While all precautions were taken to ensure accuracy, errors may have occurred. Please disregard any typographical errors.

## 2020-09-07 NOTE — PROGRESS NOTES
flush 0.9 % injection 10 mL, PRN  enoxaparin (LOVENOX) injection 40 mg, Nightly  aspirin EC tablet 81 mg, Daily    Or  aspirin suppository 300 mg, Daily  perflutren lipid microspheres (DEFINITY) injection 1.65 mg, ONCE PRN  senna (SENOKOT) tablet 8.6 mg, Daily PRN  ondansetron (ZOFRAN-ODT) disintegrating tablet 4 mg, Q8H PRN    Or  ondansetron (ZOFRAN) injection 4 mg, Q6H PRN  rosuvastatin (CRESTOR) tablet 40 mg, Nightly  labetalol (NORMODYNE;TRANDATE) injection 10 mg, Q10 Min PRN  amLODIPine (NORVASC) tablet 5 mg, Daily      Objective:  /82   Pulse 98   Temp 98 °F (36.7 °C) (Oral)   Resp 16   Ht 5' 7\" (1.702 m)   Wt 178 lb 8 oz (81 kg)   SpO2 98%   BMI 27.96 kg/m²     Intake/Output Summary (Last 24 hours) at 9/7/2020 0655  Last data filed at 9/6/2020 0820  Gross per 24 hour   Intake 240 ml   Output --   Net 240 ml      Wt Readings from Last 3 Encounters:   09/05/20 178 lb 8 oz (81 kg)   06/11/20 170 lb (77.1 kg)   03/22/20 186 lb 6 oz (84.5 kg)     General:  Awake, alert, oriented in NAD  Skin:  Warm and dry. No unusual bruising or rash  Neck:  Supple. No JVD or carotid bruit appreciated  Chest:  Normal effort. Clear to auscultation, no wheezes/rhonchi/rales  Cardiovascular:  RRR, normal S1/S2, no murmur/gallop/rub  Abdomen:  Soft, nontender, +bowel sounds  Extremities:  No edema  Neurological: Moves all extremities, + right leg weakness, unable to lift from bed. Psychological: Normal mood and affect    Labs and Tests:  CBC: No results for input(s): WBC, HGB, PLT in the last 72 hours. BMP:  No results for input(s): NA, K, CL, CO2, BUN, CREATININE, GLUCOSE in the last 72 hours. Hepatic: No results for input(s): AST, ALT, ALB, BILITOT, ALKPHOS in the last 72 hours. MRI CERVICAL SPINE WO CONTRAST 9/4/2020:   Final Result   Mild reversal cervical lordosis which is chronic.   There is no evidence for   disc herniation there is no evidence for acute neural foraminal effacement no   evidence for nerve root impingement. No evidence for cord impingement. No   signal abnormality in the cord substance         CT LUMBAR SPINE WO CONTRAST 9/2/2020:   Final Result   1. Negative CT of the lumbar spine. 2. Nonobstructing bilateral nephrolithiasis. MRI brain with and without contrast 9/1/2020:   Final Result   Unremarkable exam.  No acute ischemia         XR CHEST PORTABLE 8/31/2020:   Final Result   Unremarkable portable exam         CTA NECK W CONTRAST 8/31/2020:   Final Result   No acute abnormality or flow-limiting stenosis of the major arteries of the   head and neck. CTA HEAD W CONTRAST 8/31/2020:   Final Result   No acute abnormality or flow-limiting stenosis of the major arteries of the   head and neck. CT HEAD WO CONTRAST 8/31/2020:   Final Result   No acute intracranial abnormality. Stroke alert results were called by Dr. Gena Gutierrez. MD [facility] to [facility]   on 8/31/2020 at 13:51. Problem List  Principal Problem:    Focal motor deficit  Active Problems:    HTN (hypertension)    Fibromyalgia    Moderate episode of recurrent major depressive disorder (HCC)    Paresthesia of right arm and leg    Right sided weakness  Resolved Problems:    * No resolved hospital problems. *       Assessment & Plan:    1. Right lower extremity weakness. Cause unclear, workup thus far non revealing. MRI brain and C-spine are not consistent with transverse myelopathy, demyelination or MS. Awaiting psych consult over somatoform etiology. Neurology has signed off. ARU consult pending. 2. Hypertension. BP controlled, Continue amlodipine and hydrochlorothiazide    3. Depression/anxiety. Continue Cymbalta. Psych consult pending. 4. Fibromyalgia. Continue Lyrica and prn pain med. Diet: DIET GENERAL; No Added Salt (3-4 GM)  Code:Full Code  DVT PPX Lovenox    Disposition: ARU consult pending.       Marylene Blander, LEESA - CNP   9/7/2020 8:20 AM

## 2020-09-07 NOTE — PROGRESS NOTES
Pt reported feeling dizzy and that she was going to pass out. Checked b/p was 106/72, HR was 91 on tele. Spoke  With NP and she stated to hold off on shower and to allow body to rest. Pt stated she had just worked with therapy. In chair at this time.
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
## 2020-09-08 LAB
ANION GAP SERPL CALCULATED.3IONS-SCNC: 13 MMOL/L (ref 3–16)
BUN BLDV-MCNC: 21 MG/DL (ref 7–20)
CALCIUM SERPL-MCNC: 9.9 MG/DL (ref 8.3–10.6)
CHLORIDE BLD-SCNC: 99 MMOL/L (ref 99–110)
CO2: 24 MMOL/L (ref 21–32)
CREAT SERPL-MCNC: 0.8 MG/DL (ref 0.6–1.1)
D DIMER: <200 NG/ML DDU (ref 0–229)
GFR AFRICAN AMERICAN: >60
GFR NON-AFRICAN AMERICAN: >60
GLUCOSE BLD-MCNC: 162 MG/DL (ref 70–99)
MAGNESIUM: 2.2 MG/DL (ref 1.8–2.4)
POTASSIUM SERPL-SCNC: 4.1 MMOL/L (ref 3.5–5.1)
SODIUM BLD-SCNC: 136 MMOL/L (ref 136–145)
TSH REFLEX: 1.04 UIU/ML (ref 0.27–4.2)

## 2020-09-08 PROCEDURE — 6360000002 HC RX W HCPCS: Performed by: HOSPITALIST

## 2020-09-08 PROCEDURE — 6370000000 HC RX 637 (ALT 250 FOR IP): Performed by: INTERNAL MEDICINE

## 2020-09-08 PROCEDURE — G0378 HOSPITAL OBSERVATION PER HR: HCPCS

## 2020-09-08 PROCEDURE — 99215 OFFICE O/P EST HI 40 MIN: CPT | Performed by: NURSE PRACTITIONER

## 2020-09-08 PROCEDURE — 6370000000 HC RX 637 (ALT 250 FOR IP): Performed by: HOSPITALIST

## 2020-09-08 PROCEDURE — 94640 AIRWAY INHALATION TREATMENT: CPT

## 2020-09-08 PROCEDURE — 85379 FIBRIN DEGRADATION QUANT: CPT

## 2020-09-08 PROCEDURE — 96372 THER/PROPH/DIAG INJ SC/IM: CPT

## 2020-09-08 PROCEDURE — 97116 GAIT TRAINING THERAPY: CPT

## 2020-09-08 PROCEDURE — 36415 COLL VENOUS BLD VENIPUNCTURE: CPT

## 2020-09-08 PROCEDURE — 6370000000 HC RX 637 (ALT 250 FOR IP): Performed by: PSYCHIATRY & NEUROLOGY

## 2020-09-08 PROCEDURE — 97535 SELF CARE MNGMENT TRAINING: CPT

## 2020-09-08 PROCEDURE — 80048 BASIC METABOLIC PNL TOTAL CA: CPT

## 2020-09-08 PROCEDURE — 94761 N-INVAS EAR/PLS OXIMETRY MLT: CPT

## 2020-09-08 PROCEDURE — 83735 ASSAY OF MAGNESIUM: CPT

## 2020-09-08 PROCEDURE — 99213 OFFICE O/P EST LOW 20 MIN: CPT | Performed by: PSYCHIATRY & NEUROLOGY

## 2020-09-08 PROCEDURE — 84443 ASSAY THYROID STIM HORMONE: CPT

## 2020-09-08 RX ORDER — BUSPIRONE HYDROCHLORIDE 5 MG/1
5 TABLET ORAL 2 TIMES DAILY
Status: DISCONTINUED | OUTPATIENT
Start: 2020-09-08 | End: 2020-09-09 | Stop reason: HOSPADM

## 2020-09-08 RX ADMIN — BUSPIRONE HYDROCHLORIDE 5 MG: 5 TABLET ORAL at 20:51

## 2020-09-08 RX ADMIN — ROSUVASTATIN CALCIUM 40 MG: 40 TABLET, FILM COATED ORAL at 20:46

## 2020-09-08 RX ADMIN — OXYCODONE 5 MG: 5 TABLET ORAL at 08:17

## 2020-09-08 RX ADMIN — ASPIRIN 81 MG: 81 TABLET, COATED ORAL at 08:16

## 2020-09-08 RX ADMIN — BUDESONIDE 250 MCG: 0.5 SUSPENSION RESPIRATORY (INHALATION) at 08:33

## 2020-09-08 RX ADMIN — BUDESONIDE 500 MCG: 0.5 SUSPENSION RESPIRATORY (INHALATION) at 20:36

## 2020-09-08 RX ADMIN — FERROUS SULFATE TAB 325 MG (65 MG ELEMENTAL FE) 325 MG: 325 (65 FE) TAB at 20:46

## 2020-09-08 RX ADMIN — CLONAZEPAM 0.5 MG: 0.5 TABLET ORAL at 20:46

## 2020-09-08 RX ADMIN — DIPHENHYDRAMINE HCL 25 MG: 25 TABLET ORAL at 20:51

## 2020-09-08 RX ADMIN — AMLODIPINE BESYLATE 5 MG: 5 TABLET ORAL at 08:17

## 2020-09-08 RX ADMIN — DIPHENHYDRAMINE HCL 25 MG: 25 TABLET ORAL at 12:31

## 2020-09-08 RX ADMIN — TIZANIDINE 4 MG: 4 TABLET ORAL at 11:28

## 2020-09-08 RX ADMIN — PREGABALIN 150 MG: 75 CAPSULE ORAL at 18:03

## 2020-09-08 RX ADMIN — PANTOPRAZOLE SODIUM 40 MG: 40 TABLET, DELAYED RELEASE ORAL at 06:23

## 2020-09-08 RX ADMIN — ENOXAPARIN SODIUM 40 MG: 40 INJECTION SUBCUTANEOUS at 20:46

## 2020-09-08 RX ADMIN — TIZANIDINE 4 MG: 4 TABLET ORAL at 20:46

## 2020-09-08 RX ADMIN — CLONAZEPAM 0.5 MG: 0.5 TABLET ORAL at 00:05

## 2020-09-08 RX ADMIN — FERROUS SULFATE TAB 325 MG (65 MG ELEMENTAL FE) 325 MG: 325 (65 FE) TAB at 08:17

## 2020-09-08 RX ADMIN — HYDROCHLOROTHIAZIDE 12.5 MG: 25 TABLET ORAL at 08:16

## 2020-09-08 RX ADMIN — SENNOSIDES 8.6 MG: 8.6 TABLET, FILM COATED ORAL at 08:17

## 2020-09-08 RX ADMIN — DULOXETINE HYDROCHLORIDE 60 MG: 60 CAPSULE, DELAYED RELEASE ORAL at 08:17

## 2020-09-08 RX ADMIN — TIZANIDINE 4 MG: 4 TABLET ORAL at 00:05

## 2020-09-08 RX ADMIN — SUCRALFATE 1 G: 1 TABLET ORAL at 18:03

## 2020-09-08 RX ADMIN — PREGABALIN 150 MG: 75 CAPSULE ORAL at 08:16

## 2020-09-08 ASSESSMENT — PAIN SCALES - GENERAL
PAINLEVEL_OUTOF10: 9
PAINLEVEL_OUTOF10: 9
PAINLEVEL_OUTOF10: 10

## 2020-09-08 ASSESSMENT — PAIN DESCRIPTION - LOCATION
LOCATION: GENERALIZED
LOCATION: BACK
LOCATION: GENERALIZED

## 2020-09-08 ASSESSMENT — PAIN DESCRIPTION - PAIN TYPE
TYPE: CHRONIC PAIN

## 2020-09-08 ASSESSMENT — PAIN DESCRIPTION - ORIENTATION: ORIENTATION: MID

## 2020-09-08 NOTE — CARE COORDINATION
CM notes that ARU has initiated a pre-cert with payor. CM received a voice message from [facility] that they realized they are not in network for this patient's insurance plan. CM also received a message from [facility] that they are in network and could possibly accept patient at discharge if needed.     Inocencia Murillo, MAYNORN, CCM, RN  [facility]  [phone]
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
## 2020-09-08 NOTE — PLAN OF CARE
Problem: Pain:  Goal: Pain level will decrease  Description: Pain level will decrease  Outcome: Ongoing     Problem: Falls - Risk of:  Goal: Will remain free from falls  Description: Will remain free from falls  Outcome: Ongoing     Problem: ACTIVITY INTOLERANCE/IMPAIRED MOBILITY  Goal: Mobility/activity is maintained at optimum level for patient  Outcome: Ongoing     Vitals:    09/08/20 0004   BP: (!) 155/97   Pulse: 118   Resp: 16   Temp: 98.2 °F (36.8 °C)   SpO2: 98%

## 2020-09-08 NOTE — PROGRESS NOTES
Occupational Therapy  Facility/Department: [address]  Daily Treatment Note  NAME: Marco Antonio Guzmán  : 1980  MRN: [id number]    Date of Service: 2020    Discharge Recommendations:      Marco Antonio Guzmán scored a 18/24 on the AM-PAC ADL Inpatient form. Current research shows that an AM-PAC score of 17 or less is typically not associated with a discharge to the patient's home setting. Based on the patient's AM-PAC score and their current ADL deficits, it is recommended that the patient have 5-7 sessions per week of Occupational Therapy at d/c to increase the patient's independence. At this time, this patient demonstrates the endurance, and/or tolerance for 3 hours of therapy each day, with a treatment frequency of 5-7x/wk. Please see assessment section for further patient specific details. If patient discharges prior to next session this note will serve as a discharge summary. Please see below for the latest assessment towards goals. OT Equipment Recommendations  Equipment Needed: Yes  Mobility Devices: ADL Assistive Devices  ADL Assistive Devices: Shower Chair without back    Assessment   Performance deficits / Impairments: Decreased functional mobility ; Decreased ADL status; Decreased endurance;Decreased sensation;Decreased balance;Decreased high-level IADLs  Assessment: Pt is not at baseline level of function and will benefit from continued OT to address the above limitations. Treatment Diagnosis: Decreased functional mobility, ADL/IADL status, balance, sensation, activity tolerance related to focal motor deficit. Prognosis: Good  Decision Making: Medium Complexity  OT Education: OT Role;Plan of Care;Precautions; ADL Adaptive Strategies;Transfer Training;Energy Conservation;Equipment  Patient Education: Pt verbalized understanding-would benefit from continued reinforcement for carryover  REQUIRES OT FOLLOW UP: Yes  Activity Tolerance  Activity Tolerance: Patient Tolerated treatment well;Patient limited by fatigue  Safety Devices  Safety Devices in place: Yes  Type of devices: All fall risk precautions in place;Call light within reach; Chair alarm in place; Patient at risk for falls; Left in chair;Nurse notified         Patient Diagnosis(es): The encounter diagnosis was Right sided weakness. has a past medical history of Asthma, Constipation, Fibromyalgia, Graves disease, History of blood transfusion, Hx of blood clots, Hypertension, Joint pain, Kidney stone, Localized rash, Lupus ([address] MDRO (multiple drug resistant organisms) resistance, and PONV (postoperative nausea and vomiting). has a past surgical history that includes Tubal ligation;  section; Tonsillectomy; other surgical history (2016); and Cystoscopy (2018). Restrictions  Restrictions/Precautions  Restrictions/Precautions: Fall Risk(high fall risk)  Required Braces or Orthoses?: No  Position Activity Restriction  Other position/activity restrictions: Gareth Soto is a 42 y.o. female who presented to the ED concerned that she was having a stroke. The patient presents with headache, right-sided weakness, and paresthesia ongoing for greater than 12 hours. The patient does endorse being under a great deal of stress related to home schooling, full-time employment, and PTSD related to murder of family member. She has known hypertension and reports that her BP has been quite elevated resulting in recurrent epistaxis lately. Labs were obtained and essentially unremarkable. EKG as well as neuro imaging failed to reveal any acute process. Hospitalist team consulted to admit this patient for possible CVA. MRI negative, CT of lumbar spine negative. Subjective   General  Chart Reviewed:  Yes  Additional Pertinent Hx: HTN, blood clots, lupus, fibromyalgia  Response to previous treatment: Patient with no complaints from previous session  Family / Caregiver Present: No  Diagnosis: focal motor deficit (MRI negative)  Subjective  Subjective: Pt supine in bed upon arrival and agreeable to therapy session. Denied pain at rest      Orientation  Orientation  Overall Orientation Status: Within Normal Limits  Objective    ADL  Grooming: Setup;Contact guard assistance(in stance at sink to perform oral care progressing to SBA, set up to apply deoderant)  UE Bathing: Setup;Stand by assistance  LE Bathing: Setup;Stand by assistance  UE Dressing: Setup;Stand by assistance(gown change)  LE Dressing: Moderate assistance(A to thread L into underwear and to perform LB clothing management over hips once in stance d/t fatigue)  Toileting: None  Additional Comments: Pt performed functional mobility to restroom and completed grooming tasks in stance at sink. Pt completed the above ADLs at the above assist levels from shower level on intermittently in stance and seated from shower bench. Increased time to complete, fatigues quickly        Balance  Sitting Balance: Supervision  Standing Balance: Contact guard assistance  Standing Balance  Time: ~10 minutes  Activity: functional mobility to/from restroom, stance for ADL completion, functional transfers  Comment: with use of rw  Functional Mobility  Functional - Mobility Device: Rolling Walker  Activity: To/from bathroom  Assist Level: Contact guard assistance  Functional Mobility Comments: increased time to complete, heavily WBing throughout b UEs, dragging R LE throughout  Shower Transfers  Shower - Transfer From: [facility] - Transfer Type:  To and From  Shower - Transfer To: (shower bench)  Shower Transfers: Minimal assistance  Shower Transfers Comments: A to bring R LE into shower-verbal and tactile cues for hand placement and transfer sequence  Bed mobility  Supine to Sit: Modified independent  Scooting: Modified independent  Comment: HOB elevated  Transfers  Sit to stand: Contact guard assistance  Stand to sit: Contact guard assistance     Cognition  Overall Cognitive Status: [facility] Perception  Overall Perceptual Status: [facility]       Plan   Plan  Times per week: 3-5x  Times per day: Daily  Current Treatment Recommendations: Functional Mobility Training, Safety Education & Training, Self-Care / ADL, Equipment Evaluation, Education, & procurement, Patient/Caregiver Education & Training, Home Management Training    AM-PAC Score        AM-PAC Inpatient Daily Activity Raw Score: 18 (09/08/20 1505)  AM-PAC Inpatient ADL T-Scale Score : 38.66 (09/08/20 1505)  ADL Inpatient CMS 0-100% Score: 46.65 (09/08/20 1505)  ADL Inpatient CMS G-Code Modifier : CK (09/08/20 1505)    Goals  Short term goals  Time Frame for Short term goals: Discharge  Short term goal 1: Mod I for all UB ADLs. - SBA for grooming, set-up for bathing/dressing 9/4, set up/SBA 9/8  Short term goal 2: Mod I for all LB ADLs. - SBA for toileting, bathing, dressing 9/4, mod A d/t fatigue 9/8  Short term goal 3: Mod I for functional mobility. - CGA 9/8  Short term goal 4: Mod I for functional transfers. - SBA/CGA 9/4, GGA/min A 9/8  Long term goals  Time Frame for Long term goals : STG=LTG  Patient Goals   Patient goals :  To return home with family and sisters       Therapy Time   Individual Concurrent Group Co-treatment   Time In 1408         Time Out 1501         Minutes 53            Timed Code Treatment Minutes: 53 Minutes  Total Treatment Minutes:  [address], [address]

## 2020-09-08 NOTE — PROGRESS NOTES
[facility]    9/8/2020 9:28 AM        Name: Jarad Reaves . Admitted: 8/31/2020  Primary Care Provider: LEESA Smith CNP (Tel: [phone])      Brief Course:  Bert Becker a 42 y. o. female who has history of fibromyalgia, on medical marijuana. She presented to the ED with  headache, right-sided weakness, and paresthesia ongoing for greater than 12 hours.  The patient does endorse being under a great deal of stress related to home schooling, full-time employment, and PTSD related to murder of family member. MRI of cervical spine and MRI brain is unremarkable, not consistent with transverse myelopathy, demyelination or MS. There are concerns of somatoform, conversion disorder, psych consult pending. Physical therapy recommending ARU, consult pending. Subjective:    Presently ambulating in [place] with PT. Still with weakness right leg, using wheeled walker. HR noted to be 170s with activity. Patient notes some dizziness, palpitations, and mild dyspnea with the activity. HR quickly recovers once she sits down, remains in sinus rhythm.      Reviewed interval ancillary notes    Current Medications  diphenhydrAMINE (BENADRYL) tablet 25 mg, Q6H PRN  oxyCODONE (ROXICODONE) immediate release tablet 5 mg, Q8H PRN  SUMAtriptan (IMITREX) tablet 100 mg, Daily PRN  hydrALAZINE (APRESOLINE) injection 10 mg, Once  albuterol (PROVENTIL) nebulizer solution 2.5 mg, Q4H PRN  budesonide (PULMICORT) nebulizer suspension 250 mcg, BID  clonazePAM (KLONOPIN) tablet 0.5 mg, Nightly PRN  DULoxetine (CYMBALTA) extended release capsule 60 mg, Daily  ferrous sulfate (IRON 325) tablet 325 mg, BID  hydroCHLOROthiazide (HYDRODIURIL) tablet 12.5 mg, Daily  pantoprazole (PROTONIX) tablet 40 mg, Daily  pregabalin (LYRICA) capsule 150 mg, BID  sucralfate (CARAFATE) tablet 1 g, BID AC  tiZANidine (ZANAFLEX) tablet 4 mg, Q8H PRN  sodium chloride flush 0.9 % injection 10 mL, 2 times per day  sodium chloride flush 0.9 % injection 10 mL, PRN  enoxaparin (LOVENOX) injection 40 mg, Nightly  aspirin EC tablet 81 mg, Daily    Or  aspirin suppository 300 mg, Daily  perflutren lipid microspheres (DEFINITY) injection 1.65 mg, ONCE PRN  senna (SENOKOT) tablet 8.6 mg, Daily PRN  ondansetron (ZOFRAN-ODT) disintegrating tablet 4 mg, Q8H PRN    Or  ondansetron (ZOFRAN) injection 4 mg, Q6H PRN  rosuvastatin (CRESTOR) tablet 40 mg, Nightly  labetalol (NORMODYNE;TRANDATE) injection 10 mg, Q10 Min PRN  amLODIPine (NORVASC) tablet 5 mg, Daily      Objective:  BP (!) 167/84   Pulse 118   Temp 98.2 °F (36.8 °C) (Oral)   Resp 16   Ht 5' 7\" (1.702 m)   Wt 180 lb 11.2 oz (82 kg)   SpO2 97%   BMI 28.30 kg/m²     Intake/Output Summary (Last 24 hours) at 9/8/2020 0928  Last data filed at 9/8/2020 0004  Gross per 24 hour   Intake 480 ml   Output --   Net 480 ml      Wt Readings from Last 3 Encounters:   09/08/20 180 lb 11.2 oz (82 kg)   06/11/20 170 lb (77.1 kg)   03/22/20 186 lb 6 oz (84.5 kg)     General:  Awake, alert, oriented in NAD  Skin:  Warm and dry. No unusual bruising or rash  Neck:  Supple. No JVD appreciated  Chest:  Normal effort. Clear to auscultation  Cardiovascular:  Tachycardic, normal S1/S2, no murmur/gallop/rub  Abdomen:  Soft, nontender, +bowel sounds  Extremities:  No edema  Neurological: Weakness right leg  Psychological: Normal mood and affect      Labs and Tests:  CBC:   Recent Labs     09/07/20  0738   WBC 7.9   HGB 12.7        BMP:    Recent Labs     09/07/20  0738   *   K 4.2   CL 99   CO2 25   BUN 21*   CREATININE 0.7   GLUCOSE 103*     Hepatic:   Recent Labs     09/07/20  0738   AST 40*   ALT 51*   BILITOT <0.2   ALKPHOS 102          MRI CERVICAL SPINE WO CONTRAST 9/4/2020:   Final Result   Mild reversal cervical lordosis which is chronic.   There is no evidence for   disc herniation there is no evidence for acute neural foraminal effacement no   evidence for nerve root impingement. No evidence for cord impingement. No   signal abnormality in the cord substance         CT LUMBAR SPINE WO CONTRAST 9/2/2020:   Final Result   1. Negative CT of the lumbar spine. 2. Nonobstructing bilateral nephrolithiasis. MRI brain with and without contrast 9/1/2020:   Final Result   Unremarkable exam.  No acute ischemia         XR CHEST PORTABLE 8/31/2020:   Final Result   Unremarkable portable exam         CTA NECK W CONTRAST 8/31/2020:   Final Result   No acute abnormality or flow-limiting stenosis of the major arteries of the   head and neck. CTA HEAD W CONTRAST 8/31/2020:   Final Result   No acute abnormality or flow-limiting stenosis of the major arteries of the   head and neck. CT HEAD WO CONTRAST 8/31/2020:   Final Result   No acute intracranial abnormality. Stroke alert results were called by Dr. Anjali Ford. MD Sebastian to Abran Bolton   on 8/31/2020 at 13:51. Problem List  Principal Problem:    Focal motor deficit  Active Problems:    HTN (hypertension)    Fibromyalgia    Moderate episode of recurrent major depressive disorder (HCC)    Paresthesia of right arm and leg    Right sided weakness  Resolved Problems:    * No resolved hospital problems. *       Assessment & Plan:    1. Right lower extremity weakness. Cause unclear, workup thus far non revealing. CT head and MRI brain with no evidence CVA. MRI brain and C-spine are not consistent with transverse myelopathy, demyelination or MS. Awaiting psych consult over possible somatoform etiology. Neurology has signed off. Has been evaluated by PMR and considered candidate for ARU, precert required. 2. Hypertension. BP controlled. Continue amlodipine and hydrochlorothiazide    3. Depression/anxiety. Continue Cymbalta. Psych consult pending. 4. Fibromyalgia. Continue Lyrica and prn pain med. 5. Sinus tachycardia.  HR noted to go to 170s when ambulating in [place] with walker. Pt does report sensation of heart racing, mild dizziness and mild dyspnea. HR quickly recovers at rest, remains sinus rhythm. Cardiology to see. Diet: DIET GENERAL; No Added Salt (3-4 GM)  Code:Full Code  DVT PPX Lovenox    Disposition: ARU, precert pending.        LEESA Olea - CNP   9/8/2020 9:28 AM

## 2020-09-08 NOTE — PROGRESS NOTES
Nutrition Assessment     Type and Reason for Visit: Initial(LOS)    Nutrition Recommendations/Plan:   1. Continue diet    Nutrition Assessment:  Pt is nutritionally stable at this time. Pt has been eating well. PO % at meals while admitted. Pt was eating well pta. No n/v. Pt denies any needs at this time.     Malnutrition Assessment:  Malnutrition Status: No malnutrition    Nutrition Related Findings: RLE trace edema      Current Nutrition Therapies:    DIET GENERAL; No Added Salt (3-4 GM)    Anthropometric Measures:  · Height: 5' 7\" (170.2 cm)  · Current Body Wt: 180 lb (81.6 kg)   · BMI: 28.2    Nutrition Diagnosis:   No nutrition diagnosis at this time     Nutrition Interventions:   Food and/or Nutrient Delivery:  Continue Current Diet  Nutrition Education/Counseling:  No recommendation at this time   Coordination of Nutrition Care:  No recommendation at this time    Goals:  PO to remain 100% during admission       Nutrition Monitoring and Evaluation:   Food/Nutrient Intake Outcomes:  Food and Nutrient Intake  Physical Signs/Symptoms Outcomes:  Weight     Discharge Planning:    No discharge needs at this time     Electronically signed by Eric Ley RD, CNSC, LD on 9/8/20 at 11:14 AM EDT    Contact: [phone]

## 2020-09-08 NOTE — PROGRESS NOTES
[facility]   Cardiology Progress Note     Date: 9/8/2020  Admit Date: 8/31/2020       Chief Complaint:   Chief Complaint   Patient presents with    Extremity Weakness     Pt to ER with c/o right arm and leg numbness and weakness sinc 2200 last night, able to move arm with difficulty, decreased sensation. Dr guzman requested to bedside for eval.       History of Present Illness: History obtained from patient and medical record. Lenard Salinas is a 42 y.o. female admitted with a 12 hour hx of right sided paresthesia with weakness.  Has a  Hx of possible pericarditis in the past though no hx of known CAD. Denies exertional chest discomfort, change in exercise tolerance, palpitations or syncope.  She does have a hx of chronic GAXIOLA though is active and has not noted a major change in her functional capacity.  A Lexiscan Myoview test 2/2017 was normal.  Her Cerebral MRI, ECG and Echo are normal.    Neuro eval: MRI brain and C-spine are not consistent with transverse myelopathy, demyelination or MS. Bubble echo: neg for shunt. Interval Hx: Today, she is reports some feeling of her heart racing, dizziness, and dyspnea on exertion. HR increased to 150s with exertion today, primary asked for pt to be reassessed by cardiology today. Pt denies any CP on exertion. Patient seen and examined. Clinical notes reviewed. Telemetry reviewed. No major events overnight. Denies having chest pain, orthopnea/PND, or cough at the time of this visit. Allergies: Allergies   Allergen Reactions    Ambien [Zolpidem Tartrate] Shortness Of Breath     Tongue swelling    Sudafed [Pseudoephedrine Hcl] Shortness Of Breath    Amitriptyline Hcl Other (See Comments)     Increased sedation and no relief of headache. Home Meds:  Prior to Visit Medications    Medication Sig Taking?  Authorizing Provider   tiZANidine (ZANAFLEX) 6 MG capsule TAKE ONE CAPSULE BY MOUTH THREE TIMES A DAY AT LEAST EIGHT HOURS APART Yes Anselmo Huff Werneke, APRN - CNP   clonazePAM (KLONOPIN) 0.5 MG tablet Take 1 tablet by mouth nightly as needed for Anxiety for up to 30 days. Yes Rachelle Caal MD   ondansetron (ZOFRAN) 4 MG tablet Take 1 tablet by mouth daily as needed for Nausea or Vomiting Yes Rachelle Caal MD   pantoprazole (PROTONIX) 20 MG tablet Take 1 tablet by mouth 2 times daily Yes Rachelle Caal MD   benzonatate (TESSALON PERLES) 100 MG capsule Take 1-2 capsules by mouth 3 times daily as needed for Cough Yes Rachelle Caal MD   beclomethasone (QVAR REDIHALER) 40 MCG/ACT AERB inhaler Inhale 1 puff into the lungs 2 times daily Yes Rachelle Caal MD   levonorgestrel-ethinyl estradiol (SEASONALE) 0.15-0.03 MG per tablet Take 1 tablet by mouth daily Yes Rachelle Caal MD   triamcinolone (KENALOG) 0.025 % LOTN lotion Apply topically 2 times daily Yes Rachelle Caal MD   albuterol sulfate  (90 Base) MCG/ACT inhaler Inhale 2 puffs into the lungs 4 times daily as needed for Wheezing Yes Rachelle Caal MD   hydrochlorothiazide (HYDRODIURIL) 12.5 MG tablet Take 1 tablet by mouth daily Yes Rachelle Caal MD   hydrOXYzine (ATARAX) 25 MG tablet Take 25 mg by mouth 2 times daily Yes Dasia Monroe MD   pregabalin (LYRICA) 150 MG capsule Take 1 capsule by mouth 2 times daily for 30 days. Take one in am with breakfast and one at dinner time. Yes LEESA Roach CNP   DULoxetine (CYMBALTA) 60 MG extended release capsule Take 1 capsule by mouth daily Yes LEESA Roach CNP   sucralfate (CARAFATE) 1 GM tablet Take 1 tablet by mouth 2 times daily Yes LEESA Roach CNP   Handicap Placard MISC by Does not apply route Unable able to walk long distance due to medical condition. Expires in 5 years.  Yes LEESA Roach CNP   ferrous sulfate 325 (65 Fe) MG tablet Take 1 tablet by mouth 2 times daily Yes Gael Garcia MD   Spacer/Aero-Holding Chambers (AEROCHAMBER PLUS NAVDEEP-VU Dolly Tobar) MISC 1 Device by Does not apply route 2 times daily  LEESA Patel CNP   EPINEPHrine (EPIPEN 2-LUANNE) 0.3 MG/0.3ML SOAJ injection Inject 0.3 mLs into the muscle once for 1 dose Use as directed for allergic reaction  Dipesh Mcpherson PA-C      Scheduled Meds:   hydrALAZINE  10 mg Intravenous Once    budesonide  0.25 mg Nebulization BID    DULoxetine  60 mg Oral Daily    ferrous sulfate  325 mg Oral BID    hydroCHLOROthiazide  12.5 mg Oral Daily    pantoprazole  40 mg Oral Daily    pregabalin  150 mg Oral BID    sucralfate  1 g Oral BID AC    sodium chloride flush  10 mL Intravenous 2 times per day    enoxaparin  40 mg Subcutaneous Nightly    aspirin  81 mg Oral Daily    Or    aspirin  300 mg Rectal Daily    rosuvastatin  40 mg Oral Nightly    amLODIPine  5 mg Oral Daily     Continuous Infusions:  PRN Meds:diphenhydrAMINE, oxyCODONE, SUMAtriptan, albuterol, clonazePAM, tiZANidine, sodium chloride flush, perflutren lipid microspheres, senna, ondansetron **OR** ondansetron, labetalol     Past Medical History:  Past Medical History:   Diagnosis Date    Asthma     Constipation 2014    Fibromyalgia     Graves disease     History of blood transfusion     Hx of blood clots      LT LUNG W/ PNEUMONIA    Hypertension     Joint pain 2014    Various joints (back, hands, knees, hips), recurrent flares since age 27, RF+ at one point, never fully worked up for rheumatologic dz yet    Kidney stone     Localized rash 2014    Recurrent, on inner surface of upper arms, q 3 months, sometimes on chest, no facial involvement    Lupus ([address]     MDRO (multiple drug resistant organisms) resistance     PONV (postoperative nausea and vomiting)         Past Surgical History:    has a past surgical history that includes Tubal ligation;  section; Tonsillectomy; other surgical history (2016); and Cystoscopy (2018). Social History:  Reviewed. reports that she has never smoked.  She has never used smokeless tobacco. She reports current alcohol use. She reports that she does not use drugs. Family History:  Reviewed. family history includes Cancer in her maternal grandmother. Denies family history of sudden cardiac death, arrhythmia, premature CAD    Review of Systems:  · Constitutional: Negative for fever, night sweats, chills, weight changes, or weakness  · Skin: Negative for rash, dry skin, pruritus, bleeding, blood clots, changes in skin pigment, or bruising    · HEENT: Negative for vision changes, ringing in the ears, dysphagia, or swollen lymph nodes  · Respiratory: Reviewed in HPI  · Cardiovascular: Reviewed in HPI  · Gastrointestinal: Negative for abdominal pain, N/V/D, constipation, or black/tarry stools  · Genito-Urinary: Negative for dysuria, incontinence, or hematuria  · Musculoskeletal: Negative for joint swelling, muscle pain, or injuries  · Neurological/Psych: Negative for confusion, seizures, headaches, balance issues or TIA-like symptoms. No anxiety, depression, or insomnia    Physical Examination:  Vitals:    09/08/20 0835   BP:    Pulse:    Resp: 16   Temp:    SpO2: 97%      In: 480 [P.O.:480]  Out: -    Wt Readings from Last 3 Encounters:   09/08/20 180 lb 11.2 oz (82 kg)   06/11/20 170 lb (77.1 kg)   03/22/20 186 lb 6 oz (84.5 kg)       Intake/Output Summary (Last 24 hours) at 9/8/2020 0953  Last data filed at 9/8/2020 0004  Gross per 24 hour   Intake 480 ml   Output --   Net 480 ml       Telemetry: Personally Reviewed  - Sinus rhythm - ST   · Constitutional: Cooperative and in no apparent distress, and appears well nourished  · Skin: Warm and pink; no pallor, cyanosis, clubbing, or bruising   · HEENT: Symmetric and normocephalic. PERRL, EOM intact. Conjunctiva pink with clear sclera. Mucus membranes pink and moist.   · Cardiovascular: Regular rate and rhythm. S1/S2 present without murmurs, no rubs or gallops. Peripheral pulses 2+, capillary refill < 3 seconds. No elevation of JVP.  No peripheral edema  · Respiratory: Respirations symmetric and unlabored. Lungs clear to auscultation bilaterally, no wheezing, crackles, or rhonchi  · Gastrointestinal: Abdomen soft and round. Bowel sounds normoactive without tenderness or masses. · Musculoskeletal: Bilateral upper and lower extremity strength with R weakness   · Neurologic/Psych: Awake and orientated to person, place and time. Calm affect, appropriate mood    Pertinent labs, diagnostic, device, and imaging results reviewed as a part of this visit    Labs:    BMP:   Recent Labs     20  0738   *   K 4.2   CL 99   CO2 25   BUN 21*   CREATININE 0.7     Estimated Creatinine Clearance: 121 mL/min (based on SCr of 0.7 mg/dL). CBC:   Recent Labs     20  0738   WBC 7.9   HGB 12.7   HCT 39.8   MCV 76.6*        Thyroid:   Lab Results   Component Value Date    TSH 0.58 2020    W9TPRMB 1.27 2020    G8TEJDG 8.7 2020     Lipids:   Lab Results   Component Value Date    CHOL 150 2020    HDL 38 2020    TRIG 122 2020     LFTS:   Lab Results   Component Value Date    ALT 51 2020    AST 40 2020    ALKPHOS 102 2020    PROT 7.9 2020    AGRATIO 1.2 2020    BILITOT <0.2 2020     Cardiac Enzymes:   Lab Results   Component Value Date    CKTOTAL 155 2015    TROPONINI 0.00 2017    TROPONINI 0.00 2017     Coags:   Lab Results   Component Value Date    PROTIME 11.7 2020    INR 1.01 2020     EC/3/20  Normal sinus rhythm  Normal ECG  When compared with ECG of 31-AUG-2020 13:26,  No significant change was found    ECHO:  20  Summary   -Normal left ventricle size, borderline wall thickness and hyperdynamic   systolic function with an estimated ejection fraction of 65-70%.   -No regional wall motion abnormalities are seen.   -Normal diastolic function. E/e\"=7.4   -Trivial mitral and tricuspid regurgitation.   -Estimated pulmonary artery systolic pressure is normal at 26 mmHg assuming   a right atrial pressure of 3 mmHg.   -A bubble study was performed and fails to show evidence of shunting. GXT: 2/2017   Impression:   No new ECG changes to suggest ischemia, but nondiagnostic Lexiscan due to   inadequate heart rate. CXR 8/31/20  FINDINGS:    The heart and pulmonary vascularity are within normal limits.  There are no    focal areas of consolidation or pleural effusion. The osseous structures are    intact.           Problem List:   Patient Active Problem List    Diagnosis Date Noted    Right sided weakness     Paresthesia of right arm and leg 08/31/2020    Focal motor deficit 08/31/2020    Migraine with aura and with status migrainosus, not intractable 09/28/2019    Kidney stone 09/28/2019    Insomnia due to other mental disorder 09/28/2019    Mild intermittent asthma without complication 29/03/1613    Moderate episode of recurrent major depressive disorder ([facility] [address] 09/28/2019    PTSD (post-traumatic stress disorder) 09/28/2019    Nephroureterolithiasis 07/31/2018    Hyperthyroidism 09/30/2016    Fibromyalgia 04/28/2016    Myalgia 03/25/2015    HTN (hypertension) 11/27/2014    Yeast vaginitis 11/27/2014    Joint pain 01/13/2014    Constipation 01/13/2014    Localized rash 01/13/2014        Assessment and Plan:     Right sided weakness  ~ etiology unclear   ~ seen/eval by neuro : unremarkable findings   ~ bubble echo neg for shunt  ~ working with PT/OT : rehab consulted  ~ awaiting psych c/s for possible somatoform etiology per IM     Shortness of Breath   ~ persistent   ~ Echo 9/1/20: hyperdynamic systolic function, EF 50-92%  ~ CXR unremarkable   ~ hx of asthma   ~ Lexiscan myoview as OP      Hypertension   ~ stable   ~ norvasc/ HCTZ    Tachycardia   ~ HR up to 150s with ambulation this AM. Rhythm regular with clear P waves seen on tele strips. Associated symptoms of heart racing, dizziness, and dyspnea.  There was one strip on Tele showing rate of 170s but there was a lot of artifact on that strip. Recovers with rest after about 15 min per pt. She reports walking requires a lot of effort as she has to drag her R leg d/t weakness. Pt states she is pretty active with her kids PTA. When her son has practice she walks labs around the track and reports associated feelings of racing heart. She does have hx of Graves disease and asthma. Baseline resting HR has been .  ~ plan for 30 day MCOT at d/c to r/o PAF     Will check Magnesium, TSH, BMP, ddimer, and orthostatic BP. Do not recommend starting beta blocker with hx of asthma. Can consider diltiazem if it this persists. Will continue to monitor rate and rhythm at this point. Multiple medical conditions with risk of decompensation. All pertinent information and plan of care discussed with the physician. All questions and concerns were addressed to the patient. Alternatives to my treatment were discussed. I have discussed the above stated plan with patient and the nurse. The family verbalized understanding and agreed with the plan. Thank you for allowing to us to participate in the care of [facility].     Cyn LANDERS-MARTIN  [address]   Office: [phone]

## 2020-09-08 NOTE — PLAN OF CARE
Problem: Pain:  Goal: Pain level will decrease  Description: Pain level will decrease  9/8/2020 0914 by Foreign Lim RN  Outcome: Ongoing  Note: Pt medicated with oxycodone for level 9/10 chronic generalized pain. Will reassess     Problem: Falls - Risk of:  Goal: Will remain free from falls  Description: Will remain free from falls  9/8/2020 0914 by Foreign Lim RN  Outcome: Ongoing  Note: Pt high fall risk, bed alarm on.  Instructed pt to call nurse prior to ambulation for assistance      Problem: ACTIVITY INTOLERANCE/IMPAIRED MOBILITY  Goal: Mobility/activity is maintained at optimum level for patient  9/8/2020 0914 by Foreign Lim RN  Outcome: Ongoing  Note: Pt ambulates with walker and standby assist.

## 2020-09-09 ENCOUNTER — TELEPHONE (OUTPATIENT)
Dept: CARDIOLOGY CLINIC | Age: 40
End: 2020-09-09

## 2020-09-09 VITALS
SYSTOLIC BLOOD PRESSURE: 105 MMHG | TEMPERATURE: 97.6 F | HEIGHT: 67 IN | WEIGHT: 180.5 LBS | DIASTOLIC BLOOD PRESSURE: 79 MMHG | RESPIRATION RATE: 16 BRPM | BODY MASS INDEX: 28.33 KG/M2 | HEART RATE: 101 BPM | OXYGEN SATURATION: 99 %

## 2020-09-09 PROCEDURE — G0378 HOSPITAL OBSERVATION PER HR: HCPCS

## 2020-09-09 PROCEDURE — 6370000000 HC RX 637 (ALT 250 FOR IP): Performed by: INTERNAL MEDICINE

## 2020-09-09 PROCEDURE — 6360000002 HC RX W HCPCS: Performed by: HOSPITALIST

## 2020-09-09 PROCEDURE — 94640 AIRWAY INHALATION TREATMENT: CPT

## 2020-09-09 PROCEDURE — 99215 OFFICE O/P EST HI 40 MIN: CPT | Performed by: NURSE PRACTITIONER

## 2020-09-09 PROCEDURE — 97535 SELF CARE MNGMENT TRAINING: CPT

## 2020-09-09 PROCEDURE — 6370000000 HC RX 637 (ALT 250 FOR IP): Performed by: PSYCHIATRY & NEUROLOGY

## 2020-09-09 PROCEDURE — 97116 GAIT TRAINING THERAPY: CPT

## 2020-09-09 PROCEDURE — 93228 REMOTE 30 DAY ECG REV/REPORT: CPT | Performed by: INTERNAL MEDICINE

## 2020-09-09 PROCEDURE — 94761 N-INVAS EAR/PLS OXIMETRY MLT: CPT

## 2020-09-09 PROCEDURE — 97530 THERAPEUTIC ACTIVITIES: CPT

## 2020-09-09 PROCEDURE — 6370000000 HC RX 637 (ALT 250 FOR IP): Performed by: HOSPITALIST

## 2020-09-09 RX ORDER — AMLODIPINE BESYLATE 5 MG/1
5 TABLET ORAL DAILY
Qty: 30 TABLET | Refills: 1 | Status: SHIPPED | OUTPATIENT
Start: 2020-09-10 | End: 2020-11-24

## 2020-09-09 RX ORDER — BUSPIRONE HYDROCHLORIDE 5 MG/1
5 TABLET ORAL 2 TIMES DAILY
Qty: 60 TABLET | Refills: 0 | Status: SHIPPED | OUTPATIENT
Start: 2020-09-09 | End: 2020-10-13 | Stop reason: SDUPTHER

## 2020-09-09 RX ORDER — DULOXETIN HYDROCHLORIDE 60 MG/1
60 CAPSULE, DELAYED RELEASE ORAL DAILY
Qty: 30 CAPSULE | Refills: 0 | Status: SHIPPED | OUTPATIENT
Start: 2020-09-10 | End: 2022-09-14 | Stop reason: SDUPTHER

## 2020-09-09 RX ADMIN — TIZANIDINE 4 MG: 4 TABLET ORAL at 08:26

## 2020-09-09 RX ADMIN — BUSPIRONE HYDROCHLORIDE 5 MG: 5 TABLET ORAL at 08:27

## 2020-09-09 RX ADMIN — DULOXETINE HYDROCHLORIDE 60 MG: 60 CAPSULE, DELAYED RELEASE ORAL at 08:26

## 2020-09-09 RX ADMIN — ASPIRIN 81 MG: 81 TABLET, COATED ORAL at 08:26

## 2020-09-09 RX ADMIN — DIPHENHYDRAMINE HCL 25 MG: 25 TABLET ORAL at 12:33

## 2020-09-09 RX ADMIN — BUDESONIDE 250 MCG: 0.5 SUSPENSION RESPIRATORY (INHALATION) at 07:54

## 2020-09-09 RX ADMIN — AMLODIPINE BESYLATE 5 MG: 5 TABLET ORAL at 08:26

## 2020-09-09 RX ADMIN — PANTOPRAZOLE SODIUM 40 MG: 40 TABLET, DELAYED RELEASE ORAL at 05:29

## 2020-09-09 RX ADMIN — FERROUS SULFATE TAB 325 MG (65 MG ELEMENTAL FE) 325 MG: 325 (65 FE) TAB at 08:26

## 2020-09-09 RX ADMIN — PREGABALIN 150 MG: 75 CAPSULE ORAL at 08:27

## 2020-09-09 RX ADMIN — HYDROCHLOROTHIAZIDE 12.5 MG: 25 TABLET ORAL at 08:26

## 2020-09-09 RX ADMIN — OXYCODONE 5 MG: 5 TABLET ORAL at 05:28

## 2020-09-09 ASSESSMENT — PAIN DESCRIPTION - DESCRIPTORS
DESCRIPTORS: SHARP;SPASM
DESCRIPTORS: SPASM

## 2020-09-09 ASSESSMENT — PAIN DESCRIPTION - FREQUENCY: FREQUENCY: CONTINUOUS

## 2020-09-09 ASSESSMENT — PAIN DESCRIPTION - LOCATION
LOCATION: BACK
LOCATION: BACK

## 2020-09-09 ASSESSMENT — PAIN DESCRIPTION - ONSET: ONSET: ON-GOING

## 2020-09-09 ASSESSMENT — PAIN DESCRIPTION - ORIENTATION: ORIENTATION: MID;LOWER

## 2020-09-09 ASSESSMENT — PAIN - FUNCTIONAL ASSESSMENT: PAIN_FUNCTIONAL_ASSESSMENT: ACTIVITIES ARE NOT PREVENTED

## 2020-09-09 ASSESSMENT — PAIN DESCRIPTION - PROGRESSION: CLINICAL_PROGRESSION: GRADUALLY WORSENING

## 2020-09-09 ASSESSMENT — PAIN SCALES - GENERAL
PAINLEVEL_OUTOF10: 10
PAINLEVEL_OUTOF10: 8
PAINLEVEL_OUTOF10: 10

## 2020-09-09 ASSESSMENT — PAIN DESCRIPTION - PAIN TYPE
TYPE: CHRONIC PAIN
TYPE: CHRONIC PAIN

## 2020-09-09 NOTE — PROGRESS NOTES
functional mobility, ADL/IADL status, balance, sensation, activity tolerance related to focal motor deficit. Prognosis: Good  Decision Making: Medium Complexity  OT Education: OT Role;Plan of Care;Precautions; ADL Adaptive Strategies;Transfer Training;Energy Conservation;Equipment  Patient Education: Pt verbalized and demo'd understanding  REQUIRES OT FOLLOW UP: Yes  Activity Tolerance  Activity Tolerance: Patient Tolerated treatment well;Patient limited by fatigue  Safety Devices  Safety Devices in place: Yes  Type of devices: All fall risk precautions in place;Call light within reach; Bed alarm in place;Gait belt;Patient at risk for falls; Left in bed;Nurse notified         Patient Diagnosis(es): The primary encounter diagnosis was Right sided weakness. A diagnosis of Gastritis without bleeding, unspecified chronicity, unspecified gastritis type was also pertinent to this visit. has a past medical history of Asthma, Constipation, Fibromyalgia, Graves disease, History of blood transfusion, Hx of blood clots, Hypertension, Joint pain, Kidney stone, Localized rash, Lupus ([address] MDRO (multiple drug resistant organisms) resistance, and PONV (postoperative nausea and vomiting). has a past surgical history that includes Tubal ligation;  section; Tonsillectomy; other surgical history (2016); and Cystoscopy (2018). Restrictions  Restrictions/Precautions  Restrictions/Precautions: Fall Risk(high fall risk)  Required Braces or Orthoses?: No  Position Activity Restriction  Other position/activity restrictions: Kaci Duong is a 42 y.o. female who presented to the ED concerned that she was having a stroke. The patient presents with headache, right-sided weakness, and paresthesia ongoing for greater than 12 hours. The patient does endorse being under a great deal of stress related to home schooling, full-time employment, and PTSD related to murder of family member.   She has known hypertension and reports that her BP has been quite elevated resulting in recurrent epistaxis lately. Labs were obtained and essentially unremarkable. EKG as well as neuro imaging failed to reveal any acute process. Hospitalist team consulted to admit this patient for possible CVA. MRI negative, CT of lumbar spine negative. Subjective   General  Chart Reviewed: Yes  Patient assessed for rehabilitation services?: Yes  Additional Pertinent Hx: HTN, blood clots, lupus, fibromyalgia  Response to previous treatment: Patient with no complaints from previous session  Family / Caregiver Present: No  Diagnosis: focal motor deficit (MRI negative)  Subjective  Subjective: Pt lying supine in bed upon arrival and lethargic but agreeable to therapy session. Reporting pain in back, did not rate numerically, stated pain medications were administered prior to arrival. RN aware to administer medications accordingly. Orientation  Orientation  Overall Orientation Status: Within Normal Limits  Objective    ADL  Grooming: Setup;Supervision(washed face in a seated position)  UE Bathing: Setup;Stand by assistance  LE Bathing: Stand by assistance;Setup  UE Dressing: Setup;Stand by assistance  LE Dressing: Setup;Contact guard assistance(CGA to perform LB clothing management over hips once in stance)  Toileting: None  Additional Comments: Pt performed functional mobility to restroom and transferred to shower bench in walk in shower and completed the above shower level ADLs at the above assist levels. Balance  Sitting Balance: Supervision  Standing Balance: Contact guard assistance(progressing to SBA-CGA when fatigued)  Standing Balance  Time: ~20 minutes  Activity: functional mobiilty to/from restroom and in hallway, stance for ADL completion, functional transfers, navigating stairs  Comment: with use of rw, no LOB  Functional Mobility  Functional - Mobility Device: Rolling Walker  Activity: To/from bathroom; Other  Assist Level: Contact guard assistance  Functional Mobility Comments: increased time to complete, heavily WBing throughout b UEs, dragging R LE throughout--navigating stairs with PT during session d/t fatigue and R LE weakness, patient completed flight of stairs ascending and descending stairs HHA on the R, patient ambulated in hallway ~100' with increased time and 2 seated rest breaks in hallway, patient reporting \"my heart rate is very fast right now. \" able to recover with deep breathing   Shower Transfers  Shower - Transfer From: Hugh Laguna - Transfer Type: To and From  Shower - Transfer To: (shower bench)  Shower Transfers: Contact Guard  Shower Transfers Comments: use of grab bars, improved awareness of hand placement and sequence  Bed mobility  Supine to Sit: Modified independent  Sit to Supine: Modified independent  Scooting: Modified independent  Transfers  Sit to stand: Contact guard assistance  Stand to sit: Contact guard assistance  Transfer Comments: demo'd decreased eccentric control     Cognition  Overall Cognitive Status: WFL     Perception  Overall Perceptual Status: WFL        Plan   Plan  Times per week: 3-5x  Times per day: Daily  Current Treatment Recommendations: Functional Mobility Training, Safety Education & Training, Self-Care / ADL, Equipment Evaluation, Education, & procurement, Patient/Caregiver Education & Training, Home Management Training    AM-PAC Score        AM-PAC Inpatient Daily Activity Raw Score: 19 (09/09/20 1534)  AM-PAC Inpatient ADL T-Scale Score : 40.22 (09/09/20 1534)  ADL Inpatient CMS 0-100% Score: 42.8 (09/09/20 1534)  ADL Inpatient CMS G-Code Modifier : CK (09/09/20 1534)    Goals  Short term goals  Time Frame for Short term goals: Discharge  Short term goal 1: Mod I for all UB ADLs. - set up/SBA 9/9  Short term goal 2: Mod I for all LB ADLs.  - set up/SBA 9/9  Short term goal 3: Mod I for functional mobility. - CGA progressing to SBA 9/9  Short term goal 4: Mod I for functional transfers. - CGA progressing to SBA 9/9  Long term goals  Time Frame for Long term goals : STG=LTG  Patient Goals   Patient goals :  To return home with family and sisters       Therapy Time   Individual Concurrent Group Co-treatment   Time In 25 [id number]        Time Out 1        Minutes 76            Alexandro Reed 92 with Physical Therapy ([id number]) to assess stairs d/t pt's increased anxiety navigating stairs    Timed Code Treatment Minutes:  68 Minutes  Total Treatment Minutes:  [address]

## 2020-09-09 NOTE — PROGRESS NOTES
Physical Therapy  Facility/Department: [address]  Daily Treatment Note  NAME: Rommel English  : 1980  MRN: [id number]    Date of Service: 2020    Discharge Recommendations:Greyson Schilling scored a 18/24 on the AM-PAC short mobility form. Current research shows that an AM-PAC score of 18 or greater is typically associated with a discharge to the patient's home setting. Based on the patient's AM-PAC score and their current functional mobility deficits, it is recommended that the patient have 2-3 sessions per week of Physical Therapy at d/c to increase the patient's independence. At this time, this patient demonstrates the endurance and safety to discharge home with home PT and a follow up treatment frequency of 2-3x/wk. Please see assessment section for further patient specific details. REcommend 24 hr assist PRN initially. If patient discharges prior to next session this note will serve as a discharge summary. Please see below for the latest assessment towards goals. HOME HEALTH CARE: LEVEL 1 STANDARD    - Initial home health evaluation to occur within 24-48 hours, in patient home   - Therapy to evaluate with goal of regaining prior level of functioning   - Therapy to evaluate if patient has [address] needs for personal care  2-3 sessions per week   PT Equipment Recommendations  Equipment Needed: Yes  Walker: Rolling    Assessment   Body structures, Functions, Activity limitations: Decreased functional mobility ; Decreased ADL status; Decreased ROM; Decreased strength;Decreased endurance;Decreased sensation;Decreased balance  Assessment: SBA amb with RW, CGA on steps. Moving very slowly and anxious on  Steps but did well. Pt improving in gait, but is still below baseline. Pt limited in RLE function causing pt to ambulate with increased time and heavy reliance in BUE through RW. Pt would benefit from skilled PT to return to baseline.   Prognosis: Good  Decision Making: Low negative. Subjective   General  Chart Reviewed: Yes  Response To Previous Treatment: Patient with no complaints from previous session. Family / Caregiver Present: Yes(OT for cotx on stairs)  Subjective  Subjective: Pt wanting to try steps before [address] home later today. General Comment  Comments: Pt seated in recliner chair upon arrival. Pt did not report pain during treatment. Pt just finished long walk with OT. Cotx for stair portion of walk due to pt anxious about trying steps. Orientation  Orientation  Overall Orientation Status: Within Normal Limits  Cognition      Objective   Bed mobility  Comment: Pt in chair and left on shower bench with OT present. Transfers  Sit to Stand: Stand by assistance(from chair, WC 2 times, and shower bench.)  Stand to sit: Stand by assistance  Ambulation  Ambulation?: Yes  Ambulation 1  Surface: level tile  Device: Rolling Walker  Other Apparatus: (WC ride to stairs)  Assistance: Stand by assistance  Quality of Gait: difficulty advancing R LE, lack of DF  Gait Deviations: Slow Camille;Decreased step height  Distance: Pt amb 10 ft x 4 with RW and SBA. Comments: Pt amb to WC, rode to steps, amb to stairs and then back to WC. Pt rode back to room in [facility]. Stairs/Curb  Stairs?: Yes(Pt amb up/down 12 steps with L rail ascending, HHA on R. Pt amb 1 step at a time, leading with L LE up steps and R LE down steps.)  Stairs  Comment: Pt instructed in sequencing for stairs. Higher step needed on R LE to clear foot due to footdrop. Pt reports family will be able to assist as needed with stairclimbing. Pt taking several standing rest breaks. More anxious with going down steps but tolerated well with additional time needed to complete task. Balance  Posture: Good  Sitting - Static: Good  Sitting - Dynamic: Good  Standing - Static: Fair;+(with RW)  Standing - Dynamic: Fair;+(with RW)            Comment: Stair training, Discussed DC recommendations.  Pt verbalized good understanding              G-Code     OutComes Score                                                     AM-PAC Score  AM-PAC Inpatient Mobility Raw Score : 18 (09/09/20 1528)  AM-PAC Inpatient T-Scale Score : 43.63 (09/09/20 1528)  Mobility Inpatient CMS 0-100% Score: 46.58 (09/09/20 1528)  Mobility Inpatient CMS G-Code Modifier : CK (09/09/20 1528)          Goals  Short term goals  Time Frame for Short term goals:  To be met prior to discharge  Short term goal 1: Pt will complete bed mobility with mod I--MET 9/5/20  Short term goal 2: Pt will complete sit to/from stand with mod I--MET 9/5/20  Short term goal 3: Pt will ambulate 100 ft with LRAD and mod I  Short term goal 4: Pt will navigate up/down 6 steps with HR and CGA - Goal met 9/9    Plan    Plan  Times per week: 5-7x  Times per day: Daily  Current Treatment Recommendations: Strengthening, ROM, Balance Training, Functional Mobility Training, Transfer Training, Endurance Training, Gait Training, Stair training, Safety Education & Training, Patient/Caregiver Education & Training  Safety Devices  Type of devices: Gait belt(Pt left with  OT doing shower.)  Restraints  Initially in place: No     Therapy Time   Individual Concurrent Group Co-treatment   Time In       1443   Time Out       1507   Minutes       [address] [facility], [place] [address]

## 2020-09-09 NOTE — PROGRESS NOTES
Pt d/c'd with sister. Notified [facility] and returned and cleaned tele box. Reviewed d/c instructions, home meds, and  f/u information utilizing teach-back method. Pt discharged with all belongings. Three scripts filled outpatient and sent with pt. Patient verbalized understanding of holter monitor and HHC plans. Denies further questions at discharge.

## 2020-09-09 NOTE — DISCHARGE SUMMARY
[facility]    Patient Demographics    Patient. Lenard Salinas  Date of Birth. 1980  MRN. [id number]     Primary care provider. LEESA Glez CNP  (Tel: [phone])    Admit date: 8/31/2020    Discharge date (blank if same as Note Date): Note Date: 9/9/2020     Reason for Hospitalization. Chief Complaint   Patient presents with    Extremity Weakness     Pt to ER with c/o right arm and leg numbness and weakness sinc 2200 last night, able to move arm with difficulty, decreased sensation. Dr guzman requested to bedside for eval.         Significant Findings. Principal Problem:    Focal motor deficit  Active Problems:    HTN (hypertension)    Fibromyalgia    Moderate episode of recurrent major depressive disorder (HCC)    Paresthesia of right arm and leg    Right sided weakness    Generalized anxiety disorder    Depression  Resolved Problems:    * No resolved hospital problems. *       Problems and results from this hospitalization that need follow up. 1. Outpatient stress test. Cardiology to arrange. Significant test results and incidental findings. MRI CERVICAL SPINE WO CONTRAST 9/4/2020:   Final Result   Mild reversal cervical lordosis which is chronic. There is no evidence for   disc herniation there is no evidence for acute neural foraminal effacement no   evidence for nerve root impingement. No evidence for cord impingement. No   signal abnormality in the cord substance           CT LUMBAR SPINE WO CONTRAST 9/2/2020:   Final Result   1. Negative CT of the lumbar spine.    2. Nonobstructing bilateral nephrolithiasis.           MRI brain with and without contrast 9/1/2020:   Final Result   Unremarkable exam.  No acute ischemia           XR CHEST PORTABLE 8/31/2020:   Final Result   Unremarkable portable exam           CTA NECK W CONTRAST 8/31/2020:   Final Result No acute abnormality or flow-limiting stenosis of the major arteries of the   head and neck.           CTA HEAD W CONTRAST 8/31/2020:   Final Result   No acute abnormality or flow-limiting stenosis of the major arteries of the   head and neck.           CT HEAD WO CONTRAST 8/31/2020:   Final Result   No acute intracranial abnormality.       Stroke alert results were called by Dr. Tom Justin. MD Sebastian to [facility]   on 8/31/2020 at 13:51. Invasive procedures and treatments. 1. None     Problem-based Hospital Course. Virgilio Moctezuma a 42 y. o. female who has history of fibromyalgia, on medical marijuana. She presented to the ED with  headache, right-sided weakness, and paresthesia ongoing for greater than 12 hours.  The patient does endorse being under a great deal of stress related to home schooling, full-time employment, and PTSD related to murder of family member. MRI of cervical spine and MRI brain is unremarkable, not consistent with transverse myelopathy, demyelination or MS. There are concerns of somatoform, conversion disorder, psych consult obtained. 1. Right lower extremity weakness. Cause unclear, workup thus far non revealing. CT head and MRI brain with no evidence CVA. MRI brain and C-spine are not consistent with transverse myelopathy, demyelination or MS. Patient was evaluated by psychiatry who believed that conversion disorder was a strong possibility as cause of her symptoms. It was suspected she would likely improve with PT/OT and psychotherapy. Patient was provided with outpatient counseling resources. Buspar was added. Patient was evaluated and considered apprppriate candidate for ARU, psychiatry believed ARU would be more successful vs home PT/OT. Unfortunatelty insurance denied ARU stay and patient was discharged home with home PT/OT.       2. Hypertension. BP controlled. Continued amlodipine and hydrochlorothiazide     3. Depression/anxiety.  Continued Cymbalta, Buspar was added. She has been provided with outpatient counseling services.       4. Fibromyalgia. Continued Lyrica.       5. Sinus tachycardia. HR noted to go to 150s with ambulation, baseline 80s-90s. Cardiology evaluated, plan for MCOT on DC to r/o PAF, outpatient stress test also planned, cardiology office to arrange. Consults. IP CONSULT TO STROKE TEAM  IP CONSULT TO HOSPITALIST  IP CONSULT TO NEUROLOGY  IP CONSULT TO CARDIOLOGY  IP CONSULT TO PHYSICAL MEDICINE REHAB  IP CONSULT TO PSYCHIATRY    Physical examination on discharge day. /79   Pulse 101   Temp 97.6 °F (36.4 °C) (Oral)   Resp 16   Ht 5' 7\" (1.702 m)   Wt 180 lb 8 oz (81.9 kg)   SpO2 99%   BMI 28.27 kg/m²   General appearance. Alert. Looks comfortable. HEENT. Sclera clear. Moist mucus membranes. Cardiovascular. Regular rate and rhythm, normal S1, S2. No murmur. Respiratory. Not using accessory muscles. Clear to auscultation bilaterally, no wheeze. Gastrointestinal. Abdomen soft, non-tender, not distended, normal bowel sounds  Neurology. Facial symmetry. No speech deficits. Weakness RLE. Extremities. No edema in lower extremities. Skin. Warm, dry, normal turgor    Condition at time of discharge stable    Medication instructions provided to patient at discharge. Medication List      START taking these medications    amLODIPine 5 MG tablet  Commonly known as:  NORVASC  Take 1 tablet by mouth daily  Notes to patient:  Amlodipine (Norvasc*)  Use: lower blood pressure  Side effects: dizziness, headache, and constipation. busPIRone 5 MG tablet  Commonly known as:  BUSPAR  Take 1 tablet by mouth 2 times daily  Notes to patient:  For anxiety  Side effects: headache; dizziness, drowsiness; sleep problems (insomnia);nausea, upset stomach; or feeling nervous or excited.         CONTINUE taking these medications    albuterol sulfate  (90 Base) MCG/ACT inhaler  Inhale 2 puffs into the lungs 4 times daily as needed for reduces stomach acid and protect the digestive tract  Side effects: headache or diarrhea     pregabalin 150 MG capsule  Commonly known as:  LYRICA  Take 1 capsule by mouth 2 times daily for 30 days. Take one in am with breakfast and one at dinner time. Notes to patient:  Lyrica (Pregabalin)  Uses: nerve pain, seizures  Side Effects: swelling, sleepiness, blurry vision, weight gain\     sucralfate 1 GM tablet  Commonly known as:  Carafate  Take 1 tablet by mouth 2 times daily  Notes to patient:  Use: antiacid, ulcer treatment  Side effects: constipation, gas, drowsiness, indegestion     tiZANidine 6 MG capsule  Commonly known as:  ZANAFLEX  TAKE ONE CAPSULE BY MOUTH THREE TIMES A DAY AT LEAST EIGHT HOURS APART  Notes to patient:  .Use: muscle relaxer  . Side effects: feeling lightheaded, sleepy, having blurred eyesight, confusion. STOP taking these medications    AeroChamber Plus Mohsen-Vu w/Mask Misc     benzonatate 100 MG capsule  Commonly known as:  Tessalon Perles     hydrOXYzine 25 MG tablet  Commonly known as:  ATARAX     triamcinolone 0.025 % Lotn lotion  Commonly known as:  KENALOG           Where to Get Your Medications      These medications were sent to [facility], [address] [place] [address]    Phone:  [phone]   · amLODIPine 5 MG tablet  · busPIRone 5 MG tablet  · DULoxetine 60 MG extended release capsule         Discharge recommendations given to patient. Follow Up. PCP in 1 week   Disposition. Home with home health  Activity. As tolerated  Diet: DIET GENERAL; No Added Salt (3-4 GM)      Spent 40 minutes in discharge process.     Signed:  LEESA Olea CNP     9/9/2020 2:14 PM

## 2020-09-09 NOTE — PLAN OF CARE
Problem: Pain:  Goal: Pain level will decrease  Description: Pain level will decrease  Outcome: Ongoing     Problem: Falls - Risk of:  Goal: Will remain free from falls  Description: Will remain free from falls  Outcome: Ongoing     Problem: HEMODYNAMIC STATUS  Goal: Patient has stable vital signs and fluid balance  Outcome: Ongoing     Problem: ACTIVITY INTOLERANCE/IMPAIRED MOBILITY  Goal: Mobility/activity is maintained at optimum level for patient  Outcome: Ongoing     Vitals:    09/09/20 0121   BP: 117/78   Pulse: 84   Resp: 19   Temp: 97.8 °F (36.6 °C)   SpO2: 100%

## 2020-09-09 NOTE — TELEPHONE ENCOUNTER
Patient will be discharged from hospital today. She needs a 2-3 week fu with CHARLIE with [facility] same day prior to appt.

## 2020-09-09 NOTE — PROGRESS NOTES
capsule TAKE ONE CAPSULE BY MOUTH THREE TIMES A DAY AT LEAST EIGHT HOURS APART Yes Wetzel Bumpers, APRN - CNP   clonazePAM (KLONOPIN) 0.5 MG tablet Take 1 tablet by mouth nightly as needed for Anxiety for up to 30 days. Yes Derek Parra MD   ondansetron (ZOFRAN) 4 MG tablet Take 1 tablet by mouth daily as needed for Nausea or Vomiting Yes Derek Parra MD   pantoprazole (PROTONIX) 20 MG tablet Take 1 tablet by mouth 2 times daily Yes Derek Parra MD   benzonatate (TESSALON PERLES) 100 MG capsule Take 1-2 capsules by mouth 3 times daily as needed for Cough Yes Derek Parra MD   beclomethasone (QVAR REDIHALER) 40 MCG/ACT AERB inhaler Inhale 1 puff into the lungs 2 times daily Yes Derek Parra MD   levonorgestrel-ethinyl estradiol (SEASONALE) 0.15-0.03 MG per tablet Take 1 tablet by mouth daily Yes Derek Parra MD   triamcinolone (KENALOG) 0.025 % LOTN lotion Apply topically 2 times daily Yes Derek Parra MD   albuterol sulfate  (90 Base) MCG/ACT inhaler Inhale 2 puffs into the lungs 4 times daily as needed for Wheezing Yes Derek Parra MD   hydrochlorothiazide (HYDRODIURIL) 12.5 MG tablet Take 1 tablet by mouth daily Yes Derek Parra MD   hydrOXYzine (ATARAX) 25 MG tablet Take 25 mg by mouth 2 times daily Yes Dasia Monroe MD   pregabalin (LYRICA) 150 MG capsule Take 1 capsule by mouth 2 times daily for 30 days. Take one in am with breakfast and one at dinner time. Yes LEESA Stewart CNP   DULoxetine (CYMBALTA) 60 MG extended release capsule Take 1 capsule by mouth daily Yes LEESA Stewart CNP   sucralfate (CARAFATE) 1 GM tablet Take 1 tablet by mouth 2 times daily Yes LEESA Stewart CNP   Handicap Placard MISC by Does not apply route Unable able to walk long distance due to medical condition. Expires in 5 years.  Yes Kurtis Hurl, APRN - CNP   ferrous sulfate 325 (65 Fe) MG tablet Take 1 tablet by mouth 2 times daily Yes Hetal Nichole MD Spacer/Aero-Holding Chambers (AEROCHAMBER PLUS NAVDEEP-VU W/MASK) MISC 1 Device by Does not apply route 2 times daily  Seward Mcburney, APRN - CNP   EPINEPHrine (EPIPEN 2-LUANNE) 0.3 MG/0.3ML SOAJ injection Inject 0.3 mLs into the muscle once for 1 dose Use as directed for allergic reaction  Jaylene Ross PA-C      Scheduled Meds:   busPIRone  5 mg Oral BID    hydrALAZINE  10 mg Intravenous Once    budesonide  0.25 mg Nebulization BID    DULoxetine  60 mg Oral Daily    ferrous sulfate  325 mg Oral BID    hydroCHLOROthiazide  12.5 mg Oral Daily    pantoprazole  40 mg Oral Daily    pregabalin  150 mg Oral BID    sucralfate  1 g Oral BID AC    sodium chloride flush  10 mL Intravenous 2 times per day    enoxaparin  40 mg Subcutaneous Nightly    aspirin  81 mg Oral Daily    Or    aspirin  300 mg Rectal Daily    rosuvastatin  40 mg Oral Nightly    amLODIPine  5 mg Oral Daily     Continuous Infusions:  PRN Meds:diphenhydrAMINE, oxyCODONE, SUMAtriptan, albuterol, clonazePAM, tiZANidine, sodium chloride flush, perflutren lipid microspheres, senna, ondansetron **OR** ondansetron, labetalol     Past Medical History:  Past Medical History:   Diagnosis Date    Asthma     Constipation 2014    Fibromyalgia     Graves disease     History of blood transfusion     Hx of blood clots      LT LUNG W/ PNEUMONIA    Hypertension     Joint pain 2014    Various joints (back, hands, knees, hips), recurrent flares since age 27, RF+ at one point, never fully worked up for rheumatologic dz yet    Kidney stone     Localized rash 2014    Recurrent, on inner surface of upper arms, q 3 months, sometimes on chest, no facial involvement    Lupus ([facility] [address]     MDRO (multiple drug resistant organisms) resistance     PONV (postoperative nausea and vomiting)         Past Surgical History:    has a past surgical history that includes Tubal ligation;  section;  Tonsillectomy; other surgical history (08/05/2016); and Cystoscopy (08/02/2018). Social History:  Reviewed. reports that she has never smoked. She has never used smokeless tobacco. She reports current alcohol use. She reports that she does not use drugs. Family History:  Reviewed. family history includes Cancer in her maternal grandmother. Denies family history of sudden cardiac death, arrhythmia, premature CAD    Review of Systems:  · Constitutional: Negative for fever, night sweats, chills, weight changes, or weakness  · Skin: Negative for rash, dry skin, pruritus, bleeding, blood clots, changes in skin pigment, or bruising    · HEENT: Negative for vision changes, ringing in the ears, dysphagia, or swollen lymph nodes  · Respiratory: Reviewed in HPI  · Cardiovascular: Reviewed in HPI  · Gastrointestinal: Negative for abdominal pain, N/V/D, constipation, or black/tarry stools  · Genito-Urinary: Negative for dysuria, incontinence, or hematuria  · Musculoskeletal: Negative for joint swelling, muscle pain, or injuries  · Neurological/Psych: Negative for confusion, seizures, headaches, balance issues or TIA-like symptoms. No anxiety, depression, or insomnia    Physical Examination:  Vitals:    09/09/20 0824   BP: (!) 147/91   Pulse: 104   Resp: 17   Temp: 97.8 °F (36.6 °C)   SpO2: 99%      In: 240 [P.O.:240]  Out: -    Wt Readings from Last 3 Encounters:   09/09/20 180 lb 8 oz (81.9 kg)   06/11/20 170 lb (77.1 kg)   03/22/20 186 lb 6 oz (84.5 kg)       Intake/Output Summary (Last 24 hours) at 9/9/2020 1029  Last data filed at 9/9/2020 0947  Gross per 24 hour   Intake 240 ml   Output --   Net 240 ml       Telemetry: Personally Reviewed  - Sinus rhythm - ST   · Constitutional: Cooperative and in no apparent distress, and appears well nourished  · Skin: Warm and pink; no pallor, cyanosis, clubbing, or bruising   · HEENT: Symmetric and normocephalic. PERRL, EOM intact. Conjunctiva pink with clear sclera.  Mucus membranes pink and moist. · Cardiovascular: Regular rate and rhythm. S1/S2 present without murmurs, no rubs or gallops. Peripheral pulses 2+, capillary refill < 3 seconds. No elevation of JVP. No peripheral edema  · Respiratory: Respirations symmetric and unlabored. Lungs clear to auscultation bilaterally, no wheezing, crackles, or rhonchi  · Gastrointestinal: Abdomen soft and round. Bowel sounds normoactive without tenderness or masses. · Musculoskeletal: Bilateral upper and lower extremity strength with R weakness   · Neurologic/Psych: Awake and orientated to person, place and time. Calm affect, appropriate mood    Pertinent labs, diagnostic, device, and imaging results reviewed as a part of this visit    Labs:    BMP:   Recent Labs     20  0738 20  1207   * 136   K 4.2 4.1   CL 99 99   CO2 25 24   BUN 21* 21*   CREATININE 0.7 0.8   MG  --  2.20     Estimated Creatinine Clearance: 106 mL/min (based on SCr of 0.8 mg/dL).    CBC:   Recent Labs     20  0738   WBC 7.9   HGB 12.7   HCT 39.8   MCV 76.6*        Thyroid:   Lab Results   Component Value Date    TSH 0.58 2020    Q9TOXLB 1.27 2020    D4KWEML 8.7 2020     Lipids:   Lab Results   Component Value Date    CHOL 150 2020    HDL 38 2020    TRIG 122 2020     LFTS:   Lab Results   Component Value Date    ALT 51 2020    AST 40 2020    ALKPHOS 102 2020    PROT 7.9 2020    AGRATIO 1.2 2020    BILITOT <0.2 2020     Cardiac Enzymes:   Lab Results   Component Value Date    CKTOTAL 155 2015    TROPONINI 0.00 2017    TROPONINI 0.00 2017     Coags:   Lab Results   Component Value Date    PROTIME 11.7 2020    INR 1.01 2020     EC/3/20  Normal sinus rhythm  Normal ECG  When compared with ECG of 31-AUG-2020 13:26,  No significant change was found    ECHO:  20  Summary   -Normal left ventricle size, borderline wall thickness and hyperdynamic   systolic function with an estimated ejection fraction of 65-70%.   -No regional wall motion abnormalities are seen.   -Normal diastolic function. E/e\"=7.4   -Trivial mitral and tricuspid regurgitation.   -Estimated pulmonary artery systolic pressure is normal at 26 mmHg assuming   a right atrial pressure of 3 mmHg.   -A bubble study was performed and fails to show evidence of shunting. GXT: 2/2017   Impression:   No new ECG changes to suggest ischemia, but nondiagnostic Lexiscan due to   inadequate heart rate. CXR 8/31/20  FINDINGS:    The heart and pulmonary vascularity are within normal limits.  There are no    focal areas of consolidation or pleural effusion.  The osseous structures are    intact.           Problem List:   Patient Active Problem List    Diagnosis Date Noted    Generalized anxiety disorder     Depression     Right sided weakness     Paresthesia of right arm and leg 08/31/2020    Focal motor deficit 08/31/2020    Migraine with aura and with status migrainosus, not intractable 09/28/2019    Kidney stone 09/28/2019    Insomnia due to other mental disorder 09/28/2019    Mild intermittent asthma without complication 48/72/4931    Moderate episode of recurrent major depressive disorder ([facility] [address] 09/28/2019    PTSD (post-traumatic stress disorder) 09/28/2019    Nephroureterolithiasis 07/31/2018    Hyperthyroidism 09/30/2016    Fibromyalgia 04/28/2016    Myalgia 03/25/2015    HTN (hypertension) 11/27/2014    Yeast vaginitis 11/27/2014    Joint pain 01/13/2014    Constipation 01/13/2014    Localized rash 01/13/2014        Assessment and Plan:     Right sided weakness  ~ etiology unclear   ~ seen/eval by neuro : unremarkable findings   ~ bubble echo neg for shunt  ~ working with PT/OT : rehab consulted  ~ psych c/s for possible somatoform etiology per IM   ~ plan for 30 day MCOT at d/c to r/o PAF     Shortness of Breath   ~ persistent on exertion and when resting she feels like she has to breath more

## 2020-09-09 NOTE — CARE COORDINATION
Call from Elizabeth Munoz in [address] stating insurance denied ARU. Call to floor RN Souris to inform.     Bettie  MSW, [address]

## 2020-09-10 ENCOUNTER — CARE COORDINATION (OUTPATIENT)
Dept: CASE MANAGEMENT | Age: 40
End: 2020-09-10

## 2020-09-10 ENCOUNTER — TELEPHONE (OUTPATIENT)
Dept: INTERNAL MEDICINE CLINIC | Age: 40
End: 2020-09-10

## 2020-09-10 NOTE — CARE COORDINATION
\"Zoila\" with [facility] contacted CTN and determined that patient is not in their service area. She will transfer the referral to [facility] and give them CTN contact number for any questions or concerns. CTN will contact [facility] to follow up on orders for home care and confirm that they have been received.

## 2020-09-10 NOTE — CARE COORDINATION
14:27  \"Kiara\" from [facility] calling to determine patient insurance and now she will check her staffing and see if they can accommodate this patient's home care for PT/OT. CTN will remain available. \"Kiara\" with [facility] contacted PCP, she has [place] of state. CTN contacted office, spoke with \"Eveline\" who confirmed that care was being transferred to [facility]. CTN contacted \"Kiara\" [facility], she will reach out to PCP office to determine which MD will be covering. They will be able to see patient early next week.

## 2020-09-10 NOTE — TELEPHONE ENCOUNTER
Patient is calling in with severe sharp pain in her right side. She said she has passed kidney stones in the past and it feels like that could be the problem. not having any other symptoms. She would like to know if something can be called in for the pain to [facility] on [address].

## 2020-09-10 NOTE — CARE COORDINATION
return call at [facility] to [facility]. CTN will remain available.

## 2020-09-11 ENCOUNTER — HOSPITAL ENCOUNTER (OUTPATIENT)
Age: 40
Discharge: HOME | End: 2020-09-11
Payer: MEDICAID

## 2020-09-11 LAB
BACTERIA: ABNORMAL /HPF
BILIRUBIN URINE: NEGATIVE
BLOOD, URINE: NEGATIVE
CLARITY: ABNORMAL
COLOR: ABNORMAL
EPITHELIAL CELLS, UA: 14 /HPF (ref 0–5)
GLUCOSE URINE: NEGATIVE MG/DL
HYALINE CASTS: 13 /LPF (ref 0–8)
KETONES, URINE: NEGATIVE MG/DL
LEUKOCYTE ESTERASE, URINE: ABNORMAL
MICROSCOPIC EXAMINATION: YES
NITRITE, URINE: NEGATIVE
PH UA: 6.5 (ref 5–8)
PROTEIN UA: 30 MG/DL
RBC UA: 7 /HPF (ref 0–4)
SPECIFIC GRAVITY UA: 1.03 (ref 1–1.03)
URINE TYPE: ABNORMAL
UROBILINOGEN, URINE: 1 E.U./DL
WBC UA: 17 /HPF (ref 0–5)

## 2020-09-11 PROCEDURE — 87086 URINE CULTURE/COLONY COUNT: CPT

## 2020-09-11 PROCEDURE — 81001 URINALYSIS AUTO W/SCOPE: CPT

## 2020-09-11 NOTE — TELEPHONE ENCOUNTER
Does she have a Urologist that she has met with or ever needed to have a procedure to remove stones? Any difficulty urinating? Blood in urine? I'd like to have her get a urine sample before I prescribe anything. Lab or office NV. Let me know and I will order. Reviewed CT from 8/31/20 hospitalization/ER visit and at that time she did have bilateral kidney stones that were nonobstructing.

## 2020-09-11 NOTE — TELEPHONE ENCOUNTER
Spoke with patient , she states Right sided pain higher near the waist.   Pain is getting worse. No fever at this time.

## 2020-09-11 NOTE — TELEPHONE ENCOUNTER
She did see a urologist last year when the stint was put in. She does not remember who , they were in [place]. No issues with urination. No blood in urine. She will go to [facility] and have urine labs done.

## 2020-09-12 LAB — URINE CULTURE, ROUTINE: NORMAL

## 2020-09-15 ENCOUNTER — TELEMEDICINE (OUTPATIENT)
Dept: INTERNAL MEDICINE CLINIC | Age: 40
End: 2020-09-15
Payer: MEDICAID

## 2020-09-15 PROCEDURE — 99213 OFFICE O/P EST LOW 20 MIN: CPT | Performed by: NURSE PRACTITIONER

## 2020-09-15 ASSESSMENT — PATIENT HEALTH QUESTIONNAIRE - PHQ9
SUM OF ALL RESPONSES TO PHQ QUESTIONS 1-9: 0
1. LITTLE INTEREST OR PLEASURE IN DOING THINGS: 0
SUM OF ALL RESPONSES TO PHQ QUESTIONS 1-9: 0
2. FEELING DOWN, DEPRESSED OR HOPELESS: 0
SUM OF ALL RESPONSES TO PHQ9 QUESTIONS 1 & 2: 0

## 2020-09-15 ASSESSMENT — ENCOUNTER SYMPTOMS
ABDOMINAL DISTENTION: 0
WHEEZING: 1
VOMITING: 0
CHEST TIGHTNESS: 0
BACK PAIN: 1
SHORTNESS OF BREATH: 0
CONSTIPATION: 0
NAUSEA: 0
DIARRHEA: 0

## 2020-09-15 NOTE — PROGRESS NOTES
9/15/2020    TELEHEALTH EVALUATION -- Audio/Visual (During VJSYZ-14 public health emergency)    HPI:    Dixon Crass (:  1980) has requested an audio/video evaluation for the following concern(s):    Ms. Williams Jc presents to transfer care to myself after previously seeing Veronika Vu in the office. She states she has dealt with a longstanding history of anxiety, PTSD, depression, chronic pain issues/fibromyalgia. She has been treated through us, pain management, rheumatology which has all helped her symptoms. Current is utilizing medical marijuana to treat her symptoms which she states is improving somewhat. Using clonazepam for anxiety. States she was recently admitted for unilateral weakness in which she was diagnosed with conversion disorder. Negative for acute CVA. She was recommended to see a counselor/psych for management of symptoms. Current symptoms: difficulty concentrating, feelings of losing control, irritable, palpitations, paresthesias, psychomotor agitation, racing thoughts. She denies current suicidal and homicidal ideation. She complains of the following side effects from the treatment: drowsiness. She states she has a long history of asthma in which she has used QVAR, albuterol to manage. States her symptoms are controlled during the day, however can be uncontrolled at times at night. She states during her hospitalization she used a nebulizer which significantly improved her respiratory status/symptoms. Describes wheezing, coughing, occasional difficulty feeling of achieving deep breaths. Review of Systems   Constitutional: Negative for activity change, fatigue and unexpected weight change. HENT: Negative for congestion. Respiratory: Positive for wheezing (Nighttime). Negative for chest tightness and shortness of breath. Cardiovascular: Negative for chest pain, palpitations and leg swelling.    Gastrointestinal: Negative for abdominal distention, constipation, diarrhea, nausea and vomiting. Genitourinary: Negative for difficulty urinating and urgency. Musculoskeletal: Positive for arthralgias, back pain and myalgias. Skin: Negative for rash. Neurological: Positive for weakness (Right sided weakness; improving since hospitalization). Negative for dizziness and light-headedness. Psychiatric/Behavioral: Positive for decreased concentration, dysphoric mood and sleep disturbance. The patient is nervous/anxious. Prior to Visit Medications    Medication Sig Taking? Authorizing Provider   albuterol (PROVENTIL) (5 MG/ML) 0.5% nebulizer solution Take 1 mL by nebulization 4 times daily as needed for Wheezing Yes LEESA Cueto CNP   amLODIPine (NORVASC) 5 MG tablet Take 1 tablet by mouth daily Yes LEESA Yancey CNP   DULoxetine (CYMBALTA) 60 MG extended release capsule Take 1 capsule by mouth daily Yes LEESA Yancey CNP   busPIRone (BUSPAR) 5 MG tablet Take 1 tablet by mouth 2 times daily Yes Tone MedicLEESA laura CNP   tiZANidine (ZANAFLEX) 6 MG capsule TAKE ONE CAPSULE BY MOUTH THREE TIMES A DAY AT LEAST EIGHT HOURS APART Yes LEESA Cueto CNP   clonazePAM (KLONOPIN) 0.5 MG tablet Take 1 tablet by mouth nightly as needed for Anxiety for up to 30 days.  Yes Nupur Moses MD   ondansetron (ZOFRAN) 4 MG tablet Take 1 tablet by mouth daily as needed for Nausea or Vomiting Yes Nupur Moses MD   pantoprazole (PROTONIX) 20 MG tablet Take 1 tablet by mouth 2 times daily Yes Nupur Moses MD   beclomethasone (QVAR REDIHALER) 40 MCG/ACT AERB inhaler Inhale 1 puff into the lungs 2 times daily Yes Nupur Moses MD   levonorgestrel-ethinyl estradiol (SEASONALE) 0.15-0.03 MG per tablet Take 1 tablet by mouth daily Yes Nupur Moses MD   albuterol sulfate  (90 Base) MCG/ACT inhaler Inhale 2 puffs into the lungs 4 times daily as needed for Wheezing Yes Nupur Moses MD   hydrochlorothiazide (HYDRODIURIL) 12.5 MG tablet Take 1 tablet by mouth daily Yes Rosita Cole MD   pregabalin (LYRICA) 150 MG capsule Take 1 capsule by mouth 2 times daily for 30 days. Take one in am with breakfast and one at dinner time. Yes Juline Kocher, APRN - CNP   Handicap Placard MISC by Does not apply route Unable able to walk long distance due to medical condition. Expires in 5 years. Yes Juline Kocher, APRN - CNP   EPINEPHrine (EPIPEN 2-LUANNE) 0.3 MG/0.3ML SOAJ injection Inject 0.3 mLs into the muscle once for 1 dose Use as directed for allergic reaction Yes Terry Sears PA-C   sucralfate (CARAFATE) 1 GM tablet Take 1 tablet by mouth 2 times daily  Patient not taking: Reported on 9/15/2020  Juline Kocher, APRN - CNP   ferrous sulfate 325 (65 Fe) MG tablet Take 1 tablet by mouth 2 times daily  Patient not taking: Reported on 9/15/2020  Angeline Gosselin, MD       Social History     Tobacco Use    Smoking status: Never Smoker    Smokeless tobacco: Never Used   Substance Use Topics    Alcohol use:  Yes     Alcohol/week: 0.0 standard drinks     Comment: occasional     Drug use: No        Allergies   Allergen Reactions    Ambien [Zolpidem Tartrate] Shortness Of Breath     Tongue swelling    Sudafed [Pseudoephedrine Hcl] Shortness Of Breath    Amitriptyline Hcl Other (See Comments)     Increased sedation and no relief of headache.   ,   Past Medical History:   Diagnosis Date    Anxiety     Asthma     Constipation 1/13/2014    Conversion disorder     Depression     Fibromyalgia     Graves disease     History of blood transfusion     Hx of blood clots     2015 LT LUNG W/ PNEUMONIA    Hypertension     Joint pain 1/13/2014    Various joints (back, hands, knees, hips), recurrent flares since age 27, RF+ at one point, never fully worked up for rheumatologic dz yet    Kidney stone     Multiple    Localized rash 1/13/2014    Recurrent, on inner surface of upper arms, q 3 months, sometimes on chest, no facial involvement    [facility]     MDRO (multiple drug resistant organisms) resistance     PONV (postoperative nausea and vomiting)    ,   Past Surgical History:   Procedure Laterality Date     SECTION       X 2    CYSTOSCOPY  2018    CYSTOSCOPY URETEROSCOPY WITH HOLMIUM LASER LITHOTRIPSY FOR 5 MM STONE, RIGHT STENT PLACEMENT    OTHER SURGICAL HISTORY  2016    CYSTOSCOPY, BILATERAL RETROGRADES, RIGHT URETEROSCOPY,    TONSILLECTOMY      TUBAL LIGATION     ,   Social History     Tobacco Use    Smoking status: Never Smoker    Smokeless tobacco: Never Used   Substance Use Topics    Alcohol use:  Yes     Alcohol/week: 0.0 standard drinks     Comment: occasional     Drug use: No   ,   Family History   Problem Relation Age of Onset    Cancer Maternal Grandmother         Lung Ca   ,   Immunization History   Administered Date(s) Administered    DTaP 2009    Influenza Virus Vaccine 2013    Influenza, Dock Race, IM, (6 mo and older Fluzone, Flulaval, Fluarix and 3 yrs and older Afluria) 11/10/2016    Influenza, Quadv, IM, PF (6 mo and older Fluzone, Flulaval, Fluarix, and 3 yrs and older Afluria) 2019    Tdap (Boostrix, Adacel) 2013, 2014, 2019   ,   Health Maintenance   Topic Date Due    Varicella vaccine (1 of 2 - 2-dose childhood series) 1983    Pneumococcal 0-64 years Vaccine (1 of 1 - PPSV23) 1988    Flu vaccine (1) 2020    A1C test (Diabetic or Prediabetic)  2021    TSH testing  2021    Potassium monitoring  2021    Creatinine monitoring  2021    Cervical cancer screen  2024    DTaP/Tdap/Td vaccine (5 - Td) 2029    HIV screen  Completed    Hepatitis A vaccine  Aged Out    Hepatitis B vaccine  Aged Out    Hib vaccine  Aged Out    Meningococcal (ACWY) vaccine  Aged Out         PHYSICAL EXAMINATION:  [ INSTRUCTIONS:  \"[x]\" Indicates a positive item  \"[]\" Indicates a negative item  -- DELETE ALL ITEMS NOT EXAMINED]  Vital Signs: (As obtained by patient/caregiver or practitioner observation)    Blood pressure-  Heart rate-    Respiratory rate-    Temperature-  Pulse oximetry-     Constitutional: [x] Appears well-developed and well-nourished [x] No apparent distress      [] Abnormal-   Mental status  [x] Alert and awake  [x] Oriented to person/place/time [x]Able to follow commands      Eyes:  EOM    [x]  Normal  [] Abnormal-  Sclera  [x]  Normal  [] Abnormal -         Discharge [x]  None visible  [] Abnormal -    HENT:   [x] Normocephalic, atraumatic. [] Abnormal   [x] Mouth/Throat: Mucous membranes are moist.     External Ears [x] Normal  [] Abnormal-     Neck: [x] No visualized mass     Pulmonary/Chest: [x] Respiratory effort normal.  [x] No visualized signs of difficulty breathing or respiratory distress        [] Abnormal-      Musculoskeletal:   [x] Normal gait with no signs of ataxia         [x] Normal range of motion of neck        [] Abnormal-       Neurological:        [x] No Facial Asymmetry (Cranial nerve 7 motor function) (limited exam to video visit)          [] No gaze palsy        [] Abnormal-         Skin:        [x] No significant exanthematous lesions or discoloration noted on facial skin         [] Abnormal-            Psychiatric:       [x] Normal Affect [x] No Hallucinations        [] Abnormal-     Other pertinent observable physical exam findings-       ASSESSMENT/PLAN:    1. Essential hypertension    2. Anxiety    3. Moderate persistent asthma without complication    4. Fibromyalgia    5. Moderate episode of recurrent major depressive disorder ([facility]    6. PTSD (post-traumatic stress disorder)    7. Generalized anxiety disorder    8. Conversion disorder        Verner Postin was seen today for hypertension. Diagnoses and all orders for this visit:    Essential hypertension    Controlled. Continue current regimen.      Moderate episode of recurrent major depressive disorder (HCC)    PTSD (post-traumatic Time: minutes: 11-20 minutes    --LEESA Cueto - CNP on 9/15/2020 at 2:06 PM    An electronic signature was used to authenticate this note.

## 2020-09-16 ENCOUNTER — TELEPHONE (OUTPATIENT)
Dept: INTERNAL MEDICINE CLINIC | Age: 40
End: 2020-09-16

## 2020-09-16 ENCOUNTER — TELEPHONE (OUTPATIENT)
Dept: ADMINISTRATIVE | Age: 40
End: 2020-09-16

## 2020-09-16 ENCOUNTER — CARE COORDINATION (OUTPATIENT)
Dept: CASE MANAGEMENT | Age: 40
End: 2020-09-16

## 2020-09-16 NOTE — TELEPHONE ENCOUNTER
Brent Nielson called from [facility] stated saw the pt 9/15/20 and want to know if the doctor would sing for planner care. Brent Nielson #[phone]

## 2020-09-16 NOTE — CARE COORDINATION
Follow up outreach call attempt, no answer. CTN left VM with contact information and request for return call. CTN will continue with outreach call attempts.

## 2020-09-21 ENCOUNTER — CARE COORDINATION (OUTPATIENT)
Dept: CASE MANAGEMENT | Age: 40
End: 2020-09-21

## 2020-09-21 NOTE — CARE COORDINATION
Second attempt for follow up outreach call attempt, no answer. CTN left VM with contact information and request for return call. No further outreach calls indicated.

## 2020-09-29 NOTE — PROGRESS NOTES
[facility]     Outpatient Follow Up Note    CHIEF COMPLAINT / HPI: Hospital Follow Up secondary to SOB and tachycardia     Hospital record has been reviewed  Hospital Course progressed as follows per discharge summary:     [address] a 42 y. o. female who has history of fibromyalgia, on medical marijuana. She presented to the ED with  headache, right-sided weakness, and paresthesia ongoing for greater than 12 hours.  The patient does endorse being under a great deal of stress related to home schooling, full-time employment, and PTSD related to murder of family member. MRI of cervical spine and MRI brain is unremarkable, not consistent with transverse myelopathy, demyelination or MS. There are concerns of somatoform, conversion disorder, psych consult obtained.     1. Right lower extremity weakness. Cause unclear, workup thus far non revealing. CT head and MRI brain with no evidence CVA. MRI brain and C-spine are not consistent with transverse myelopathy, demyelination or MS. Patient was evaluated by psychiatry who believed that conversion disorder was a strong possibility as cause of her symptoms. It was suspected she would likely improve with PT/OT and psychotherapy. Patient was provided with outpatient counseling resources. Buspar was added. Patient was evaluated and considered apprppriate candidate for ARU, psychiatry believed ARU would be more successful vs home PT/OT. Unfortunatelty insurance denied ARU stay and patient was discharged home with home PT/OT.        2. Hypertension. BP controlled. Continued amlodipine and hydrochlorothiazide     3. Depression/anxiety. Continued Cymbalta, Buspar was added. She has been provided with outpatient counseling services.       4. Fibromyalgia. Continued Lyrica.        5. Sinus tachycardia. HR noted to go to 150s with ambulation, baseline 80s-90s.  Cardiology evaluated, plan for MCOT on DC to r/o PAF, outpatient stress Wheezing 120 each 3    amLODIPine (NORVASC) 5 MG tablet Take 1 tablet by mouth daily 30 tablet 1    DULoxetine (CYMBALTA) 60 MG extended release capsule Take 1 capsule by mouth daily 30 capsule 0    busPIRone (BUSPAR) 5 MG tablet Take 1 tablet by mouth 2 times daily 60 tablet 0    tiZANidine (ZANAFLEX) 6 MG capsule TAKE ONE CAPSULE BY MOUTH THREE TIMES A DAY AT LEAST EIGHT HOURS APART 90 capsule 0    clonazePAM (KLONOPIN) 0.5 MG tablet Take 1 tablet by mouth nightly as needed for Anxiety for up to 30 days. 30 tablet 0    ondansetron (ZOFRAN) 4 MG tablet Take 1 tablet by mouth daily as needed for Nausea or Vomiting 30 tablet 0    pantoprazole (PROTONIX) 20 MG tablet Take 1 tablet by mouth 2 times daily 60 tablet 3    beclomethasone (QVAR REDIHALER) 40 MCG/ACT AERB inhaler Inhale 1 puff into the lungs 2 times daily 1 Inhaler 5    levonorgestrel-ethinyl estradiol (SEASONALE) 0.15-0.03 MG per tablet Take 1 tablet by mouth daily 1 packet 3    albuterol sulfate  (90 Base) MCG/ACT inhaler Inhale 2 puffs into the lungs 4 times daily as needed for Wheezing 1 Inhaler 5    hydrochlorothiazide (HYDRODIURIL) 12.5 MG tablet Take 1 tablet by mouth daily 30 tablet 3    pregabalin (LYRICA) 150 MG capsule Take 1 capsule by mouth 2 times daily for 30 days. Take one in am with breakfast and one at dinner time. 60 capsule 1    sucralfate (CARAFATE) 1 GM tablet Take 1 tablet by mouth 2 times daily (Patient not taking: Reported on 9/15/2020) 60 tablet 1    Handicap Placard MISC by Does not apply route Unable able to walk long distance due to medical condition. Expires in 5 years.  1 each 0    EPINEPHrine (EPIPEN 2-LUANNE) 0.3 MG/0.3ML SOAJ injection Inject 0.3 mLs into the muscle once for 1 dose Use as directed for allergic reaction 1 each 0    ferrous sulfate 325 (65 Fe) MG tablet Take 1 tablet by mouth 2 times daily (Patient not taking: Reported on 9/15/2020) 60 tablet 2     No current facility-administered 09/07/2020     Lab Results   Component Value Date    CREATININE 0.8 09/08/2020    BUN 21 (H) 09/08/2020     09/08/2020    K 4.1 09/08/2020    CL 99 09/08/2020    CO2 24 09/08/2020     Lab Results   Component Value Date    TSH 0.58 01/24/2020    O9COIOW 1.27 01/24/2020    O5WWHEV 8.7 01/24/2020     Lab Results   Component Value Date    WBC 7.9 09/07/2020    HGB 12.7 09/07/2020    HCT 39.8 09/07/2020    MCV 76.6 (L) 09/07/2020     09/07/2020     No components found for: CHLPL  Lab Results   Component Value Date    TRIG 122 09/01/2020    TRIG 112 05/01/2019     Lab Results   Component Value Date    HDL 38 (L) 09/01/2020    HDL 41 05/01/2019     Lab Results   Component Value Date    LDLCALC 88 09/01/2020    LDLCALC 113 (H) 05/01/2019     Lab Results   Component Value Date    LABVLDL 24 09/01/2020    LABVLDL 22 05/01/2019     Radiology Review:  Pertinent images / reports were reviewed as a part of this visit and reveals the following:    ECHO:  9/1/20  Summary   -Normal left ventricle size, borderline wall thickness and hyperdynamic   systolic function with an estimated ejection fraction of 65-70%.  -No regional wall motion abnormalities are seen.   -Normal diastolic function. E/e\"=7.4   -Trivial mitral and tricuspid regurgitation.   -Estimated pulmonary artery systolic pressure is normal at 26 mmHg assuming   a right atrial pressure of 3 mmHg.   -A bubble study was performed and fails to show evidence of shunting.     GXT: 2/2017   Impression:   No new ECG changes to suggest ischemia, but nondiagnostic Lexiscan due to   inadequate heart rate. Last stress test: 9/30/20  Summary     Normal myocardial perfusion study.     Normal LV size and systolic function. Assessment:     1.  Shortness of Breath   ~ persistent but has improved   ~ Echo 9/1/20: hyperdynamic systolic function, EF 61-09%  ~ CXR unremarkable 8/31  ~ hx of asthma and with inhaler use her breathing improves   ~ [address] with normal perfusion     2. Right sided weakness  ~ Improved   ~ seen/eval by neuro : unremarkable findings   ~ bubble echo neg for shunt  ~ working with home PT/OT   ~ needs to follow with psych for probable conversion disorder   ~ wearing 30 day MCOT to r/o PAF       3. Hypertension   ~ stable   ~ norvasc/ HCTZ     4. Tachycardia   ~ associated with exertion     Patient  is stable since hospital discharge. Plan:   1. Continue current management   2. Will discuss follow up needs after MCOT results    I have addressed the patient's cardiac risk factors and adjusted pharmacologic treatment as needed. In addition, I have reinforced the need for patient directed risk factor modification. Further evaluation will be based upon the patient's clinical course and testing results. All questions and concerns were addressed to the patient. Alternatives to  treatment were discussed. The patient  currently  is not smoking. The risks related to smoking were reviewed with the patient. Recommend maintaining a smoke-free lifestyle. Pt is not on a BB; neg MI   Pt is not on an ace-i/ARB ; neg CHF  Pt is not on a statin ; neg MI    Low Saturated fat diet discussed  Exercise program discussed    Thank you for allowing to us to participate in the care of [employer].     LEESA Perkins 83  Documentation of today's visit sent to PCP

## 2020-09-30 ENCOUNTER — HOSPITAL ENCOUNTER (OUTPATIENT)
Dept: NON INVASIVE DIAGNOSTICS | Age: 40
Discharge: HOME | End: 2020-09-30
Payer: MEDICAID

## 2020-09-30 ENCOUNTER — OFFICE VISIT (OUTPATIENT)
Dept: CARDIOLOGY CLINIC | Age: 40
End: 2020-09-30
Payer: MEDICAID

## 2020-09-30 VITALS
OXYGEN SATURATION: 99 % | HEIGHT: 67 IN | BODY MASS INDEX: 28.83 KG/M2 | DIASTOLIC BLOOD PRESSURE: 80 MMHG | SYSTOLIC BLOOD PRESSURE: 130 MMHG | WEIGHT: 183.7 LBS | HEART RATE: 87 BPM

## 2020-09-30 LAB
LV EF: 77 %
LVEF MODALITY: NORMAL

## 2020-09-30 PROCEDURE — 78452 HT MUSCLE IMAGE SPECT MULT: CPT | Performed by: INTERNAL MEDICINE

## 2020-09-30 PROCEDURE — A9502 TC99M TETROFOSMIN: HCPCS | Performed by: NURSE PRACTITIONER

## 2020-09-30 PROCEDURE — 6360000002 HC RX W HCPCS: Performed by: NURSE PRACTITIONER

## 2020-09-30 PROCEDURE — 99214 OFFICE O/P EST MOD 30 MIN: CPT | Performed by: NURSE PRACTITIONER

## 2020-09-30 PROCEDURE — 93017 CV STRESS TEST TRACING ONLY: CPT | Performed by: INTERNAL MEDICINE

## 2020-09-30 PROCEDURE — 6360000002 HC RX W HCPCS: Performed by: INTERNAL MEDICINE

## 2020-09-30 PROCEDURE — 3430000000 HC RX DIAGNOSTIC RADIOPHARMACEUTICAL: Performed by: NURSE PRACTITIONER

## 2020-09-30 RX ORDER — AMINOPHYLLINE DIHYDRATE 25 MG/ML
100 INJECTION, SOLUTION INTRAVENOUS ONCE
Status: COMPLETED | OUTPATIENT
Start: 2020-09-30 | End: 2020-09-30

## 2020-09-30 RX ADMIN — TETROFOSMIN 30 MILLICURIE: 1.38 INJECTION, POWDER, LYOPHILIZED, FOR SOLUTION INTRAVENOUS at 11:15

## 2020-09-30 RX ADMIN — TETROFOSMIN 10 MILLICURIE: 1.38 INJECTION, POWDER, LYOPHILIZED, FOR SOLUTION INTRAVENOUS at 10:07

## 2020-09-30 RX ADMIN — REGADENOSON 0.4 MG: 0.08 INJECTION, SOLUTION INTRAVENOUS at 11:02

## 2020-09-30 RX ADMIN — AMINOPHYLLINE 100 MG: 25 INJECTION, SOLUTION INTRAVENOUS at 11:27

## 2020-10-02 ENCOUNTER — TELEPHONE (OUTPATIENT)
Dept: INTERNAL MEDICINE CLINIC | Age: 40
End: 2020-10-02

## 2020-10-12 ENCOUNTER — TELEPHONE (OUTPATIENT)
Dept: INTERNAL MEDICINE CLINIC | Age: 40
End: 2020-10-12

## 2020-10-12 NOTE — TELEPHONE ENCOUNTER
FYI:  Baljit Pierce at [facility], patient missed a visit, but has a follow up this afternoon

## 2020-10-13 ENCOUNTER — TELEPHONE (OUTPATIENT)
Dept: INTERNAL MEDICINE CLINIC | Age: 40
End: 2020-10-13

## 2020-10-13 RX ORDER — CLONAZEPAM 0.5 MG/1
0.5 TABLET ORAL NIGHTLY PRN
Qty: 30 TABLET | Refills: 0 | OUTPATIENT
Start: 2020-10-13 | End: 2020-11-12

## 2020-10-13 RX ORDER — BUSPIRONE HYDROCHLORIDE 5 MG/1
5 TABLET ORAL 2 TIMES DAILY
Qty: 60 TABLET | Refills: 0 | Status: SHIPPED | OUTPATIENT
Start: 2020-10-13 | End: 2020-11-19 | Stop reason: SDUPTHER

## 2020-10-20 ENCOUNTER — TELEPHONE (OUTPATIENT)
Dept: CARDIOLOGY CLINIC | Age: 40
End: 2020-10-20

## 2020-10-26 ENCOUNTER — TELEPHONE (OUTPATIENT)
Dept: INTERNAL MEDICINE CLINIC | Age: 40
End: 2020-10-26

## 2020-10-26 RX ORDER — CLONAZEPAM 0.5 MG/1
0.5 TABLET ORAL NIGHTLY PRN
Qty: 30 TABLET | Refills: 0 | Status: CANCELLED | OUTPATIENT
Start: 2020-10-26 | End: 2020-11-25

## 2020-10-26 NOTE — TELEPHONE ENCOUNTER
Short term disability ends today. More paperwork is being faxed to our office. She would like to return to work after she finished PT. She is out of Clonazepam - needs a refill asap. Please order.

## 2020-10-26 NOTE — TELEPHONE ENCOUNTER
Spoke with pt. She states she has not been able to see or schedule with psychiatry at this time as she is just not able to move around.

## 2020-10-26 NOTE — TELEPHONE ENCOUNTER
Refill request for clonazepam  medication. Name of Pharmacy- [facility]       Last visit - 9-     Pending visit - 12-    Last refill -8-      Medication Contract signed -   Last Beto Prescott ran- 7-2-2020        Additional Comments out of medication at this time.    Will await paperwork

## 2020-10-26 NOTE — TELEPHONE ENCOUNTER
Ehsan recommended psychiatry at last OV for medical management and further recommendations. Has she met or scheduled with psychiatry?

## 2020-10-28 ENCOUNTER — TELEPHONE (OUTPATIENT)
Dept: CARDIOLOGY CLINIC | Age: 40
End: 2020-10-28

## 2020-10-28 NOTE — TELEPHONE ENCOUNTER
I am not familiar with this case at all. Is Dexter Patterson able to be contacted and tell him I would appreciate his insight whether or not to rx this medication for this patient?

## 2020-10-29 NOTE — TELEPHONE ENCOUNTER
Patient called back and I explained that she was in normal sinus rhythm the whole time she was wearing the monitor and that there were some spots where the heart was racy by it was nothing of concern. ....  ii told her I was going to forward the return call to [place] that was if she had anything she wanted to personaly discus with her or if any further testing needs to be done she can call her back

## 2020-10-30 ENCOUNTER — TELEPHONE (OUTPATIENT)
Dept: INTERNAL MEDICINE CLINIC | Age: 40
End: 2020-10-30

## 2020-11-02 NOTE — TELEPHONE ENCOUNTER
Spoke with patient she said she needs an extension from being off from work because she still has issues with memory and her right arm she is not able to type for work. She is supposed to go back on 11/11/2020 but she said she needs more time. I told the patient to have [employer] send us new forms and I would try and get them filled out for her.

## 2020-11-02 NOTE — TELEPHONE ENCOUNTER
I am a little confused at this message. I see [facility] papers in the patient's chart that were last scanned into her media. These forms did not need signed. Is the patient looking for a copy of these forms or was [facility] missing an order that needed signed?

## 2020-11-03 ENCOUNTER — TELEPHONE (OUTPATIENT)
Dept: INTERNAL MEDICINE CLINIC | Age: 40
End: 2020-11-03

## 2020-11-03 NOTE — TELEPHONE ENCOUNTER
Noted patient told me yesterday her return to work date is 11/11 but she states she still needs more time, asking if we can addend the forms in her chart and resend them?

## 2020-11-03 NOTE — TELEPHONE ENCOUNTER
Patient called, states Yayo told her to have us addend the last forms. They need to states that she is still having issues with :   Short term memory   Right arm weakness. Is scheduling appointments for psychology and neurology. Pt is aware that Nenita Abernathy is out of the office this week. She asked to have this sent to [facility].

## 2020-11-04 NOTE — TELEPHONE ENCOUNTER
Can we please call her psych and neurology providers to ensure she is scheduled?  I will fill out forms depending on this

## 2020-11-04 NOTE — TELEPHONE ENCOUNTER
Patient is in the process of making an appointment with a psychiatrist she said the first number we gave her did not work and the second number she left a message to schedule with them. Her PT referred her to Neurology but she has not called to set up the appointment yet.

## 2020-11-05 ENCOUNTER — TELEPHONE (OUTPATIENT)
Dept: INTERNAL MEDICINE CLINIC | Age: 40
End: 2020-11-05

## 2020-11-05 RX ORDER — BLOOD PRESSURE TEST KIT
1 KIT MISCELLANEOUS DAILY
Qty: 1 KIT | Refills: 0 | Status: SHIPPED | OUTPATIENT
Start: 2020-11-05 | End: 2022-09-14 | Stop reason: SDUPTHER

## 2020-11-05 NOTE — TELEPHONE ENCOUNTER
Please call patient.   Ask patient to check blood pressure readings twice a day and call next week with readings to [facility]  Dr. Noah Monroy

## 2020-11-05 NOTE — TELEPHONE ENCOUNTER
Lester the patient's PT called, he wanted to let us know that the past few times he has visited with the patient her BP's have been high (150's over 90's). He states her BP today was 154/94. She had been resting for 30 minutes prior to taking the BP's.         U:[phone]

## 2020-11-05 NOTE — TELEPHONE ENCOUNTER
Patient notified, she said she does not have a cuff at home and is not cleared to drive yet and wants to know if a cuff can be sent to the pharmacy to see if her insurance will cover it? Pended script.

## 2020-11-09 ENCOUNTER — TELEPHONE (OUTPATIENT)
Dept: INTERNAL MEDICINE CLINIC | Age: 40
End: 2020-11-09

## 2020-11-18 NOTE — TELEPHONE ENCOUNTER
Pt notified.  She was also in rehab and her BP had been high staying around 155/116 she is due for a refill of her amlodipine now and wanted to see if that should be increased before placing the refill request.

## 2020-11-18 NOTE — TELEPHONE ENCOUNTER
Following back up with a return phone call on her part would be a start.  If other referral needs to be placed, can do

## 2020-11-20 RX ORDER — BUSPIRONE HYDROCHLORIDE 5 MG/1
5 TABLET ORAL 2 TIMES DAILY
Qty: 60 TABLET | Refills: 0 | Status: SHIPPED | OUTPATIENT
Start: 2020-11-20 | End: 2020-12-18 | Stop reason: SDUPTHER

## 2020-11-24 ENCOUNTER — TELEPHONE (OUTPATIENT)
Dept: INTERNAL MEDICINE CLINIC | Age: 40
End: 2020-11-24

## 2020-11-24 RX ORDER — FLUCONAZOLE 150 MG/1
TABLET ORAL
Qty: 2 TABLET | Refills: 0 | Status: ON HOLD | OUTPATIENT
Start: 2020-11-24 | End: 2021-02-15

## 2020-11-24 RX ORDER — AMLODIPINE BESYLATE 10 MG/1
10 TABLET ORAL DAILY
Qty: 30 TABLET | Refills: 1 | Status: SHIPPED | OUTPATIENT
Start: 2020-11-24 | End: 2021-05-28 | Stop reason: SDUPTHER

## 2020-11-24 NOTE — TELEPHONE ENCOUNTER
Will refax papers. The nebulizer was already sent but I know due to covid they are restricting new orders for these. Will have her check at the pharmacy. Also a message about her BP's was sent to [facility] on 11/3 where I asked if he would like to increase her dose and gave exact BP readings but it doesn't look like anything was done with this.

## 2020-11-24 NOTE — TELEPHONE ENCOUNTER
Sounds like pt has had trouble finding covered providers for psychiatry and neurology. Would recommend that she go ahead and schedule if she has not already done so, and then see if she can be put on a cancellation list.  Diflucan sent for yeast infection. Does she need diclofenac as well, or was that a mistake? OK to increase amlodipine to 10 qd. I will send Rx for 10 mg tablets, but pt can take #2 of the 5's to use up what she has if needed. Keep f/u with [facility] as scheduled for 12/18/20.

## 2020-11-24 NOTE — TELEPHONE ENCOUNTER
Pt called she is having trouble getting into psychology and neurology , next apts she was offered was not until February. Refill request for diclofenac  medication.      [facility]- [facility]       Last visit - 9-     Pending visit - 12-    Last refill -4-      Medication Contract signed -   Last Clive Atkins ran-         Additional Comments pt currently has a yeast infection

## 2020-11-24 NOTE — TELEPHONE ENCOUNTER
Patient notified, she did say she got the nebulizer machine she just needed the medication for it. Pended albuterol.

## 2020-11-25 ENCOUNTER — CLINICAL DOCUMENTATION (OUTPATIENT)
Dept: INTERNAL MEDICINE CLINIC | Age: 40
End: 2020-11-25

## 2020-12-01 ENCOUNTER — TELEPHONE (OUTPATIENT)
Dept: INTERNAL MEDICINE CLINIC | Age: 40
End: 2020-12-01

## 2020-12-01 NOTE — TELEPHONE ENCOUNTER
Patient has been off for arm pain and fibromyalgia. Per Dr. Jose Marrufo it is ok to write letter. Also I refaxed the records.

## 2020-12-01 NOTE — LETTER
[facility] EM[facility] [address] [facility]  Phone: [phone]  Fax: [phone]           Dr. Rashi James        December 1, 2020     Patient: Derek Garsia   YOB: 1980   Date of Visit: 12/1/2020       To Whom It May Concern: It is my medical opinion that Hayde Allen may return to work on 12/2/2020. If you have any questions or concerns, please don't hesitate to call.     Sincerely,        Dr. Rashi James

## 2020-12-01 NOTE — TELEPHONE ENCOUNTER
Patient needs a return to work on 12/2/20  faxed to [phone]    She also asks that the disability paperwork needs to be refaxed again to [phone]. They did not receive them yet.    Please fax asap

## 2020-12-03 ENCOUNTER — TELEPHONE (OUTPATIENT)
Dept: INTERNAL MEDICINE CLINIC | Age: 40
End: 2020-12-03

## 2020-12-03 NOTE — LETTER
[facility] [phone] [facility]  Phone: [phone]  Fax: [phone]    LEESA Adamson CNP        December 7, 2020     Patient: Martinez Peguero   YOB: 1980   Date of Visit: 12/3/2020       To Whom It May Concern: It is my medical opinion that Pedro Caro may return to work on 12/2/2020. She is unable to see a psychiatrist in the area due to her insurance not being accepted my any of the offices. Ni Damian has a follow up visit with neurology to discuss her condition. Please allow Ni Damian to take frequent breaks when needed for her condition. I have attached her recent office visit notes to this letter. If you have any questions or concerns, please don't hesitate to call.     Sincerely,          LEESA Adamson CNP

## 2020-12-03 NOTE — TELEPHONE ENCOUNTER
Patient called, she stated that there is some information that is missing in her FMLA forms that need to be sent over. She needs Rehab notes, return to work form, last office visit note, documentation that there is no psychiatrist in this area that accept her insurance, restrictions and frequent breaks. This is for her memory issues and right arm weakness. Also she does have an appt with neurology in February. I did call all of the psychiatrist offices in this area and none of them take her insurance. Would a letter be sufficient for this information, the other missing info I can fax.     [phone]

## 2020-12-07 RX ORDER — TIZANIDINE HYDROCHLORIDE 6 MG/1
CAPSULE, GELATIN COATED ORAL
Qty: 90 CAPSULE | Refills: 0 | Status: SHIPPED | OUTPATIENT
Start: 2020-12-07 | End: 2021-02-24 | Stop reason: SDUPTHER

## 2020-12-07 NOTE — TELEPHONE ENCOUNTER
Information was sent and patient was notified. Also she needs a refill on tizanidine. Pended medication and pharmacy. Refill request for tizanidine medication.      Name of Danielle Rdz      Last visit - 9/15/2020     Pending visit - 12/18/2020    Last refill -8/11/2020  0 refills

## 2020-12-10 NOTE — TELEPHONE ENCOUNTER
I called the cell phone again and it is still out of service I also called her work but there was no answer. Juan Forman returned your call

## 2020-12-18 ENCOUNTER — TELEMEDICINE (OUTPATIENT)
Dept: INTERNAL MEDICINE CLINIC | Age: 40
End: 2020-12-18
Payer: MEDICAID

## 2020-12-18 PROCEDURE — 99213 OFFICE O/P EST LOW 20 MIN: CPT | Performed by: NURSE PRACTITIONER

## 2020-12-18 RX ORDER — HYDROCHLOROTHIAZIDE 25 MG/1
25 TABLET ORAL DAILY
Qty: 30 TABLET | Refills: 2 | Status: ON HOLD
Start: 2020-12-18 | End: 2021-02-17 | Stop reason: HOSPADM

## 2020-12-18 RX ORDER — ONDANSETRON 4 MG/1
4 TABLET, FILM COATED ORAL DAILY PRN
Qty: 30 TABLET | Refills: 0 | Status: SHIPPED | OUTPATIENT
Start: 2020-12-18 | End: 2022-04-26 | Stop reason: SDUPTHER

## 2020-12-18 RX ORDER — LEVONORGESTREL AND ETHINYL ESTRADIOL 0.15-0.03
1 KIT ORAL DAILY
Qty: 1 PACKET | Refills: 3 | Status: ON HOLD | OUTPATIENT
Start: 2020-12-18 | End: 2021-02-15

## 2020-12-18 RX ORDER — IPRATROPIUM BROMIDE AND ALBUTEROL SULFATE 2.5; .5 MG/3ML; MG/3ML
1 SOLUTION RESPIRATORY (INHALATION) 4 TIMES DAILY
Qty: 360 ML | Refills: 2 | Status: SHIPPED | OUTPATIENT
Start: 2020-12-18 | End: 2021-11-24 | Stop reason: SDUPTHER

## 2020-12-18 RX ORDER — BUSPIRONE HYDROCHLORIDE 10 MG/1
10 TABLET ORAL 2 TIMES DAILY PRN
Qty: 60 TABLET | Refills: 2 | Status: SHIPPED | OUTPATIENT
Start: 2020-12-18 | End: 2021-02-24 | Stop reason: SDUPTHER

## 2020-12-18 RX ORDER — BENZONATATE 100 MG/1
100 CAPSULE ORAL 3 TIMES DAILY PRN
Qty: 42 CAPSULE | Refills: 0 | Status: SHIPPED | OUTPATIENT
Start: 2020-12-18 | End: 2022-04-26 | Stop reason: SDUPTHER

## 2020-12-18 ASSESSMENT — ENCOUNTER SYMPTOMS
CHEST TIGHTNESS: 0
ABDOMINAL DISTENTION: 0
DIARRHEA: 0
VOMITING: 0
NAUSEA: 0
CONSTIPATION: 0
BACK PAIN: 1
SHORTNESS OF BREATH: 0

## 2020-12-18 NOTE — PROGRESS NOTES
2020    TELEHEALTH EVALUATION -- Audio/Visual (During LNUMV-64 public health emergency)    HPI:    Anamaria Lambert (:  1980) has requested an audio/video evaluation for the following concern(s):    Ms. Rylee Reyes presents for follow up after TIA/conversion disorder. She states she is overall feeling better and progressing well. She has discontinued use of her cane and is now walking more easily. She has also returned to work on a limited basis. 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 States anxiety is controlled, however does feel she could benefit from slightly higher dose of buspar to further improve. Has appointment with neurology scheduled in March to further evaluate weakness. Does mention that she is experiencing occasional pain, however mitigated with medical marijuana. She states she has a long history of asthma in which she has used QVAR, albuterol to manage. States her breathing is slightly worsening. She describes seasonal change associated with colder weather/fall allergies. Has had to increase her use of albuterol nebs to several times per day. Will continue to wheeze occasionally as well. She prefers to use tessalon perles during this time of year to mitigate cough at nighttime as well.      HTN stated to be slightly elevated. States she will notice diastolic elevation and BLE swelling. Has continued her normal doses daily without side effects. Denies chest pain, shortness of breath, palpitations, weight fluctuations. Review of Systems   Constitutional: Positive for fatigue. Negative for activity change and unexpected weight change. Respiratory: Negative for chest tightness and shortness of breath. Cardiovascular: Negative for chest pain, palpitations and leg swelling. Gastrointestinal: Negative for abdominal distention, constipation, diarrhea, nausea and vomiting. Genitourinary: Negative for difficulty urinating and urgency.    Musculoskeletal: Positive for arthralgias, back pain and gait problem. Skin: Negative for rash. Neurological: Positive for weakness (Right sided residual weakness, improving). Negative for dizziness and light-headedness. Psychiatric/Behavioral: Negative for agitation, confusion, decreased concentration and dysphoric mood. The patient is nervous/anxious. Prior to Visit Medications    Medication Sig Taking? Authorizing Provider   levonorgestrel-ethinyl estradiol (SEASONALE) 0.15-0.03 MG per tablet Take 1 tablet by mouth daily Yes Christopher Neither, LEESA - CNP   ondansetron (ZOFRAN) 4 MG tablet Take 1 tablet by mouth daily as needed for Nausea or Vomiting Yes Christopher Neither, LEESA - CNP   busPIRone (BUSPAR) 10 MG tablet Take 1 tablet by mouth 2 times daily as needed (anxiety) Yes Christopher Neither, LEESA - CNP   benzonatate (TESSALON) 100 MG capsule Take 1 capsule by mouth 3 times daily as needed for Cough Yes Christopher Neither, LEESA - MARTIN   ipratropium-albuterol (DUONEB) 0.5-2.5 (3) MG/3ML SOLN nebulizer solution Inhale 3 mLs into the lungs 4 times daily Yes Christopher Neither, LEESA - MARTIN   hydroCHLOROthiazide (HYDRODIURIL) 25 MG tablet Take 1 tablet by mouth daily Yes Christopher Em, LEESA - CNP   tiZANidine (ZANAFLEX) 6 MG capsule TAKE ONE CAPSULE BY MOUTH THREE TIMES A DAY AT LEAST EIGHT HOURS APART Yes Christopher Neither, LEESA - CNP   fluconazole (DIFLUCAN) 150 MG tablet Take one tablet by mouth today and may repeat in 3 days if no improvement.  Yes Mara Foster MD   amLODIPine (NORVASC) 10 MG tablet Take 1 tablet by mouth daily Yes Mara Foster MD   Nebulizers (COMPRESSOR/NEBULIZER) MISC 1 each by Does not apply route daily Yes LEESA Hawk CNP   Blood Pressure KIT 1 kit by Does not apply route daily Yes Melody Martinez MD   pantoprazole (PROTONIX) 20 MG tablet Take 1 tablet by mouth 2 times daily Yes Greg Gutiérrez MD   beclomethasone (QVAR REDIHALER) 40 MCG/ACT AERB inhaler Inhale 1 puff into the lungs 2 times daily Yes Maldonaod Mays MD Shantell   albuterol sulfate  (90 Base) MCG/ACT inhaler Inhale 2 puffs into the lungs 4 times daily as needed for Wheezing Yes Jeanette Mukherjee MD   pregabalin (LYRICA) 150 MG capsule Take 1 capsule by mouth 2 times daily for 30 days. Take one in am with breakfast and one at dinner time. Yes LEESA Interiano CNP   Handicap Placard MISC by Does not apply route Unable able to walk long distance due to medical condition. Expires in 5 years. Yes LEESA Interiano CNP   DULoxetine (CYMBALTA) 60 MG extended release capsule Take 1 capsule by mouth daily  LEESA Cantu CNP   EPINEPHrine (EPIPEN 2-LUANNE) 0.3 MG/0.3ML SOAJ injection Inject 0.3 mLs into the muscle once for 1 dose Use as directed for allergic reaction  Patient not taking: Reported on 9/30/2020  Edgar Acosta PA-C       Social History     Tobacco Use    Smoking status: Never Smoker    Smokeless tobacco: Never Used   Substance Use Topics    Alcohol use:  Yes     Alcohol/week: 0.0 standard drinks     Comment: occasional     Drug use: No        Allergies   Allergen Reactions    Ambien [Zolpidem Tartrate] Shortness Of Breath     Tongue swelling    Sertraline Shortness Of Breath    Sudafed [Pseudoephedrine Hcl] Shortness Of Breath    Amitriptyline Hcl Other (See Comments)     Increased sedation and no relief of headache.   ,   Past Medical History:   Diagnosis Date    Anxiety     Asthma     Constipation 1/13/2014    Conversion disorder     Depression     Fibromyalgia     Graves disease     History of blood transfusion     Hx of blood clots     2015 LT LUNG W/ PNEUMONIA    Hypertension     Joint pain 1/13/2014    Various joints (back, hands, knees, hips), recurrent flares since age 27, RF+ at one point, never fully worked up for rheumatologic dz yet    Kidney stone     Multiple    Localized rash 1/13/2014    Recurrent, on inner surface of upper arms, q 3 months, sometimes on chest, no facial involvement    Lupus ([address]     MDRO (multiple drug resistant organisms) resistance     PONV (postoperative nausea and vomiting)    ,   Past Surgical History:   Procedure Laterality Date     SECTION       X 2    CYSTOSCOPY  2018    CYSTOSCOPY URETEROSCOPY WITH HOLMIUM LASER LITHOTRIPSY FOR 5 MM STONE, RIGHT STENT PLACEMENT    OTHER SURGICAL HISTORY  2016    CYSTOSCOPY, BILATERAL RETROGRADES, RIGHT URETEROSCOPY,    TONSILLECTOMY      TUBAL LIGATION     ,   Social History     Tobacco Use    Smoking status: Never Smoker    Smokeless tobacco: Never Used   Substance Use Topics    Alcohol use:  Yes     Alcohol/week: 0.0 standard drinks     Comment: occasional     Drug use: No   ,   Family History   Problem Relation Age of Onset    Cancer Maternal Grandmother         Lung Ca   ,   Immunization History   Administered Date(s) Administered    DTaP 2009    Influenza Virus Vaccine 2013    Influenza, Jennifer Memory, IM, (6 mo and older Fluzone, Flulaval, Fluarix and 3 yrs and older Afluria) 11/10/2016    Influenza, Quadv, IM, PF (6 mo and older Fluzone, Flulaval, Fluarix, and 3 yrs and older Afluria) 2019    Tdap (Boostrix, Adacel) 2013, 2014, 2019   ,   Health Maintenance   Topic Date Due    Varicella vaccine (1 of 2 - 2-dose childhood series) 1983    Pneumococcal 0-64 years Vaccine (1 of 1 - PPSV23) 1988    Flu vaccine (1) 2020    A1C test (Diabetic or Prediabetic)  2021    TSH testing  2021    Potassium monitoring  2021    Creatinine monitoring  2021    Cervical cancer screen  2024    DTaP/Tdap/Td vaccine (5 - Td) 2029    HIV screen  Completed    Hepatitis A vaccine  Aged Out    Hepatitis B vaccine  Aged Out    Hib vaccine  Aged Out    Meningococcal (ACWY) vaccine  Aged Out         PHYSICAL EXAMINATION:  [ INSTRUCTIONS:  \"[x]\" Indicates a positive item  \"[]\" Indicates a negative item  -- DELETE ALL ITEMS NOT EXAMINED]  Vital Signs: (As obtained by patient/caregiver or practitioner observation)    Blood pressure-  Heart rate-    Respiratory rate-    Temperature-  Pulse oximetry-     Constitutional: [x] Appears well-developed and well-nourished [x] No apparent distress      [] Abnormal-   Mental status  [x] Alert and awake  [x] Oriented to person/place/time [x]Able to follow commands      Eyes:  EOM    [x]  Normal  [] Abnormal-  Sclera  [x]  Normal  [] Abnormal -         Discharge [x]  None visible  [] Abnormal -    HENT:   [x] Normocephalic, atraumatic. [] Abnormal   [x] Mouth/Throat: Mucous membranes are moist.     External Ears [x] Normal  [] Abnormal-     Neck: [x] No visualized mass     Pulmonary/Chest: [x] Respiratory effort normal.  [x] No visualized signs of difficulty breathing or respiratory distress        [] Abnormal-      Musculoskeletal:   [x] Normal gait with no signs of ataxia         [x] Normal range of motion of neck        [] Abnormal-       Neurological:        [x] No Facial Asymmetry (Cranial nerve 7 motor function) (limited exam to video visit)          [] No gaze palsy        [] Abnormal-         Skin:        [x] No significant exanthematous lesions or discoloration noted on facial skin         [] Abnormal-            Psychiatric:       [x] Normal Affect [x] No Hallucinations        [] Abnormal-     Other pertinent observable physical exam findings-       ASSESSMENT/PLAN:    1. Anxiety    2. Gastritis without bleeding, unspecified chronicity, unspecified gastritis type    3. Essential hypertension    4. Moderate persistent asthma without complication    5. Conversion disorder    6. Focal motor deficit    7. Myalgia        Mary Ellenlupillo Al was seen today for muscle pain. Diagnoses and all orders for this visit:    Anxiety  Conversion disorder  -     busPIRone (BUSPAR) 10 MG tablet; Take 1 tablet by mouth 2 times daily as needed (anxiety)    Slight increase of Buspar to 10 mg for anxiety.  Encouraged relaxation exercises to promote calm. Encouraged continued exercise routine to exert stress as well. Consider psych. Gastritis without bleeding, unspecified chronicity, unspecified gastritis type  -     ondansetron (ZOFRAN) 4 MG tablet; Take 1 tablet by mouth daily as needed for Nausea or Vomiting    Essential hypertension  -     hydroCHLOROthiazide (HYDRODIURIL) 25 MG tablet; Take 1 tablet by mouth daily    Slight increase in HCTZ to benefit HTN. Encouraged decreasing sodium in the diet, weight loss, and gradual increases in exercise at least 20 minutes daily to further benefit BP    Moderate persistent asthma without complication  -     benzonatate (TESSALON) 100 MG capsule; Take 1 capsule by mouth 3 times daily as needed for Cough  -     ipratropium-albuterol (DUONEB) 0.5-2.5 (3) MG/3ML SOLN nebulizer solution; Inhale 3 mLs into the lungs 4 times daily    Trial of duoneb for relief of wheezing. Consider increasing controller inhaler to combo ICS/LABA if symptoms continue/worsen. Focal motor deficit    Continue with neurology follow up. Continue daily exercises to promote range of motion. Myalgia    Continue pain management. Consider d/c medical marijuana and restarting pain management if symptoms continue. Other orders  -     levonorgestrel-ethinyl estradiol (SEASONALE) 0.15-0.03 MG per tablet; Take 1 tablet by mouth daily      Return in about 3 months (around 3/18/2021), or if symptoms worsen or fail to improve. Penny Starr is a 47 y.o. female being evaluated by a Virtual Visit (video visit) encounter to address concerns as mentioned above. A caregiver was present when appropriate. Due to this being a TeleHealth encounter (During [facility]- public health emergency), evaluation of the following organ systems was limited: Vitals/Constitutional/EENT/Resp/CV/GI//MS/Neuro/Skin/Heme-Lymph-Imm.   Pursuant to the emergency declaration under the [facility] waiver authority and the [employer] and [employer] Act, this Virtual Visit was conducted with patient's (and/or legal guardian's) consent, to reduce the patient's risk of exposure to COVID-19 and provide necessary medical care. The patient (and/or legal guardian) has also been advised to contact this office for worsening conditions or problems, and seek emergency medical treatment and/or call 911 if deemed necessary. Services were provided through a phone discussion virtually to substitute for in-person clinic visit. Patient and provider were located at their individual homes. Consent:  She and/or health care decision maker is aware that that she may receive a bill for this telephone service, depending on her insurance coverage, and has provided verbal consent to proceed: Yes    Total Time: minutes: 11-20 minutes    --LEESA Avila CNP on 12/18/2020 at 4:13 PM    An electronic signature was used to authenticate this note.

## 2021-01-26 ENCOUNTER — TELEPHONE (OUTPATIENT)
Dept: INTERNAL MEDICINE CLINIC | Age: 41
End: 2021-01-26

## 2021-01-26 DIAGNOSIS — R30.0 DYSURIA: Primary | ICD-10-CM

## 2021-01-26 DIAGNOSIS — Z20.6 EXPOSURE TO HIV: ICD-10-CM

## 2021-01-26 NOTE — TELEPHONE ENCOUNTER
Patient thinks she has a bladder infection. She is having urgency, frequency, and back pain. The past couple of days. she doesn't want it to get worse and is  asking if she can get medication to treat. Also, the man she was dating told her he was HIV positive, and she would like to be tested for this. And, she will need a form filled out again to submit for her job.   She will fax the form to the office    Call [phone] with any questions

## 2021-02-14 ENCOUNTER — APPOINTMENT (OUTPATIENT)
Dept: GENERAL RADIOLOGY | Age: 41
DRG: 137 | End: 2021-02-14
Payer: MEDICAID

## 2021-02-14 ENCOUNTER — APPOINTMENT (OUTPATIENT)
Dept: CT IMAGING | Age: 41
DRG: 137 | End: 2021-02-14
Payer: MEDICAID

## 2021-02-14 ENCOUNTER — HOSPITAL ENCOUNTER (INPATIENT)
Age: 41
LOS: 3 days | Discharge: HOME | DRG: 137 | End: 2021-02-17
Attending: EMERGENCY MEDICINE | Admitting: HOSPITALIST
Payer: MEDICAID

## 2021-02-14 DIAGNOSIS — N17.9 AKI (ACUTE KIDNEY INJURY) (HCC): ICD-10-CM

## 2021-02-14 DIAGNOSIS — R00.0 TACHYCARDIA: ICD-10-CM

## 2021-02-14 DIAGNOSIS — U07.1 COVID-19: Primary | ICD-10-CM

## 2021-02-14 DIAGNOSIS — R79.89 ELEVATED D-DIMER: ICD-10-CM

## 2021-02-14 LAB
A/G RATIO: 1 (ref 1.1–2.2)
ALBUMIN SERPL-MCNC: 4.5 G/DL (ref 3.4–5)
ALP BLD-CCNC: 80 U/L (ref 40–129)
ALT SERPL-CCNC: 39 U/L (ref 10–40)
ANION GAP SERPL CALCULATED.3IONS-SCNC: 18 MMOL/L (ref 3–16)
AST SERPL-CCNC: 52 U/L (ref 15–37)
BASE EXCESS VENOUS: -4.6 MMOL/L (ref -3–3)
BASOPHILS ABSOLUTE: 0 K/UL (ref 0–0.2)
BASOPHILS RELATIVE PERCENT: 0.4 %
BILIRUB SERPL-MCNC: 0.3 MG/DL (ref 0–1)
BUN BLDV-MCNC: 34 MG/DL (ref 7–20)
CALCIUM SERPL-MCNC: 9 MG/DL (ref 8.3–10.6)
CARBOXYHEMOGLOBIN: 3 % (ref 0–1.5)
CHLORIDE BLD-SCNC: 94 MMOL/L (ref 99–110)
CO2: 17 MMOL/L (ref 21–32)
CREAT SERPL-MCNC: 2.4 MG/DL (ref 0.6–1.1)
D DIMER: 2756 NG/ML DDU (ref 0–229)
EOSINOPHILS ABSOLUTE: 0 K/UL (ref 0–0.6)
EOSINOPHILS RELATIVE PERCENT: 0 %
GFR AFRICAN AMERICAN: 27
GFR NON-AFRICAN AMERICAN: 23
GLOBULIN: 4.3 G/DL
GLUCOSE BLD-MCNC: 114 MG/DL (ref 70–99)
HCG QUALITATIVE: NEGATIVE
HCO3 VENOUS: 18.9 MMOL/L (ref 23–29)
HCT VFR BLD CALC: 46.1 % (ref 36–48)
HEMOGLOBIN, VEN, REDUCED: 32 %
HEMOGLOBIN: 14.7 G/DL (ref 12–16)
LACTIC ACID: 1.5 MMOL/L (ref 0.4–2)
LIPASE: 80 U/L (ref 13–60)
LYMPHOCYTES ABSOLUTE: 1.3 K/UL (ref 1–5.1)
LYMPHOCYTES RELATIVE PERCENT: 21.9 %
MCH RBC QN AUTO: 24.7 PG (ref 26–34)
MCHC RBC AUTO-ENTMCNC: 31.9 G/DL (ref 31–36)
MCV RBC AUTO: 77.3 FL (ref 80–100)
METHEMOGLOBIN VENOUS: 0.3 %
MONOCYTES ABSOLUTE: 0.6 K/UL (ref 0–1.3)
MONOCYTES RELATIVE PERCENT: 10.8 %
NEUTROPHILS ABSOLUTE: 3.9 K/UL (ref 1.7–7.7)
NEUTROPHILS RELATIVE PERCENT: 66.9 %
O2 CONTENT, VEN: 14 VOL %
O2 SAT, VEN: 67 %
O2 THERAPY: ABNORMAL
PCO2, VEN: 30.3 MMHG (ref 40–50)
PDW BLD-RTO: 16.2 % (ref 12.4–15.4)
PH VENOUS: 7.4 (ref 7.35–7.45)
PLATELET # BLD: 186 K/UL (ref 135–450)
PMV BLD AUTO: 9.1 FL (ref 5–10.5)
PO2, VEN: 34.7 MMHG (ref 25–40)
POTASSIUM SERPL-SCNC: 3.7 MMOL/L (ref 3.5–5.1)
PROCALCITONIN: 0.13 NG/ML (ref 0–0.15)
RBC # BLD: 5.95 M/UL (ref 4–5.2)
SARS-COV-2, NAAT: DETECTED
SODIUM BLD-SCNC: 129 MMOL/L (ref 136–145)
TCO2 CALC VENOUS: 44 MMOL/L
TOTAL CK: 553 U/L (ref 26–192)
TOTAL PROTEIN: 8.8 G/DL (ref 6.4–8.2)
WBC # BLD: 5.9 K/UL (ref 4–11)

## 2021-02-14 PROCEDURE — 2580000003 HC RX 258: Performed by: PHYSICIAN ASSISTANT

## 2021-02-14 PROCEDURE — 94640 AIRWAY INHALATION TREATMENT: CPT

## 2021-02-14 PROCEDURE — 83605 ASSAY OF LACTIC ACID: CPT

## 2021-02-14 PROCEDURE — 99283 EMERGENCY DEPT VISIT LOW MDM: CPT

## 2021-02-14 PROCEDURE — 80053 COMPREHEN METABOLIC PANEL: CPT

## 2021-02-14 PROCEDURE — 84145 PROCALCITONIN (PCT): CPT

## 2021-02-14 PROCEDURE — 6360000002 HC RX W HCPCS: Performed by: PHYSICIAN ASSISTANT

## 2021-02-14 PROCEDURE — U0002 COVID-19 LAB TEST NON-CDC: HCPCS

## 2021-02-14 PROCEDURE — 96375 TX/PRO/DX INJ NEW DRUG ADDON: CPT

## 2021-02-14 PROCEDURE — 93005 ELECTROCARDIOGRAM TRACING: CPT | Performed by: EMERGENCY MEDICINE

## 2021-02-14 PROCEDURE — 82550 ASSAY OF CK (CPK): CPT

## 2021-02-14 PROCEDURE — 84703 CHORIONIC GONADOTROPIN ASSAY: CPT

## 2021-02-14 PROCEDURE — 1200000000 HC SEMI PRIVATE

## 2021-02-14 PROCEDURE — 71045 X-RAY EXAM CHEST 1 VIEW: CPT

## 2021-02-14 PROCEDURE — 85025 COMPLETE CBC W/AUTO DIFF WBC: CPT

## 2021-02-14 PROCEDURE — 94761 N-INVAS EAR/PLS OXIMETRY MLT: CPT

## 2021-02-14 PROCEDURE — 96361 HYDRATE IV INFUSION ADD-ON: CPT

## 2021-02-14 PROCEDURE — 36415 COLL VENOUS BLD VENIPUNCTURE: CPT

## 2021-02-14 PROCEDURE — 96374 THER/PROPH/DIAG INJ IV PUSH: CPT

## 2021-02-14 PROCEDURE — 87040 BLOOD CULTURE FOR BACTERIA: CPT

## 2021-02-14 PROCEDURE — 82803 BLOOD GASES ANY COMBINATION: CPT

## 2021-02-14 PROCEDURE — 6370000000 HC RX 637 (ALT 250 FOR IP): Performed by: PHYSICIAN ASSISTANT

## 2021-02-14 PROCEDURE — 83690 ASSAY OF LIPASE: CPT

## 2021-02-14 PROCEDURE — 85379 FIBRIN DEGRADATION QUANT: CPT

## 2021-02-14 RX ORDER — KETOROLAC TROMETHAMINE 30 MG/ML
30 INJECTION, SOLUTION INTRAMUSCULAR; INTRAVENOUS ONCE
Status: COMPLETED | OUTPATIENT
Start: 2021-02-14 | End: 2021-02-14

## 2021-02-14 RX ORDER — 0.9 % SODIUM CHLORIDE 0.9 %
1000 INTRAVENOUS SOLUTION INTRAVENOUS ONCE
Status: COMPLETED | OUTPATIENT
Start: 2021-02-14 | End: 2021-02-14

## 2021-02-14 RX ORDER — ONDANSETRON 2 MG/ML
4 INJECTION INTRAMUSCULAR; INTRAVENOUS ONCE
Status: COMPLETED | OUTPATIENT
Start: 2021-02-14 | End: 2021-02-14

## 2021-02-14 RX ORDER — DEXAMETHASONE SODIUM PHOSPHATE 10 MG/ML
6 INJECTION, SOLUTION INTRAMUSCULAR; INTRAVENOUS ONCE
Status: COMPLETED | OUTPATIENT
Start: 2021-02-14 | End: 2021-02-14

## 2021-02-14 RX ORDER — MORPHINE SULFATE 4 MG/ML
4 INJECTION, SOLUTION INTRAMUSCULAR; INTRAVENOUS ONCE
Status: COMPLETED | OUTPATIENT
Start: 2021-02-14 | End: 2021-02-15

## 2021-02-14 RX ORDER — IPRATROPIUM BROMIDE AND ALBUTEROL SULFATE 2.5; .5 MG/3ML; MG/3ML
1 SOLUTION RESPIRATORY (INHALATION) ONCE
Status: COMPLETED | OUTPATIENT
Start: 2021-02-14 | End: 2021-02-14

## 2021-02-14 RX ORDER — MORPHINE SULFATE 4 MG/ML
4 INJECTION, SOLUTION INTRAMUSCULAR; INTRAVENOUS ONCE
Status: COMPLETED | OUTPATIENT
Start: 2021-02-14 | End: 2021-02-14

## 2021-02-14 RX ADMIN — ONDANSETRON 4 MG: 2 INJECTION INTRAMUSCULAR; INTRAVENOUS at 21:10

## 2021-02-14 RX ADMIN — IPRATROPIUM BROMIDE AND ALBUTEROL SULFATE 1 AMPULE: .5; 3 SOLUTION RESPIRATORY (INHALATION) at 20:40

## 2021-02-14 RX ADMIN — KETOROLAC TROMETHAMINE 30 MG: 30 INJECTION, SOLUTION INTRAMUSCULAR at 21:10

## 2021-02-14 RX ADMIN — DEXAMETHASONE SODIUM PHOSPHATE 6 MG: 10 INJECTION, SOLUTION INTRAMUSCULAR; INTRAVENOUS at 21:10

## 2021-02-14 RX ADMIN — SODIUM CHLORIDE 1000 ML: 9 INJECTION, SOLUTION INTRAVENOUS at 21:10

## 2021-02-14 RX ADMIN — MORPHINE SULFATE 4 MG: 4 INJECTION, SOLUTION INTRAMUSCULAR; INTRAVENOUS at 21:58

## 2021-02-14 ASSESSMENT — PAIN SCALES - GENERAL
PAINLEVEL_OUTOF10: 10
PAINLEVEL_OUTOF10: 10

## 2021-02-14 ASSESSMENT — ENCOUNTER SYMPTOMS
WHEEZING: 0
VOMITING: 0
DIARRHEA: 0
ABDOMINAL PAIN: 0
COUGH: 1
NAUSEA: 1
SHORTNESS OF BREATH: 1
RHINORRHEA: 0

## 2021-02-15 ENCOUNTER — APPOINTMENT (OUTPATIENT)
Dept: CT IMAGING | Age: 41
DRG: 137 | End: 2021-02-15
Payer: MEDICAID

## 2021-02-15 ENCOUNTER — APPOINTMENT (OUTPATIENT)
Dept: NUCLEAR MEDICINE | Age: 41
DRG: 137 | End: 2021-02-15
Payer: MEDICAID

## 2021-02-15 LAB
ANION GAP SERPL CALCULATED.3IONS-SCNC: 17 MMOL/L (ref 3–16)
BACTERIA: ABNORMAL /HPF
BASE EXCESS VENOUS: -7.1 MMOL/L (ref -3–3)
BILIRUBIN URINE: NEGATIVE
BLOOD, URINE: ABNORMAL
BUN BLDV-MCNC: 34 MG/DL (ref 7–20)
C-REACTIVE PROTEIN: 32 MG/L (ref 0–5.1)
CALCIUM SERPL-MCNC: 8.5 MG/DL (ref 8.3–10.6)
CARBOXYHEMOGLOBIN: 3.1 % (ref 0–1.5)
CHLORIDE BLD-SCNC: 102 MMOL/L (ref 99–110)
CLARITY: ABNORMAL
CO2: 14 MMOL/L (ref 21–32)
COLOR: YELLOW
CREAT SERPL-MCNC: 2 MG/DL (ref 0.6–1.1)
EKG ATRIAL RATE: 125 BPM
EKG DIAGNOSIS: NORMAL
EKG P AXIS: 67 DEGREES
EKG P-R INTERVAL: 128 MS
EKG Q-T INTERVAL: 286 MS
EKG QRS DURATION: 80 MS
EKG QTC CALCULATION (BAZETT): 412 MS
EKG R AXIS: 43 DEGREES
EKG T AXIS: 41 DEGREES
EKG VENTRICULAR RATE: 125 BPM
EPITHELIAL CELLS, UA: 4 /HPF (ref 0–5)
GFR AFRICAN AMERICAN: 34
GFR NON-AFRICAN AMERICAN: 28
GLUCOSE BLD-MCNC: 163 MG/DL (ref 70–99)
GLUCOSE URINE: NEGATIVE MG/DL
HCO3 VENOUS: 15 MMOL/L (ref 23–29)
HCT VFR BLD CALC: 42.6 % (ref 36–48)
HEMOGLOBIN: 13.4 G/DL (ref 12–16)
HYALINE CASTS: 12 /LPF (ref 0–8)
KETONES, URINE: ABNORMAL MG/DL
LEUKOCYTE ESTERASE, URINE: ABNORMAL
MCH RBC QN AUTO: 24.9 PG (ref 26–34)
MCHC RBC AUTO-ENTMCNC: 31.4 G/DL (ref 31–36)
MCV RBC AUTO: 79.2 FL (ref 80–100)
METHEMOGLOBIN VENOUS: 0 %
MICROSCOPIC EXAMINATION: YES
NITRITE, URINE: NEGATIVE
O2 CONTENT, VEN: 19 VOL %
O2 SAT, VEN: >100 %
O2 THERAPY: ABNORMAL
PCO2, VEN: 22.1 MMHG (ref 40–50)
PDW BLD-RTO: 16.5 % (ref 12.4–15.4)
PH UA: 5.5 (ref 5–8)
PH VENOUS: 7.44 (ref 7.35–7.45)
PLATELET # BLD: 156 K/UL (ref 135–450)
PMV BLD AUTO: 8.6 FL (ref 5–10.5)
PO2, VEN: 188 MMHG (ref 25–40)
POTASSIUM REFLEX MAGNESIUM: 4.1 MMOL/L (ref 3.5–5.1)
PROTEIN UA: 100 MG/DL
RBC # BLD: 5.38 M/UL (ref 4–5.2)
RBC UA: 5 /HPF (ref 0–4)
SODIUM BLD-SCNC: 133 MMOL/L (ref 136–145)
SPECIFIC GRAVITY UA: 1.01 (ref 1–1.03)
TCO2 CALC VENOUS: 35 MMOL/L
TROPONIN: <0.01 NG/ML
URINE REFLEX TO CULTURE: YES
URINE TYPE: ABNORMAL
UROBILINOGEN, URINE: 0.2 E.U./DL
WBC # BLD: 4.5 K/UL (ref 4–11)
WBC UA: 51 /HPF (ref 0–5)

## 2021-02-15 PROCEDURE — 87088 URINE BACTERIA CULTURE: CPT

## 2021-02-15 PROCEDURE — 82803 BLOOD GASES ANY COMBINATION: CPT

## 2021-02-15 PROCEDURE — 80048 BASIC METABOLIC PNL TOTAL CA: CPT

## 2021-02-15 PROCEDURE — 2580000003 HC RX 258: Performed by: HOSPITALIST

## 2021-02-15 PROCEDURE — 6360000002 HC RX W HCPCS: Performed by: PHYSICIAN ASSISTANT

## 2021-02-15 PROCEDURE — 2500000003 HC RX 250 WO HCPCS: Performed by: HOSPITALIST

## 2021-02-15 PROCEDURE — 81003 URINALYSIS AUTO W/O SCOPE: CPT

## 2021-02-15 PROCEDURE — 94761 N-INVAS EAR/PLS OXIMETRY MLT: CPT

## 2021-02-15 PROCEDURE — 2580000003 HC RX 258: Performed by: INTERNAL MEDICINE

## 2021-02-15 PROCEDURE — 2500000003 HC RX 250 WO HCPCS: Performed by: INTERNAL MEDICINE

## 2021-02-15 PROCEDURE — 87086 URINE CULTURE/COLONY COUNT: CPT

## 2021-02-15 PROCEDURE — 6360000002 HC RX W HCPCS: Performed by: INTERNAL MEDICINE

## 2021-02-15 PROCEDURE — 85027 COMPLETE CBC AUTOMATED: CPT

## 2021-02-15 PROCEDURE — 6370000000 HC RX 637 (ALT 250 FOR IP): Performed by: NURSE PRACTITIONER

## 2021-02-15 PROCEDURE — 3430000000 HC RX DIAGNOSTIC RADIOPHARMACEUTICAL: Performed by: PHYSICIAN ASSISTANT

## 2021-02-15 PROCEDURE — 78580 LUNG PERFUSION IMAGING: CPT

## 2021-02-15 PROCEDURE — 6370000000 HC RX 637 (ALT 250 FOR IP): Performed by: PHYSICIAN ASSISTANT

## 2021-02-15 PROCEDURE — A9540 TC99M MAA: HCPCS | Performed by: PHYSICIAN ASSISTANT

## 2021-02-15 PROCEDURE — 36415 COLL VENOUS BLD VENIPUNCTURE: CPT

## 2021-02-15 PROCEDURE — 70450 CT HEAD/BRAIN W/O DYE: CPT

## 2021-02-15 PROCEDURE — 6370000000 HC RX 637 (ALT 250 FOR IP): Performed by: HOSPITALIST

## 2021-02-15 PROCEDURE — 93010 ELECTROCARDIOGRAM REPORT: CPT | Performed by: INTERNAL MEDICINE

## 2021-02-15 PROCEDURE — 6360000002 HC RX W HCPCS: Performed by: EMERGENCY MEDICINE

## 2021-02-15 PROCEDURE — 94640 AIRWAY INHALATION TREATMENT: CPT

## 2021-02-15 PROCEDURE — 2580000003 HC RX 258: Performed by: PHYSICIAN ASSISTANT

## 2021-02-15 PROCEDURE — 1200000000 HC SEMI PRIVATE

## 2021-02-15 PROCEDURE — 87186 SC STD MICRODIL/AGAR DIL: CPT

## 2021-02-15 PROCEDURE — 84484 ASSAY OF TROPONIN QUANT: CPT

## 2021-02-15 PROCEDURE — 86140 C-REACTIVE PROTEIN: CPT

## 2021-02-15 RX ORDER — GABAPENTIN 100 MG/1
200 CAPSULE ORAL ONCE
Status: COMPLETED | OUTPATIENT
Start: 2021-02-15 | End: 2021-02-15

## 2021-02-15 RX ORDER — LORAZEPAM 2 MG/ML
1 INJECTION INTRAMUSCULAR ONCE
Status: COMPLETED | OUTPATIENT
Start: 2021-02-15 | End: 2021-02-15

## 2021-02-15 RX ORDER — BUDESONIDE 0.25 MG/2ML
0.25 INHALANT ORAL 2 TIMES DAILY
Status: DISCONTINUED | OUTPATIENT
Start: 2021-02-15 | End: 2021-02-15 | Stop reason: ALTCHOICE

## 2021-02-15 RX ORDER — ONDANSETRON 2 MG/ML
4 INJECTION INTRAMUSCULAR; INTRAVENOUS EVERY 6 HOURS PRN
Status: DISCONTINUED | OUTPATIENT
Start: 2021-02-15 | End: 2021-02-17 | Stop reason: HOSPADM

## 2021-02-15 RX ORDER — SODIUM CHLORIDE 0.9 % (FLUSH) 0.9 %
10 SYRINGE (ML) INJECTION PRN
Status: DISCONTINUED | OUTPATIENT
Start: 2021-02-15 | End: 2021-02-17 | Stop reason: HOSPADM

## 2021-02-15 RX ORDER — ACETAMINOPHEN 325 MG/1
650 TABLET ORAL EVERY 6 HOURS PRN
Status: DISCONTINUED | OUTPATIENT
Start: 2021-02-15 | End: 2021-02-17 | Stop reason: HOSPADM

## 2021-02-15 RX ORDER — SODIUM CHLORIDE 9 MG/ML
INJECTION, SOLUTION INTRAVENOUS CONTINUOUS
Status: DISCONTINUED | OUTPATIENT
Start: 2021-02-15 | End: 2021-02-15

## 2021-02-15 RX ORDER — ALBUTEROL SULFATE 90 UG/1
2 AEROSOL, METERED RESPIRATORY (INHALATION) 4 TIMES DAILY
Status: DISCONTINUED | OUTPATIENT
Start: 2021-02-15 | End: 2021-02-17 | Stop reason: HOSPADM

## 2021-02-15 RX ORDER — SODIUM CHLORIDE 0.9 % (FLUSH) 0.9 %
10 SYRINGE (ML) INJECTION EVERY 12 HOURS SCHEDULED
Status: DISCONTINUED | OUTPATIENT
Start: 2021-02-15 | End: 2021-02-17 | Stop reason: HOSPADM

## 2021-02-15 RX ORDER — ACETAMINOPHEN 650 MG/1
650 SUPPOSITORY RECTAL EVERY 6 HOURS PRN
Status: DISCONTINUED | OUTPATIENT
Start: 2021-02-15 | End: 2021-02-17 | Stop reason: HOSPADM

## 2021-02-15 RX ORDER — DULOXETIN HYDROCHLORIDE 60 MG/1
60 CAPSULE, DELAYED RELEASE ORAL DAILY
Status: DISCONTINUED | OUTPATIENT
Start: 2021-02-15 | End: 2021-02-17 | Stop reason: HOSPADM

## 2021-02-15 RX ORDER — DEXAMETHASONE 4 MG/1
6 TABLET ORAL DAILY
Status: DISCONTINUED | OUTPATIENT
Start: 2021-02-15 | End: 2021-02-17 | Stop reason: HOSPADM

## 2021-02-15 RX ORDER — AMLODIPINE BESYLATE 5 MG/1
10 TABLET ORAL DAILY
Status: DISCONTINUED | OUTPATIENT
Start: 2021-02-15 | End: 2021-02-15

## 2021-02-15 RX ORDER — BUSPIRONE HYDROCHLORIDE 5 MG/1
10 TABLET ORAL 3 TIMES DAILY
Status: DISCONTINUED | OUTPATIENT
Start: 2021-02-15 | End: 2021-02-17 | Stop reason: HOSPADM

## 2021-02-15 RX ORDER — TIZANIDINE 4 MG/1
6 TABLET ORAL EVERY 8 HOURS PRN
Status: DISCONTINUED | OUTPATIENT
Start: 2021-02-15 | End: 2021-02-17 | Stop reason: HOSPADM

## 2021-02-15 RX ORDER — FLUTICASONE PROPIONATE 110 UG/1
1 AEROSOL, METERED RESPIRATORY (INHALATION) 2 TIMES DAILY
Status: DISCONTINUED | OUTPATIENT
Start: 2021-02-15 | End: 2021-02-17 | Stop reason: HOSPADM

## 2021-02-15 RX ORDER — FAMOTIDINE 20 MG/1
20 TABLET, FILM COATED ORAL DAILY
Status: DISCONTINUED | OUTPATIENT
Start: 2021-02-15 | End: 2021-02-17 | Stop reason: HOSPADM

## 2021-02-15 RX ORDER — ALBUTEROL SULFATE 90 UG/1
2 AEROSOL, METERED RESPIRATORY (INHALATION) 4 TIMES DAILY PRN
Status: DISCONTINUED | OUTPATIENT
Start: 2021-02-15 | End: 2021-02-17 | Stop reason: HOSPADM

## 2021-02-15 RX ADMIN — FAMOTIDINE 20 MG: 20 TABLET, FILM COATED ORAL at 08:23

## 2021-02-15 RX ADMIN — TIZANIDINE 6 MG: 4 TABLET ORAL at 23:30

## 2021-02-15 RX ADMIN — TIZANIDINE 6 MG: 4 TABLET ORAL at 04:28

## 2021-02-15 RX ADMIN — DULOXETINE HYDROCHLORIDE 60 MG: 60 CAPSULE, DELAYED RELEASE ORAL at 08:24

## 2021-02-15 RX ADMIN — BUSPIRONE HYDROCHLORIDE 10 MG: 5 TABLET ORAL at 08:23

## 2021-02-15 RX ADMIN — DEXAMETHASONE 6 MG: 4 TABLET ORAL at 08:23

## 2021-02-15 RX ADMIN — Medication 2 PUFF: at 20:56

## 2021-02-15 RX ADMIN — ENOXAPARIN SODIUM 80 MG: 80 INJECTION SUBCUTANEOUS at 17:06

## 2021-02-15 RX ADMIN — BUSPIRONE HYDROCHLORIDE 10 MG: 5 TABLET ORAL at 20:36

## 2021-02-15 RX ADMIN — Medication 2 PUFF: at 15:23

## 2021-02-15 RX ADMIN — SODIUM BICARBONATE: 84 INJECTION, SOLUTION INTRAVENOUS at 05:54

## 2021-02-15 RX ADMIN — Medication 1 PUFF: at 20:56

## 2021-02-15 RX ADMIN — GABAPENTIN 200 MG: 100 CAPSULE ORAL at 22:05

## 2021-02-15 RX ADMIN — SODIUM CHLORIDE: 9 INJECTION, SOLUTION INTRAVENOUS at 01:16

## 2021-02-15 RX ADMIN — SODIUM BICARBONATE: 84 INJECTION, SOLUTION INTRAVENOUS at 12:45

## 2021-02-15 RX ADMIN — BUSPIRONE HYDROCHLORIDE 10 MG: 5 TABLET ORAL at 12:36

## 2021-02-15 RX ADMIN — MORPHINE SULFATE 4 MG: 4 INJECTION, SOLUTION INTRAMUSCULAR; INTRAVENOUS at 00:10

## 2021-02-15 RX ADMIN — Medication 2 PUFF: at 08:11

## 2021-02-15 RX ADMIN — Medication 3.99 MILLICURIE: at 11:17

## 2021-02-15 RX ADMIN — Medication 10 ML: at 11:17

## 2021-02-15 RX ADMIN — SODIUM BICARBONATE: 84 INJECTION, SOLUTION INTRAVENOUS at 18:09

## 2021-02-15 RX ADMIN — AMLODIPINE BESYLATE 10 MG: 5 TABLET ORAL at 08:23

## 2021-02-15 RX ADMIN — ACETAMINOPHEN 650 MG: 325 TABLET ORAL at 04:28

## 2021-02-15 RX ADMIN — ENOXAPARIN SODIUM 80 MG: 80 INJECTION SUBCUTANEOUS at 00:10

## 2021-02-15 RX ADMIN — Medication 10 ML: at 20:37

## 2021-02-15 RX ADMIN — ACETAMINOPHEN 650 MG: 325 TABLET ORAL at 12:36

## 2021-02-15 RX ADMIN — Medication 2 PUFF: at 20:55

## 2021-02-15 RX ADMIN — ONDANSETRON 4 MG: 2 INJECTION INTRAMUSCULAR; INTRAVENOUS at 04:28

## 2021-02-15 RX ADMIN — Medication 1 PUFF: at 08:12

## 2021-02-15 RX ADMIN — LORAZEPAM 1 MG: 2 INJECTION INTRAMUSCULAR; INTRAVENOUS at 02:48

## 2021-02-15 RX ADMIN — ENOXAPARIN SODIUM 80 MG: 80 INJECTION SUBCUTANEOUS at 20:36

## 2021-02-15 RX ADMIN — TIZANIDINE 6 MG: 4 TABLET ORAL at 12:36

## 2021-02-15 RX ADMIN — Medication 2 PUFF: at 08:10

## 2021-02-15 ASSESSMENT — PAIN SCALES - GENERAL
PAINLEVEL_OUTOF10: 10

## 2021-02-15 ASSESSMENT — PAIN DESCRIPTION - PAIN TYPE: TYPE: ACUTE PAIN

## 2021-02-15 NOTE — ED PROVIDER NOTES
[facility]        Pt Name: Brent Tejeda  MRN: [id number]  [place] 1980  Date of evaluation: 2/14/2021  Provider: Ab Beard PA-C  PCP: LEESA Allison CNP     I have seen and evaluated this patient with my supervising physician Maribel Trejo, *. CHIEF COMPLAINT       Chief Complaint   Patient presents with    Shortness of Breath     x 1 day - hx of asthma - feels like she can't get air. Pt hasn't eaten in 5 days and passed out this morning hitting her head       HISTORY OF PRESENT ILLNESS   (Location, Timing/Onset, Context/Setting, Quality, Duration, Modifying Factors, Severity, Associated Signs and Symptoms)  Note limiting factors. Brent Tejeda is a 47 y.o. female who presents for evaluation of shortness of breath that started 2 to 3 days ago. Patient states that for the past 5 days she has had generalized body aches decreased appetite and p.o. intake. States that she is still drinking. She does have history of asthma as well. States that she was not able to find her breathing treatments. She denies any known sick contacts with similar symptoms, states that she works from home and has not had any known Covid exposures. She does have cough, nonproductive. No significant chest pain. She did have a syncopal episode earlier today as well and states that she fell and hit her head. Denies significant headache. No neck pain. No abdominal pain. She has had some nausea. No vomiting or diarrhea. No urinary complaints. She has no other complaints or concerns at this time. Nursing Notes were all reviewed and agreed with or any disagreements were addressed in the HPI. REVIEW OF SYSTEMS    (2-9 systems for level 4, 10 or more for level 5)     Review of Systems   Constitutional: Positive for chills. Negative for appetite change and fever. HENT: Negative for congestion and rhinorrhea. Eyes: Negative for visual disturbance. Respiratory: Positive for cough and shortness of breath. Negative for wheezing. Cardiovascular: Negative for chest pain. Gastrointestinal: Positive for nausea. Negative for abdominal pain, diarrhea and vomiting. Genitourinary: Negative for difficulty urinating, dysuria and hematuria. Musculoskeletal: Positive for myalgias. Negative for neck pain and neck stiffness. Skin: Negative for rash. Neurological: Positive for syncope, weakness and light-headedness. Negative for dizziness, numbness and headaches. Positives and Pertinent negatives as per HPI. Except as noted above in the ROS, all other systems were reviewed and negative.        PAST MEDICAL HISTORY     Past Medical History:   Diagnosis Date    Anxiety     Asthma     Constipation 2014    Conversion disorder     Depression     Fibromyalgia     Graves disease     History of blood transfusion     Hx of blood clots      LT LUNG W/ PNEUMONIA    Hypertension     Joint pain 2014    Various joints (back, hands, knees, hips), recurrent flares since age 27, RF+ at one point, never fully worked up for rheumatologic dz yet    Kidney stone     Multiple    Localized rash 2014    Recurrent, on inner surface of upper arms, q 3 months, sometimes on chest, no facial involvement    Lupus ([address]     MDRO (multiple drug resistant organisms) resistance     PONV (postoperative nausea and vomiting)          SURGICAL HISTORY     Past Surgical History:   Procedure Laterality Date     SECTION       X 2    CYSTOSCOPY  2018    CYSTOSCOPY URETEROSCOPY WITH HOLMIUM LASER LITHOTRIPSY FOR 5 MM STONE, RIGHT STENT PLACEMENT    OTHER SURGICAL HISTORY  2016    CYSTOSCOPY, BILATERAL RETROGRADES, RIGHT URETEROSCOPY,    TONSILLECTOMY      TUBAL LIGATION           CURRENTMEDICATIONS       Previous Medications    ALBUTEROL SULFATE  (90 BASE) MCG/ACT INHALER    Inhale 2 puffs into the lungs 4 times daily as needed for Wheezing    AMLODIPINE (NORVASC) 10 MG TABLET    Take 1 tablet by mouth daily    BECLOMETHASONE (QVAR REDIHALER) 40 MCG/ACT AERB INHALER    Inhale 1 puff into the lungs 2 times daily    BLOOD PRESSURE KIT    1 kit by Does not apply route daily    BUSPIRONE (BUSPAR) 10 MG TABLET    Take 1 tablet by mouth 2 times daily as needed (anxiety)    DULOXETINE (CYMBALTA) 60 MG EXTENDED RELEASE CAPSULE    Take 1 capsule by mouth daily    EPINEPHRINE (EPIPEN 2-LUANNE) 0.3 MG/0.3ML SOAJ INJECTION    Inject 0.3 mLs into the muscle once for 1 dose Use as directed for allergic reaction    FLUCONAZOLE (DIFLUCAN) 150 MG TABLET    Take one tablet by mouth today and may repeat in 3 days if no improvement. HANDICAP PLACARD MISC    by Does not apply route Unable able to walk long distance due to medical condition. Expires in 5 years. HYDROCHLOROTHIAZIDE (HYDRODIURIL) 25 MG TABLET    Take 1 tablet by mouth daily    IPRATROPIUM-ALBUTEROL (DUONEB) 0.5-2.5 (3) MG/3ML SOLN NEBULIZER SOLUTION    Inhale 3 mLs into the lungs 4 times daily    LEVONORGESTREL-ETHINYL ESTRADIOL (SEASONALE) 0.15-0.03 MG PER TABLET    Take 1 tablet by mouth daily    NEBULIZERS (COMPRESSOR/NEBULIZER) MISC    1 each by Does not apply route daily    ONDANSETRON (ZOFRAN) 4 MG TABLET    Take 1 tablet by mouth daily as needed for Nausea or Vomiting    PANTOPRAZOLE (PROTONIX) 20 MG TABLET    Take 1 tablet by mouth 2 times daily    PREGABALIN (LYRICA) 150 MG CAPSULE    Take 1 capsule by mouth 2 times daily for 30 days. Take one in am with breakfast and one at dinner time.     TIZANIDINE (ZANAFLEX) 6 MG CAPSULE    TAKE ONE CAPSULE BY MOUTH THREE TIMES A DAY AT LEAST EIGHT HOURS APART         ALLERGIES     Ambien [zolpidem tartrate], Sertraline, Sudafed [pseudoephedrine hcl], and Amitriptyline hcl    FAMILYHISTORY       Family History   Problem Relation Age of Onset    Cancer Maternal Grandmother         Lung Ca          SOCIAL HISTORY       Social History     Tobacco Use    Smoking status: Never Smoker    Smokeless tobacco: Never Used   Substance Use Topics    Alcohol use: Yes     Alcohol/week: 0.0 standard drinks     Comment: occasional     Drug use: Yes     Types: Marijuana     Comment: medical card       SCREENINGS             PHYSICAL EXAM    (up to 7 for level 4, 8 or more for level 5)     ED Triage Vitals [02/14/21 2018]   BP Temp Temp src Pulse Resp SpO2 Height Weight   (!) 144/84 98.4 °F (36.9 °C) -- 145 24 92 % -- 183 lb (83 kg)       Physical Exam  Vitals signs and nursing note reviewed. Constitutional:       General: She is in acute distress. Appearance: She is well-developed. She is ill-appearing. She is not diaphoretic. HENT:      Head: Normocephalic and atraumatic. Right Ear: External ear normal.      Left Ear: External ear normal.      Nose: Nose normal.   Eyes:      General:         Right eye: No discharge. Left eye: No discharge. Neck:      Musculoskeletal: Normal range of motion and neck supple. Cardiovascular:      Rate and Rhythm: Regular rhythm. Tachycardia present. Heart sounds: Normal heart sounds. Pulmonary:      Effort: Pulmonary effort is normal. Tachypnea present. No respiratory distress. Breath sounds: No decreased breath sounds, wheezing or rhonchi. Abdominal:      General: There is no distension. Palpations: Abdomen is soft. Tenderness: There is no abdominal tenderness. Musculoskeletal: Normal range of motion. Skin:     General: Skin is warm and dry. Neurological:      Mental Status: She is alert and oriented to person, place, and time.    Psychiatric:         Behavior: Behavior normal.         DIAGNOSTIC RESULTS   LABS:    Labs Reviewed   CBC WITH AUTO DIFFERENTIAL - Abnormal; Notable for the following components:       Result Value    RBC 5.95 (*)     MCV 77.3 (*)     MCH 24.7 (*)     RDW 16.2 (*)     All other components within normal limits   [address],  [place], [place]   Phone [phone]   CULTURE, BLOOD 1   CULTURE, BLOOD 2   HCG, SERUM, QUALITATIVE    Narrative:     Performed at:  [facility]  [address], [place]   Phone [phone]   PROCALCITONIN    Narrative:     Performed at:  [facility]  [address],  [place], [place]   Phone [phone]   LACTIC ACID, PLASMA    Narrative:     Performed at:  [facility]  [address],  [place], [place]   Phone [phone]   URINE RT REFLEX TO CULTURE       All other labs were within normal range or not returned as of this dictation. EKG: All EKG's are interpreted by the Emergency Department Physician in the absence of a cardiologist.  Please see their note for interpretation of EKG. RADIOLOGY:   Non-plain film images such as CT, Ultrasound and MRI are read by the radiologist. Plain radiographic images are visualized and preliminarily interpreted by the ED Provider with the below findings:        Interpretation per the Radiologist below, if available at the time of this note:    XR CHEST PORTABLE   Final Result   No acute cardiopulmonary process. No focal airspace disease. CT CHEST PULMONARY EMBOLISM W CONTRAST    (Results Pending)     No results found.         PROCEDURES   Unless otherwise noted below, none     Procedures    CRITICAL CARE TIME   N/A    CONSULTS:  IP CONSULT TO HOSPITALIST      EMERGENCY DEPARTMENT COURSE and DIFFERENTIAL DIAGNOSIS/MDM:   Vitals:    Vitals:    02/14/21 2100 02/14/21 2130 02/14/21 2158 02/14/21 2230   BP: (!) 147/69 (!) 143/71  (!) 106/50   Pulse:  119 119 110   Resp:       Temp:       SpO2: 99% 98% 99% 99%   Weight:           Patient was given the following medications:  Medications   enoxaparin (LOVENOX) injection 80 mg (has no administration in time range)   dexamethasone (PF) (DECADRON) injection 6 mg (6 mg Intravenous Given 2/14/21 2110)   ipratropium-albuterol (DUONEB) nebulizer solution 1 ampule (1 ampule Inhalation Given 2/14/21 2040)   ketorolac (TORADOL) injection 30 mg (30 mg Intravenous Given 2/14/21 2110)   ondansetron (ZOFRAN) injection 4 mg (4 mg Intravenous Given 2/14/21 2110)   0.9 % sodium chloride bolus (0 mLs Intravenous Stopped 2/14/21 2240)   morphine injection 4 mg (4 mg Intravenous Given 2/14/21 2158)           Patient presents for evaluation of shortness of breath, body aches, cough. On exam, she looks like she does not feel well and is in mild respiratory distress, tachypneic and tachycardic. She does have history of asthma but actually has pretty good aeration with no obvious wheezing rales or rhonchi. Chest is nontender and abdomen is benign. Vitals otherwise stable. Maintaining adequate oxygen saturations. She was given dose of Decadron and DuoNeb breathing treatment will be reevaluated. Please see attending note for EKG interpretation. On reevaluation, patient was still complaining of severe pain. She was given Toradol and Zofran initially and subsequently morphine. CBC and CMP are remarkable for an acute kidney injury the creatinine of 2.4. No leukocytosis or lymphopenia. Lipase 80. Beta-hCG is negative. VBG is unremarkable. Procalcitonin is 0.13. Lactic acid 1.5. D-dimer is markedly elevated at 2756. Chest x-ray shows no acute cardiopulmonary process or focal airspace disease, however, due to elevated D-dimer I wanted to order CT, however, did not's due to acute kidney injury. She was covered empirically with 1 mg/kg Lovenox and will be admitted for further evaluation management of this. Hospitalist will resume care the patient at this time. Patient was informed and agreeable. She is stable for admission. Critical Care  There was a high probability of life-threatening clinical deterioration in the patient's condition requiring my urgent intervention.   Total critical care time with

## 2021-02-15 NOTE — PROGRESS NOTES
Patient with continued complaints of generalized discomfort voiced at this time. Offered patient zanaflex and tylenol but patient declines due to nausea. Message sent to MD regarding patient pain and patient requesting something IV at this time. Awaiting response.

## 2021-02-15 NOTE — CARE COORDINATION
CM reviewed chart. Covid +, currently on room air. Nephrology following. CM will follow for d/c plan.     Rikki Carpenter RN, BSN  [phone]

## 2021-02-15 NOTE — PROGRESS NOTES
Clinical Pharmacy Note: Pharmacy to Dose Medication    Juana Byrne is a 47 y.o. female ; consult received from Dr. Ariel Deshpande to renally dose medications. Assessment/Plan:  Current medications that could require renal dose adjustment include lovenox, famotidine, and duloxetine. Currently ordered doses of medications are appropriate for renal function, no changes required at this time. Changes in regimen will be determined based on culture results, renal function, and clinical response. Pharmacy will continue to monitor and adjust regimen as necessary. Allergies:  Ambien [zolpidem tartrate], Sertraline, Sudafed [pseudoephedrine hcl], and Amitriptyline hcl       Recent Labs     02/14/21  2034 02/15/21  0431   CREATININE 2.4* 2.0*       Recent Labs     02/14/21  2034 02/15/21  0431   WBC 5.9 4.5       Ht Readings from Last 1 Encounters:   02/15/21 5' 7\" (1.702 m)        Wt Readings from Last 1 Encounters:   02/15/21 183 lb 6.8 oz (83.2 kg)         Estimated Creatinine Clearance: 42 mL/min (A) (based on SCr of 2 mg/dL (H)).       Thank you for the consult,    Tano Holloway, PharmD  2/15/2021 11:41 AM

## 2021-02-15 NOTE — PROGRESS NOTES
Inhalation BID    enoxaparin  1 mg/kg Subcutaneous BID     PRN Meds: albuterol sulfate HFA, tiZANidine, sodium chloride flush, magnesium hydroxide, acetaminophen **OR** acetaminophen, ondansetron, sodium chloride flush      Intake/Output Summary (Last 24 hours) at 2/15/2021 1322  Last data filed at 2/15/2021 0819  Gross per 24 hour   Intake 200 ml   Output --   Net 200 ml       Physical Exam Performed:    /70   Pulse 74   Temp 97.8 °F (36.6 °C) (Oral)   Resp 18   Ht 5' 7\" (1.702 m)   Wt 183 lb 6.8 oz (83.2 kg)   SpO2 100%   BMI 28.73 kg/m²     General appearance: No apparent distress, appears stated age and cooperative. HEENT: Pupils equal, round, and reactive to light. Conjunctivae/corneas clear. Neck: Supple, with full range of motion. No jugular venous distention. Trachea midline. Respiratory:  Normal respiratory effort. Mild diffuse wheezing the patient cardiovascular: Regular rate and rhythm with normal S1/S2 without murmurs, rubs or gallops. Abdomen: Soft, non-tender, non-distended with normal bowel sounds. Musculoskeletal: No clubbing, cyanosis or edema bilaterally. Full range of motion without deformity. Skin: Skin color, texture, turgor normal.  No rashes or lesions. Neurologic:  Neurovascularly intact without any focal sensory/motor deficits. Cranial nerves: II-XII intact, grossly non-focal.  Psychiatric: Alert and oriented, thought content appropriate, normal insight  Capillary Refill: Brisk,< 3 seconds   Peripheral Pulses: +2 palpable, equal bilaterally       Labs:   Recent Labs     02/14/21  2034 02/15/21  0431   WBC 5.9 4.5   HGB 14.7 13.4   HCT 46.1 42.6    156     Recent Labs     02/14/21  2034 02/15/21  0431   * 133*   K 3.7 4.1   CL 94* 102   CO2 17* 14*   BUN 34* 34*   CREATININE 2.4* 2.0*   CALCIUM 9.0 8.5     Recent Labs     02/14/21 2034   AST 52*   ALT 39   BILITOT 0.3   ALKPHOS 80     No results for input(s): INR in the last 72 hours.   Recent Labs

## 2021-02-15 NOTE — CONSULTS
Marijean Hough, MD Arna Bamberger, MD Marvell Nordmann, MD               Office: [phone]                      Fax: [phone]              [facility]                   NEPHROLOGY INITIAL CONSULT NOTE:     PATIENT NAME: Maria G Lord  : 1980  MRN: [id number]  REASON FOR CONSULT: For evaluation and management of Acute Kidney Injury . (My recommendations will be communicated by way of shared medical record.)      RECOMMENDATIONS:   - change IVF to bicarb w/ 150 mEq at 100 cc/h  - hold HCTZ,    - decrease Lyrica   - check blood gas   - check urine lytes,    COVID-19 management:  -not needing Ramdesivir or Convalescent plasma    -IV Decadron  -A/C per protocol - to prevent renal microthrombi too   -Pneumonia antibiotic coverage - f/u PCT  -Hypoxia monitoring - currently on RA     - monitor PVR w/ bladder scan for lopez insertion need. - no need for dialysis , but at higher risk for decompensation, needing closer monitoring. D/C plan from renal stand point:  - 2-3 days       IMPRESSION:       JUAN (on no CKD):   - Oligouric  - BL Scr- 0.8  ---> 2.4 on admission  -: Etiology of JUAN - presumed pre-renal / ATN     - other differentials: COVID related nephropathy + unlikely GN / TI / TMA process  - UA : reviewed, ketones, prote, LE, hyalinize cast = pre-renal / ATN ? UTI WBC    Associated problems:   - Volume status: mild hypo-volemic  : BP: lower  : Na: hyponatremia - moderate - 129   - Azotemia: pre-renal   - Electrolytes: K: WNL  - Acid-Base: acidosis - higher AGMA - d/to JUAN + some respirator     Other major problems: Management per primary and other consulting teams.    # COVID  +, no hypoxia   Hospital Problems           Last Modified POA    JUAN (acute kidney injury) ([facility] [address] 2021 Yes        : other supportive care :   - Check daily renal function panel with electrolytes-phosphorus  - Strict monitoring of I/Os, daily weight  - Renal feeds/diet  - Current medications reviewed. - Nephrotoxic medications have been discontinued. - Dose adjusted and appropriate. - Dose meds for eGFR <15 mL/min/1.73m2 during JUAN    - Avoid heavy opioids due to renal failure - may use very low dose dilaudid / fentanyl with close monitoring of CNS and respiratory depression. Please refer to the orders. High Complexity. Multiple complex problems. Discussed with patient's treatment team- nurseing  Thank you for allowing me to participate in this patient's care. Please do not hesitate to contact me anytime. We will follow along with you. Soraida Perera MD  [facility] [id number] [place] [address] [place]: [phone] or Via [employer]  Fax: [phone]      Patient Active Problem List   Diagnosis    Joint pain    Constipation    Localized rash    HTN (hypertension)    Yeast vaginitis    Myalgia    Fibromyalgia    Hyperthyroidism    Nephroureterolithiasis    Migraine with aura and with status migrainosus, not intractable    Kidney stone    Insomnia due to other mental disorder    Mild intermittent asthma without complication    Moderate episode of recurrent major depressive disorder (HCC)    PTSD (post-traumatic stress disorder)    Paresthesia of right arm and leg    Focal motor deficit    Right sided weakness    Generalized anxiety disorder    Depression    Anxiety    Asthma    Conversion disorder    JUAN (acute kidney injury) ([facility] [address]       ========================================================   ========================================================      This patient is COVID-19 +, so in a strict isolation, as per current protocol; So in order to preserve PPE supply and to avoid unnecessary exposure to prevent transmission of COVID-19; this patient was NOT examined face to face, as risk of contact outweighs the benefits and likely would not change much of my clinical management.    HPI, review of system and physical exam was limited, as it was mainly obtained from chart review and the primary team. But all relevant records and diagnostic tests were reviewed, including laboratory and imaging results. Patient's care is being coordinated with the primary service. CHIEF COMPLAINT:   Chief Complaint   Patient presents with    Shortness of Breath     x 1 day - hx of asthma - feels like she can't get air. Pt hasn't eaten in 5 days and passed out this morning hitting her head     History Obtained From:    + treatment team + Electronic Medical Records    HPI: Ms. Kiarra Brady is a 47 y.o. female with significant past medical history as below:   Past Medical History:   Diagnosis Date    Anxiety     Asthma     Constipation 1/13/2014    Conversion disorder     Depression     Fibromyalgia     Graves disease     History of blood transfusion     Hx of blood clots     2015 LT LUNG W/ PNEUMONIA    Hypertension     Joint pain 1/13/2014    Various joints (back, hands, knees, hips), recurrent flares since age 27, RF+ at one point, never fully worked up for rheumatologic dz yet    Kidney stone     Multiple    Localized rash 1/13/2014    Recurrent, on inner surface of upper arms, q 3 months, sometimes on chest, no facial involvement    Lupus ([facility] [address]     MDRO (multiple drug resistant organisms) resistance     PONV (postoperative nausea and vomiting)       Presents with Shortness of Breath (x 1 day - hx of asthma - feels like she can't get air. Pt hasn't eaten in 5 days and passed out this morning hitting her head)    Admitted with JUAN (acute kidney injury) ([facility] [address] [N17.9]   Found to have JUAN , so we are called for that. Regarding: JUAN   · Duration: acute   · Location: kidneys  · Severity: Severe   · Timing: continous  · Context (ex: related to condition):  , refer to my assessment / impression. · Modifying factors (ex: medications, interventions):   , refer to my plan / recommendation.   · Associated signs & symptoms (ex: edema, SOB): As mentioned above in CC and HPI      Past medical, Surgical, Social, Family medical history reviewed by me. PAST MEDICAL HISTORY:   Past Medical History:   Diagnosis Date    Anxiety     Asthma     Constipation 2014    Conversion disorder     Depression     Fibromyalgia     Graves disease     History of blood transfusion     Hx of blood clots      LT LUNG W/ PNEUMONIA    Hypertension     Joint pain 2014    Various joints (back, hands, knees, hips), recurrent flares since age 27, RF+ at one point, never fully worked up for rheumatologic dz yet    Kidney stone     Multiple    Localized rash 2014    Recurrent, on inner surface of upper arms, q 3 months, sometimes on chest, no facial involvement    Lupus ([facility] [address]     MDRO (multiple drug resistant organisms) resistance     PONV (postoperative nausea and vomiting)      PAST SURGICAL HISTORY:   Past Surgical History:   Procedure Laterality Date     SECTION       X 2    CYSTOSCOPY  2018    CYSTOSCOPY URETEROSCOPY WITH HOLMIUM LASER LITHOTRIPSY FOR 5 MM STONE, RIGHT STENT PLACEMENT    OTHER SURGICAL HISTORY  2016    CYSTOSCOPY, BILATERAL RETROGRADES, RIGHT URETEROSCOPY,    TONSILLECTOMY      TUBAL LIGATION       FAMILY HISTORY:   Family History   Problem Relation Age of Onset    Cancer Maternal Grandmother         Lung Ca     SOCIAL HISTORY:   Social History     Socioeconomic History    Marital status: Single     Spouse name: None    Number of children: 5    Years of education: None    Highest education level: None   Occupational History    None   Social Needs    Financial resource strain: None    Food insecurity     Worry: None     Inability: None    Transportation needs     Medical: None     Non-medical: None   Tobacco Use    Smoking status: Never Smoker    Smokeless tobacco: Never Used   Substance and Sexual Activity    Alcohol use: Yes     Alcohol/week: 0.0 standard drinks     Comment: occasional     Drug use:  Yes Types: Marijuana     Comment: medical card    Sexual activity: Yes     Partners: Male   Lifestyle    Physical activity     Days per week: None     Minutes per session: None    Stress: None   Relationships    Social connections     Talks on phone: None     Gets together: None     Attends Yazidi service: None     Active member of club or organization: None     Attends meetings of clubs or organizations: None     Relationship status: None    Intimate partner violence     Fear of current or ex partner: None     Emotionally abused: None     Physically abused: None     Forced sexual activity: None   Other Topics Concern    None   Social History Narrative    None         MEDICATIONS: reviewed by me. Medications Prior to Admission:  No current facility-administered medications on file prior to encounter. Current Outpatient Medications on File Prior to Encounter   Medication Sig Dispense Refill    levonorgestrel-ethinyl estradiol (SEASONALE) 0.15-0.03 MG per tablet Take 1 tablet by mouth daily 1 packet 3    ondansetron (ZOFRAN) 4 MG tablet Take 1 tablet by mouth daily as needed for Nausea or Vomiting 30 tablet 0    busPIRone (BUSPAR) 10 MG tablet Take 1 tablet by mouth 2 times daily as needed (anxiety) 60 tablet 2    ipratropium-albuterol (DUONEB) 0.5-2.5 (3) MG/3ML SOLN nebulizer solution Inhale 3 mLs into the lungs 4 times daily 360 mL 2    hydroCHLOROthiazide (HYDRODIURIL) 25 MG tablet Take 1 tablet by mouth daily 30 tablet 2    tiZANidine (ZANAFLEX) 6 MG capsule TAKE ONE CAPSULE BY MOUTH THREE TIMES A DAY AT LEAST EIGHT HOURS APART 90 capsule 0    fluconazole (DIFLUCAN) 150 MG tablet Take one tablet by mouth today and may repeat in 3 days if no improvement.  2 tablet 0    amLODIPine (NORVASC) 10 MG tablet Take 1 tablet by mouth daily 30 tablet 1    Nebulizers (COMPRESSOR/NEBULIZER) MISC 1 each by Does not apply route daily 1 each 3    Blood Pressure KIT 1 kit by Does not apply route daily 1 kit 0    DULoxetine (CYMBALTA) 60 MG extended release capsule Take 1 capsule by mouth daily 30 capsule 0    pantoprazole (PROTONIX) 20 MG tablet Take 1 tablet by mouth 2 times daily 60 tablet 3    beclomethasone (QVAR REDIHALER) 40 MCG/ACT AERB inhaler Inhale 1 puff into the lungs 2 times daily 1 Inhaler 5    albuterol sulfate  (90 Base) MCG/ACT inhaler Inhale 2 puffs into the lungs 4 times daily as needed for Wheezing 1 Inhaler 5    pregabalin (LYRICA) 150 MG capsule Take 1 capsule by mouth 2 times daily for 30 days. Take one in am with breakfast and one at dinner time. 60 capsule 1    Handicap Placard MISC by Does not apply route Unable able to walk long distance due to medical condition. Expires in 5 years.  1 each 0    EPINEPHrine (EPIPEN 2-LUANNE) 0.3 MG/0.3ML SOAJ injection Inject 0.3 mLs into the muscle once for 1 dose Use as directed for allergic reaction (Patient not taking: Reported on 9/30/2020) 1 each 0         Current Facility-Administered Medications:     albuterol sulfate  (90 Base) MCG/ACT inhaler 2 puff, 2 puff, Inhalation, 4x Daily PRN, Marine Cazares PA-C    budesonide (PULMICORT) nebulizer suspension 250 mcg, 0.25 mg, Nebulization, BID, Marine Cazares PA-C    busPIRone (BUSPAR) tablet 10 mg, 10 mg, Oral, BID PRN, Marine Cazares PA-C    DULoxetine (CYMBALTA) extended release capsule 60 mg, 60 mg, Oral, Daily, Marine Cazares PA-C    albuterol sulfate  (90 Base) MCG/ACT inhaler 2 puff, 2 puff, Inhalation, 4x daily **AND** ipratropium (ATROVENT HFA) 17 MCG/ACT inhaler 2 puff, 2 puff, Inhalation, 4x daily **AND** MDI Treatment, , , 4x daily, Marine Cazares PA-C    tiZANidine (ZANAFLEX) capsule 6 mg, 6 mg, Oral, Q8H PRN, Marine Cazares PA-C    sodium chloride flush 0.9 % injection 10 mL, 10 mL, Intravenous, 2 times per day, Marine Cazares PA-C    sodium chloride flush 0.9 % injection 10 mL, 10 mL, Intravenous, PRN, Anette Figueroa PA-C   magnesium hydroxide (MILK OF MAGNESIA) 400 MG/5ML suspension 30 mL, 30 mL, Oral, Daily PRN, Aakash Syed PA-C    acetaminophen (TYLENOL) tablet 650 mg, 650 mg, Oral, Q6H PRN **OR** acetaminophen (TYLENOL) suppository 650 mg, 650 mg, Rectal, Q6H PRN, Aakash Syed PA-C    0.9 % sodium chloride infusion, , Intravenous, Continuous, Marine Cazares PA-C    ondansetron (ZOFRAN) injection 4 mg, 4 mg, Intravenous, Q6H PRN, Marine Cazares PA-C    dexamethasone (DECADRON) tablet 6 mg, 6 mg, Oral, Daily, Marine Cazares PA-C    famotidine (PEPCID) tablet 20 mg, 20 mg, Oral, Daily, Marine Cazares PA-C      Allergies reviewed by me: Ambien [zolpidem tartrate], Sertraline, Sudafed [pseudoephedrine hcl], and Amitriptyline hcl    REVIEW OF SYSTEMS:  Unable to obtain due to COVID isolation      PHYSICAL EXAM:  Recent vital signs and recent I/Os reviewed by me.      Wt Readings from Last 3 Encounters:   02/15/21 183 lb 6.8 oz (83.2 kg)   09/30/20 183 lb 11.2 oz (83.3 kg)   09/09/20 180 lb 8 oz (81.9 kg)     BP Readings from Last 3 Encounters:   02/15/21 (!) 147/88   09/30/20 130/80   09/09/20 105/79     Patient Vitals for the past 24 hrs:   BP Temp Temp src Pulse Resp SpO2 Height Weight   02/15/21 0043 -- -- -- -- -- -- 5' 7\" (1.702 m) 183 lb 6.8 oz (83.2 kg)   02/15/21 0040 (!) 147/88 99 °F (37.2 °C) Oral 90 18 99 % -- --   02/14/21 2355 -- -- -- 98 -- 100 % -- --   02/14/21 2330 (!) 146/92 -- -- -- -- 99 % -- --   02/14/21 2328 -- -- -- -- -- 99 % -- --   02/14/21 2327 -- -- -- -- -- 99 % -- --   02/14/21 2326 -- -- -- -- -- 99 % -- --   02/14/21 2325 -- -- -- -- -- 99 % -- --   02/14/21 2324 -- -- -- -- -- 98 % -- --   02/14/21 2323 -- -- -- -- -- 99 % -- --   02/14/21 2322 -- -- -- -- -- 98 % -- --   02/14/21 2321 -- -- -- -- -- 98 % -- --   02/14/21 2320 -- -- -- -- -- 98 % -- --   02/14/21 2306 -- -- -- -- -- 100 % -- --   02/14/21 2230 (!) 106/50 -- -- 110 -- 99 % -- --   02/14/21 2200 (!) 111/94 -- -- 117 -- 99 % -- --   02/14/21 2158 -- -- -- 119 -- 99 % -- --   02/14/21 2130 (!) 143/71 -- -- 119 -- 98 % -- --   02/14/21 2100 (!) 147/69 -- -- -- -- 99 % -- --   02/14/21 2050 -- -- -- 124 23 99 % -- --   02/14/21 2040 -- -- -- -- 22 98 % -- --   02/14/21 2030 (!) 142/85 -- -- 140 27 93 % -- --   02/14/21 2018 (!) 144/84 98.4 °F (36.9 °C) -- 145 24 92 % -- 183 lb (83 kg)     No intake or output data in the 24 hours ending 02/15/21 0048            Physical exam:    In-person bedside physical examination deferred. Pursuant to the emergency declaration under the [address] [place] [address] and the [employer] Act, this clinical encounter was conducted to provide necessary medical care. (Also consistent with new provisions and guidance offered by [facility] on March 18, 2020 in setting of COVID 19 outbreak and in order to preserve personal protective equipment in accordance with the flexibilities announced by CMS on March 30, 2020)   References: [email or web address]_18%20Telemed%20Guidance%20Updated%20March%2018. pdf?[email or web address]                      [email or web address]%20Telemed%20Guidance%20Updated%20March%2018. pdf?ezd=[email or web address]                      [email or web address]    However, I did visually inspect the patient and observed the following:      Vitals signs reviewed. Constitutional:       General: not in acute distress. Appearance:   ill-appearing. HENT:      Head: Normocephalic and atraumatic. Nose: Nose normal.      Mouth/Throat:      Mouth: Mucous membranes are moist.   Eyes:      General: No scleral icterus. Conjunctiva/sclera: Conjunctivae normal.   Neck:      Musculoskeletal: Normal range of motion and neck supple. Cardiovascular:      Rate and Rhythm: Normal rate. Pulmonary:      Effort: Pulmonary effort is normal.   Abdominal:      General: There is no distension. Musculoskeletal:      Right lower leg: No edema. Left lower leg: No edema. Skin:     Coloration: Skin is not jaundiced. Neurological:      General: No focal deficit present. DATA:  Diagnostic tests reviewed by me for today's visit:   (AS NEEDED FOR MY EVALUATION AND MANAGEMENT). Recent Labs     02/14/21 2034   WBC 5.9   HCT 46.1        Iron Saturation:  No components found for: PERCENTFE  FERRITIN:    Lab Results   Component Value Date    FERRITIN 7.8 08/02/2018     IRON:    Lab Results   Component Value Date    IRON 37 03/13/2020     TIBC:    Lab Results   Component Value Date    TIBC 410 03/13/2020       Recent Labs     02/14/21 2034   *   K 3.7   CL 94*   CO2 17*   BUN 34*   CREATININE 2.4*     Recent Labs     02/14/21 2034   CALCIUM 9.0     No results for input(s): PH, PCO2, PO2 in the last 72 hours.     Invalid input(s): Himanshu Zhang    ABG:  No results found for: PH, PCO2, PO2, HCO3, BE, THGB, TCO2, O2SAT  VBG:    Lab Results   Component Value Date    PHVEN 7.402 02/14/2021    KYV3SEZ 30.3 02/14/2021    BEVEN -4.6 02/14/2021    U2BDWLCH 67 02/14/2021       LDH:  No results found for: LDH  Uric Acid:  No results found for: LABURIC, URICACID    PT/INR:    Lab Results   Component Value Date    PROTIME 11.7 08/31/2020    INR 1.01 08/31/2020     Warfarin PT/INR:  No components found for: Radha Anaya  PTT:    Lab Results   Component Value Date    APTT 21.5 08/31/2020   [APTT}  Last 3 Troponin:    Lab Results   Component Value Date    TROPONINI 0.00 02/07/2017    TROPONINI 0.00 02/07/2017       U/A:    Lab Results   Component Value Date    NITRITE Neg 03/13/2020    COLORU DK YELLOW 09/11/2020    PROTEINU 30 09/11/2020    PHUR 6.5 09/11/2020    WBCUA 17 09/11/2020    RBCUA 7 09/11/2020    MUCUS 2+ 02/09/2017    BACTERIA RARE 09/11/2020    CLARITYU CLOUDY 09/11/2020    SPECGRAV 1.027 09/11/2020    LEUKOCYTESUR TRACE 09/11/2020    UROBILINOGEN 1.0 09/11/2020    BILIRUBINUR Negative 09/11/2020    BILIRUBINUR Neg 03/13/2020    BLOODU Negative 09/11/2020    GLUCOSEU Negative 09/11/2020     Microalbumen/Creatinine ratio:  No components found for: RUCREAT  24 Hour Urine for Protein:  No components found for: RAWUPRO, UHRS3, XTEO04KD, UTV3  24 Hour Urine for Creatinine Clearance:  No components found for: CREAT4, UHRS10, UTV10  Urine Toxicology:  No components found for: Shannon Clapper, IBENZO, ICOCAINE, IMARTHC, IOPIATES, IPHENCYC    HgBA1c:    Lab Results   Component Value Date    LABA1C 6.0 09/01/2020     RPR:  No results found for: RPR  HIV:  No results found for: HIV  RUY:    Lab Results   Component Value Date    RUY Negative 01/24/2020     RF:    Lab Results   Component Value Date    RF 11.0 01/24/2020     DSDNA:  No components found for: DNA  AMYLASE:  No results found for: AMYLASE  LIPASE:    Lab Results   Component Value Date    LIPASE 80.0 02/14/2021     Fibrinogen Level:  No components found for: FIB       BELOW MENTIONED RADIOLOGY STUDY RESULTS BY ME (AS NEEDED FOR MY EVALUATION AND MANAGEMENT). Xr Chest Portable    Result Date: 2/14/2021  EXAMINATION: ONE XRAY VIEW OF THE CHEST 2/14/2021 8:51 pm COMPARISON: August 31, 2020. HISTORY: ORDERING SYSTEM PROVIDED HISTORY: SOB TECHNOLOGIST PROVIDED HISTORY: Reason for exam:->SOB Reason for Exam: SOB hx of asthma - feels like she can't get air. Pt hasn't eaten in 5 days and passed out this morning hitting her head Acuity: Acute Type of Exam: Initial FINDINGS: Frontal portable view of the chest.  Normal lung volume. No focal airspace disease. Normal pulmonary vasculature. No pleural effusion or pneumothorax. Normal cardiomediastinal silhouette and great vessels.   Stable regional skeleton with redemonstration of mild rightward convex curvature of the thoracic spine. No acute cardiopulmonary process. No focal airspace disease.

## 2021-02-15 NOTE — H&P
[facility] Medicine History & Physical      Patient Name: Karina Agarwal    : 1980    PCP: Cm Lane, APRN - CNP    Date of Service:  Patient seen and examined on 2021     Chief Complaint:  Shortness of breath    History Of Present Illness:    Karina Agarwal is a 47 y.o. female who presented to ED with complaint of shortness of breath that started 2 to 3 days ago. She reports that for the last 5 days she has had generalized body aches, nausea, vomiting, and decreased appetite. She reports a nonproductive cough. She reports shortness of breath feels similar to previous asthma exacerbations. She reports that she was not able to find her breathing treatments so she could not complete them at home. She reports that she did have a syncopal episode earlier today and fell and hit her head. She denies dizziness, headache, neck pain or stiffness, changes in vision, difficulty swallowing, numbness/tingling in extremities, difficulty swallowing, focal weakness. She denies fever, chest pain, edema, abdominal pain, diarrhea, constipation, urinary symptoms. She denies any known sick contacts but does state that her children went back to school this week. Imaging and labs obtained in ED reviewed and notable for the following: CXR negative for acute process; creatinine 2.4, baseline 0.8; Na 129; D-dimer 2756; ; rapid COVID positive; no leukocytosis; procalcitonin WNL. Past Medical History:    Patient has a past medical history of Anxiety, Asthma, Constipation, Conversion disorder, Depression, Fibromyalgia, Graves disease, History of blood transfusion, Hx of blood clots, Hypertension, Joint pain, Kidney stone, Localized rash, Lupus ([facility] [address] MDRO (multiple drug resistant organisms) resistance, and PONV (postoperative nausea and vomiting). Past Surgical History:    Patient has a past surgical history that includes Tubal ligation;  section;  Tonsillectomy; other surgical MD   pregabalin (LYRICA) 150 MG capsule Take 1 capsule by mouth 2 times daily for 30 days. Take one in am with breakfast and one at dinner time. 5/1/20 12/18/20  LEESA Nobles CNP   Handicap Placard MISC by Does not apply route Unable able to walk long distance due to medical condition. Expires in 5 years. 9/27/19   LEESA Nobles CNP   EPINEPHrine (EPIPEN 2-LUANNE) 0.3 MG/0.3ML SOAJ injection Inject 0.3 mLs into the muscle once for 1 dose Use as directed for allergic reaction  Patient not taking: Reported on 9/30/2020 7/29/19 9/30/20  Jani Borrego PA-C       Allergies:  Ambien [zolpidem tartrate], Sertraline, Sudafed [pseudoephedrine hcl], and Amitriptyline hcl    Social History:      TOBACCO:   reports that she has never smoked. She has never used smokeless tobacco.  ETOH:   reports current alcohol use. DRUGS:  reports current drug use. Drug: Marijuana. Family History:      Reviewed in detail positive as follows:        Problem Relation Age of Onset    Cancer Maternal Grandmother         Lung Ca       REVIEW OF SYSTEMS:   Pertinent positives as noted in the HPI. All other systems reviewed and negative. PHYSICAL EXAM PERFORMED:    BP (!) 106/50   Pulse 110   Temp 98.4 °F (36.9 °C)   Resp 23   Wt 183 lb (83 kg)   SpO2 99%   BMI 28.66 kg/m²     General appearance:  Awake, alert, no apparent distress  HEENT:  Normocephalic, atraumatic without obvious deformity. PERRL. EOM intact. Conjunctivae/corneas clear. Neck: Supple, with full range of motion. No JVD. Trachea midline. Respiratory:  +Wheezing. No rales or rhonchi. Normal respiratory effort. Cardiovascular:  +Tachycardia. Regular rhythm without murmurs, rubs or gallops. Abdomen: Soft, NT, ND, without rebound or guarding. Normal bowel sounds. Extremities:  No clubbing, cyanosis, or edema bilaterally. Full range of motion without deformity. +2 palpable pulses, equal bilaterally.  Capillary refill brisk,< 3 seconds   Skin: No rashes or lesions. Warm/dry. Neurologic:  Neurovascularly intact without any focal sensory/motor deficits. Cranial nerves: II-XII intact, grossly non-focal. Alert and oriented x 3. Normal speech. Psychiatric:  Thought content appropriate, normal insight. Labs:   CBC   Recent Labs     02/14/21 2034   WBC 5.9   HGB 14.7   HCT 46.1           RENAL  Recent Labs     02/14/21 2034   *   K 3.7   CL 94*   CO2 17*   BUN 34*   CREATININE 2.4*       LFTS  Recent Labs     02/14/21 2034   AST 52*   ALT 39   BILITOT 0.3   ALKPHOS 80       COAG  No results for input(s): INR in the last 72 hours. CARDIAC ENZYMES  No results for input(s): TROPONINI in the last 72 hours. LIPIDS  Cholesterol, Total   Date/Time Value Ref Range Status   09/01/2020 05:09  0 - 199 mg/dL Final     Triglycerides   Date/Time Value Ref Range Status   09/01/2020 05:09  0 - 150 mg/dL Final     HDL   Date/Time Value Ref Range Status   09/01/2020 05:09 AM 38 (L) 40 - 60 mg/dL Final     LDL Calculated   Date/Time Value Ref Range Status   09/01/2020 05:09 AM 88 <100 mg/dL Final         Radiology:     XR CHEST PORTABLE   Final Result   No acute cardiopulmonary process. No focal airspace disease. NM LUNG VENT/PERFUSION (VQ)    (Results Pending)       ASSESSMENT/PLAN:    JUAN  Cr baseline 0.8, today 2.4  Prerenal, due to dehydration  IVF  Avoid nephrotoxic medications - hold home HCTZ and protonix  Renally dose medications  Monitor for electrolyte abnormalities  Nephrology consulted    Hyponatremia  Mild - Na 129  IVF with NS  Monitor Na    COVID-19 virus infection  Currently not requiring oxygen. CXR negative for PNA  Will treat with decadron for acute asthma exacerbation  Currently no indication for remdesivir or convalescent plasma  D-dimer elevated at 2756 - start therapeutic lovenox. No CTA due to JUAN.  VQ scan ordered   - IVF    Acute Asthma Exacerbation  Continue home Qvar (sub w/ budesonide)  Albuterol/Ipratropium  Decadron ordered in place of solumedrol due to positive COVID  Supplemental O2 PRN    Syncope and Collapse  Likely due to acute illness/dehydration/JUAN  Check orthostatic BP and pulse  EKG shows sinus tachycardia without evidence of acute ischemia or infarction  Patient does report hitting her head. She denies dizziness/headache. If patient develops new symptoms or if she has recurrent episodes of presyncope/syncope, would consider CT head and/or cardiology consult/additional cardiac workup    HTN  Continue home norvasc  Hold home HCTZ for JUAN    Chronic pain syndrome  Continue home tizanidine, cymbalta, lyrica    DVT prophylaxis: Lovenox  Probiotic if on abx: N/A    Diet: DIET GENERAL;  Code Status: Full Code    Consults:  IP CONSULT TO HOSPITALIST  IP CONSULT TO NEPHROLOGY    Disposition: Admit to Inpatient   ELOS: Greater than two midnights due to medical therapy     Louis Graves PA-C    Thank you LEESA Lindsay CNP for the opportunity to be involved in this patient's care. If you have any questions or concerns please feel free to contact me at [phone].

## 2021-02-15 NOTE — PROGRESS NOTES
4 Eyes Skin Assessment     NAME:  Gagan Mcfarland  YOB: 1980  MEDICAL RECORD NUMBER:  [id number]    The patient is being assess for  Admission    I agree that 2 RN's have performed a thorough Head to Toe Skin Assessment on the patient. ALL assessment sites listed below have been assessed. Areas assessed by both nurses:    Head, Face, Ears, Shoulders, Back, Chest, Arms, Elbows, Hands, Sacrum. Buttock, Coccyx, Ischium and Legs. Feet and Heels        Does the Patient have a Wound?  No noted wound(s)       Doron Prevention initiated:  NA   Wound Care Orders initiated:  No    Pressure Injury (Stage 3,4, Unstageable, DTI, NWPT, and Complex wounds) if present place consult order under [de-identified] NA    New and Established Ostomies if present place consult order under : NA      Nurse 1 eSignature: Electronically signed by Carolina Gonzalez RN on 2/15/21 at 3:40 AM EST    **SHARE this note so that the co-signing nurse is able to place an eSignature**    Nurse 2 eSignature: Electronically signed by Haydee Seo RN on 2/15/21 at 3:41 AM EST

## 2021-02-15 NOTE — PROGRESS NOTES
Patient admitted to room 5584 at this time. Oriented to room and call light system. Patient with complaints of generalized discomfort at this time 10/10. Morphine given by ED prior to patient being transferred to unit. Will monitor for response. Admission assessment complete. See flow sheet. Respirations easy and even. Denies needs at this time. The care plan and education has been reviewed and mutually agreed upon with the patient.

## 2021-02-15 NOTE — ED PROVIDER NOTES
[id number] [facility] Emergency Department      Pt Name: Matt Arthur  MRN: [id number]  [place] 1980  Date of evaluation: 2021  Provider: Collette Bulla, MD  I independently performed a history and physical on Gutier[place]. All diagnostic, treatment, and disposition decisions were made by myself in conjunction with the advanced practice provider. HPI: Matt Arthur presented with   Chief Complaint   Patient presents with    Shortness of Breath     x 1 day - hx of asthma - feels like she can't get air. Pt hasn't eaten in 5 days and passed out this morning hitting her head     Matt Arthur has a past medical history of Anxiety, Asthma, Constipation (2014), Conversion disorder, Depression, Fibromyalgia, Graves disease, History of blood transfusion, blood clots, Hypertension, Joint pain (2014), Kidney stone, Localized rash (2014), Lupus ([address] MDRO (multiple drug resistant organisms) resistance, and PONV (postoperative nausea and vomiting). She has a past surgical history that includes Tubal ligation;  section; Tonsillectomy; other surgical history (2016); and Cystoscopy (2018). No current facility-administered medications on file prior to encounter.       Current Outpatient Medications on File Prior to Encounter   Medication Sig Dispense Refill    levonorgestrel-ethinyl estradiol (SEASONALE) 0.15-0.03 MG per tablet Take 1 tablet by mouth daily 1 packet 3    ondansetron (ZOFRAN) 4 MG tablet Take 1 tablet by mouth daily as needed for Nausea or Vomiting 30 tablet 0    busPIRone (BUSPAR) 10 MG tablet Take 1 tablet by mouth 2 times daily as needed (anxiety) 60 tablet 2    ipratropium-albuterol (DUONEB) 0.5-2.5 (3) MG/3ML SOLN nebulizer solution Inhale 3 mLs into the lungs 4 times daily 360 mL 2    hydroCHLOROthiazide (HYDRODIURIL) 25 MG tablet Take 1 tablet by mouth daily 30 tablet 2    tiZANidine (ZANAFLEX) 6 MG capsule TAKE ONE CAPSULE BY MOUTH THREE TIMES A DAY AT LEAST EIGHT HOURS APART 90 capsule 0    fluconazole (DIFLUCAN) 150 MG tablet Take one tablet by mouth today and may repeat in 3 days if no improvement. 2 tablet 0    amLODIPine (NORVASC) 10 MG tablet Take 1 tablet by mouth daily 30 tablet 1    Nebulizers (COMPRESSOR/NEBULIZER) MISC 1 each by Does not apply route daily 1 each 3    Blood Pressure KIT 1 kit by Does not apply route daily 1 kit 0    DULoxetine (CYMBALTA) 60 MG extended release capsule Take 1 capsule by mouth daily 30 capsule 0    pantoprazole (PROTONIX) 20 MG tablet Take 1 tablet by mouth 2 times daily 60 tablet 3    beclomethasone (QVAR REDIHALER) 40 MCG/ACT AERB inhaler Inhale 1 puff into the lungs 2 times daily 1 Inhaler 5    albuterol sulfate  (90 Base) MCG/ACT inhaler Inhale 2 puffs into the lungs 4 times daily as needed for Wheezing 1 Inhaler 5    pregabalin (LYRICA) 150 MG capsule Take 1 capsule by mouth 2 times daily for 30 days. Take one in am with breakfast and one at dinner time. 60 capsule 1    Handicap Placard MISC by Does not apply route Unable able to walk long distance due to medical condition. Expires in 5 years.  1 each 0    EPINEPHrine (EPIPEN 2-LUANNE) 0.3 MG/0.3ML SOAJ injection Inject 0.3 mLs into the muscle once for 1 dose Use as directed for allergic reaction (Patient not taking: Reported on 9/30/2020) 1 each 0     PHYSICAL EXAM  Vitals: /84  Pulse 110   Temp 98.4 °F (36.9 °C)   Resp 24   Wt 183 lb (83 kg)   SpO2 92%   BMI 28.66 kg/m²   Constitutional:  47 y.o. female alert, cooperative  HENT:  Atraumatic scalp, mucous membranes moist  Eyes:   Conjunctiva clear, no icterus  Neck:  Supple, no visible JVD, no signs of injury  Cardiovascular:  Regular, no rubs  Thorax & Lungs:  some accessory muscle usage, clear  Abdomen:  Soft, non distended, NT  Back:  No deformity  Genitalia:  Deferred  Rectal:  Deferred  Extremities:  No cyanosis, no edema, adequate perfusion  Skin:  Warm, 30.3 (*)     HCO3, Venous 18.9 (*)     Base Excess, Daryl -4.6 (*)     Carboxyhemoglobin 3.0 (*)     All other components within normal limits    Narrative:     Performed at:  [facility] [address], [facility], [address]   Phone [phone]   D-DIMER, QUANTITATIVE - Abnormal; Notable for the following components:    D-Dimer, Quant 2756 (*)     All other components within normal limits    Narrative:     Performed at:  [facility] [address], [facility], [address]   Phone [phone] - Abnormal; Notable for the following components:    SARS-CoV-2, NAAT DETECTED (*)     All other components within normal limits    Narrative:     Yary Cachorro  YONATAN tel. [phone],  Microbiology results called to and read back by SARAH Montoya, 02/14/2021  21:00, by Luly Amador  Performed at:  [facility] [address], [facility], [address]   Phone [phone]   CK - Abnormal; Notable for the following components:     Total  (*)     All other components within normal limits    Narrative:     Performed at:  [facility] [address], [facility], [address]   Phone [phone]   CULTURE, BLOOD 1   CULTURE, BLOOD 2   HCG, SERUM, QUALITATIVE    Narrative:     Performed at:  [facility] [address], [facility], [address]   Phone [phone]   URINE RT REFLEX TO CULTURE    Narrative:     Performed at:  [facility] [address], [facility], [address]   Phone [phone]   PROCALCITONIN    Narrative:     Performed at:  [facility] [address], [facility], [address]   Phone [phone]   LACTIC ACID, PLASMA    Narrative:     Performed at:  [facility] [address], [facility], [address]   Phone [phone]     RADIOLOGY:     Plain x-rays were viewed by me:   XR CHEST PORTABLE   Final Result   No acute cardiopulmonary process. No focal airspace disease. EKG:  Read by me in the absence of a cardiologist shows: Sinus tachycardia, rate 125, intervals normal, axis 43 degrees, nonspecific ST-T wave abnormality, faster heart rate when compared to 3 September 2020    CRITICAL CARE:   Total critical care time of 31 minutes (excludes any time for procedures). This includes but not limited to vital sign monitoring, medication, clinical response to medications, interpretation of diagnostics, review of nursing notes, pertinent record review, discussions about patient condition, consultation time, documentation time. Critical care due to the patient's sob, COVID. Vitals:    02/14/21 2327 02/14/21 2328 02/14/21 2330 02/14/21 2355   BP:   (!) 146/92    Pulse:    98   Resp:       Temp:       SpO2: 99% 99% 99% 100%   Weight:         Medications administered:  Medications   dexamethasone (PF) (DECADRON) injection 6 mg (6 mg Intravenous Given 2/14/21 2110)   ipratropium-albuterol (DUONEB) nebulizer solution 1 ampule (1 ampule Inhalation Given 2/14/21 2040)   ketorolac (TORADOL) injection 30 mg (30 mg Intravenous Given 2/14/21 2110)   ondansetron (ZOFRAN) injection 4 mg (4 mg Intravenous Given 2/14/21 2110)   0.9 % sodium chloride bolus (0 mLs Intravenous Stopped 2/14/21 2240)   morphine injection 4 mg (4 mg Intravenous Given 2/14/21 2158)   enoxaparin (LOVENOX) injection 80 mg (80 mg Subcutaneous Given 2/15/21 0010)   morphine injection 4 mg (4 mg Intravenous Given 2/15/21 0010)     FINAL IMPRESSION:    1. COVID-19    2. Tachycardia    3.  JUAN (acute kidney injury) ([facility] [address]    4. Elevated d-dimer       DISPOSITION Admitted 02/14/2021 10:57:42 PM       Rena Wells MD  02/15/21 [id number]

## 2021-02-15 NOTE — ED NOTES
Pt alert and oriented x4. Pt c/o sob, cough, body aches, and n/v/d x5 days. She rates pain as a 10/10 on pain scale. Pt does have hx of asthma and fibromyalgia. Pt with tachypnea at rest and tachycardic with Hr of 140bpm. Pt is stuttering when attempting to answer questions. Pt lung sounds are clear and equal bilaterally. No wheezing or rales noted upon auscultation at this time. Pt denies any chest pain but does states she has a \"pressure\" and feels like she cannot \"get a deep breath in\". IV established. Blood work collected and sent to lab. [place], [place] at bedside to assess pt. Fall precautions in place. Will continue to monitor.       Jaimie Meng RN  02/14/21 2051

## 2021-02-16 LAB
ALBUMIN SERPL-MCNC: 4.1 G/DL (ref 3.4–5)
ANION GAP SERPL CALCULATED.3IONS-SCNC: 13 MMOL/L (ref 3–16)
BUN BLDV-MCNC: 40 MG/DL (ref 7–20)
C-REACTIVE PROTEIN: 24.7 MG/L (ref 0–5.1)
CALCIUM SERPL-MCNC: 8.9 MG/DL (ref 8.3–10.6)
CHLORIDE BLD-SCNC: 97 MMOL/L (ref 99–110)
CO2: 26 MMOL/L (ref 21–32)
CREAT SERPL-MCNC: 1.9 MG/DL (ref 0.6–1.1)
D DIMER: 2033 NG/ML DDU (ref 0–229)
GFR AFRICAN AMERICAN: 36
GFR NON-AFRICAN AMERICAN: 30
GLUCOSE BLD-MCNC: 142 MG/DL (ref 70–99)
PHOSPHORUS: 2.1 MG/DL (ref 2.5–4.9)
POTASSIUM SERPL-SCNC: 4.2 MMOL/L (ref 3.5–5.1)
PROCALCITONIN: 0.08 NG/ML (ref 0–0.15)
SODIUM BLD-SCNC: 136 MMOL/L (ref 136–145)
TOTAL CK: 282 U/L (ref 26–192)

## 2021-02-16 PROCEDURE — 80069 RENAL FUNCTION PANEL: CPT

## 2021-02-16 PROCEDURE — 6370000000 HC RX 637 (ALT 250 FOR IP): Performed by: PHYSICIAN ASSISTANT

## 2021-02-16 PROCEDURE — 1200000000 HC SEMI PRIVATE

## 2021-02-16 PROCEDURE — 2500000003 HC RX 250 WO HCPCS: Performed by: INTERNAL MEDICINE

## 2021-02-16 PROCEDURE — 85379 FIBRIN DEGRADATION QUANT: CPT

## 2021-02-16 PROCEDURE — 2580000003 HC RX 258: Performed by: INTERNAL MEDICINE

## 2021-02-16 PROCEDURE — 2580000003 HC RX 258: Performed by: PHYSICIAN ASSISTANT

## 2021-02-16 PROCEDURE — 86140 C-REACTIVE PROTEIN: CPT

## 2021-02-16 PROCEDURE — 84145 PROCALCITONIN (PCT): CPT

## 2021-02-16 PROCEDURE — 94761 N-INVAS EAR/PLS OXIMETRY MLT: CPT

## 2021-02-16 PROCEDURE — 94640 AIRWAY INHALATION TREATMENT: CPT

## 2021-02-16 PROCEDURE — 82550 ASSAY OF CK (CPK): CPT

## 2021-02-16 PROCEDURE — 36415 COLL VENOUS BLD VENIPUNCTURE: CPT

## 2021-02-16 PROCEDURE — 6360000002 HC RX W HCPCS: Performed by: PHYSICIAN ASSISTANT

## 2021-02-16 PROCEDURE — 6360000002 HC RX W HCPCS: Performed by: INTERNAL MEDICINE

## 2021-02-16 RX ORDER — SODIUM CHLORIDE, SODIUM LACTATE, POTASSIUM CHLORIDE, CALCIUM CHLORIDE 600; 310; 30; 20 MG/100ML; MG/100ML; MG/100ML; MG/100ML
INJECTION, SOLUTION INTRAVENOUS CONTINUOUS
Status: DISCONTINUED | OUTPATIENT
Start: 2021-02-16 | End: 2021-02-17 | Stop reason: HOSPADM

## 2021-02-16 RX ORDER — MORPHINE SULFATE 2 MG/ML
1 INJECTION, SOLUTION INTRAMUSCULAR; INTRAVENOUS EVERY 4 HOURS PRN
Status: DISCONTINUED | OUTPATIENT
Start: 2021-02-16 | End: 2021-02-17 | Stop reason: HOSPADM

## 2021-02-16 RX ADMIN — MORPHINE SULFATE 1 MG: 2 INJECTION, SOLUTION INTRAMUSCULAR; INTRAVENOUS at 17:39

## 2021-02-16 RX ADMIN — ACETAMINOPHEN 650 MG: 325 TABLET ORAL at 13:10

## 2021-02-16 RX ADMIN — ENOXAPARIN SODIUM 80 MG: 80 INJECTION SUBCUTANEOUS at 08:02

## 2021-02-16 RX ADMIN — TIZANIDINE 6 MG: 4 TABLET ORAL at 07:46

## 2021-02-16 RX ADMIN — TIZANIDINE 6 MG: 4 TABLET ORAL at 21:45

## 2021-02-16 RX ADMIN — Medication 2 PUFF: at 08:57

## 2021-02-16 RX ADMIN — SODIUM BICARBONATE: 84 INJECTION, SOLUTION INTRAVENOUS at 05:52

## 2021-02-16 RX ADMIN — Medication 2 PUFF: at 12:46

## 2021-02-16 RX ADMIN — ACETAMINOPHEN 650 MG: 325 TABLET ORAL at 20:37

## 2021-02-16 RX ADMIN — ONDANSETRON 4 MG: 2 INJECTION INTRAMUSCULAR; INTRAVENOUS at 07:46

## 2021-02-16 RX ADMIN — Medication 2 PUFF: at 16:31

## 2021-02-16 RX ADMIN — Medication 2 PUFF: at 20:42

## 2021-02-16 RX ADMIN — Medication 1 PUFF: at 08:56

## 2021-02-16 RX ADMIN — BUSPIRONE HYDROCHLORIDE 10 MG: 5 TABLET ORAL at 15:04

## 2021-02-16 RX ADMIN — Medication 10 ML: at 07:47

## 2021-02-16 RX ADMIN — BUSPIRONE HYDROCHLORIDE 10 MG: 5 TABLET ORAL at 20:37

## 2021-02-16 RX ADMIN — ENOXAPARIN SODIUM 80 MG: 80 INJECTION SUBCUTANEOUS at 20:37

## 2021-02-16 RX ADMIN — Medication 2 PUFF: at 12:44

## 2021-02-16 RX ADMIN — FAMOTIDINE 20 MG: 20 TABLET, FILM COATED ORAL at 08:01

## 2021-02-16 RX ADMIN — MORPHINE SULFATE 1 MG: 2 INJECTION, SOLUTION INTRAMUSCULAR; INTRAVENOUS at 21:46

## 2021-02-16 RX ADMIN — Medication 2 PUFF: at 08:56

## 2021-02-16 RX ADMIN — Medication 10 ML: at 20:37

## 2021-02-16 RX ADMIN — SODIUM CHLORIDE, POTASSIUM CHLORIDE, SODIUM LACTATE AND CALCIUM CHLORIDE: 600; 310; 30; 20 INJECTION, SOLUTION INTRAVENOUS at 13:04

## 2021-02-16 RX ADMIN — DEXAMETHASONE 6 MG: 4 TABLET ORAL at 08:02

## 2021-02-16 RX ADMIN — BUSPIRONE HYDROCHLORIDE 10 MG: 5 TABLET ORAL at 08:01

## 2021-02-16 RX ADMIN — Medication 1 PUFF: at 20:42

## 2021-02-16 ASSESSMENT — PAIN DESCRIPTION - LOCATION: LOCATION: GENERALIZED

## 2021-02-16 ASSESSMENT — PAIN SCALES - GENERAL: PAINLEVEL_OUTOF10: 10

## 2021-02-16 NOTE — PROGRESS NOTES
Pt complains of burning in her chest and SOB, sat 97%. RT responded and treatment given. NP  Ordered gabapentin, as pt c/o generalized sharp pains and burning in body. Pt vomited small amount of mucous. Zofran is not due at this time. Will continue to monitor.

## 2021-02-16 NOTE — PROGRESS NOTES
MD Sridhar Spaulding MD Rozena Dar, MD               Office: [phone]                      Fax: [phone]             [facility]                   NEPHROLOGY INPATIENT PROGRESS NOTE:     PATIENT NAME: Ace Caballero  : 1980  MRN: [id number]       Examined face to face (w/ full PPE), as it would affect my plan. RECOMMENDATIONS:   - keep changed IVF to bicarb w/ 150 mEq at 100 cc/h for 1 more day   - trend CK   - holding HCTZ,    - decreased Lyrica   - checked blood gas : showing Metabolic acidosis     RRPNP-43 management:  -not needing Ramdesivir or Convalescent plasma    -IV Decadron  -A/C per protocol - to prevent renal microthrombi too   -Pneumonia antibiotic coverage - f/u PCT  -Hypoxia monitoring - remains on RA     - monitor PVR w/ bladder scan for lopez insertion need. - at higher risk for decompensation, needing closer monitoring. D/C plan from renal stand point:  - 2-3 days       IMPRESSION:       JUAN (on no CKD): Slow improvement   - Oligouric   - BL Scr- 0.8  ---> 2.4 on admission  -: Etiology of JUAN - presumed pre-renal / ATN     - other differentials: COVID related nephropathy + unlikely GN / TI / TMA process  - UA : reviewed, ketones, prote, LE, hyalinize cast = pre-renal / ATN ? UTI WBC    Associated problems:   - Volume status: mild hypo-volemic - better   : BP: lower - better   : Na: hyponatremia -hypovolemic moderate - 129 - resolving w/ IVF   - Azotemia: pre-renal   - Electrolytes: K: WNL  - Acid-Base: acidosis - higher AGMA - d/to JUAN + some respirator     Other major problems: Management per primary and other consulting teams.    # COVID  +, no hypoxia   Hospital Problems           Last Modified POA    JUAN (acute kidney injury) ([facility] [address] 2021 Yes        : other supportive care :   - Check daily renal function panel with electrolytes-phosphorus  - Strict monitoring of I/Os, daily weight  - Renal feeds/diet  - Current medications reviewed. - Nephrotoxic medications have been discontinued. - Dose adjusted and appropriate. - Dose meds for eGFR <15 mL/min/1.73m2 during JUAN    - Avoid heavy opioids due to renal failure - may use very low dose dilaudid / fentanyl with close monitoring of CNS and respiratory depression. Please refer to the orders. High Complexity. Multiple complex problems. Discussed with patient's treatment team- nurseing  Thank you for allowing me to participate in this patient's care. Please do not hesitate to contact me anytime. We will follow along with you. Savannah Gillespie MD  [facility] [address] [place]: [phone] or Via [email or web address]  Fax: [phone]      Patient Active Problem List   Diagnosis    Joint pain    Constipation    Localized rash    HTN (hypertension)    Yeast vaginitis    Myalgia    Fibromyalgia    Hyperthyroidism    Nephroureterolithiasis    Migraine with aura and with status migrainosus, not intractable    Kidney stone    Insomnia due to other mental disorder    Mild intermittent asthma without complication    Moderate episode of recurrent major depressive disorder (HCC)    PTSD (post-traumatic stress disorder)    Paresthesia of right arm and leg    Focal motor deficit    Right sided weakness    Generalized anxiety disorder    Depression    Anxiety    Asthma    Conversion disorder    JUAN (acute kidney injury) ([facility] [address]       ========================================================   ========================================================      SUBJECTIVE:  Seen face to face   Patient was seen comfortably sitting up in the bed,   Reported no active complaints,   Not hypoxic     Past medical, Surgical, Social, Family medical history reviewed by me. MEDICATIONS: reviewed by me. Medications Prior to Admission:  No current facility-administered medications on file prior to encounter.       Current Outpatient Medications on File Prior to Encounter   Medication Sig Dispense Refill    ondansetron (ZOFRAN) 4 MG tablet Take 1 tablet by mouth daily as needed for Nausea or Vomiting 30 tablet 0    busPIRone (BUSPAR) 10 MG tablet Take 1 tablet by mouth 2 times daily as needed (anxiety) (Patient taking differently: Take 10 mg by mouth 3 times daily ) 60 tablet 2    ipratropium-albuterol (DUONEB) 0.5-2.5 (3) MG/3ML SOLN nebulizer solution Inhale 3 mLs into the lungs 4 times daily 360 mL 2    hydroCHLOROthiazide (HYDRODIURIL) 25 MG tablet Take 1 tablet by mouth daily 30 tablet 2    tiZANidine (ZANAFLEX) 6 MG capsule TAKE ONE CAPSULE BY MOUTH THREE TIMES A DAY AT LEAST EIGHT HOURS APART 90 capsule 0    amLODIPine (NORVASC) 10 MG tablet Take 1 tablet by mouth daily 30 tablet 1    DULoxetine (CYMBALTA) 60 MG extended release capsule Take 1 capsule by mouth daily 30 capsule 0    pantoprazole (PROTONIX) 20 MG tablet Take 1 tablet by mouth 2 times daily 60 tablet 3    beclomethasone (QVAR REDIHALER) 40 MCG/ACT AERB inhaler Inhale 1 puff into the lungs 2 times daily 1 Inhaler 5    albuterol sulfate  (90 Base) MCG/ACT inhaler Inhale 2 puffs into the lungs 4 times daily as needed for Wheezing 1 Inhaler 5    Nebulizers (COMPRESSOR/NEBULIZER) MISC 1 each by Does not apply route daily 1 each 3    Blood Pressure KIT 1 kit by Does not apply route daily 1 kit 0    pregabalin (LYRICA) 150 MG capsule Take 1 capsule by mouth 2 times daily for 30 days. Take one in am with breakfast and one at dinner time. 60 capsule 1    Handicap Placard MISC by Does not apply route Unable able to walk long distance due to medical condition. Expires in 5 years.  1 each 0    EPINEPHrine (EPIPEN 2-LUANNE) 0.3 MG/0.3ML SOAJ injection Inject 0.3 mLs into the muscle once for 1 dose Use as directed for allergic reaction (Patient not taking: Reported on 9/30/2020) 1 each 0         Current Facility-Administered Medications:     lactated ringers infusion, , Intravenous, Continuous, Scotty Trevizo MD    albuterol sulfate  (90 Base) MCG/ACT inhaler 2 puff, 2 puff, Inhalation, 4x Daily PRN, Jose D Winter PA-C    busPIRone (BUSPAR) tablet 10 mg, 10 mg, Oral, TID, Marine Cazares PA-C, 10 mg at 02/16/21 0801    DULoxetine (CYMBALTA) extended release capsule 60 mg, 60 mg, Oral, Daily, Marine Cazares PA-C, 60 mg at 02/15/21 0824    albuterol sulfate  (90 Base) MCG/ACT inhaler 2 puff, 2 puff, Inhalation, 4x daily, 2 puff at 02/16/21 0856 **AND** ipratropium (ATROVENT HFA) 17 MCG/ACT inhaler 2 puff, 2 puff, Inhalation, 4x daily, 2 puff at 02/16/21 0857 **AND** MDI Treatment, , , 4x daily, Marine Cazares PA-C    tiZANidine (ZANAFLEX) tablet 6 mg, 6 mg, Oral, Q8H PRN, Jose D Winter PA-C, 6 mg at 02/16/21 0746    sodium chloride flush 0.9 % injection 10 mL, 10 mL, Intravenous, 2 times per day, Jose D Winter PA-C, 10 mL at 02/16/21 0747    sodium chloride flush 0.9 % injection 10 mL, 10 mL, Intravenous, PRN, Jose D Winter PA-C    magnesium hydroxide (MILK OF MAGNESIA) 400 MG/5ML suspension 30 mL, 30 mL, Oral, Daily PRN, Marine Cazares PA-C    acetaminophen (TYLENOL) tablet 650 mg, 650 mg, Oral, Q6H PRN, 650 mg at 02/15/21 1236 **OR** acetaminophen (TYLENOL) suppository 650 mg, 650 mg, Rectal, Q6H PRN, Jose D Winter PA-C    ondansetron (ZOFRAN) injection 4 mg, 4 mg, Intravenous, Q6H PRN, Jose D Winter PA-C, 4 mg at 02/16/21 0746    dexamethasone (DECADRON) tablet 6 mg, 6 mg, Oral, Daily, Marine Cazares PA-C, 6 mg at 02/16/21 0802    famotidine (PEPCID) tablet 20 mg, 20 mg, Oral, Daily, Marine Cazares PA-C, 20 mg at 02/16/21 0801    fluticasone (FLOVENT HFA) 110 MCG/ACT inhaler 1 puff, 1 puff, Inhalation, BID, Shy Sosa MD, 1 puff at 02/16/21 0856    sodium chloride flush 0.9 % injection 10 mL, 10 mL, Intravenous, PRN, Petty Cazares PA-C, 10 mL at 02/15/21 1117    enoxaparin (LOVENOX) injection 80 Value Date    IRON 37 03/13/2020     TIBC:    Lab Results   Component Value Date    TIBC 410 03/13/2020       Recent Labs     02/14/21  2034 02/15/21  0431 02/16/21  0520   * 133* 136   K 3.7 4.1 4.2   CL 94* 102 97*   CO2 17* 14* 26   BUN 34* 34* 40*   CREATININE 2.4* 2.0* 1.9*     Recent Labs     02/14/21  2034 02/15/21  0431 02/16/21  0520   CALCIUM 9.0 8.5 8.9   PHOS  --   --  2.1*     No results for input(s): PH, PCO2, PO2 in the last 72 hours.     Invalid input(s): Trina [place]    ABG:  No results found for: PH, PCO2, PO2, HCO3, BE, THGB, TCO2, O2SAT  VBG:    Lab Results   Component Value Date    PHVEN 7.440 02/15/2021    UZW5UDD 22.1 02/15/2021    BEVEN -7.1 02/15/2021    I1ZPDIDG >100 02/15/2021       LDH:  No results found for: LDH  Uric Acid:  No results found for: LABURIC, URICACID    PT/INR:    Lab Results   Component Value Date    PROTIME 11.7 08/31/2020    INR 1.01 08/31/2020     Warfarin PT/INR:  No components found for: PTPATWAR, PTINRWAR  PTT:    Lab Results   Component Value Date    APTT 21.5 08/31/2020   [APTT}  Last 3 Troponin:    Lab Results   Component Value Date    TROPONINI <0.01 02/15/2021    TROPONINI <0.01 02/15/2021    TROPONINI <0.01 02/15/2021       U/A:    Lab Results   Component Value Date    NITRITE Neg 03/13/2020    COLORU YELLOW 02/15/2021    PROTEINU 100 02/15/2021    PHUR 5.5 02/15/2021    WBCUA 51 02/15/2021    RBCUA 5 02/15/2021    MUCUS 2+ 02/09/2017    BACTERIA 4+ 02/15/2021    CLARITYU CLOUDY 02/15/2021    SPECGRAV 1.013 02/15/2021    LEUKOCYTESUR SMALL 02/15/2021    UROBILINOGEN 0.2 02/15/2021    BILIRUBINUR Negative 02/15/2021    BILIRUBINUR Neg 03/13/2020    BLOODU SMALL 02/15/2021    GLUCOSEU Negative 02/15/2021     Microalbumen/Creatinine ratio:  No components found for: RUCREAT  24 Hour Urine for Protein:  No components found for: RAWUPRO, UHRS3, JLCR09UJ, UTV3  24 Hour Urine for Creatinine Clearance:  No components found for: Ervin Knife, UTV10  Urine Toxicology:  No components found for: Lucendia Seller, ICOCAINE, IMARTHC, IOPIATES, IPHENCYC    HgBA1c:    Lab Results   Component Value Date    LABA1C 6.0 09/01/2020     RPR:  No results found for: RPR  HIV:  No results found for: HIV  RUY:    Lab Results   Component Value Date    RUY Negative 01/24/2020     RF:    Lab Results   Component Value Date    RF 11.0 01/24/2020     DSDNA:  No components found for: DNA  AMYLASE:  No results found for: AMYLASE  LIPASE:    Lab Results   Component Value Date    LIPASE 80.0 02/14/2021     Fibrinogen Level:  No components found for: FIB       BELOW MENTIONED RADIOLOGY STUDY RESULTS BY ME (AS NEEDED FOR MY EVALUATION AND MANAGEMENT). Xr Chest Portable    Result Date: 2/14/2021  EXAMINATION: ONE XRAY VIEW OF THE CHEST 2/14/2021 8:51 pm COMPARISON: August 31, 2020. HISTORY: ORDERING SYSTEM PROVIDED HISTORY: SOB TECHNOLOGIST PROVIDED HISTORY: Reason for exam:->SOB Reason for Exam: SOB hx of asthma - feels like she can't get air. Pt hasn't eaten in 5 days and passed out this morning hitting her head Acuity: Acute Type of Exam: Initial FINDINGS: Frontal portable view of the chest.  Normal lung volume. No focal airspace disease. Normal pulmonary vasculature. No pleural effusion or pneumothorax. Normal cardiomediastinal silhouette and great vessels. Stable regional skeleton with redemonstration of mild rightward convex curvature of the thoracic spine. No acute cardiopulmonary process. No focal airspace disease.

## 2021-02-16 NOTE — PROGRESS NOTES
Patient alert and awake. Shift assessment completed. Routine vitals stable. Respirations easy and unlabored. Scheduled medications given per MAR orders and tolerated. Needs met appropriately. Resting comfortably in bed. Fall precautions being maintained and call light within reach. Will continue to monitor.

## 2021-02-16 NOTE — PROGRESS NOTES
[facility] PROGRESS NOTE    2/16/2021 10:19 AM        Name: Joanne Mauricio .               Admitted: 2/14/2021  Primary Care Provider: LEESA Bradshaw CNP (Tel: [phone])    \      Subjective:    Lying in bed having diffuse body aches mild cough chest pain with deep inspiration no  diarrhea    Reviewed interval ancillary notes    Current Medications      albuterol sulfate  (90 Base) MCG/ACT inhaler 2 puff, 4x Daily PRN      busPIRone (BUSPAR) tablet 10 mg, TID      DULoxetine (CYMBALTA) extended release capsule 60 mg, Daily      albuterol sulfate  (90 Base) MCG/ACT inhaler 2 puff, 4x daily    And      ipratropium (ATROVENT HFA) 17 MCG/ACT inhaler 2 puff, 4x daily      tiZANidine (ZANAFLEX) tablet 6 mg, Q8H PRN      sodium chloride flush 0.9 % injection 10 mL, 2 times per day      sodium chloride flush 0.9 % injection 10 mL, PRN      magnesium hydroxide (MILK OF MAGNESIA) 400 MG/5ML suspension 30 mL, Daily PRN      acetaminophen (TYLENOL) tablet 650 mg, Q6H PRN    Or      acetaminophen (TYLENOL) suppository 650 mg, Q6H PRN      ondansetron (ZOFRAN) injection 4 mg, Q6H PRN      dexamethasone (DECADRON) tablet 6 mg, Daily      famotidine (PEPCID) tablet 20 mg, Daily      fluticasone (FLOVENT HFA) 110 MCG/ACT inhaler 1 puff, BID      sodium bicarbonate 150 mEq in dextrose 5 % 1,000 mL infusion, Continuous      sodium chloride flush 0.9 % injection 10 mL, PRN      enoxaparin (LOVENOX) injection 80 mg, BID        Objective:  /84   Pulse 92   Temp 100.4 °F (38 °C) (Oral)   Resp 18   Ht 5' 7\" (1.702 m)   Wt 183 lb 6.8 oz (83.2 kg)   SpO2 97%   BMI 28.73 kg/m²     Intake/Output Summary (Last 24 hours) at 2/16/2021 1019  Last data filed at 2/16/2021 0805  Gross per 24 hour   Intake 120 ml   Output 1 ml   Net 119 ml      Wt Readings from Last 3 Encounters:   02/15/21 183 lb 6.8 oz (83.2 kg)   09/30/20 183 lb 11.2 oz (83.3 kg)   09/09/20 180 lb 8 oz (81.9 kg)     Exam from window was COVID-19 pneumonia is awake alert oriented x3 able to talk in full sentences no tachypnea no accessory muscle usage no gross focal neurological deficits    Labs and Tests:  CBC:   Recent Labs     02/14/21  2034 02/15/21  0431   WBC 5.9 4.5   HGB 14.7 13.4    156     BMP:    Recent Labs     02/14/21  2034 02/15/21  0431 02/16/21  0520   * 133* 136   K 3.7 4.1 4.2   CL 94* 102 97*   CO2 17* 14* 26   BUN 34* 34* 40*   CREATININE 2.4* 2.0* 1.9*   GLUCOSE 114* 163* 142*     Hepatic:   Recent Labs     02/14/21 2034   AST 52*   ALT 39   BILITOT 0.3   ALKPHOS [de-identified]     NM LUNG SCAN PERFUSION ONLY   Final Result   Very low probability for pulmonary embolism. CT HEAD WO CONTRAST   Final Result   1. No acute intracranial abnormality. XR CHEST PORTABLE   Final Result   No acute cardiopulmonary process. No focal airspace disease. Recent imaging reviewed    Problem List  Active Problems:    JUAN (acute kidney injury) ([facility] [address]  Resolved Problems:    * No resolved hospital problems. *       Assessment & Plan:    JUAN with metabolic acidosis   improving change fluids to lr  - nephro on board    covid 19 pna: decadron, elevated d dimer continue therapetuic lovenox  - vq scan low prob for pe    Acute asthma exacerbation: on decadron    Hyponatremia:improved    Diet: DIET GENERAL;  Code:Full Code  DVT PPXlovenox  Disposition pending improved of renal function home      Audrey Pina MD   2/16/2021 10:19 AM

## 2021-02-16 NOTE — PROGRESS NOTES
Messaged hospitalist for PRN pain med for pt. Continues to c/o sharp burning pain primarily in back and legs. Message read and no new orders given.

## 2021-02-16 NOTE — PROGRESS NOTES
Pt complaining of nausea and muscle spasms. PRN Zofran and Zanaflex given, per MAR orders. Will continue to monitor.

## 2021-02-17 VITALS
WEIGHT: 183.42 LBS | BODY MASS INDEX: 28.79 KG/M2 | RESPIRATION RATE: 18 BRPM | HEIGHT: 67 IN | TEMPERATURE: 98.8 F | OXYGEN SATURATION: 91 % | DIASTOLIC BLOOD PRESSURE: 81 MMHG | HEART RATE: 86 BPM | SYSTOLIC BLOOD PRESSURE: 134 MMHG

## 2021-02-17 LAB
ALBUMIN SERPL-MCNC: 3.3 G/DL (ref 3.4–5)
ANION GAP SERPL CALCULATED.3IONS-SCNC: 10 MMOL/L (ref 3–16)
BASOPHILS ABSOLUTE: 0 K/UL (ref 0–0.2)
BASOPHILS RELATIVE PERCENT: 0.2 %
BUN BLDV-MCNC: 24 MG/DL (ref 7–20)
CALCIUM SERPL-MCNC: 8.2 MG/DL (ref 8.3–10.6)
CHLORIDE BLD-SCNC: 99 MMOL/L (ref 99–110)
CO2: 27 MMOL/L (ref 21–32)
CREAT SERPL-MCNC: 1.2 MG/DL (ref 0.6–1.1)
EOSINOPHILS ABSOLUTE: 0 K/UL (ref 0–0.6)
EOSINOPHILS RELATIVE PERCENT: 0 %
GFR AFRICAN AMERICAN: >60
GFR NON-AFRICAN AMERICAN: 50
GLUCOSE BLD-MCNC: 115 MG/DL (ref 70–99)
HCT VFR BLD CALC: 38.7 % (ref 36–48)
HEMOGLOBIN: 12.3 G/DL (ref 12–16)
LYMPHOCYTES ABSOLUTE: 1.3 K/UL (ref 1–5.1)
LYMPHOCYTES RELATIVE PERCENT: 19.9 %
MCH RBC QN AUTO: 24.8 PG (ref 26–34)
MCHC RBC AUTO-ENTMCNC: 31.8 G/DL (ref 31–36)
MCV RBC AUTO: 78 FL (ref 80–100)
MONOCYTES ABSOLUTE: 0.5 K/UL (ref 0–1.3)
MONOCYTES RELATIVE PERCENT: 7.6 %
NEUTROPHILS ABSOLUTE: 4.7 K/UL (ref 1.7–7.7)
NEUTROPHILS RELATIVE PERCENT: 72.3 %
ORGANISM: ABNORMAL
PDW BLD-RTO: 16.2 % (ref 12.4–15.4)
PHOSPHORUS: 2.5 MG/DL (ref 2.5–4.9)
PLATELET # BLD: 143 K/UL (ref 135–450)
PMV BLD AUTO: 8.9 FL (ref 5–10.5)
POTASSIUM SERPL-SCNC: 4.3 MMOL/L (ref 3.5–5.1)
RBC # BLD: 4.96 M/UL (ref 4–5.2)
SODIUM BLD-SCNC: 136 MMOL/L (ref 136–145)
TOTAL CK: 152 U/L (ref 26–192)
URINE CULTURE, ROUTINE: ABNORMAL
URINE CULTURE, ROUTINE: ABNORMAL
WBC # BLD: 6.5 K/UL (ref 4–11)

## 2021-02-17 PROCEDURE — 85025 COMPLETE CBC W/AUTO DIFF WBC: CPT

## 2021-02-17 PROCEDURE — 6360000002 HC RX W HCPCS: Performed by: INTERNAL MEDICINE

## 2021-02-17 PROCEDURE — 82550 ASSAY OF CK (CPK): CPT

## 2021-02-17 PROCEDURE — 2580000003 HC RX 258: Performed by: PHYSICIAN ASSISTANT

## 2021-02-17 PROCEDURE — 6360000002 HC RX W HCPCS: Performed by: PHYSICIAN ASSISTANT

## 2021-02-17 PROCEDURE — 80069 RENAL FUNCTION PANEL: CPT

## 2021-02-17 PROCEDURE — 94761 N-INVAS EAR/PLS OXIMETRY MLT: CPT

## 2021-02-17 PROCEDURE — 6370000000 HC RX 637 (ALT 250 FOR IP): Performed by: INTERNAL MEDICINE

## 2021-02-17 PROCEDURE — 36415 COLL VENOUS BLD VENIPUNCTURE: CPT

## 2021-02-17 PROCEDURE — 6370000000 HC RX 637 (ALT 250 FOR IP): Performed by: PHYSICIAN ASSISTANT

## 2021-02-17 PROCEDURE — 94640 AIRWAY INHALATION TREATMENT: CPT

## 2021-02-17 RX ORDER — AMOXICILLIN AND CLAVULANATE POTASSIUM 500; 125 MG/1; MG/1
1 TABLET, FILM COATED ORAL 3 TIMES DAILY
Qty: 15 TABLET | Refills: 0 | Status: SHIPPED | OUTPATIENT
Start: 2021-02-17 | End: 2021-02-22

## 2021-02-17 RX ORDER — DEXAMETHASONE 6 MG/1
6 TABLET ORAL DAILY
Qty: 7 TABLET | Refills: 0 | Status: SHIPPED | OUTPATIENT
Start: 2021-02-17 | End: 2021-02-24

## 2021-02-17 RX ADMIN — FAMOTIDINE 20 MG: 20 TABLET, FILM COATED ORAL at 10:27

## 2021-02-17 RX ADMIN — Medication 2 PUFF: at 16:23

## 2021-02-17 RX ADMIN — ACETAMINOPHEN 650 MG: 325 TABLET ORAL at 10:55

## 2021-02-17 RX ADMIN — Medication 1 PUFF: at 08:38

## 2021-02-17 RX ADMIN — Medication 2 PUFF: at 08:38

## 2021-02-17 RX ADMIN — ENOXAPARIN SODIUM 80 MG: 80 INJECTION SUBCUTANEOUS at 10:27

## 2021-02-17 RX ADMIN — BUSPIRONE HYDROCHLORIDE 10 MG: 5 TABLET ORAL at 14:19

## 2021-02-17 RX ADMIN — Medication 2 PUFF: at 13:39

## 2021-02-17 RX ADMIN — MORPHINE SULFATE 1 MG: 2 INJECTION, SOLUTION INTRAMUSCULAR; INTRAVENOUS at 03:11

## 2021-02-17 RX ADMIN — TIZANIDINE 6 MG: 4 TABLET ORAL at 14:24

## 2021-02-17 RX ADMIN — MORPHINE SULFATE 1 MG: 2 INJECTION, SOLUTION INTRAMUSCULAR; INTRAVENOUS at 10:53

## 2021-02-17 RX ADMIN — Medication 1000 MG: at 10:54

## 2021-02-17 RX ADMIN — Medication 2 PUFF: at 13:37

## 2021-02-17 RX ADMIN — Medication 10 ML: at 10:31

## 2021-02-17 RX ADMIN — BUSPIRONE HYDROCHLORIDE 10 MG: 5 TABLET ORAL at 10:27

## 2021-02-17 RX ADMIN — DEXAMETHASONE 6 MG: 4 TABLET ORAL at 10:27

## 2021-02-17 RX ADMIN — TIZANIDINE 6 MG: 4 TABLET ORAL at 06:48

## 2021-02-17 RX ADMIN — BENZOCAINE AND MENTHOL 1 LOZENGE: 15; 3.6 LOZENGE ORAL at 14:21

## 2021-02-17 ASSESSMENT — PAIN SCALES - GENERAL
PAINLEVEL_OUTOF10: 10
PAINLEVEL_OUTOF10: 0
PAINLEVEL_OUTOF10: 10

## 2021-02-17 NOTE — PROGRESS NOTES
C/O pain 10/10 PRN pain medication has been given. Patient also given tylenol at this time for fever.

## 2021-02-17 NOTE — DISCHARGE SUMMARY
Hospital Medicine Discharge Summary    Patient: Christian Flores     Gender: female  : 1980   Age: 47 y.o. MRN: [id number]    Admitting Physician: Leigh Hernandez MD  Discharge Physician: Scotty Trevizo MD     Code Status: Full Code     Admit Date: 2021   Discharge Date:   21    Disposition:  Home  Time spent arranging discharge: 35 minutes    Discharge Diagnoses: Active Hospital Problems    Diagnosis Date Noted    JUAN (acute kidney injury) ([facility] [address] [N17.9] 2021   JUAN with metabolic acidosis  Acute asthma exacerbation  Hyponatremia  covid 19 pna  hypercoagulable state secondary to covid 19  UTI    Condition at Discharge:  Stable    Hospital Course:   Admitted to hospital with COVID-19 pneumonia. Patient remained on room air was started on Decadron and stayed stable. Patient did have elevated D-dimer at 2000 range was started on full dose Lovenox. Discharged on Eliquis 5 mg twice daily for 30 days. Advised patient if had any bleeding that does not stop to seek medical attention if falls and hits her head to seek medical attention. Patient did have JUAN with metabolic acidosis with withdrawals with IV fluids. Patient did have hyponatremia which also resolved with IV fluids and hydrochlorothiazide was discontinued. Patient discharged home with a course of Decadron to complete therapy for COVID-19.   Patient did have UTI was given Rocephin while inpatient discharged with a course of Augmentin to complete therapy    Discharge Exam:    BP (!) 131/90   Pulse 67   Temp 97.7 °F (36.5 °C) (Oral)   Resp 18   Ht 5' 7\" (1.702 m)   Wt 183 lb 6.8 oz (83.2 kg)   SpO2 95%   BMI 28.73 kg/m²      Exam from window was COVID-19 pneumonia is awake alert oriented x3 able to talk in full sentences no tachypnea no accessory muscle usage no gross focal neurological deficits  Discharge Medications:   Current Discharge Medication List      START taking these medications    Details dexamethasone (DECADRON) 6 MG tablet Take 1 tablet by mouth daily for 7 days  Qty: 7 tablet, Refills: 0      apixaban (ELIQUIS) 5 MG TABS tablet Take 1 tablet by mouth 2 times daily  Qty: 60 tablet, Refills: 0           Current Discharge Medication List        Current Discharge Medication List      CONTINUE these medications which have NOT CHANGED    Details   ondansetron (ZOFRAN) 4 MG tablet Take 1 tablet by mouth daily as needed for Nausea or Vomiting  Qty: 30 tablet, Refills: 0    Associated Diagnoses: Gastritis without bleeding, unspecified chronicity, unspecified gastritis type      busPIRone (BUSPAR) 10 MG tablet Take 1 tablet by mouth 2 times daily as needed (anxiety)  Qty: 60 tablet, Refills: 2    Associated Diagnoses: Anxiety      ipratropium-albuterol (DUONEB) 0.5-2.5 (3) MG/3ML SOLN nebulizer solution Inhale 3 mLs into the lungs 4 times daily  Qty: 360 mL, Refills: 2    Associated Diagnoses: Moderate persistent asthma without complication      tiZANidine (ZANAFLEX) 6 MG capsule TAKE ONE CAPSULE BY MOUTH THREE TIMES A DAY AT LEAST EIGHT HOURS APART  Qty: 90 capsule, Refills: 0    Associated Diagnoses: Fibromyalgia; Muscle spasm      amLODIPine (NORVASC) 10 MG tablet Take 1 tablet by mouth daily  Qty: 30 tablet, Refills: 1      DULoxetine (CYMBALTA) 60 MG extended release capsule Take 1 capsule by mouth daily  Qty: 30 capsule, Refills: 0      pantoprazole (PROTONIX) 20 MG tablet Take 1 tablet by mouth 2 times daily  Qty: 60 tablet, Refills: 3    Associated Diagnoses: Gastritis without bleeding, unspecified chronicity, unspecified gastritis type      beclomethasone (QVAR REDIHALER) 40 MCG/ACT AERB inhaler Inhale 1 puff into the lungs 2 times daily  Qty: 1 Inhaler, Refills: 5    Comments: Maintenance Inhaler for Asthma to be used every day.   Associated Diagnoses: Mild intermittent asthma without complication      albuterol sulfate  (90 Base) MCG/ACT inhaler Inhale 2 puffs into the lungs 4 times daily as needed for Wheezing  Qty: 1 Inhaler, Refills: 5    Comments: Rescue inhaler not to be used every day unless Asthma wheezing/SOB  Associated Diagnoses: Mild intermittent asthma without complication      Nebulizers (COMPRESSOR/NEBULIZER) MISC 1 each by Does not apply route daily  Qty: 1 each, Refills: 3    Comments: Please fill what is covered by the patient's insurance. Associated Diagnoses: Moderate persistent asthma without complication      Blood Pressure KIT 1 kit by Does not apply route daily  Qty: 1 kit, Refills: 0    Comments: Please fill what is covered by the patient's insurance. Associated Diagnoses: Essential hypertension      pregabalin (LYRICA) 150 MG capsule Take 1 capsule by mouth 2 times daily for 30 days. Take one in am with breakfast and one at dinner time. Qty: 60 capsule, Refills: 1    Associated Diagnoses: Fibromyalgia      Handicap Placard MISC by Does not apply route Unable able to walk long distance due to medical condition. Expires in 5 years. Qty: 1 each, Refills: 0    Associated Diagnoses: Fibromyalgia      EPINEPHrine (EPIPEN 2-LUANNE) 0.3 MG/0.3ML SOAJ injection Inject 0.3 mLs into the muscle once for 1 dose Use as directed for allergic reaction  Qty: 1 each, Refills: 0           Current Discharge Medication List      STOP taking these medications       levonorgestrel-ethinyl estradiol (SEASONALE) 0.15-0.03 MG per tablet Comments:   Reason for Stopping:         hydroCHLOROthiazide (HYDRODIURIL) 25 MG tablet Comments:   Reason for Stopping:         fluconazole (DIFLUCAN) 150 MG tablet Comments:   Reason for Stopping:               Labs:  For convenience and continuity at follow-up the following most recent labs are provided:    Lab Results   Component Value Date    WBC 6.5 02/17/2021    HGB 12.3 02/17/2021    HCT 38.7 02/17/2021    MCV 78.0 02/17/2021     02/17/2021     02/17/2021    K 4.3 02/17/2021    K 4.1 02/15/2021    CL 99 02/17/2021    CO2 27 02/17/2021    BUN 24 02/17/2021    CREATININE 1.2 02/17/2021    CALCIUM 8.2 02/17/2021    PHOS 2.5 02/17/2021    ALKPHOS 80 02/14/2021    ALT 39 02/14/2021    AST 52 02/14/2021    BILITOT 0.3 02/14/2021    LABALBU 3.3 02/17/2021    LDLCALC 88 09/01/2020    TRIG 122 09/01/2020     Lab Results   Component Value Date    INR 1.01 08/31/2020    INR 0.99 02/07/2017       Radiology:  Ct Head Wo Contrast    Result Date: 2/15/2021  EXAMINATION: CT OF THE HEAD WITHOUT CONTRAST  2/15/2021 6:55 am TECHNIQUE: CT of the head was performed without the administration of intravenous contrast. Dose modulation, iterative reconstruction, and/or weight based adjustment of the mA/kV was utilized to reduce the radiation dose to as low as reasonably achievable. COMPARISON: 08/31/2020 HISTORY: ORDERING SYSTEM PROVIDED HISTORY: Syncope TECHNOLOGIST PROVIDED HISTORY: Reason for exam:->Syncope Has a \"code stroke\" or \"stroke alert\" been called? ->No Is the patient pregnant?->No Reason for Exam: Syncope Acuity: Unknown Type of Exam: Unknown FINDINGS: BRAIN/VENTRICLES: There is no acute intracerebral hemorrhage or extra-axial fluid collection. The ventricles and sulci are within normal limits. ORBITS: The orbits are unremarkable. There is a dysconjugate gaze. SINUSES: The visualized paranasal sinuses and mastoid air cells are clear. SOFT TISSUES/SKULL:  The calvarium is intact. 1. No acute intracranial abnormality. Nm Lung Scan Perfusion Only    Result Date: 2/15/2021  EXAMINATION: NUCLEAR MEDICINE PERFUSION SCAN. 2/15/2021 TECHNIQUE: 4 millicuries of Tc 63T MAA was administered intravenously prior to planar imaging of the lungs in multiple projections. COMPARISON: Chest radiograph 02/14/2021.  HISTORY: ORDERING SYSTEM PROVIDED HISTORY: ? pe TECHNOLOGIST PROVIDED HISTORY: Reason for exam:->? pe Is the patient pregnant?->No Reason for Exam: Shortness of breath Acuity: Unknown Type of Exam: Ongoing FINDINGS: PERFUSION: Mildly heterogeneous distribution of radiotracer. No segmental perfusion defects. CHEST RADIOGRAPH: No focal areas of consolidation or significant effusions on recent chest radiograph. Very low probability for pulmonary embolism. Xr Chest Portable    Result Date: 2/14/2021  EXAMINATION: ONE XRAY VIEW OF THE CHEST 2/14/2021 8:51 pm COMPARISON: August 31, 2020. HISTORY: ORDERING SYSTEM PROVIDED HISTORY: SOB TECHNOLOGIST PROVIDED HISTORY: Reason for exam:->SOB Reason for Exam: SOB hx of asthma - feels like she can't get air. Pt hasn't eaten in 5 days and passed out this morning hitting her head Acuity: Acute Type of Exam: Initial FINDINGS: Frontal portable view of the chest.  Normal lung volume. No focal airspace disease. Normal pulmonary vasculature. No pleural effusion or pneumothorax. Normal cardiomediastinal silhouette and great vessels. Stable regional skeleton with redemonstration of mild rightward convex curvature of the thoracic spine. No acute cardiopulmonary process. No focal airspace disease.          Signed:    Sabine Patrick MD   2/17/2021

## 2021-02-17 NOTE — PROGRESS NOTES
CLINICAL PHARMACY NOTE: MEDS TO [address] Select Patient?: No  Total # of Prescriptions Filled: 3   The following medications were delivered to the patient:  · Dexamethasone  · Eliquis  · Augmentin  Total # of Interventions Completed: 0  Time Spent (min): 75    Additional Documentation:    Delivered to nurses station    Gerber PAEZ/ Y[facility] Olson [facility]

## 2021-02-17 NOTE — PROGRESS NOTES
MD Sridhar Spaulding MD Rozena Dar, MD               Office: [phone]                      Fax: [phone]             [facility]                   NEPHROLOGY INPATIENT PROGRESS NOTE:     PATIENT NAME: Ace Caballero  : 1980  MRN: [id number]       Examined face to face (w/ full PPE), as it would affect my plan. RECOMMENDATIONS:   - ok to stop IVF, allow PO hydration - as till w/ fever higher risk of insensible loss, needs monitoring  - trend CK   - holding HCTZ,    - decreased Lyrica   - checked blood gas : showing Metabolic acidosis     [id number] management:  -not needing Ramdesivir or Convalescent plasma    -IV Decadron  -A/C per protocol - to prevent renal microthrombi too   -Pneumonia antibiotic coverage - f/u PCT  -Hypoxia monitoring - remains on RA       D/C plan from renal stand point:  -Follow-up as with fever    Discussed with patient, and treatment team-nurse      IMPRESSION:       JUAN (on no CKD): Slow improvement   - Oligouric   - BL Scr- 0.8  ---> 2.4 on admission  -: Etiology of JUAN - presumed pre-renal / ATN     - other differentials: COVID related nephropathy + unlikely GN / TI / TMA process  - UA : reviewed, ketones, prote, LE, hyalinize cast = pre-renal / ATN ? UTI WBC    Associated problems:   - Volume status: mild hypo-volemic - better   : BP: lower - better   : Na: hyponatremia -hypovolemic moderate - 129 - resolving w/ IVF   - Azotemia: pre-renal   - Electrolytes: K: WNL  - Acid-Base: acidosis - higher AGMA - d/to JUAN + some respirator     Other major problems: Management per primary and other consulting teams. # COVID  +, no hypoxia   Hospital Problems           Last Modified POA    JUAN (acute kidney injury) ([facility] [address] 2021 Yes             Please refer to the orders. High Complexity. Multiple complex problems.   Discussed with patient's treatment team- nurseing  Thank you for allowing me to participate in this patient's care. Please do not hesitate to contact me anytime. We will follow along with you. Ish Montero MD  [facility] [address] [place]: [phone] or [address] [employer]  Fax: [phone]      Patient Active Problem List   Diagnosis    Joint pain    Constipation    Localized rash    HTN (hypertension)    Yeast vaginitis    Myalgia    Fibromyalgia    Hyperthyroidism    Nephroureterolithiasis    Migraine with aura and with status migrainosus, not intractable    Kidney stone    Insomnia due to other mental disorder    Mild intermittent asthma without complication    Moderate episode of recurrent major depressive disorder (HCC)    PTSD (post-traumatic stress disorder)    Paresthesia of right arm and leg    Focal motor deficit    Right sided weakness    Generalized anxiety disorder    Depression    Anxiety    Asthma    Conversion disorder    [facility]       ========================================================   ========================================================      SUBJECTIVE:  Seen face to face   Said still w/ fever  Patient denied chest pain / dizziness/lightheadedness/syncope/ SOB / leg edema. Past medical, Surgical, Social, Family medical history reviewed by me. MEDICATIONS: reviewed by me. Medications Prior to Admission:  No current facility-administered medications on file prior to encounter.       Current Outpatient Medications on File Prior to Encounter   Medication Sig Dispense Refill    ondansetron (ZOFRAN) 4 MG tablet Take 1 tablet by mouth daily as needed for Nausea or Vomiting 30 tablet 0    busPIRone (BUSPAR) 10 MG tablet Take 1 tablet by mouth 2 times daily as needed (anxiety) (Patient taking differently: Take 10 mg by mouth 3 times daily ) 60 tablet 2    ipratropium-albuterol (DUONEB) 0.5-2.5 (3) MG/3ML SOLN nebulizer solution Inhale 3 mLs into the lungs 4 times daily 360 mL 2    tiZANidine (ZANAFLEX) 6 MG capsule TAKE ONE CAPSULE BY MOUTH THREE TIMES A DAY AT LEAST EIGHT HOURS APART 90 capsule 0    amLODIPine (NORVASC) 10 MG tablet Take 1 tablet by mouth daily 30 tablet 1    DULoxetine (CYMBALTA) 60 MG extended release capsule Take 1 capsule by mouth daily 30 capsule 0    pantoprazole (PROTONIX) 20 MG tablet Take 1 tablet by mouth 2 times daily 60 tablet 3    beclomethasone (QVAR REDIHALER) 40 MCG/ACT AERB inhaler Inhale 1 puff into the lungs 2 times daily 1 Inhaler 5    albuterol sulfate  (90 Base) MCG/ACT inhaler Inhale 2 puffs into the lungs 4 times daily as needed for Wheezing 1 Inhaler 5    Nebulizers (COMPRESSOR/NEBULIZER) MISC 1 each by Does not apply route daily 1 each 3    Blood Pressure KIT 1 kit by Does not apply route daily 1 kit 0    pregabalin (LYRICA) 150 MG capsule Take 1 capsule by mouth 2 times daily for 30 days. Take one in am with breakfast and one at dinner time. 60 capsule 1    Handicap Placard MISC by Does not apply route Unable able to walk long distance due to medical condition. Expires in 5 years.  1 each 0    EPINEPHrine (EPIPEN 2-LUANNE) 0.3 MG/0.3ML SOAJ injection Inject 0.3 mLs into the muscle once for 1 dose Use as directed for allergic reaction (Patient not taking: Reported on 9/30/2020) 1 each 0         Current Facility-Administered Medications:     lactated ringers infusion, , Intravenous, Continuous, Migel Nelson MD, Last Rate: 100 mL/hr at 02/16/21 1304, New Bag at 02/16/21 1304    morphine (PF) injection 1 mg, 1 mg, Intravenous, Q4H PRN, Migel Nelson MD, 1 mg at 02/17/21 0311    albuterol sulfate  (90 Base) MCG/ACT inhaler 2 puff, 2 puff, Inhalation, 4x Daily PRN, Marine Cazares PA-C    busPIRone (BUSPAR) tablet 10 mg, 10 mg, Oral, TID, Marine Cazares PA-C, 10 mg at 02/16/21 2037    DULoxetine (CYMBALTA) extended release capsule 60 mg, 60 mg, Oral, Daily, Jeannie Rao PA-C, 60 mg at 02/15/21 0824    albuterol sulfate  (90 Base) MCG/ACT inhaler 2 puff, 2 puff, Inhalation, 4x daily, 2 puff at 02/17/21 0838 **AND** ipratropium (ATROVENT HFA) 17 MCG/ACT inhaler 2 puff, 2 puff, Inhalation, 4x daily, 2 puff at 02/17/21 0838 **AND** MDI Treatment, , , 4x daily, Marine Cazares PA-C    tiZANidine (ZANAFLEX) tablet 6 mg, 6 mg, Oral, Q8H PRN, Jacob Cabrera PA-C, 6 mg at 02/17/21 5394    sodium chloride flush 0.9 % injection 10 mL, 10 mL, Intravenous, 2 times per day, Jacob Cabrera PA-C, 10 mL at 02/16/21 2037    sodium chloride flush 0.9 % injection 10 mL, 10 mL, Intravenous, PRN, Jacob Cabrera PA-C    magnesium hydroxide (MILK OF MAGNESIA) 400 MG/5ML suspension 30 mL, 30 mL, Oral, Daily PRN, Marine Cazares PA-C    acetaminophen (TYLENOL) tablet 650 mg, 650 mg, Oral, Q6H PRN, 650 mg at 02/16/21 2037 **OR** acetaminophen (TYLENOL) suppository 650 mg, 650 mg, Rectal, Q6H PRN, Jacob Cabrera PA-C    ondansetron (ZOFRAN) injection 4 mg, 4 mg, Intravenous, Q6H PRN, Jacob Cabrera PA-C, 4 mg at 02/16/21 0746    dexamethasone (DECADRON) tablet 6 mg, 6 mg, Oral, Daily, Marine Cazares PA-C, 6 mg at 02/16/21 0802    famotidine (PEPCID) tablet 20 mg, 20 mg, Oral, Daily, Marine Cazares PA-C, 20 mg at 02/16/21 0801    fluticasone (FLOVENT HFA) 110 MCG/ACT inhaler 1 puff, 1 puff, Inhalation, BID, Shy Sosa MD, 1 puff at 02/16/21 2042    sodium chloride flush 0.9 % injection 10 mL, 10 mL, Intravenous, PRN, Marthe Apley Berkley, PA-C, 10 mL at 02/15/21 1117    enoxaparin (LOVENOX) injection 80 mg, 1 mg/kg, Subcutaneous, BID, Jared Steph REYES MD, 80 mg at 02/16/21 2037         PHYSICAL EXAM:  Recent vital signs and recent I/Os reviewed by me.      Wt Readings from Last 3 Encounters:   02/15/21 183 lb 6.8 oz (83.2 kg)   09/30/20 183 lb 11.2 oz (83.3 kg)   09/09/20 180 lb 8 oz (81.9 kg)     BP Readings from Last 3 Encounters:   02/17/21 (!) 131/90   09/30/20 130/80   09/09/20 105/79     Patient Vitals for the past 24 hrs:   BP Temp Temp src Pulse Resp SpO2   02/17/21 0840 -- -- -- -- 18 95 %   02/17/21 0404 (!) 131/90 97.7 °F (36.5 °C) Oral 67 18 97 %   02/17/21 0030 130/83 97.8 °F (36.6 °C) Oral 69 18 98 %   02/16/21 2046 -- -- -- -- 20 99 %   02/16/21 2045 -- -- -- -- 20 99 %   02/16/21 2044 -- -- -- -- 20 97 %   02/16/21 2033 109/72 97.7 °F (36.5 °C) Oral 96 20 97 %   02/16/21 1809 -- -- -- 86 -- --   02/16/21 1741 113/70 98.3 °F (36.8 °C) Oral 86 16 97 %   02/16/21 1600 -- -- -- 92 -- --   02/16/21 1424 -- -- -- 91 -- --   02/16/21 1252 -- -- -- 93 -- --   02/16/21 1245 119/71 99.5 °F (37.5 °C) Oral 104 16 97 %       Intake/Output Summary (Last 24 hours) at 2/17/2021 1002  Last data filed at 2/16/2021 1705  Gross per 24 hour   Intake 2774.34 ml   Output --   Net 2774.34 ml       Physical exam:  General: Awake, Alert,   HENT: Atraumatic, normocephalic   Eyes: Normal conjunctiva, Non-incteral sclera. Neck: Supple, JVD not visible. CVS:  Heart sounds are normal. No loud murmur. RS: Normal respiratory effort, Breat sound: diminished at bases. Abd: Soft , bowel sounds are normal, no distension and no tenderness . Skin: No rash , some bruises,   CNS: Awake Oriented , No focal.   Extremities/MSK: mild Edema, no cyanosis. DATA:  Diagnostic tests reviewed by me for today's visit:   (AS NEEDED FOR MY EVALUATION AND MANAGEMENT).        Recent Labs     02/14/21  2034 02/15/21  0431 02/17/21  0542   WBC 5.9 4.5 6.5   HCT 46.1 42.6 38.7    156 143     Iron Saturation:  No components found for: PERCENTFE  FERRITIN:    Lab Results   Component Value Date    FERRITIN 7.8 08/02/2018     IRON:    Lab Results   Component Value Date    IRON 37 03/13/2020     TIBC:    Lab Results   Component Value Date    TIBC 410 03/13/2020       Recent Labs     02/14/21  2034 02/15/21  0431 02/16/21  0520 02/17/21  0542   * 133* 136 136   K 3.7 4.1 4.2 4.3   CL 94* 102 97* 99   CO2 17* 14* 26 27   BUN 34* 34* 40* 24* CREATININE 2.4* 2.0* 1.9* 1.2*     Recent Labs     02/14/21  2034 02/15/21  0431 02/16/21  0520 02/17/21  0542   CALCIUM 9.0 8.5 8.9 8.2*   PHOS  --   --  2.1* 2.5     No results for input(s): PH, PCO2, PO2 in the last 72 hours.     Invalid input(s): Ivonne Portillo    ABG:  No results found for: PH, PCO2, PO2, HCO3, BE, THGB, TCO2, O2SAT  VBG:    Lab Results   Component Value Date    PHVEN 7.440 02/15/2021    JQY3JJI 22.1 02/15/2021    BEVEN -7.1 02/15/2021    [id number] >100 02/15/2021       LDH:  No results found for: LDH  Uric Acid:  No results found for: LABURIC, URICACID    PT/INR:    Lab Results   Component Value Date    PROTIME 11.7 08/31/2020    INR 1.01 08/31/2020     Warfarin PT/INR:  No components found for: PTPATWAR, PTINRWAR  PTT:    Lab Results   Component Value Date    APTT 21.5 08/31/2020   [APTT}  Last 3 Troponin:    Lab Results   Component Value Date    TROPONINI <0.01 02/15/2021    TROPONINI <0.01 02/15/2021    TROPONINI <0.01 02/15/2021       U/A:    Lab Results   Component Value Date    NITRITE Neg 03/13/2020    COLORU YELLOW 02/15/2021    PROTEINU 100 02/15/2021    PHUR 5.5 02/15/2021    WBCUA 51 02/15/2021    RBCUA 5 02/15/2021    MUCUS 2+ 02/09/2017    BACTERIA 4+ 02/15/2021    CLARITYU CLOUDY 02/15/2021    SPECGRAV 1.013 02/15/2021    LEUKOCYTESUR SMALL 02/15/2021    UROBILINOGEN 0.2 02/15/2021    BILIRUBINUR Negative 02/15/2021    BILIRUBINUR Neg 03/13/2020    BLOODU SMALL 02/15/2021    GLUCOSEU Negative 02/15/2021     Microalbumen/Creatinine ratio:  No components found for: RUCREAT  24 Hour Urine for Protein:  No components found for: RAWUPRO, UHRS3, ERKM40IN, UTV3  24 Hour Urine for Creatinine Clearance:  No components found for: CREAT4, UHRS10, UTV10  Urine Toxicology:  No components found for: Jalen Francisca, IBENZO, ICOCAINE, IMARTHC, IOPIATES, IPHENCYC    HgBA1c:    Lab Results   Component Value Date    LABA1C 6.0 09/01/2020     RPR:  No results found for: RPR  HIV:  No results found for: HIV  RUY:    Lab Results   Component Value Date    RUY Negative 01/24/2020     RF:    Lab Results   Component Value Date    RF 11.0 01/24/2020     DSDNA:  No components found for: DNA  AMYLASE:  No results found for: AMYLASE  LIPASE:    Lab Results   Component Value Date    LIPASE 80.0 02/14/2021     Fibrinogen Level:  No components found for: FIB       BELOW MENTIONED RADIOLOGY STUDY RESULTS BY ME (AS NEEDED FOR MY EVALUATION AND MANAGEMENT). Xr Chest Portable    Result Date: 2/14/2021  EXAMINATION: ONE XRAY VIEW OF THE CHEST 2/14/2021 8:51 pm COMPARISON: August 31, 2020. HISTORY: ORDERING SYSTEM PROVIDED HISTORY: SOB TECHNOLOGIST PROVIDED HISTORY: Reason for exam:->SOB Reason for Exam: SOB hx of asthma - feels like she can't get air. Pt hasn't eaten in 5 days and passed out this morning hitting her head Acuity: Acute Type of Exam: Initial FINDINGS: Frontal portable view of the chest.  Normal lung volume. No focal airspace disease. Normal pulmonary vasculature. No pleural effusion or pneumothorax. Normal cardiomediastinal silhouette and great vessels. Stable regional skeleton with redemonstration of mild rightward convex curvature of the thoracic spine. No acute cardiopulmonary process. No focal airspace disease.

## 2021-02-17 NOTE — PROGRESS NOTES
Shift assessment completed. Routine vitals stable. Scheduled medications given. Patient c/o 10/10 generalized pain. Tylenol given, morphine not due for another hour. Pt requesting morphine be given with zanaflex when due. Patient is awake, alert and oriented. Respirations are easy and unlabored. Call light within reach. Will continue to monitor.

## 2021-02-17 NOTE — PROGRESS NOTES
Shift assessment complete. VSS. Scheduled medications given. Patient resting in bed at this time and does not appear to be in distress. Comfort items have been given. Bed alarm engaged, call light in reach. Will monitor.

## 2021-02-18 ENCOUNTER — CARE COORDINATION (OUTPATIENT)
Dept: CASE MANAGEMENT | Age: 41
End: 2021-02-18

## 2021-02-18 LAB — BLOOD CULTURE, ROUTINE: NORMAL

## 2021-02-19 ENCOUNTER — CARE COORDINATION (OUTPATIENT)
Dept: CASE MANAGEMENT | Age: 41
End: 2021-02-19

## 2021-02-19 LAB — CULTURE, BLOOD 2: NORMAL

## 2021-02-19 NOTE — CARE COORDINATION
Second attempt at 24 hour discharge call, no answer, CTN left VM with contact information and request for return call. CTN will resolve episode and remain available.

## 2021-02-24 DIAGNOSIS — F41.9 ANXIETY: ICD-10-CM

## 2021-02-24 DIAGNOSIS — M62.838 MUSCLE SPASM: ICD-10-CM

## 2021-02-24 DIAGNOSIS — M79.7 FIBROMYALGIA: ICD-10-CM

## 2021-02-24 RX ORDER — BUSPIRONE HYDROCHLORIDE 10 MG/1
10 TABLET ORAL 3 TIMES DAILY PRN
Qty: 90 TABLET | Refills: 0 | Status: SHIPPED | OUTPATIENT
Start: 2021-02-24 | End: 2021-05-28 | Stop reason: SDUPTHER

## 2021-02-24 RX ORDER — TIZANIDINE HYDROCHLORIDE 6 MG/1
CAPSULE, GELATIN COATED ORAL
Qty: 90 CAPSULE | Refills: 0 | Status: SHIPPED | OUTPATIENT
Start: 2021-02-24 | End: 2021-05-28 | Stop reason: SDUPTHER

## 2021-02-24 NOTE — TELEPHONE ENCOUNTER
Refill request for tizanidine / buspar medication.      Name [facility]- [facility]       Last visit - 12-     Pending visit - none    Last refill -12-7-2020 /12-      Medication Contract signed -   Last Oas ran- 2-        Additional Comments

## 2021-02-24 NOTE — TELEPHONE ENCOUNTER
Released from [place] [id number] last night from [facility]. She needs some refills. She is also now on O2. They also had her on xanax 0.5mg BID. She states that she really needs this to help with breathing. Did you receive paperwork for her employment?

## 2021-02-25 ENCOUNTER — TELEPHONE (OUTPATIENT)
Dept: INTERNAL MEDICINE CLINIC | Age: 41
End: 2021-02-25

## 2021-03-01 NOTE — TELEPHONE ENCOUNTER
Patient says that the xanax is to calm her nerves because she is on Oxygen from the covid pnumonia , and this helps her to relax more per the hospital doctors.

## 2021-03-02 ENCOUNTER — TELEMEDICINE (OUTPATIENT)
Dept: INTERNAL MEDICINE CLINIC | Age: 41
End: 2021-03-02
Payer: MEDICAID

## 2021-03-02 DIAGNOSIS — U07.1 COVID-19: ICD-10-CM

## 2021-03-02 DIAGNOSIS — J18.9 PNEUMONIA OF LOWER LOBE DUE TO INFECTIOUS ORGANISM, UNSPECIFIED LATERALITY: Primary | ICD-10-CM

## 2021-03-02 DIAGNOSIS — K64.4 EXTERNAL HEMORRHOID: ICD-10-CM

## 2021-03-02 DIAGNOSIS — N39.0 URINARY TRACT INFECTION WITHOUT HEMATURIA, SITE UNSPECIFIED: ICD-10-CM

## 2021-03-02 PROCEDURE — 1111F DSCHRG MED/CURRENT MED MERGE: CPT | Performed by: NURSE PRACTITIONER

## 2021-03-02 PROCEDURE — 99215 OFFICE O/P EST HI 40 MIN: CPT | Performed by: NURSE PRACTITIONER

## 2021-03-02 RX ORDER — HYDROXYZINE HYDROCHLORIDE 25 MG/1
25 TABLET, FILM COATED ORAL EVERY 8 HOURS PRN
Qty: 30 TABLET | Refills: 0 | Status: SHIPPED | OUTPATIENT
Start: 2021-03-02 | End: 2021-05-28 | Stop reason: SDUPTHER

## 2021-03-02 RX ORDER — LEVOFLOXACIN 500 MG/1
TABLET, FILM COATED ORAL
Qty: 7 TABLET | Refills: 0 | Status: SHIPPED | OUTPATIENT
Start: 2021-03-02 | End: 2021-12-14

## 2021-03-02 RX ORDER — HYDROCORTISONE 25 MG/G
CREAM TOPICAL
Qty: 1 TUBE | Refills: 0 | Status: SHIPPED | OUTPATIENT
Start: 2021-03-02

## 2021-03-02 RX ORDER — ONDANSETRON 4 MG/1
4 TABLET, ORALLY DISINTEGRATING ORAL 3 TIMES DAILY PRN
Qty: 60 TABLET | Refills: 0 | Status: SHIPPED | OUTPATIENT
Start: 2021-03-02 | End: 2021-07-16 | Stop reason: SDUPTHER

## 2021-03-02 ASSESSMENT — ENCOUNTER SYMPTOMS
DIARRHEA: 0
NAUSEA: 0
ABDOMINAL DISTENTION: 0
CHEST TIGHTNESS: 0
VOMITING: 0
CONSTIPATION: 0
SHORTNESS OF BREATH: 0

## 2021-03-02 ASSESSMENT — PATIENT HEALTH QUESTIONNAIRE - PHQ9
SUM OF ALL RESPONSES TO PHQ QUESTIONS 1-9: 0
2. FEELING DOWN, DEPRESSED OR HOPELESS: 0
1. LITTLE INTEREST OR PLEASURE IN DOING THINGS: 0
SUM OF ALL RESPONSES TO PHQ QUESTIONS 1-9: 0

## 2021-03-02 NOTE — TELEPHONE ENCOUNTER
I do not see any mention of Xanax from hospital providers. I would also not recommend this seeing that her respiratory drive is compromised from pneumonia.  Benzos can suppress respiration further as well

## 2021-03-02 NOTE — PROGRESS NOTES
albuterol sulfate  (90 Base) MCG/ACT inhaler  Inhale 2 puffs into the lungs 4 times daily as needed for Wheezing             amLODIPine (NORVASC) 10 MG tablet  Take 1 tablet by mouth daily             apixaban (ELIQUIS) 5 MG TABS tablet  Take 1 tablet by mouth 2 times daily             beclomethasone (QVAR REDIHALER) 40 MCG/ACT AERB inhaler  Inhale 1 puff into the lungs 2 times daily             Blood Pressure KIT  1 kit by Does not apply route daily             busPIRone (BUSPAR) 10 MG tablet  Take 1 tablet by mouth 3 times daily as needed (anxiety)             DULoxetine (CYMBALTA) 60 MG extended release capsule  Take 1 capsule by mouth daily             EPINEPHrine (EPIPEN 2-LUANNE) 0.3 MG/0.3ML SOAJ injection  Inject 0.3 mLs into the muscle once for 1 dose Use as directed for allergic reaction             Handicap Placard MISC  by Does not apply route Unable able to walk long distance due to medical condition. Expires in 5 years. hydrocortisone (ANUSOL-HC) 2.5 % CREA rectal cream  Apply to hemorrhoid twice daily             hydrOXYzine (ATARAX) 25 MG tablet  Take 1 tablet by mouth every 8 hours as needed for Anxiety (sleep)             ipratropium-albuterol (DUONEB) 0.5-2.5 (3) MG/3ML SOLN nebulizer solution  Inhale 3 mLs into the lungs 4 times daily             levoFLOXacin (LEVAQUIN) 500 MG tablet  1 daily by mouth             Nebulizers (COMPRESSOR/NEBULIZER) MISC  1 each by Does not apply route daily             ondansetron (ZOFRAN) 4 MG tablet  Take 1 tablet by mouth daily as needed for Nausea or Vomiting             ondansetron (ZOFRAN-ODT) 4 MG disintegrating tablet  Take 1 tablet by mouth 3 times daily as needed for Nausea or Vomiting             pantoprazole (PROTONIX) 20 MG tablet  Take 1 tablet by mouth 2 times daily             pregabalin (LYRICA) 150 MG capsule  Take 1 capsule by mouth 2 times daily for 30 days. Take one in am with breakfast and one at dinner time. Expires in 5 years. 1 each 0        Medications patient taking as of now reconciled against medications ordered at time of hospital discharge: Yes    Chief Complaint   Patient presents with    Follow-Up from Hospital       Ms. Glover Simmonds presents after recent hospitalization at [address] [place] [address].     She states she initially went to [facility] ER where she states she simply felt terrible. She was diagnosed with COVID there and discharged home with supportive care. Several days later she reported worsening symptoms including shortness of breath, severe generalized arthralgia, cough, lethargy, fever, dysuria. She reported to [facility] ER at this point where she was admitted for monitoring/IV abx. She required 3 L oxygen at discharge due to deficient lung capacity/functional shortness of breath. States she continues with respiratory symptoms including wheezing/cough despite steroid/abx. She states with minimal activity she needs to breathe hard to catch her breath. Has not returned to work yet due to these symptoms. She states she rests primarily throughout the day. Reports altered taste sensation with accompanying nausea as well. Continues to use her oxygen at 3 l/min. Continues to mention dysuria symptoms including frequency, extreme discomfort with urination. States urine is very concentrated because she is not able to drink due to her nausea. Was prescribed augmentin during hospitalization for both PNA and UTI. She has also developed hemorrhoids during hospitalization and finds it difficult to have BM given pain with wiping. Has used tucks pads with minimal effect. Inpatient course: Discharge summary reviewed- see chart. Interval history/Current status: see above    Review of Systems   Constitutional: Negative for activity change, fatigue and unexpected weight change. Respiratory: Negative for chest tightness and shortness of breath. Cardiovascular: Negative for chest pain, palpitations and leg swelling. discoloration noted on facial skin         [] Abnormal-            Psychiatric:       [x] Normal Affect [x] No Hallucinations        [] Abnormal-     Other pertinent observable physical exam findings-     Assessment/Plan:  1. Pneumonia of lower lobe due to infectious organism, unspecified laterality    - levoFLOXacin (LEVAQUIN) 500 MG tablet; 1 daily by mouth  Dispense: 7 tablet; Refill: 0  - ondansetron (ZOFRAN-ODT) 4 MG disintegrating tablet; Take 1 tablet by mouth 3 times daily as needed for Nausea or Vomiting  Dispense: 60 tablet; Refill: 0  - NE DISCHARGE MEDS RECONCILED W/ CURRENT OUTPATIENT MED LIST    Increase abx potency with levaquin for both uti/PNA. Continue inhalers provided by [facility]. If symptoms persist, would like benefit from pulm referral. Encouraged mucinex to assist with expectoration/cough suppression. Flonase to relieve sinus congestion. Warm fluids with honey, humidifier to assist in moistening of secretion recommended. Encouraged increased nutrition/hydration/rest while recovering. 2. Urinary tract infection without hematuria, site unspecified    - NE DISCHARGE MEDS RECONCILED W/ CURRENT OUTPATIENT MED LIST    Levaquin to treat continued UTI symptoms. Encouraged to increase hydration to flush the urinary tract. Can use ibuprofen/tylenol for additional symptom relief. Encouraged proper genital hygiene to prevent further UTI. 3. COVID-19    - NE DISCHARGE MEDS RECONCILED W/ CURRENT OUTPATIENT MED LIST      4.  Hemorrhoids  Medical Decision Making: high complexity

## 2021-05-27 ENCOUNTER — TELEPHONE (OUTPATIENT)
Dept: INTERNAL MEDICINE CLINIC | Age: 41
End: 2021-05-27

## 2021-05-27 DIAGNOSIS — M79.7 FIBROMYALGIA: ICD-10-CM

## 2021-05-27 DIAGNOSIS — J18.9 PNEUMONIA OF LOWER LOBE DUE TO INFECTIOUS ORGANISM, UNSPECIFIED LATERALITY: ICD-10-CM

## 2021-05-27 DIAGNOSIS — M62.838 MUSCLE SPASM: ICD-10-CM

## 2021-05-27 DIAGNOSIS — I10 ESSENTIAL HYPERTENSION: ICD-10-CM

## 2021-05-27 DIAGNOSIS — F41.9 ANXIETY: ICD-10-CM

## 2021-05-27 RX ORDER — LEVOFLOXACIN 500 MG/1
TABLET, FILM COATED ORAL
Qty: 7 TABLET | Refills: 0 | Status: CANCELLED | OUTPATIENT
Start: 2021-05-27

## 2021-05-27 NOTE — TELEPHONE ENCOUNTER
Refill request for medication.    Pended  See list     Name of [facility]       Last visit - 3/2/21     Pending visit - none  Last refill -    Medication Contract signed -  Last Oarrs ran- 2/14/21        Additional Comments

## 2021-05-28 ENCOUNTER — TELEPHONE (OUTPATIENT)
Dept: ADMINISTRATIVE | Age: 41
End: 2021-05-28

## 2021-05-28 RX ORDER — HYDROCHLOROTHIAZIDE 25 MG/1
25 TABLET ORAL DAILY
Qty: 30 TABLET | Refills: 2 | Status: SHIPPED | OUTPATIENT
Start: 2021-05-28 | End: 2021-08-31

## 2021-05-28 RX ORDER — AMLODIPINE BESYLATE 10 MG/1
10 TABLET ORAL DAILY
Qty: 30 TABLET | Refills: 1 | Status: SHIPPED | OUTPATIENT
Start: 2021-05-28 | End: 2021-08-31 | Stop reason: SDUPTHER

## 2021-05-28 RX ORDER — TIZANIDINE HYDROCHLORIDE 6 MG/1
CAPSULE, GELATIN COATED ORAL
Qty: 90 CAPSULE | Refills: 0 | Status: SHIPPED | OUTPATIENT
Start: 2021-05-28 | End: 2021-08-31 | Stop reason: SDUPTHER

## 2021-05-28 RX ORDER — BUSPIRONE HYDROCHLORIDE 10 MG/1
10 TABLET ORAL 3 TIMES DAILY PRN
Qty: 90 TABLET | Refills: 0 | Status: SHIPPED | OUTPATIENT
Start: 2021-05-28 | End: 2021-08-31 | Stop reason: SDUPTHER

## 2021-05-28 RX ORDER — HYDROXYZINE HYDROCHLORIDE 25 MG/1
25 TABLET, FILM COATED ORAL EVERY 8 HOURS PRN
Qty: 30 TABLET | Refills: 0 | Status: SHIPPED | OUTPATIENT
Start: 2021-05-28 | End: 2021-08-31 | Stop reason: SDUPTHER

## 2021-05-28 NOTE — TELEPHONE ENCOUNTER
Submitted PA for tiZANidine HCl 6MG capsules, Key: [id number]. Medication has been APPROVED through 05/28/2022. See approval letter attached. Please notify patient. Thank you.

## 2021-06-07 ENCOUNTER — TELEPHONE (OUTPATIENT)
Dept: INTERNAL MEDICINE CLINIC | Age: 41
End: 2021-06-07

## 2021-06-07 NOTE — TELEPHONE ENCOUNTER
Form for [employer] is in fax box to be filled out and signed. She needs this done by the 9th or electric will be shut off.

## 2021-06-08 NOTE — TELEPHONE ENCOUNTER
Faxed and scanned into encounter. [de-identified] : 89 wheelchair bound male, with PMHx of prostate cancer, herpetic neuralgia, and CLL/SLL, trisomy 12, diagnosed in 2008.\par \par CASE SYNOPSIS:\par 2008- prostate cancer; generalized lymphadenopathy on CT; diagnosed with CLL/SLL.\par 4/8/09- bone marrow biopsy ( Dr. Hager)- consistent with CLL ( trisomy 12)\par 2/2013- completes 6 cycles of Rituximab.\par 7/1/13- PET scan: multiple scattered enlarged lymph nodes in neck, chest, abdomen, and pelvis \par 6/25/18- CT A/P: stable diffuse lymphadenopathy\par 1/27/20: CT A/P ([facility]) compared with CT from 5/22/17- collapse and consolidation right lower lung medially associated with occluded right lower lobe bronchi. New 9 x 11 mm nodule with adjacent smaller nodules right upper lung sample, 10 mm nodule left lower lung. Multiple enlarged lymph nodes neck, axilla and mediastinum.; small pleural effusion. \par  [FreeTextEntry1] : expectant surveillance\par \par  [de-identified] : Patient’s last office visit was in 2018; his performance status is great despite persistent generalized lymphadenopathy and advanced age. Denies recent hospitalization; complies with inhalers’ treatment. Presents with irritative cough, occasional wheezing, and sputum production. RLL lung collapsed on recent CT, with occlusion right lower lobe bronchi. Here accompanied by his daughter, .

## 2021-07-01 NOTE — PLAN OF CARE
Problem: Pain:  Goal: Pain level will decrease  Description: Pain level will decrease  Outcome: Ongoing  Note: C/o chronic low back and neck pain. Rated level 8 out of 10. Zanaflex PRN dose given. Sleeping at this time. Problem: Falls - Risk of:  Goal: Will remain free from falls  Description: Will remain free from falls  Outcome: Ongoing  Note: Scored a high fall risk due to right sided weakness. Needs assistance and close supervision when out of bed. Problem: HEMODYNAMIC STATUS  Goal: Patient has stable vital signs and fluid balance  Outcome: Ongoing  Note: Vitals are stable except HR was elevated with breathing treatment and activity. HR is 94 at this time while resting with eyes closed. Problem: ACTIVITY INTOLERANCE/IMPAIRED MOBILITY  Goal: Mobility/activity is maintained at optimum level for patient  Outcome: Ongoing     Problem: COMMUNICATION IMPAIRMENT  Goal: Ability to express needs and understand communication  Outcome: Ongoing  Note: No issues with communication. Eating and drinking well without any issue. detailed exam lower extremities/decreased ROM

## 2021-07-16 ENCOUNTER — TELEPHONE (OUTPATIENT)
Dept: INTERNAL MEDICINE CLINIC | Age: 41
End: 2021-07-16

## 2021-07-16 DIAGNOSIS — J18.9 PNEUMONIA OF LOWER LOBE DUE TO INFECTIOUS ORGANISM, UNSPECIFIED LATERALITY: ICD-10-CM

## 2021-07-16 DIAGNOSIS — K29.70 GASTRITIS WITHOUT BLEEDING, UNSPECIFIED CHRONICITY, UNSPECIFIED GASTRITIS TYPE: ICD-10-CM

## 2021-07-16 RX ORDER — ONDANSETRON 4 MG/1
4 TABLET, ORALLY DISINTEGRATING ORAL 3 TIMES DAILY PRN
Qty: 60 TABLET | Refills: 0 | Status: SHIPPED | OUTPATIENT
Start: 2021-07-16 | End: 2021-08-31 | Stop reason: SDUPTHER

## 2021-07-16 RX ORDER — PANTOPRAZOLE SODIUM 20 MG/1
20 TABLET, DELAYED RELEASE ORAL 2 TIMES DAILY
Qty: 60 TABLET | Refills: 3 | Status: SHIPPED | OUTPATIENT
Start: 2021-07-16 | End: 2021-08-31 | Stop reason: SDUPTHER

## 2021-07-16 NOTE — TELEPHONE ENCOUNTER
Refill request for pantoprazole (PROTONIX) 20 MG tablet [[id number]]  ondansetron (ZOFRAN) 4 MG tablet [[id number]]        medication. Name of [address] [place] [address] [address], [address] [place] [address] - F [phone]       Last visit - 3.2.21     Pending visit - none    Last refill -8.11.20, and 12.18.20,         Additional Comments  Patient would also like to know if Mayte Asif filled out her paperwork for [place]? Please call patient and advise.

## 2021-07-19 ENCOUNTER — TELEPHONE (OUTPATIENT)
Dept: INTERNAL MEDICINE CLINIC | Age: 41
End: 2021-07-19

## 2021-07-19 NOTE — TELEPHONE ENCOUNTER
The form for [employer] will not be accepted because the provider's License number was not included on the form. Please add this to the form and re-send asap. Her power will be shut off today.

## 2021-08-30 ENCOUNTER — TELEPHONE (OUTPATIENT)
Dept: INTERNAL MEDICINE CLINIC | Age: 41
End: 2021-08-30

## 2021-08-31 DIAGNOSIS — K29.70 GASTRITIS WITHOUT BLEEDING, UNSPECIFIED CHRONICITY, UNSPECIFIED GASTRITIS TYPE: ICD-10-CM

## 2021-08-31 DIAGNOSIS — J18.9 PNEUMONIA OF LOWER LOBE DUE TO INFECTIOUS ORGANISM, UNSPECIFIED LATERALITY: ICD-10-CM

## 2021-08-31 DIAGNOSIS — J45.20 MILD INTERMITTENT ASTHMA WITHOUT COMPLICATION: ICD-10-CM

## 2021-08-31 DIAGNOSIS — M79.7 FIBROMYALGIA: ICD-10-CM

## 2021-08-31 DIAGNOSIS — F41.9 ANXIETY: ICD-10-CM

## 2021-08-31 DIAGNOSIS — I10 ESSENTIAL HYPERTENSION: ICD-10-CM

## 2021-08-31 DIAGNOSIS — M62.838 MUSCLE SPASM: ICD-10-CM

## 2021-08-31 RX ORDER — AMLODIPINE BESYLATE 10 MG/1
10 TABLET ORAL DAILY
Qty: 270 TABLET | Refills: 1 | Status: SHIPPED | OUTPATIENT
Start: 2021-08-31 | End: 2022-04-26 | Stop reason: SDUPTHER

## 2021-08-31 RX ORDER — PANTOPRAZOLE SODIUM 20 MG/1
20 TABLET, DELAYED RELEASE ORAL 2 TIMES DAILY
Qty: 180 TABLET | Refills: 1 | Status: SHIPPED | OUTPATIENT
Start: 2021-08-31 | End: 2021-11-24 | Stop reason: SDUPTHER

## 2021-08-31 RX ORDER — BUSPIRONE HYDROCHLORIDE 10 MG/1
10 TABLET ORAL 3 TIMES DAILY PRN
Qty: 270 TABLET | Refills: 1 | Status: SHIPPED | OUTPATIENT
Start: 2021-08-31 | End: 2021-11-24 | Stop reason: SDUPTHER

## 2021-08-31 RX ORDER — HYDROCHLOROTHIAZIDE 25 MG/1
25 TABLET ORAL DAILY
Qty: 90 TABLET | Refills: 1 | Status: ON HOLD
Start: 2021-08-31 | End: 2021-12-18 | Stop reason: HOSPADM

## 2021-08-31 RX ORDER — ALBUTEROL SULFATE 90 UG/1
2 AEROSOL, METERED RESPIRATORY (INHALATION) 4 TIMES DAILY PRN
Qty: 1 INHALER | Refills: 5 | Status: SHIPPED | OUTPATIENT
Start: 2021-08-31 | End: 2021-11-22 | Stop reason: SDUPTHER

## 2021-08-31 RX ORDER — HYDROXYZINE HYDROCHLORIDE 25 MG/1
25 TABLET, FILM COATED ORAL EVERY 8 HOURS PRN
Qty: 30 TABLET | Refills: 0 | Status: SHIPPED | OUTPATIENT
Start: 2021-08-31 | End: 2021-11-05 | Stop reason: SDUPTHER

## 2021-08-31 RX ORDER — PREGABALIN 150 MG/1
150 CAPSULE ORAL 2 TIMES DAILY
Qty: 60 CAPSULE | Refills: 0 | Status: SHIPPED | OUTPATIENT
Start: 2021-08-31 | End: 2022-02-22 | Stop reason: SDUPTHER

## 2021-08-31 RX ORDER — ONDANSETRON 4 MG/1
4 TABLET, ORALLY DISINTEGRATING ORAL 3 TIMES DAILY PRN
Qty: 60 TABLET | Refills: 0 | Status: SHIPPED | OUTPATIENT
Start: 2021-08-31 | End: 2021-11-24 | Stop reason: SDUPTHER

## 2021-08-31 RX ORDER — LEVOFLOXACIN 500 MG/1
TABLET, FILM COATED ORAL
Qty: 7 TABLET | Refills: 0 | OUTPATIENT
Start: 2021-08-31

## 2021-08-31 RX ORDER — TIZANIDINE HYDROCHLORIDE 6 MG/1
CAPSULE, GELATIN COATED ORAL
Qty: 90 CAPSULE | Refills: 0 | Status: SHIPPED | OUTPATIENT
Start: 2021-08-31 | End: 2021-11-05 | Stop reason: SDUPTHER

## 2021-08-31 NOTE — TELEPHONE ENCOUNTER
Patient calling with a ton of refills - I will pend them. .     Last OV  3/2/21  Pending  OV  Last refills ?     [facility] 2/14/21  Med cont  7/1/20

## 2021-11-05 DIAGNOSIS — M79.7 FIBROMYALGIA: ICD-10-CM

## 2021-11-05 DIAGNOSIS — M62.838 MUSCLE SPASM: ICD-10-CM

## 2021-11-05 RX ORDER — TIZANIDINE HYDROCHLORIDE 6 MG/1
CAPSULE, GELATIN COATED ORAL
Qty: 90 CAPSULE | Refills: 0 | Status: SHIPPED | OUTPATIENT
Start: 2021-11-05 | End: 2022-01-04 | Stop reason: SDUPTHER

## 2021-11-05 RX ORDER — HYDROXYZINE HYDROCHLORIDE 25 MG/1
25 TABLET, FILM COATED ORAL EVERY 8 HOURS PRN
Qty: 30 TABLET | Refills: 0 | Status: SHIPPED | OUTPATIENT
Start: 2021-11-05 | End: 2021-11-24 | Stop reason: SDUPTHER

## 2021-11-05 NOTE — TELEPHONE ENCOUNTER
Refill request for Tizanidine/Hydroxyzine medication.      Name of [facility]      Last visit - 3/2/21     Pending visit - none    Last refill -8/31/21-8/31/21      Medication Contract signed -   Last Oarrs ran-         Additional Comments

## 2021-11-22 DIAGNOSIS — J45.40 MODERATE PERSISTENT ASTHMA WITHOUT COMPLICATION: ICD-10-CM

## 2021-11-22 DIAGNOSIS — J18.9 PNEUMONIA OF LOWER LOBE DUE TO INFECTIOUS ORGANISM, UNSPECIFIED LATERALITY: ICD-10-CM

## 2021-11-22 DIAGNOSIS — J45.20 MILD INTERMITTENT ASTHMA WITHOUT COMPLICATION: ICD-10-CM

## 2021-11-22 DIAGNOSIS — F41.9 ANXIETY: ICD-10-CM

## 2021-11-22 DIAGNOSIS — K29.70 GASTRITIS WITHOUT BLEEDING, UNSPECIFIED CHRONICITY, UNSPECIFIED GASTRITIS TYPE: ICD-10-CM

## 2021-11-22 NOTE — TELEPHONE ENCOUNTER
Needs refills  Pended,      Also wants refills for:    Birth control  Tessalon Pearls   daily inhaler      [facility] [address]    Last appt.  3/2/21  Next appt   None

## 2021-11-24 RX ORDER — ONDANSETRON 4 MG/1
4 TABLET, ORALLY DISINTEGRATING ORAL 3 TIMES DAILY PRN
Qty: 60 TABLET | Refills: 0 | Status: ON HOLD
Start: 2021-11-24 | End: 2021-12-18 | Stop reason: HOSPADM

## 2021-11-24 RX ORDER — HYDROXYZINE HYDROCHLORIDE 25 MG/1
25 TABLET, FILM COATED ORAL EVERY 8 HOURS PRN
Qty: 30 TABLET | Refills: 0 | Status: SHIPPED | OUTPATIENT
Start: 2021-11-24 | End: 2021-12-04

## 2021-11-24 RX ORDER — PANTOPRAZOLE SODIUM 20 MG/1
20 TABLET, DELAYED RELEASE ORAL 2 TIMES DAILY
Qty: 180 TABLET | Refills: 0 | Status: SHIPPED | OUTPATIENT
Start: 2021-11-24 | End: 2022-04-26 | Stop reason: SDUPTHER

## 2021-11-24 RX ORDER — ALBUTEROL SULFATE 90 UG/1
2 AEROSOL, METERED RESPIRATORY (INHALATION) 4 TIMES DAILY PRN
Qty: 3 EACH | Refills: 0 | Status: SHIPPED | OUTPATIENT
Start: 2021-11-24

## 2021-11-24 RX ORDER — BUSPIRONE HYDROCHLORIDE 10 MG/1
10 TABLET ORAL 3 TIMES DAILY PRN
Qty: 270 TABLET | Refills: 0 | Status: SHIPPED | OUTPATIENT
Start: 2021-11-24 | End: 2022-04-26 | Stop reason: SDUPTHER

## 2021-11-24 RX ORDER — IPRATROPIUM BROMIDE AND ALBUTEROL SULFATE 2.5; .5 MG/3ML; MG/3ML
1 SOLUTION RESPIRATORY (INHALATION) 4 TIMES DAILY
Qty: 360 ML | Refills: 2 | Status: SHIPPED | OUTPATIENT
Start: 2021-11-24 | End: 2022-09-14 | Stop reason: SDUPTHER

## 2021-12-14 ENCOUNTER — APPOINTMENT (OUTPATIENT)
Dept: CT IMAGING | Age: 41
DRG: 141 | End: 2021-12-14
Payer: MEDICAID

## 2021-12-14 ENCOUNTER — APPOINTMENT (OUTPATIENT)
Dept: GENERAL RADIOLOGY | Age: 41
DRG: 141 | End: 2021-12-14
Payer: MEDICAID

## 2021-12-14 ENCOUNTER — TELEPHONE (OUTPATIENT)
Dept: INTERNAL MEDICINE CLINIC | Age: 41
End: 2021-12-14

## 2021-12-14 ENCOUNTER — HOSPITAL ENCOUNTER (INPATIENT)
Age: 41
LOS: 3 days | Discharge: HOME | DRG: 141 | End: 2021-12-18
Attending: EMERGENCY MEDICINE | Admitting: INTERNAL MEDICINE
Payer: MEDICAID

## 2021-12-14 DIAGNOSIS — J45.901 EXACERBATION OF ASTHMA, UNSPECIFIED ASTHMA SEVERITY, UNSPECIFIED WHETHER PERSISTENT: ICD-10-CM

## 2021-12-14 DIAGNOSIS — J18.9 PNEUMONIA OF LOWER LOBE DUE TO INFECTIOUS ORGANISM, UNSPECIFIED LATERALITY: ICD-10-CM

## 2021-12-14 DIAGNOSIS — J40 BRONCHITIS: Primary | ICD-10-CM

## 2021-12-14 DIAGNOSIS — J45.40 MODERATE PERSISTENT ASTHMA WITHOUT COMPLICATION: ICD-10-CM

## 2021-12-14 DIAGNOSIS — J98.01 ACUTE BRONCHOSPASM: Primary | ICD-10-CM

## 2021-12-14 PROBLEM — R06.02 SHORTNESS OF BREATH: Status: ACTIVE | Noted: 2021-12-14

## 2021-12-14 LAB
A/G RATIO: 1.4 (ref 1.1–2.2)
ALBUMIN SERPL-MCNC: 4.7 G/DL (ref 3.4–5)
ALP BLD-CCNC: 100 U/L (ref 40–129)
ALT SERPL-CCNC: 8 U/L (ref 10–40)
ANION GAP SERPL CALCULATED.3IONS-SCNC: 16 MMOL/L (ref 3–16)
AST SERPL-CCNC: 14 U/L (ref 15–37)
BASOPHILS ABSOLUTE: 0 K/UL (ref 0–0.2)
BASOPHILS RELATIVE PERCENT: 0.9 %
BILIRUB SERPL-MCNC: 0.4 MG/DL (ref 0–1)
BUN BLDV-MCNC: 13 MG/DL (ref 7–20)
CALCIUM SERPL-MCNC: 9.9 MG/DL (ref 8.3–10.6)
CHLORIDE BLD-SCNC: 100 MMOL/L (ref 99–110)
CO2: 19 MMOL/L (ref 21–32)
CREAT SERPL-MCNC: 0.8 MG/DL (ref 0.6–1.1)
D DIMER: 760 NG/ML DDU (ref 0–229)
EOSINOPHILS ABSOLUTE: 0.2 K/UL (ref 0–0.6)
EOSINOPHILS RELATIVE PERCENT: 3.3 %
GFR AFRICAN AMERICAN: >60
GFR NON-AFRICAN AMERICAN: >60
GLUCOSE BLD-MCNC: 77 MG/DL (ref 70–99)
HCG QUALITATIVE: NEGATIVE
HCT VFR BLD CALC: 36.5 % (ref 36–48)
HEMOGLOBIN: 11.8 G/DL (ref 12–16)
LYMPHOCYTES ABSOLUTE: 1.2 K/UL (ref 1–5.1)
LYMPHOCYTES RELATIVE PERCENT: 23.3 %
MCH RBC QN AUTO: 24.3 PG (ref 26–34)
MCHC RBC AUTO-ENTMCNC: 32.2 G/DL (ref 31–36)
MCV RBC AUTO: 75.3 FL (ref 80–100)
MONOCYTES ABSOLUTE: 1.1 K/UL (ref 0–1.3)
MONOCYTES RELATIVE PERCENT: 21.4 %
NEUTROPHILS ABSOLUTE: 2.6 K/UL (ref 1.7–7.7)
NEUTROPHILS RELATIVE PERCENT: 51.1 %
PDW BLD-RTO: 16.1 % (ref 12.4–15.4)
PLATELET # BLD: 260 K/UL (ref 135–450)
PMV BLD AUTO: 8.2 FL (ref 5–10.5)
POTASSIUM REFLEX MAGNESIUM: 4.2 MMOL/L (ref 3.5–5.1)
PRO-BNP: 54 PG/ML (ref 0–124)
RBC # BLD: 4.85 M/UL (ref 4–5.2)
SODIUM BLD-SCNC: 135 MMOL/L (ref 136–145)
TOTAL PROTEIN: 8.1 G/DL (ref 6.4–8.2)
TROPONIN: <0.01 NG/ML
WBC # BLD: 5.1 K/UL (ref 4–11)

## 2021-12-14 PROCEDURE — 71260 CT THORAX DX C+: CPT

## 2021-12-14 PROCEDURE — 6360000002 HC RX W HCPCS: Performed by: EMERGENCY MEDICINE

## 2021-12-14 PROCEDURE — 84703 CHORIONIC GONADOTROPIN ASSAY: CPT

## 2021-12-14 PROCEDURE — 6360000002 HC RX W HCPCS: Performed by: PHYSICIAN ASSISTANT

## 2021-12-14 PROCEDURE — 85025 COMPLETE CBC W/AUTO DIFF WBC: CPT

## 2021-12-14 PROCEDURE — 94761 N-INVAS EAR/PLS OXIMETRY MLT: CPT

## 2021-12-14 PROCEDURE — 6370000000 HC RX 637 (ALT 250 FOR IP): Performed by: PHYSICIAN ASSISTANT

## 2021-12-14 PROCEDURE — G0378 HOSPITAL OBSERVATION PER HR: HCPCS

## 2021-12-14 PROCEDURE — 96375 TX/PRO/DX INJ NEW DRUG ADDON: CPT

## 2021-12-14 PROCEDURE — 36415 COLL VENOUS BLD VENIPUNCTURE: CPT

## 2021-12-14 PROCEDURE — 94640 AIRWAY INHALATION TREATMENT: CPT

## 2021-12-14 PROCEDURE — 2580000003 HC RX 258: Performed by: EMERGENCY MEDICINE

## 2021-12-14 PROCEDURE — 83880 ASSAY OF NATRIURETIC PEPTIDE: CPT

## 2021-12-14 PROCEDURE — 93005 ELECTROCARDIOGRAM TRACING: CPT | Performed by: EMERGENCY MEDICINE

## 2021-12-14 PROCEDURE — 80053 COMPREHEN METABOLIC PANEL: CPT

## 2021-12-14 PROCEDURE — 6370000000 HC RX 637 (ALT 250 FOR IP): Performed by: EMERGENCY MEDICINE

## 2021-12-14 PROCEDURE — 85379 FIBRIN DEGRADATION QUANT: CPT

## 2021-12-14 PROCEDURE — 71045 X-RAY EXAM CHEST 1 VIEW: CPT

## 2021-12-14 PROCEDURE — 6360000004 HC RX CONTRAST MEDICATION: Performed by: EMERGENCY MEDICINE

## 2021-12-14 PROCEDURE — 96365 THER/PROPH/DIAG IV INF INIT: CPT

## 2021-12-14 PROCEDURE — 84484 ASSAY OF TROPONIN QUANT: CPT

## 2021-12-14 PROCEDURE — 99285 EMERGENCY DEPT VISIT HI MDM: CPT

## 2021-12-14 PROCEDURE — 84145 PROCALCITONIN (PCT): CPT

## 2021-12-14 RX ORDER — KETOROLAC TROMETHAMINE 30 MG/ML
30 INJECTION, SOLUTION INTRAMUSCULAR; INTRAVENOUS ONCE
Status: COMPLETED | OUTPATIENT
Start: 2021-12-14 | End: 2021-12-14

## 2021-12-14 RX ORDER — IPRATROPIUM BROMIDE AND ALBUTEROL SULFATE 2.5; .5 MG/3ML; MG/3ML
1 SOLUTION RESPIRATORY (INHALATION) ONCE
Status: COMPLETED | OUTPATIENT
Start: 2021-12-14 | End: 2021-12-14

## 2021-12-14 RX ORDER — MAGNESIUM SULFATE 1 G/100ML
1000 INJECTION INTRAVENOUS ONCE
Status: COMPLETED | OUTPATIENT
Start: 2021-12-14 | End: 2021-12-14

## 2021-12-14 RX ORDER — LORAZEPAM 2 MG/ML
0.5 INJECTION INTRAMUSCULAR ONCE
Status: COMPLETED | OUTPATIENT
Start: 2021-12-14 | End: 2021-12-14

## 2021-12-14 RX ORDER — METHYLPREDNISOLONE SODIUM SUCCINATE 125 MG/2ML
125 INJECTION, POWDER, LYOPHILIZED, FOR SOLUTION INTRAMUSCULAR; INTRAVENOUS ONCE
Status: COMPLETED | OUTPATIENT
Start: 2021-12-14 | End: 2021-12-14

## 2021-12-14 RX ORDER — SODIUM CHLORIDE 9 MG/ML
1000 INJECTION, SOLUTION INTRAVENOUS ONCE
Status: COMPLETED | OUTPATIENT
Start: 2021-12-14 | End: 2021-12-15

## 2021-12-14 RX ORDER — PREDNISONE 10 MG/1
TABLET ORAL
Qty: 30 TABLET | Refills: 0 | Status: ON HOLD
Start: 2021-12-14 | End: 2021-12-18 | Stop reason: HOSPADM

## 2021-12-14 RX ADMIN — IOPAMIDOL 75 ML: 755 INJECTION, SOLUTION INTRAVENOUS at 20:48

## 2021-12-14 RX ADMIN — ALBUTEROL SULFATE 5 MG: 2.5 SOLUTION RESPIRATORY (INHALATION) at 17:52

## 2021-12-14 RX ADMIN — KETOROLAC TROMETHAMINE 30 MG: 30 INJECTION, SOLUTION INTRAMUSCULAR; INTRAVENOUS at 23:22

## 2021-12-14 RX ADMIN — IPRATROPIUM BROMIDE AND ALBUTEROL SULFATE 1 AMPULE: .5; 3 SOLUTION RESPIRATORY (INHALATION) at 17:52

## 2021-12-14 RX ADMIN — ALBUTEROL SULFATE 2.5 MG: 2.5 SOLUTION RESPIRATORY (INHALATION) at 20:01

## 2021-12-14 RX ADMIN — IPRATROPIUM BROMIDE AND ALBUTEROL SULFATE 1 AMPULE: .5; 3 SOLUTION RESPIRATORY (INHALATION) at 23:08

## 2021-12-14 RX ADMIN — SODIUM CHLORIDE 1000 ML: 9 INJECTION, SOLUTION INTRAVENOUS at 22:00

## 2021-12-14 RX ADMIN — LORAZEPAM 0.5 MG: 2 INJECTION INTRAMUSCULAR; INTRAVENOUS at 22:11

## 2021-12-14 RX ADMIN — METHYLPREDNISOLONE SODIUM SUCCINATE 125 MG: 125 INJECTION, POWDER, FOR SOLUTION INTRAMUSCULAR; INTRAVENOUS at 17:50

## 2021-12-14 RX ADMIN — MAGNESIUM SULFATE HEPTAHYDRATE 1000 MG: 1 INJECTION, SOLUTION INTRAVENOUS at 20:50

## 2021-12-14 ASSESSMENT — ENCOUNTER SYMPTOMS
CONSTIPATION: 0
DIARRHEA: 0
CHEST TIGHTNESS: 1
ABDOMINAL PAIN: 0
STRIDOR: 0
NAUSEA: 0
APNEA: 0
VOMITING: 0
BACK PAIN: 0
SHORTNESS OF BREATH: 1
CHOKING: 0
COUGH: 1
WHEEZING: 1

## 2021-12-14 ASSESSMENT — PAIN SCALES - GENERAL
PAINLEVEL_OUTOF10: 10
PAINLEVEL_OUTOF10: 9

## 2021-12-14 ASSESSMENT — PAIN DESCRIPTION - LOCATION: LOCATION: CHEST

## 2021-12-14 NOTE — TELEPHONE ENCOUNTER
Patient is calling requesting prednisone due to her experiencing chest tightness, her breathing treatments are not working so she has been using her rescue inhaler more often she was prescribed this in the past and it had helped with the same symptoms that she is currently experiencing. Please send to [facility] on [facility].      Please call [employer] back at [phone]

## 2021-12-14 NOTE — ED TRIAGE NOTES
From home c/o asthma exacerbation, shortness of breath and chest pain x 1 day. Nebulizer and inhaler at home without relief.

## 2021-12-14 NOTE — ED PROVIDER NOTES
I received this patient in signout from Dr. Johnny Casarez. We discussed the patient's initial history, physical, workup thus far, and medical decision making to this point. Briefly, Warren Jackson is a 46 y.o. female  who presents to the ED complaining of significant wheezing and bronchospastic coughing without fevers. Initial history/exam also pertinent for history of asthma, feel like previous asthma. Pending studies at time of signout include: CT scan, reassessment    The patient will be reassessed after workup above is completed. Anticipated disposition at time of signout is potential admission depending on imaging and reassessment        The 12 lead EKG was interpreted by me as follows:  Rate: tachycardia with a rate of 103  Rhythm: sinus  Axis: normal  Intervals: normal OK, narrow QRS, normal QTc  ST segments: no ST elevations or depressions  T waves: no abnormal inversions  Non-specific T wave changes: not present  Prior EKG comparison: EKG dated 2/14/21 is not significantly different      ED COURSE/MDM  I introduced myself to the patient and performed my initial assessment on Warren Jackson. There is no significant change in the patient's history from what is documented initially by Dr. Johnny Casarez  after my evaluation. Old records reviewed. Labs and imaging reviewed. On my physical examination of the patient, she still notably tachypneic, conversationally dyspneic and mildly tachycardic. She still has mild wheezing throughout and coughing paroxysms. Since time of sign out, the patient's ED workup was notable for CAT scan showing no obvious PE on a somewhat limited study, equivocal findings for infiltrate however without fever or leukocytosis I do not suspect infiltrate or bacterial process but rather pure asthma at this point. Patient was given steroids nebulizers and IV magnesium prior to my taking over the patient's care.   Although not hypoxic or persistent vital sign abnormalities in the

## 2021-12-14 NOTE — ED PROVIDER NOTES
[address] [facility]        Pt Name: Jordi Brady  MRN: [id number]  [place] 1980  Date of evaluation: 12/14/2021  Provider: ROXANA Key  PCP: LEESA Roach CNP  Note Started: 5:44 PM EST        I have seen and evaluated this patient with my supervising physician Tonia Bose MD.    [address] [place] [address]       Chief Complaint   Patient presents with    Shortness of Breath     from home c/o shortness of breath x2 days, hx asthma       HISTORY OF PRESENT ILLNESS   (Location, Timing/Onset, Context/Setting, Quality, Duration, Modifying Factors, Severity, Associated Signs and Symptoms)  Note limiting factors. Chief Complaint: DONNA Brady is a 46 y.o. female with past medical history of anxiety, asthma, documented conversion disorder, fibromyalgia, previous blood clots, hypertension, kidney stones and lupus who presents the ED with complaint of shortness of breath. States for the past 2 days she has had associated chest tightness, wheezing, cough productive of greenish sputum and shortness of breath. Patient states she has longstanding history of asthma. Patient states current symptoms feel similar to previous asthma exacerbations in the past.  She has been taking home medications with minimal improvement of symptoms. States continued shortness of breath so she came to the ED for further evaluation and treatment. She has had tightness to her chest that she rates as a 10/10. States she associated productive cough, wheezing and shortness of breath. She denies any pleuritic pain, orthopnea, pedal edema or calf tenderness. She states she is a history of previous PE. She is not on anticoagulation at this time. She denies any hormone or birth control medication usage. She denies any recent travel, trips, surgery or immobilization.   Denies hemoptysis, fever/chills, sick contacts, recent travel, headache, lightheadedness/dizziness, syncope, near syncope, diaphoresis, abdominal pain, nausea/vomiting, urinary symptoms or changes in bowel movements. Became concerned and came to the ED for further evaluation and treatment. States is not a smoker. Nursing Notes were all reviewed and agreed with or any disagreements were addressed in the HPI. REVIEW OF SYSTEMS    (2-9 systems for level 4, 10 or more for level 5)     Review of Systems   Constitutional: Negative for activity change, appetite change, chills, diaphoresis, fatigue and fever. HENT: Negative. Respiratory: Positive for cough, chest tightness, shortness of breath and wheezing. Negative for apnea, choking and stridor. Cardiovascular: Negative. Negative for chest pain, palpitations and leg swelling. Gastrointestinal: Negative for abdominal pain, constipation, diarrhea, nausea and vomiting. Genitourinary: Negative for difficulty urinating and dysuria. Musculoskeletal: Negative for arthralgias, back pain, myalgias, neck pain and neck stiffness. Neurological: Negative for dizziness, light-headedness and headaches. Positives and Pertinent negatives as per HPI. Except as noted above in the ROS, all other systems were reviewed and negative.        PAST MEDICAL HISTORY     Past Medical History:   Diagnosis Date    Anxiety     Asthma     Constipation 1/13/2014    Conversion disorder     Depression     Fibromyalgia     Graves disease     History of blood transfusion     Hx of blood clots     2015 LT LUNG W/ PNEUMONIA    Hypertension     Joint pain 1/13/2014    Various joints (back, hands, knees, hips), recurrent flares since age 27, RF+ at one point, never fully worked up for rheumatologic dz yet    Kidney stone     Multiple    Localized rash 1/13/2014    Recurrent, on inner surface of upper arms, q 3 months, sometimes on chest, no facial involvement    Lupus ([address]     MDRO (multiple drug resistant organisms) resistance     PONV (postoperative nausea and vomiting)          SURGICAL HISTORY     Past Surgical History:   Procedure Laterality Date     SECTION       X 2    CYSTOSCOPY  2018    CYSTOSCOPY URETEROSCOPY WITH HOLMIUM LASER LITHOTRIPSY FOR 5 MM STONE, RIGHT STENT PLACEMENT    OTHER SURGICAL HISTORY  2016    CYSTOSCOPY, BILATERAL RETROGRADES, RIGHT URETEROSCOPY,    TONSILLECTOMY      TUBAL LIGATION           CURRENTMEDICATIONS       Previous Medications    ALBUTEROL SULFATE  (90 BASE) MCG/ACT INHALER    Inhale 2 puffs into the lungs 4 times daily as needed for Wheezing    AMLODIPINE (NORVASC) 10 MG TABLET    Take 1 tablet by mouth daily    BECLOMETHASONE (QVAR REDIHALER) 40 MCG/ACT AERB INHALER    Inhale 1 puff into the lungs 2 times daily    BLOOD PRESSURE KIT    1 kit by Does not apply route daily    BUSPIRONE (BUSPAR) 10 MG TABLET    Take 1 tablet by mouth 3 times daily as needed (anxiety)    DULOXETINE (CYMBALTA) 60 MG EXTENDED RELEASE CAPSULE    Take 1 capsule by mouth daily    EPINEPHRINE (EPIPEN 2-LUANNE) 0.3 MG/0.3ML SOAJ INJECTION    Inject 0.3 mLs into the muscle once for 1 dose Use as directed for allergic reaction    HANDICAP PLACARD MISC    by Does not apply route Unable able to walk long distance due to medical condition. Expires in 5 years.     HYDROCHLOROTHIAZIDE (HYDRODIURIL) 25 MG TABLET    Take 1 tablet by mouth daily    HYDROCORTISONE (ANUSOL-HC) 2.5 % CREA RECTAL CREAM    Apply to hemorrhoid twice daily    IPRATROPIUM-ALBUTEROL (DUONEB) 0.5-2.5 (3) MG/3ML SOLN NEBULIZER SOLUTION    Inhale 3 mLs into the lungs 4 times daily    LEVOFLOXACIN (LEVAQUIN) 500 MG TABLET    1 daily by mouth    NEBULIZERS (COMPRESSOR/NEBULIZER) MISC    1 each by Does not apply route daily    ONDANSETRON (ZOFRAN) 4 MG TABLET    Take 1 tablet by mouth daily as needed for Nausea or Vomiting    ONDANSETRON (ZOFRAN-ODT) 4 MG DISINTEGRATING TABLET    Take 1 tablet by mouth 3 times daily as needed for Nausea or Vomiting PANTOPRAZOLE (PROTONIX) 20 MG TABLET    Take 1 tablet by mouth 2 times daily    PREDNISONE (DELTASONE) 10 MG TABLET    Take 40 mg for 3 days then 30 mg for 3 days then 20 mg for 3 days then 10 mg for 3 days    PREGABALIN (LYRICA) 150 MG CAPSULE    Take 1 capsule by mouth 2 times daily for 30 days. Take one in am with breakfast and one at dinner time. TIZANIDINE (ZANAFLEX) 6 MG CAPSULE    TAKE ONE CAPSULE BY MOUTH THREE TIMES A DAY AT LEAST EIGHT HOURS APART         ALLERGIES     Ambien [zolpidem tartrate], Sertraline, Sudafed [pseudoephedrine hcl], and Amitriptyline hcl    FAMILYHISTORY       Family History   Problem Relation Age of Onset    Cancer Maternal Grandmother         Lung Ca          SOCIAL HISTORY       Social History     Tobacco Use    Smoking status: Never Smoker    Smokeless tobacco: Never Used   Vaping Use    Vaping Use: Never used   Substance Use Topics    Alcohol use: Yes     Alcohol/week: 0.0 standard drinks     Comment: occasional     Drug use: Yes     Types: Marijuana [place])     Comment: medical card       SCREENINGS             PHYSICAL EXAM    (up to 7 for level 4, 8 or more for level 5)     ED Triage Vitals [12/14/21 1725]   BP Temp Temp Source Pulse Resp SpO2 Height Weight   (!) 165/105 99.3 °F (37.4 °C) Oral 104 (!) 32 100 % 5' 7\" (1.702 m) 174 lb 9.6 oz (79.2 kg)       Physical Exam  Constitutional:       General: She is in acute distress. Appearance: Normal appearance. She is well-developed. She is not ill-appearing, toxic-appearing or diaphoretic. HENT:      Head: Normocephalic and atraumatic. Right Ear: External ear normal.      Left Ear: External ear normal.   Eyes:      General:         Right eye: No discharge. Left eye: No discharge. Conjunctiva/sclera: Conjunctivae normal.   Cardiovascular:      Rate and Rhythm: Regular rhythm. Tachycardia present. Pulses: Normal pulses. Heart sounds: Normal heart sounds. No murmur heard.   No friction rub. No gallop. Comments: Tachycardia noted. 2+ radial pulses bilaterally. No pedal edema. No calf tenderness. No JVD. Pulmonary:      Effort: Respiratory distress present. Breath sounds: No stridor. Wheezing present. No rhonchi or rales. Comments: Tachypnea noted. Patient has diminished aeration to bilateral lung fields with faint expiratory wheezing. Tachypnea noted with respiratory rate in the 30s. Oxygen is 100% on room air. Chest:      Chest wall: No tenderness. Abdominal:      General: Abdomen is flat. Bowel sounds are normal. There is no distension. Palpations: Abdomen is soft. There is no mass. Tenderness: There is no abdominal tenderness. There is no right CVA tenderness, left CVA tenderness, guarding or rebound. Hernia: No hernia is present. Musculoskeletal:         General: Normal range of motion. Cervical back: Normal range of motion and neck supple. No rigidity or tenderness. Lymphadenopathy:      Cervical: No cervical adenopathy. Skin:     General: Skin is warm and dry. Coloration: Skin is not pale. Findings: No erythema or rash. Neurological:      Mental Status: She is alert and oriented to person, place, and time.    Psychiatric:         Behavior: Behavior normal.         DIAGNOSTIC RESULTS   LABS:    Labs Reviewed   CBC WITH AUTO DIFFERENTIAL - Abnormal; Notable for the following components:       Result Value    Hemoglobin 11.8 (*)     MCV 75.3 (*)     MCH 24.3 (*)     RDW 16.1 (*)     All other components within normal limits    Narrative:     Performed at:  [facility] [address], [place], [facility] [address]   Phone [phone]   COMPREHENSIVE METABOLIC PANEL W/ REFLEX TO MG FOR LOW K - Abnormal; Notable for the following components:    Sodium 135 (*)     CO2 19 (*)     ALT 8 (*)     AST 14 (*)     All other components within normal limits    Narrative:     Performed at:  [facility] Oral    SpO2: 100% 100%   Weight: 174 lb 9.6 oz (79.2 kg)    Height: 5' 7\" (1.702 m)        Patient was given the following medications:  Medications   methylPREDNISolone sodium (SOLU-MEDROL) injection 125 mg (125 mg IntraVENous Given 12/14/21 1750)   ipratropium-albuterol (DUONEB) nebulizer solution 1 ampule (1 ampule Inhalation Given 12/14/21 1752)   albuterol (PROVENTIL) nebulizer solution 5 mg (5 mg Nebulization Given 12/14/21 1752)           Patient is a 61-year-old female with past medical history of asthma and previous PE who presents the ED with complaint of shortness of breath. Patient complaint of shortness of breath, chest tightness and difficulty breathing. Patient states feels similar to previous asthma exacerbation. Upon arrival she is not hypoxic but she is tachypneic and tachycardic. She is not on any anticoagulation. Concern for potential asthma exacerbation. Did order blood work. Ordered breathing treatments and Solu-Medrol. CBC showed normal white count and platelets. Hemoglobin 11.8. CMP relatively unremarkable. Pregnancy was negative. Troponin was normal.  BNP unremarkable. D-dimer came back elevated at 760. Chest x-ray showed no acute abnormality. EKG interpreted by attending. Given patient's elevated D-dimer did order CT of the chest.  Upon repeat evaluation after patient receives breathing treatments and steroids here in the emergency department she states she is doing much better. Patient is still slightly tachypneic but breathing much better at this time. We will continue to observe. May just be suffering from asthma exacerbation but will need CT scan to evaluate for PE. Given end of shift will sign out to attending provider. Please see attending provider note for further disposition and treatment of patient. FINAL IMPRESSION    No diagnosis found. DISPOSITION/PLAN   DISPOSITION        PATIENT REFERRED TO:  No follow-up provider specified.     DISCHARGE MEDICATIONS:  New Prescriptions    No medications on file       DISCONTINUED MEDICATIONS:  Discontinued Medications    No medications on file              (Please note that portions of this note were completed with a voice recognition program.  Efforts were made to edit the dictations but occasionally words are mis-transcribed.)    ROXANA Gilmore (electronically signed)          ROXANA Valdez  12/14/21 1954

## 2021-12-14 NOTE — ED PROVIDER NOTES
I independently performed a history and physical on [employer]. All diagnostic, treatment, and disposition decisions were made by myself in conjunction with the advanced practice provider. Briefly, this is a 46 y.o. female here for acute shortness of breath. Patient symptoms have been present for the last 2 days. She has had a cough productive of green sputum as well as wheezing. She has not had a fever. Patient reports severe tightness to her chest.  She has a history of asthma as well as previous PE. Patient also has a history of COVID-19 infection 1 year ago. .    On exam, patient is not in acute respiratory distress, although she appears incredibly uncomfortable, clutching her chest and breathing heavily. Lungs are relatively clear to auscultation bilaterally with mild diffuse rhonchi. Heart is tachycardic, but regular. EKG  The Ekg interpreted by me in the absence of a cardiologist shows. sinus tachycardia, ucvl=[id number]  Axis is   Normal  QTc is  normal  Intervals and Durations are unremarkable. No specific ST-T wave changes appreciated. No evidence of acute ischemia. No significant change from prior EKG dated 2/14/2021    Screenings  SEP-1 CORE MEASURE DATA      Sepsis Criteria   Severe Sepsis Criteria   Septic Shock Criteria     Must be confirmed or suspected to move forward with diagnosis of sepsis. Must meet 2:    [] Temperature > 100.9 F (38.3 C)        or < 96.8 F (36 C)  [] HR > 90  [] RR > 20  [] WBC > 12 or < 4 or 10% bands    AND:    [] Infection Confirmed or        Suspected.     OR:    [] Exclude from SEP-1 because:    [] No infection present or suspected  [x] Does not have 2+ SIRS criteria but may have an incidental infection that requires treatment  [] May have sepsis, but does not meet criteria for severe sepsis or septic shock  [] Alternative explanation for abnormal labs and/or vitals (see MDM)  [] Viral etiology found or highly suspected (including COVID-19) without concomitant bacterial infection   Must meet 1:    [] Lactate > 2       or   [] Signs of Organ Dysfunction:    - SBP < 90 or MAP < 65  - Altered mental status  - Creatinine > 2 or increased from      baseline  - Urine Output < 0.5 ml/kg/hr  - Bilirubin > 2  - INR > 1.5 (not anticoagulated)  - Platelets < 604,744  - Acute Respiratory Failure as     evidenced by new need for NIPPV     or mechanical ventilation        [] No criteria met for Severe Sepsis. Must meet 1:    [] Lactate > 4        or   [] SBP < 90 or MAP < 65 for at        least two readings in the first        hour after fluid bolus        administration      [] Vasopressors initiated (if hypotension persists after fluid resuscitation)                [] No criteria met for Septic Shock. Patient Vitals for the past 6 hrs:   BP Temp Pulse Resp SpO2 Height Weight Percent Weight Change   12/14/21 1725 (!) 165/105 99.3 °F (37.4 °C) 104 (!) 32 100 % 5' 7\" (1.702 m) 174 lb 9.6 oz (79.2 kg) 0   12/14/21 1753 -- -- -- 18 100 % -- -- --   12/14/21 2001 -- -- -- 28 100 % -- -- --      Recent Labs     12/14/21  1745   WBC 5.1   CREATININE 0.8   BILITOT 0.4                  MDM  Patient presented with acute shortness of breath and history significant for both asthma and previous COVID-19 infection. She had some improvement with treatments and steroids, but continued to have severe episodes of bronchospasm and vomiting. And a fourth treatment after one episode of this. After she had a repeat episode and CT, I ordered magnesium as well. Although x-ray showed no evidence of pneumonia, I thought I heard rales in the left lower lung. Her D-dimer was also elevated. CT of the chest is awaiting read for either pneumonia, PE, or other etiology. 9:00 PM  I have signed out Dennie Medina Emergency Department care to Dr. Anjali Trevino. We discussed the pertinent history, physical exam, completed/pending test results (if applicable) and current treatment plan. Please refer to his/her chart for the patients remaining Emergency Department course and final disposition. The total Critical Care time is 30 minutes which excludes separately billable procedures. CURRENT IMPRESSION  1. Acute bronchospasm        Blood pressure (!) 165/105, pulse 104, temperature 99.3 °F (37.4 °C), temperature source Oral, resp. rate 18, height 5' 7\" (1.702 m), weight 174 lb 9.6 oz (79.2 kg), SpO2 100 %, not currently breastfeeding.      For further details of [facility] emergency department encounter, please see documentation by advanced practice provider, ROXANA Merrill.       Paula Lino MD  12/14/21 [id number]

## 2021-12-15 PROBLEM — J45.901 ACUTE ASTHMA EXACERBATION: Status: ACTIVE | Noted: 2021-12-15

## 2021-12-15 LAB
A/G RATIO: 1.2 (ref 1.1–2.2)
ALBUMIN SERPL-MCNC: 4.2 G/DL (ref 3.4–5)
ALP BLD-CCNC: 90 U/L (ref 40–129)
ALT SERPL-CCNC: 7 U/L (ref 10–40)
ANION GAP SERPL CALCULATED.3IONS-SCNC: 16 MMOL/L (ref 3–16)
AST SERPL-CCNC: 12 U/L (ref 15–37)
BASOPHILS ABSOLUTE: 0 K/UL (ref 0–0.2)
BASOPHILS RELATIVE PERCENT: 0.2 %
BILIRUB SERPL-MCNC: <0.2 MG/DL (ref 0–1)
BUN BLDV-MCNC: 16 MG/DL (ref 7–20)
CALCIUM SERPL-MCNC: 9.4 MG/DL (ref 8.3–10.6)
CHLORIDE BLD-SCNC: 104 MMOL/L (ref 99–110)
CO2: 17 MMOL/L (ref 21–32)
CREAT SERPL-MCNC: 0.9 MG/DL (ref 0.6–1.1)
EKG ATRIAL RATE: 103 BPM
EKG DIAGNOSIS: NORMAL
EKG P AXIS: 49 DEGREES
EKG P-R INTERVAL: 148 MS
EKG Q-T INTERVAL: 330 MS
EKG QRS DURATION: 78 MS
EKG QTC CALCULATION (BAZETT): 432 MS
EKG R AXIS: 33 DEGREES
EKG T AXIS: 41 DEGREES
EKG VENTRICULAR RATE: 103 BPM
EOSINOPHILS ABSOLUTE: 0 K/UL (ref 0–0.6)
EOSINOPHILS RELATIVE PERCENT: 0 %
GFR AFRICAN AMERICAN: >60
GFR NON-AFRICAN AMERICAN: >60
GLUCOSE BLD-MCNC: 186 MG/DL (ref 70–99)
HCT VFR BLD CALC: 34.8 % (ref 36–48)
HEMOGLOBIN: 11.1 G/DL (ref 12–16)
LYMPHOCYTES ABSOLUTE: 0.5 K/UL (ref 1–5.1)
LYMPHOCYTES RELATIVE PERCENT: 9.1 %
MCH RBC QN AUTO: 24.3 PG (ref 26–34)
MCHC RBC AUTO-ENTMCNC: 31.9 G/DL (ref 31–36)
MCV RBC AUTO: 76.1 FL (ref 80–100)
MONOCYTES ABSOLUTE: 0.1 K/UL (ref 0–1.3)
MONOCYTES RELATIVE PERCENT: 2.3 %
NEUTROPHILS ABSOLUTE: 4.5 K/UL (ref 1.7–7.7)
NEUTROPHILS RELATIVE PERCENT: 88.4 %
ORGANISM: ABNORMAL
PDW BLD-RTO: 16.2 % (ref 12.4–15.4)
PLATELET # BLD: 270 K/UL (ref 135–450)
PMV BLD AUTO: 8.1 FL (ref 5–10.5)
POTASSIUM REFLEX MAGNESIUM: 4 MMOL/L (ref 3.5–5.1)
PROCALCITONIN: 0.05 NG/ML (ref 0–0.15)
RBC # BLD: 4.57 M/UL (ref 4–5.2)
REPORT: NORMAL
RESPIRATORY PANEL PCR: ABNORMAL
SODIUM BLD-SCNC: 137 MMOL/L (ref 136–145)
TOTAL PROTEIN: 7.7 G/DL (ref 6.4–8.2)
WBC # BLD: 5.1 K/UL (ref 4–11)

## 2021-12-15 PROCEDURE — 6360000002 HC RX W HCPCS: Performed by: INTERNAL MEDICINE

## 2021-12-15 PROCEDURE — G0378 HOSPITAL OBSERVATION PER HR: HCPCS

## 2021-12-15 PROCEDURE — 85025 COMPLETE CBC W/AUTO DIFF WBC: CPT

## 2021-12-15 PROCEDURE — 0202U NFCT DS 22 TRGT SARS-COV-2: CPT

## 2021-12-15 PROCEDURE — 96376 TX/PRO/DX INJ SAME DRUG ADON: CPT

## 2021-12-15 PROCEDURE — 2580000003 HC RX 258: Performed by: INTERNAL MEDICINE

## 2021-12-15 PROCEDURE — 6370000000 HC RX 637 (ALT 250 FOR IP): Performed by: INTERNAL MEDICINE

## 2021-12-15 PROCEDURE — 80053 COMPREHEN METABOLIC PANEL: CPT

## 2021-12-15 PROCEDURE — 94640 AIRWAY INHALATION TREATMENT: CPT

## 2021-12-15 PROCEDURE — 2500000003 HC RX 250 WO HCPCS: Performed by: NURSE PRACTITIONER

## 2021-12-15 PROCEDURE — 96375 TX/PRO/DX INJ NEW DRUG ADDON: CPT

## 2021-12-15 PROCEDURE — 6360000002 HC RX W HCPCS: Performed by: PHYSICIAN ASSISTANT

## 2021-12-15 PROCEDURE — 6370000000 HC RX 637 (ALT 250 FOR IP): Performed by: NURSE PRACTITIONER

## 2021-12-15 PROCEDURE — 36415 COLL VENOUS BLD VENIPUNCTURE: CPT

## 2021-12-15 PROCEDURE — 6360000002 HC RX W HCPCS: Performed by: NURSE PRACTITIONER

## 2021-12-15 PROCEDURE — 94761 N-INVAS EAR/PLS OXIMETRY MLT: CPT

## 2021-12-15 PROCEDURE — 96372 THER/PROPH/DIAG INJ SC/IM: CPT

## 2021-12-15 PROCEDURE — 2060000000 HC ICU INTERMEDIATE R&B

## 2021-12-15 PROCEDURE — 93010 ELECTROCARDIOGRAM REPORT: CPT | Performed by: INTERNAL MEDICINE

## 2021-12-15 RX ORDER — SODIUM CHLORIDE 0.9 % (FLUSH) 0.9 %
5-40 SYRINGE (ML) INJECTION PRN
Status: DISCONTINUED | OUTPATIENT
Start: 2021-12-15 | End: 2021-12-18 | Stop reason: HOSPADM

## 2021-12-15 RX ORDER — IPRATROPIUM BROMIDE AND ALBUTEROL SULFATE 2.5; .5 MG/3ML; MG/3ML
1 SOLUTION RESPIRATORY (INHALATION) EVERY 4 HOURS
Status: DISCONTINUED | OUTPATIENT
Start: 2021-12-15 | End: 2021-12-15

## 2021-12-15 RX ORDER — GUAIFENESIN 600 MG/1
600 TABLET, EXTENDED RELEASE ORAL 2 TIMES DAILY
Status: DISCONTINUED | OUTPATIENT
Start: 2021-12-15 | End: 2021-12-18 | Stop reason: HOSPADM

## 2021-12-15 RX ORDER — ACETAMINOPHEN 650 MG/1
650 SUPPOSITORY RECTAL EVERY 6 HOURS PRN
Status: DISCONTINUED | OUTPATIENT
Start: 2021-12-15 | End: 2021-12-18 | Stop reason: HOSPADM

## 2021-12-15 RX ORDER — HYDROXYZINE HYDROCHLORIDE 10 MG/1
10 TABLET, FILM COATED ORAL 3 TIMES DAILY PRN
Status: COMPLETED | OUTPATIENT
Start: 2021-12-15 | End: 2021-12-15

## 2021-12-15 RX ORDER — DIPHENHYDRAMINE HYDROCHLORIDE 50 MG/ML
25 INJECTION INTRAMUSCULAR; INTRAVENOUS ONCE
Status: COMPLETED | OUTPATIENT
Start: 2021-12-15 | End: 2021-12-15

## 2021-12-15 RX ORDER — KETOROLAC TROMETHAMINE 30 MG/ML
30 INJECTION, SOLUTION INTRAMUSCULAR; INTRAVENOUS ONCE
Status: COMPLETED | OUTPATIENT
Start: 2021-12-15 | End: 2021-12-15

## 2021-12-15 RX ORDER — IPRATROPIUM BROMIDE AND ALBUTEROL SULFATE 2.5; .5 MG/3ML; MG/3ML
1 SOLUTION RESPIRATORY (INHALATION)
Status: DISCONTINUED | OUTPATIENT
Start: 2021-12-15 | End: 2021-12-15

## 2021-12-15 RX ORDER — PROMETHAZINE HYDROCHLORIDE 25 MG/ML
25 INJECTION, SOLUTION INTRAMUSCULAR; INTRAVENOUS ONCE
Status: COMPLETED | OUTPATIENT
Start: 2021-12-15 | End: 2021-12-15

## 2021-12-15 RX ORDER — ONDANSETRON 4 MG/1
4 TABLET, ORALLY DISINTEGRATING ORAL EVERY 8 HOURS PRN
Status: DISCONTINUED | OUTPATIENT
Start: 2021-12-15 | End: 2021-12-18 | Stop reason: HOSPADM

## 2021-12-15 RX ORDER — AMLODIPINE BESYLATE 5 MG/1
10 TABLET ORAL DAILY
Status: DISCONTINUED | OUTPATIENT
Start: 2021-12-15 | End: 2021-12-18 | Stop reason: HOSPADM

## 2021-12-15 RX ORDER — DULOXETIN HYDROCHLORIDE 60 MG/1
60 CAPSULE, DELAYED RELEASE ORAL DAILY
Status: DISCONTINUED | OUTPATIENT
Start: 2021-12-15 | End: 2021-12-18 | Stop reason: HOSPADM

## 2021-12-15 RX ORDER — SODIUM CHLORIDE 0.9 % (FLUSH) 0.9 %
5-40 SYRINGE (ML) INJECTION EVERY 12 HOURS SCHEDULED
Status: DISCONTINUED | OUTPATIENT
Start: 2021-12-15 | End: 2021-12-18 | Stop reason: HOSPADM

## 2021-12-15 RX ORDER — GUAIFENESIN/DEXTROMETHORPHAN 100-10MG/5
5 SYRUP ORAL EVERY 4 HOURS PRN
Status: DISCONTINUED | OUTPATIENT
Start: 2021-12-15 | End: 2021-12-18 | Stop reason: HOSPADM

## 2021-12-15 RX ORDER — PANTOPRAZOLE SODIUM 40 MG/1
40 TABLET, DELAYED RELEASE ORAL
Status: DISCONTINUED | OUTPATIENT
Start: 2021-12-15 | End: 2021-12-17

## 2021-12-15 RX ORDER — PREDNISONE 20 MG/1
40 TABLET ORAL DAILY
Status: DISCONTINUED | OUTPATIENT
Start: 2021-12-15 | End: 2021-12-16

## 2021-12-15 RX ORDER — POLYETHYLENE GLYCOL 3350 17 G/17G
17 POWDER, FOR SOLUTION ORAL DAILY PRN
Status: DISCONTINUED | OUTPATIENT
Start: 2021-12-15 | End: 2021-12-18 | Stop reason: HOSPADM

## 2021-12-15 RX ORDER — ACETAMINOPHEN 325 MG/1
650 TABLET ORAL EVERY 6 HOURS PRN
Status: DISCONTINUED | OUTPATIENT
Start: 2021-12-15 | End: 2021-12-18 | Stop reason: HOSPADM

## 2021-12-15 RX ORDER — IBUPROFEN 800 MG/1
800 TABLET ORAL EVERY 6 HOURS PRN
Status: DISPENSED | OUTPATIENT
Start: 2021-12-15 | End: 2021-12-18

## 2021-12-15 RX ORDER — TIZANIDINE 4 MG/1
4 TABLET ORAL EVERY 6 HOURS PRN
Status: DISCONTINUED | OUTPATIENT
Start: 2021-12-15 | End: 2021-12-18

## 2021-12-15 RX ORDER — HYDROCHLOROTHIAZIDE 25 MG/1
25 TABLET ORAL DAILY
Status: DISCONTINUED | OUTPATIENT
Start: 2021-12-15 | End: 2021-12-17

## 2021-12-15 RX ORDER — LABETALOL HYDROCHLORIDE 5 MG/ML
10 INJECTION, SOLUTION INTRAVENOUS EVERY 4 HOURS PRN
Status: DISCONTINUED | OUTPATIENT
Start: 2021-12-15 | End: 2021-12-18 | Stop reason: HOSPADM

## 2021-12-15 RX ORDER — LORAZEPAM 0.5 MG/1
0.5 TABLET ORAL NIGHTLY PRN
Status: DISCONTINUED | OUTPATIENT
Start: 2021-12-15 | End: 2021-12-17

## 2021-12-15 RX ORDER — PREGABALIN 75 MG/1
150 CAPSULE ORAL 2 TIMES DAILY
Status: DISCONTINUED | OUTPATIENT
Start: 2021-12-15 | End: 2021-12-18 | Stop reason: HOSPADM

## 2021-12-15 RX ORDER — ONDANSETRON 2 MG/ML
4 INJECTION INTRAMUSCULAR; INTRAVENOUS EVERY 6 HOURS PRN
Status: DISCONTINUED | OUTPATIENT
Start: 2021-12-15 | End: 2021-12-18 | Stop reason: HOSPADM

## 2021-12-15 RX ORDER — BUSPIRONE HYDROCHLORIDE 5 MG/1
10 TABLET ORAL 3 TIMES DAILY PRN
Status: DISCONTINUED | OUTPATIENT
Start: 2021-12-15 | End: 2021-12-18 | Stop reason: HOSPADM

## 2021-12-15 RX ORDER — BUDESONIDE 0.5 MG/2ML
0.25 INHALANT ORAL 2 TIMES DAILY
Status: DISCONTINUED | OUTPATIENT
Start: 2021-12-15 | End: 2021-12-15

## 2021-12-15 RX ORDER — ALBUTEROL SULFATE 2.5 MG/3ML
2.5 SOLUTION RESPIRATORY (INHALATION) EVERY 4 HOURS PRN
Status: DISCONTINUED | OUTPATIENT
Start: 2021-12-15 | End: 2021-12-18 | Stop reason: HOSPADM

## 2021-12-15 RX ORDER — BUDESONIDE 0.5 MG/2ML
0.5 INHALANT ORAL 2 TIMES DAILY
Status: DISCONTINUED | OUTPATIENT
Start: 2021-12-15 | End: 2021-12-18 | Stop reason: HOSPADM

## 2021-12-15 RX ORDER — IPRATROPIUM BROMIDE AND ALBUTEROL SULFATE 2.5; .5 MG/3ML; MG/3ML
1 SOLUTION RESPIRATORY (INHALATION) EVERY 4 HOURS
Status: DISCONTINUED | OUTPATIENT
Start: 2021-12-16 | End: 2021-12-16

## 2021-12-15 RX ORDER — SODIUM CHLORIDE 9 MG/ML
25 INJECTION, SOLUTION INTRAVENOUS PRN
Status: DISCONTINUED | OUTPATIENT
Start: 2021-12-15 | End: 2021-12-18 | Stop reason: HOSPADM

## 2021-12-15 RX ORDER — HYDROXYZINE HYDROCHLORIDE 25 MG/1
25 TABLET, FILM COATED ORAL 2 TIMES DAILY
COMMUNITY
End: 2022-01-04 | Stop reason: SDUPTHER

## 2021-12-15 RX ADMIN — HYDROXYZINE HYDROCHLORIDE 10 MG: 10 TABLET ORAL at 20:41

## 2021-12-15 RX ADMIN — IPRATROPIUM BROMIDE AND ALBUTEROL SULFATE 1 AMPULE: .5; 3 SOLUTION RESPIRATORY (INHALATION) at 16:17

## 2021-12-15 RX ADMIN — DIPHENHYDRAMINE HYDROCHLORIDE 25 MG: 50 INJECTION, SOLUTION INTRAMUSCULAR; INTRAVENOUS at 21:14

## 2021-12-15 RX ADMIN — DULOXETINE HYDROCHLORIDE 60 MG: 60 CAPSULE, DELAYED RELEASE ORAL at 01:48

## 2021-12-15 RX ADMIN — TIZANIDINE 4 MG: 4 TABLET ORAL at 22:13

## 2021-12-15 RX ADMIN — LABETALOL HYDROCHLORIDE 10 MG: 5 INJECTION INTRAVENOUS at 21:08

## 2021-12-15 RX ADMIN — LORAZEPAM 0.5 MG: 0.5 TABLET ORAL at 22:13

## 2021-12-15 RX ADMIN — KETOROLAC TROMETHAMINE 30 MG: 30 INJECTION, SOLUTION INTRAMUSCULAR at 21:15

## 2021-12-15 RX ADMIN — PANTOPRAZOLE SODIUM 40 MG: 40 TABLET, DELAYED RELEASE ORAL at 17:56

## 2021-12-15 RX ADMIN — AMLODIPINE BESYLATE 10 MG: 5 TABLET ORAL at 01:48

## 2021-12-15 RX ADMIN — BUDESONIDE 250 MCG: 0.5 SUSPENSION RESPIRATORY (INHALATION) at 09:20

## 2021-12-15 RX ADMIN — HYDROXYZINE HYDROCHLORIDE 10 MG: 10 TABLET ORAL at 02:04

## 2021-12-15 RX ADMIN — GUAIFENESIN 600 MG: 600 TABLET, EXTENDED RELEASE ORAL at 12:51

## 2021-12-15 RX ADMIN — GUAIFENESIN 600 MG: 600 TABLET, EXTENDED RELEASE ORAL at 20:41

## 2021-12-15 RX ADMIN — ACETAMINOPHEN 650 MG: 325 TABLET ORAL at 01:11

## 2021-12-15 RX ADMIN — KETOROLAC TROMETHAMINE 30 MG: 30 INJECTION, SOLUTION INTRAMUSCULAR at 12:33

## 2021-12-15 RX ADMIN — IBUPROFEN 800 MG: 800 TABLET, FILM COATED ORAL at 03:22

## 2021-12-15 RX ADMIN — SODIUM CHLORIDE, PRESERVATIVE FREE 10 ML: 5 INJECTION INTRAVENOUS at 03:22

## 2021-12-15 RX ADMIN — IPRATROPIUM BROMIDE AND ALBUTEROL SULFATE 1 AMPULE: .5; 3 SOLUTION RESPIRATORY (INHALATION) at 09:20

## 2021-12-15 RX ADMIN — PREGABALIN 150 MG: 75 CAPSULE ORAL at 01:48

## 2021-12-15 RX ADMIN — BENZOCAINE AND MENTHOL 1 LOZENGE: 15; 3.6 LOZENGE ORAL at 03:24

## 2021-12-15 RX ADMIN — HYDROCHLOROTHIAZIDE 25 MG: 25 TABLET ORAL at 10:02

## 2021-12-15 RX ADMIN — IPRATROPIUM BROMIDE AND ALBUTEROL SULFATE 1 AMPULE: .5; 3 SOLUTION RESPIRATORY (INHALATION) at 20:21

## 2021-12-15 RX ADMIN — ENOXAPARIN SODIUM 40 MG: 100 INJECTION SUBCUTANEOUS at 10:06

## 2021-12-15 RX ADMIN — IPRATROPIUM BROMIDE AND ALBUTEROL SULFATE 1 AMPULE: .5; 3 SOLUTION RESPIRATORY (INHALATION) at 12:35

## 2021-12-15 RX ADMIN — SODIUM CHLORIDE, PRESERVATIVE FREE 10 ML: 5 INJECTION INTRAVENOUS at 21:10

## 2021-12-15 RX ADMIN — BUSPIRONE HYDROCHLORIDE 10 MG: 5 TABLET ORAL at 20:42

## 2021-12-15 RX ADMIN — PREGABALIN 150 MG: 75 CAPSULE ORAL at 20:41

## 2021-12-15 RX ADMIN — IPRATROPIUM BROMIDE AND ALBUTEROL SULFATE 1 AMPULE: .5; 3 SOLUTION RESPIRATORY (INHALATION) at 03:39

## 2021-12-15 RX ADMIN — PROMETHAZINE HYDROCHLORIDE 25 MG: 25 INJECTION INTRAMUSCULAR; INTRAVENOUS at 12:33

## 2021-12-15 RX ADMIN — PREDNISONE 40 MG: 20 TABLET ORAL at 10:05

## 2021-12-15 RX ADMIN — DIPHENHYDRAMINE HYDROCHLORIDE 25 MG: 50 INJECTION, SOLUTION INTRAMUSCULAR; INTRAVENOUS at 12:33

## 2021-12-15 RX ADMIN — BUSPIRONE HYDROCHLORIDE 10 MG: 5 TABLET ORAL at 01:48

## 2021-12-15 RX ADMIN — PANTOPRAZOLE SODIUM 40 MG: 40 TABLET, DELAYED RELEASE ORAL at 07:36

## 2021-12-15 RX ADMIN — SODIUM CHLORIDE, PRESERVATIVE FREE 10 ML: 5 INJECTION INTRAVENOUS at 12:34

## 2021-12-15 RX ADMIN — PROMETHAZINE HYDROCHLORIDE 25 MG: 25 INJECTION INTRAMUSCULAR; INTRAVENOUS at 21:14

## 2021-12-15 RX ADMIN — PREGABALIN 150 MG: 75 CAPSULE ORAL at 10:05

## 2021-12-15 RX ADMIN — SODIUM CHLORIDE, PRESERVATIVE FREE 10 ML: 5 INJECTION INTRAVENOUS at 10:07

## 2021-12-15 RX ADMIN — KETOROLAC TROMETHAMINE 30 MG: 30 INJECTION, SOLUTION INTRAMUSCULAR at 03:22

## 2021-12-15 RX ADMIN — BUDESONIDE 500 MCG: 0.5 SUSPENSION RESPIRATORY (INHALATION) at 20:21

## 2021-12-15 ASSESSMENT — PAIN DESCRIPTION - LOCATION
LOCATION: HEAD

## 2021-12-15 ASSESSMENT — PAIN SCALES - GENERAL
PAINLEVEL_OUTOF10: 6
PAINLEVEL_OUTOF10: 10
PAINLEVEL_OUTOF10: 3
PAINLEVEL_OUTOF10: 9
PAINLEVEL_OUTOF10: 5
PAINLEVEL_OUTOF10: 3
PAINLEVEL_OUTOF10: 10
PAINLEVEL_OUTOF10: 10
PAINLEVEL_OUTOF10: 9

## 2021-12-15 ASSESSMENT — PAIN DESCRIPTION - PAIN TYPE
TYPE: ACUTE PAIN

## 2021-12-15 ASSESSMENT — PULMONARY FUNCTION TESTS: PEFR_L/MIN: 180

## 2021-12-15 NOTE — RT PROTOCOL NOTE
RT Inhaler-Nebulizer Bronchodilator Protocol Note    There is a bronchodilator order in the chart from a provider indicating to follow the RT Bronchodilator Protocol and there is an Initiate RT Inhaler-Nebulizer Bronchodilator Protocol order as well (see protocol at bottom of note). CXR Findings:  XR CHEST PORTABLE    Result Date: 12/14/2021  Stable chest with no acute abnormality seen. The findings from the last RT Protocol Assessment were as follows:   History Pulmonary Disease: None or smoker <15 pack years (Exterbation Asthma)  Respiratory Pattern: Use of accessory muscles, prolonged exhalation, or RR 26-30 bpm  Breath Sounds: Severe inspiratory and expiratory wheezing or severely diminished  Cough: Strong, spontaneous, non-productive  Indication for Bronchodilator Therapy: Wheezing associated with pulm disorder, Peak Flow less than 80% predicted for known asthma pts  Bronchodilator Assessment Score: 14    Aerosolized bronchodilator medication orders have been revised according to the RT Inhaler-Nebulizer Bronchodilator Protocol below. Respiratory Therapist to perform RT Therapy Protocol Assessment initially then follow the protocol. Repeat RT Therapy Protocol Assessment PRN for score 0-3 or on second treatment, BID, and PRN for scores above 3. No Indications - adjust the frequency to every 6 hours PRN wheezing or bronchospasm, if no treatments needed after 48 hours then discontinue using Per Protocol order mode. If indication present, adjust the RT bronchodilator orders based on the Bronchodilator Assessment Score as indicated below. Use Inhaler orders unless patient has one or more of the following: on home nebulizer, not able to hold breath for 10 seconds, is not alert and oriented, cannot activate and use MDI correctly, or respiratory rate 25 breaths per minute or more, then use the equivalent nebulizer order(s) with same Frequency and PRN reasons based on the score.   If a patient is on this medication at home then do not decrease Frequency below that used at home. 0-3 - enter or revise RT bronchodilator order(s) to equivalent RT Bronchodilator order with Frequency of every 4 hours PRN for wheezing or increased work of breathing using Per Protocol order mode. 4-6 - enter or revise RT Bronchodilator order(s) to two equivalent RT bronchodilator orders with one order with BID Frequency and one order with Frequency of every 4 hours PRN wheezing or increased work of breathing using Per Protocol order mode. 7-10 - enter or revise RT Bronchodilator order(s) to two equivalent RT bronchodilator orders with one order with TID Frequency and one order with Frequency of every 4 hours PRN wheezing or increased work of breathing using Per Protocol order mode. 11-13 - enter or revise RT Bronchodilator order(s) to one equivalent RT bronchodilator order with QID Frequency and an Albuterol order with Frequency of every 4 hours PRN wheezing or increased work of breathing using Per Protocol order mode. Greater than 13 - enter or revise RT Bronchodilator order(s) to one equivalent RT bronchodilator order with every 4 hours Frequency and an Albuterol order with Frequency of every 2 hours PRN wheezing or increased work of breathing using Per Protocol order mode. RT to enter RT Home Evaluation for COPD & MDI Assessment order using Per Protocol order mode.     Electronically signed by Eleno Kaiser RCP on 12/15/2021 at 1:43 AM

## 2021-12-15 NOTE — ED NOTES
Pernell Colon notified that patient is still tachypenic at this time although states that she feels like she is breathing a little better. Pernell Colon in to reassess.       [address], RN  12/14/21 2126

## 2021-12-15 NOTE — ED NOTES
Bed: 06  Expected date:   Expected time:   Means of arrival:   Comments:  Kurt Coley RN  12/14/21 1955

## 2021-12-15 NOTE — PROGRESS NOTES
Hospitalist Progress Note      PCP: LEESA Biswas - MARTIN    Date of Admission: 12/14/2021    Chief Complaint: Shortness of breath    Hospital Course: This is a pleasant 46 y.o. female with history of anxiety disorder, asthma, depression, fibromyalgia, essential hypertension, who presents to the emergency room with complaints of cough that is productive of greenish sputum. She also reports shortness of breath. Symptoms have been ongoing for the past 2 days. There is no fever or chills. CT-PE protocol was negative for pulmonary embolism; reveals questionable left lung base opacity concerning for atelectasis or \"developing infiltrates. \"  Admitted for further work-up. Subjective: Patient sitting up in bed, complaining of a severe headache. States she still feels short of breath when she gets up and moves and her heart rate goes up. Wheezing at times still. Currently on room air. States she had Covid pneumonia in February and this flared her asthma. She was hospitalized for this. Since then her asthma flares frequently. She would like to follow-up with the pulmonologist.  She has used an entire albuterol inhaler before coming into the hospital, because she was so short of breath, and it was not helping. Her nebulizer treatments were not helping either. Denies chest pain palpitation abdominal pain nausea vomit diarrhea dysuria headache lightheadedness or dizziness. Reviewed plan of care, denied further needs or questions. Assessment/Plan:    Acute asthma exacerbation  -CT PE study had motion degradation. No definitive pulmonary emboli seen. Question left base opacity covers represent atelectasis or developing infiltrates.   -Patient now having intermittent wheezing, still feels very short of breath  -Continue duo nebs every 4 hours, Pulmicort nebs  -Procalcitonin normal, 0.05  -Continue prednisone 40 mg daily  -Check respiratory panel  -Patient reports productive cough with green sputum, add Mucinex  -referral for outpt pulmonology sent    Hypertensive urgency  -/105 on arrival  -bp 190/100 this afternoon  -Continue amlodipine 10 and hydrochlorothiazide 25  -tachycardia, will avoid hydralazine prn  -add labetolol  IV prn    Migraine  -Give cocktail of Toradol, Benadryl, Phenergan IV  -Monitor    Past medical history anxiety disorder, asthma, depression, fibromyalgia, hypertension  -Continue home medications as ordered    Active Hospital Problems    Diagnosis     Shortness of breath [R06.02]        Medications:  Reviewed    Infusion Medications    sodium chloride       Scheduled Medications    amLODIPine  10 mg Oral Daily    DULoxetine  60 mg Oral Daily    hydroCHLOROthiazide  25 mg Oral Daily    pantoprazole  40 mg Oral BID AC    pregabalin  150 mg Oral BID    sodium chloride flush  5-40 mL IntraVENous 2 times per day    enoxaparin  40 mg SubCUTAneous Daily    predniSONE  40 mg Oral Daily    budesonide  0.25 mg Nebulization BID    ipratropium-albuterol  1 ampule Inhalation Q4H     PRN Meds: busPIRone, sodium chloride flush, sodium chloride, ondansetron **OR** ondansetron, polyethylene glycol, acetaminophen **OR** acetaminophen, albuterol, guaiFENesin-dextromethorphan, hydrOXYzine, benzocaine-menthol, ibuprofen    No intake or output data in the 24 hours ending 12/15/21 1112    Physical Exam Performed:    BP (!) 189/81   Pulse 108   Temp 97.9 °F (36.6 °C) (Oral)   Resp 18   Ht 5' 7\" (1.702 m)   Wt 169 lb 8 oz (76.9 kg)   SpO2 100%   BMI 26.55 kg/m²     General appearance: No apparent distress, appears stated age and cooperative. HEENT: Pupils equal, round, and reactive to light. Conjunctivae/corneas clear. Neck: Supple, with full range of motion. No jugular venous distention. Trachea midline. Respiratory: Dyspnea with conversation. Expiratory wheeze. Tight lung sounds  Cardiovascular: Regular rate and rhythm with normal S1/S2 without murmurs, rubs or gallops.   Abdomen: Soft, non-tender, non-distended with normal bowel sounds. Musculoskeletal: No clubbing, cyanosis or edema bilaterally. Full range of motion without deformity. Skin: Skin color, texture, turgor normal.  No rashes or lesions. Neurologic:  Neurovascularly intact without any focal sensory/motor deficits. Cranial nerves: II-XII intact, grossly non-focal.  Psychiatric: Alert and oriented, thought content appropriate, normal insight  Capillary Refill: Brisk,< 3 seconds   Peripheral Pulses: +2 palpable, equal bilaterally       Labs:   Recent Labs     12/14/21  1745 12/15/21  0531   WBC 5.1 5.1   HGB 11.8* 11.1*   HCT 36.5 34.8*    270     Recent Labs     12/14/21  1745 12/15/21  0531   * 137   K 4.2 4.0    104   CO2 19* 17*   BUN 13 16   CREATININE 0.8 0.9   CALCIUM 9.9 9.4     Recent Labs     12/14/21  1745 12/15/21  0531   AST 14* 12*   ALT 8* 7*   BILITOT 0.4 <0.2   ALKPHOS 100 90     No results for input(s): INR in the last 72 hours. Recent Labs     12/14/21 1745   TROPONINI <0.01       Urinalysis:      Lab Results   Component Value Date    NITRU Negative 02/15/2021    WBCUA 51 02/15/2021    BACTERIA 4+ 02/15/2021    RBCUA 5 02/15/2021    BLOODU SMALL 02/15/2021    SPECGRAV 1.013 02/15/2021    GLUCOSEU Negative 02/15/2021       Radiology:  CT CHEST PULMONARY EMBOLISM W CONTRAST   Final Result   Motion degradation. No definite central or proximal lobar pulmonary emboli. If there is persistent concern, repeat examination or CT scan may be   considered. Question left lung base opacities could represent atelectasis or developing   infiltrates. Please correlate exam findings. RECOMMENDATIONS:   Unavailable         XR CHEST PORTABLE   Final Result   Stable chest with no acute abnormality seen. DVT Prophylaxis: Lovenox  Diet: ADULT DIET;  Regular  Code Status: Full Code    PT/OT Eval Status: No acute needs    Dispo -home once medically stable    Viktoria Zamorano, APRN - CNP      NOTE:  This report was transcribed using voice recognition software. Every effort was made to ensure accuracy; however, inadvertent computerized transcription errors may be present.

## 2021-12-15 NOTE — PROGRESS NOTES
HHN treatment(s) given x 1.       Breath Sounds:   LUNG FIELD Pre-Treatment Post-Treatment   RUL Breath Sounds Pre-Tx RUL: Diminished Breath Sounds Post-Tx RUL: Diminished   RML Breath Sounds Pre-Tx RML: Diminished Breath Sounds Post-Tx RML: Diminished   RLL Breath Sounds Pre-Tx RLL: Diminished Breath Sounds Post-Tx RLL: Diminished   LUIZA Breath Sounds Pre-Tx LUIZA: Diminished Breath Sounds Post-Tx LUIZA: Diminished   LLL Breath Sounds Pre-Tx LLL: Diminished Breath Sounds Post-Tx LLL: Diminished     Peak Flow Trends    Patient's predicted PF is:  495 L/min    PF before 1st TX PF after 1st TX PF after 2nd TX PF after 3rd TX   0 [] >75% 280 [] >75% - [] >75% - [] >75%    [] 50-75%  [x] 50-75%  [] 50-75%  [] 50-75%    [x] <50%  [] <50%  [] <50%  [] <50%     COMMENTS:  Pt effort was good but she was struggling some with trying to do the Peak flow

## 2021-12-15 NOTE — H&P
Hospital Medicine History and Physical    12/14/2021    Date of Admission: 12/14/2021    Date of Service: Pt seen/examined on 12/14/2021 and admitted to observation. Assessment/plan:  1. Shortness of breath. Patient has no wheezes on exam, although she has received breathing treatments in the emergency room. Cannot exclude asthma exacerbation. Will check procalcitonin level, although suspicion for pneumonia is low. Will place on scheduled prednisone, scheduled and as needed breathing treatments. Monitor pulse oximeter closely. 2. Other comorbidities: history of anxiety disorder, asthma, depression, fibromyalgia, essential hypertension. Activities: Up with assist  Prophylaxis: Subcutaneous Lovenox  Code status: Full code    ==========================================================  Chief complaint:  Chief Complaint   Patient presents with    Shortness of Breath     from home c/o shortness of breath x2 days, hx asthma       History of Presenting Illness: This is a pleasant 46 y.o. female with history of anxiety disorder, asthma, depression, fibromyalgia, essential hypertension, who presents to the emergency room with complaints of cough that is productive of greenish sputum. She also reports shortness of breath. Symptoms have been ongoing for the past 2 days. There is no fever or chills. CT-PE protocol was negative for pulmonary embolism; reveals questionable left lung base opacity concerning for atelectasis or \"developing infiltrates. \"    Past Medical History:      Diagnosis Date    Anxiety     Asthma     Constipation 1/13/2014    Conversion disorder     Depression     Fibromyalgia     Graves disease     History of blood transfusion     Hx of blood clots     2015 LT LUNG W/ PNEUMONIA    Hypertension     Joint pain 1/13/2014    Various joints (back, hands, knees, hips), recurrent flares since age 27, RF+ at one point, never fully worked up for rheumatologic dz yet    Kidney stone Multiple    Localized rash 2014    Recurrent, on inner surface of upper arms, q 3 months, sometimes on chest, no facial involvement    Lupus ([facility] [address]     MDRO (multiple drug resistant organisms) resistance     PONV (postoperative nausea and vomiting)        Past Surgical History:      Procedure Laterality Date     SECTION       X 2    CYSTOSCOPY  2018    CYSTOSCOPY URETEROSCOPY WITH HOLMIUM LASER LITHOTRIPSY FOR 5 MM STONE, RIGHT STENT PLACEMENT    OTHER SURGICAL HISTORY  2016    CYSTOSCOPY, BILATERAL RETROGRADES, RIGHT URETEROSCOPY,    TONSILLECTOMY      TUBAL LIGATION         Medications (prior to admission):  Prior to Admission medications    Medication Sig Start Date End Date Taking?  Authorizing Provider   predniSONE (DELTASONE) 10 MG tablet Take 40 mg for 3 days then 30 mg for 3 days then 20 mg for 3 days then 10 mg for 3 days 21   LEESA Rothman CNP   busPIRone (BUSPAR) 10 MG tablet Take 1 tablet by mouth 3 times daily as needed (anxiety) 21 Michelle, MD   ondansetron (ZOFRAN-ODT) 4 MG disintegrating tablet Take 1 tablet by mouth 3 times daily as needed for Nausea or Vomiting 11/24/21   Aniket Bustard Day, MD   pantoprazole (PROTONIX) 20 MG tablet Take 1 tablet by mouth 2 times daily 11/24/21 5/23/22  Aniket Bustard Day, MD   ipratropium-albuterol (DUONEB) 0.5-2.5 (3) MG/3ML SOLN nebulizer solution Inhale 3 mLs into the lungs 4 times daily 11/24/21   Aniket Bustard Day, MD   albuterol sulfate  (90 Base) MCG/ACT inhaler Inhale 2 puffs into the lungs 4 times daily as needed for Wheezing 11/24/21   Aniket Bustard Day, MD   tiZANidine (ZANAFLEX) 6 MG capsule TAKE ONE CAPSULE BY MOUTH THREE TIMES A DAY AT LEAST EIGHT HOURS APART 21   LEESA Rothman CNP   amLODIPine (NORVASC) 10 MG tablet Take 1 tablet by mouth daily 21  LEESA Rothman CNP   hydroCHLOROthiazide (HYDRODIURIL) 25 MG tablet Take 1 tablet by mouth daily 21   [facility] [place] - CNP   pregabalin (LYRICA) 150 MG capsule Take 1 capsule by mouth 2 times daily for 30 days. Take one in am with breakfast and one at dinner time. 8/31/21 9/30/21  LEESA Hamilton CNP   hydrocortisone (ANUSOL-HC) 2.5 % CREA rectal cream Apply to hemorrhoid twice daily 3/2/21   LEESA Hamilton CNP   ondansetron (ZOFRAN) 4 MG tablet Take 1 tablet by mouth daily as needed for Nausea or Vomiting 12/18/20   LEESA Hamilton CNP   Nebulizers (COMPRESSOR/NEBULIZER) MISC 1 each by Does not apply route daily 11/20/20   LEESA Hamilton CNP   Blood Pressure KIT 1 kit by Does not apply route daily 11/5/20   Mica Martinez MD   DULoxetine (CYMBALTA) 60 MG extended release capsule Take 1 capsule by mouth daily 9/10/20   LEESA Benitez CNP   beclomethasone (QVAR REDIHALER) 40 MCG/ACT AERB inhaler Inhale 1 puff into the lungs 2 times daily 8/11/20   Sonia Vu MD   Handicap Placard MISC by Does not apply route Unable able to walk long distance due to medical condition. Expires in 5 years. 9/27/19   LEESA Dewitt CNP   EPINEPHrine (EPIPEN 2-LUANNE) 0.3 MG/0.3ML SOAJ injection Inject 0.3 mLs into the muscle once for 1 dose Use as directed for allergic reaction  Patient not taking: Reported on 9/30/2020 7/29/19 9/30/20  Christian Medina PA-C       Allergy(ies):  Ambien [zolpidem tartrate], Sertraline, Sudafed [pseudoephedrine hcl], and Amitriptyline hcl    Social History:  TOBACCO:  reports that she has never smoked. She has never used smokeless tobacco.  ETOH:  reports current alcohol use. Family History:      Problem Relation Age of Onset    Cancer Maternal Grandmother         Lung Ca       Review of Systems:  Pertinent positives are listed in HPI. At least 10-point ROS reviewed and were negative.      Vitals and physical examination:  BP (!) 193/125   Pulse 102   Temp 99.3 °F (37.4 °C) (Oral)   Resp 26   Ht 5' 7\" (1.702 m)   Wt 174 lb 9.6 oz (79.2 kg)   SpO2 98%   BMI 27.35 kg/m² Gen/overall appearance: Not in acute distress. Alert. Oriented x3. Head: Normocephalic, atraumatic  Eyes: EOMI, good acuity  ENT: Oral mucosa moist  Neck: No JVD, thyromegaly  CVS: Nml S1S2, no MRG, RRR  Pulm: Clear bilaterally. No crackles/wheezes  Gastrointestinal: Soft, NT/ND, +BS  Musculoskeletal: No edema. Warm  Neuro: No focal deficit. Moves extremity spontaneously. Psychiatry: Appropriate affect. Not agitated. Skin: Warm, dry with normal turgor. No rash  Capillary refill: Brisk,< 3 seconds   Peripheral Pulses: +2 palpable, equal bilaterally       Labs/imaging/EKG:  CBC:   Recent Labs     12/14/21  1745   WBC 5.1   HGB 11.8*        BMP:    Recent Labs     12/14/21  1745   *   K 4.2      CO2 19*   BUN 13   CREATININE 0.8   GLUCOSE 77     Hepatic:   Recent Labs     12/14/21  1745   AST 14*   ALT 8*   BILITOT 0.4   ALKPHOS 100       XR CHEST PORTABLE    Result Date: 12/14/2021  EXAMINATION: ONE XRAY VIEW OF THE CHEST 12/14/2021 5:38 pm COMPARISON: 02/14/2021 HISTORY: ORDERING SYSTEM PROVIDED HISTORY: sob TECHNOLOGIST PROVIDED HISTORY: Reason for exam:->sob Reason for Exam: sob FINDINGS: The heart is borderline enlarged. The pulmonary vessels are normal.  No consolidation or effusion is seen. The bones are intact. Stable chest with no acute abnormality seen. CT CHEST PULMONARY EMBOLISM W CONTRAST    Result Date: 12/14/2021  EXAMINATION: CTA OF THE CHEST 12/14/2021 8:37 pm TECHNIQUE: CTA of the chest was performed after the administration of intravenous contrast.  Multiplanar reformatted images are provided for review. MIP images are provided for review. Dose modulation, iterative reconstruction, and/or weight based adjustment of the mA/kV was utilized to reduce the radiation dose to as low as reasonably achievable.  COMPARISON: 12/14/2021 HISTORY: ORDERING SYSTEM PROVIDED HISTORY: short of breath, elevated d-dimer TECHNOLOGIST PROVIDED HISTORY: Reason for exam:->short of breath, elevated d-dimer Decision Support Exception - unselect if not a suspected or confirmed emergency medical condition->Emergency Medical Condition (MA) Reason for Exam: Shortness of Breath (from home c/o shortness of breath x2 days, hx asthma FINDINGS: Pulmonary Arteries: Motion through the pulmonary arteries challenge evaluation. No definite central or definite lobar emboli. Main pulmonary artery is unremarkable caliber. Mediastinum: Heart is normal size. No gross cardial effusion. No suspicious adenopathy. Mildly prominent mediastinal hilar lymph nodes subcentimeter size could be reactive. Lungs/pleura: Mild patchy opacity left lower lobe could represent areas of atelectasis versus interstitial edema. Early pneumonia may be considered. Difficult to evaluate due to degree of motion. Upper Abdomen: Right renal calculi. Soft Tissues/Bones: No suspicious osseous lesion. Motion degradation. No definite central or proximal lobar pulmonary emboli. If there is persistent concern, repeat examination or CT scan may be considered. Question left lung base opacities could represent atelectasis or developing infiltrates. Please correlate exam findings. RECOMMENDATIONS: Unavailable       EKG: Sinus tachycardia, rate 103 beats per minutes. No acute ST/T changes. I reviewed EKG. Discussed with ER physician.       Thank you LEESA Clifton - MARTIN for the opportunity to be involved in this patient's care.    -----------------------------  Yesi Quiros MD  [facility]ist

## 2021-12-15 NOTE — EC ADMISSION CRITERIA
Admission Criteria    [facility] Guideline: Asthma was utilized. Based on the indications selected for the patient, the bed status of Admit to Inpatient was determined to be MET    The following indications were selected as present at the time of evaluation of the patient:   - Admission is indicated for    - Respiratory finding (eg, dyspnea, Tachypnea, accessory muscle use) that persists despite observation care (eg, beta-agonist response not sustained for at least 3 hours)     - Tachypnea,:      - Greater than 18 breaths per minute in adult or child 15years of age or older    Admission Criteria documentation entered by: [address] [place], [place] [address]e [employer], 25th edition, Copyright © 2021 [address] All Rights Reserved.  [id number]

## 2021-12-15 NOTE — ED NOTES
Report endorsed to [employer]. Pt transferred to [phone] without incident by this RN. All belongings transferred with Pt.       Yara Yin RN  12/15/21 7021

## 2021-12-16 LAB
C-REACTIVE PROTEIN: <3 MG/L (ref 0–5.1)
D DIMER: 662 NG/ML DDU (ref 0–229)

## 2021-12-16 PROCEDURE — 6360000002 HC RX W HCPCS: Performed by: PHYSICIAN ASSISTANT

## 2021-12-16 PROCEDURE — 94640 AIRWAY INHALATION TREATMENT: CPT

## 2021-12-16 PROCEDURE — 96372 THER/PROPH/DIAG INJ SC/IM: CPT

## 2021-12-16 PROCEDURE — 6370000000 HC RX 637 (ALT 250 FOR IP): Performed by: PHYSICIAN ASSISTANT

## 2021-12-16 PROCEDURE — 85379 FIBRIN DEGRADATION QUANT: CPT

## 2021-12-16 PROCEDURE — 2580000003 HC RX 258: Performed by: INTERNAL MEDICINE

## 2021-12-16 PROCEDURE — G0378 HOSPITAL OBSERVATION PER HR: HCPCS

## 2021-12-16 PROCEDURE — 96375 TX/PRO/DX INJ NEW DRUG ADDON: CPT

## 2021-12-16 PROCEDURE — 86140 C-REACTIVE PROTEIN: CPT

## 2021-12-16 PROCEDURE — 6360000002 HC RX W HCPCS: Performed by: INTERNAL MEDICINE

## 2021-12-16 PROCEDURE — 6360000002 HC RX W HCPCS: Performed by: NURSE PRACTITIONER

## 2021-12-16 PROCEDURE — 6370000000 HC RX 637 (ALT 250 FOR IP): Performed by: INTERNAL MEDICINE

## 2021-12-16 PROCEDURE — 2060000000 HC ICU INTERMEDIATE R&B

## 2021-12-16 PROCEDURE — 96376 TX/PRO/DX INJ SAME DRUG ADON: CPT

## 2021-12-16 PROCEDURE — 94761 N-INVAS EAR/PLS OXIMETRY MLT: CPT

## 2021-12-16 PROCEDURE — 6370000000 HC RX 637 (ALT 250 FOR IP): Performed by: NURSE PRACTITIONER

## 2021-12-16 PROCEDURE — 36415 COLL VENOUS BLD VENIPUNCTURE: CPT

## 2021-12-16 RX ORDER — KETOROLAC TROMETHAMINE 30 MG/ML
30 INJECTION, SOLUTION INTRAMUSCULAR; INTRAVENOUS ONCE
Status: COMPLETED | OUTPATIENT
Start: 2021-12-16 | End: 2021-12-16

## 2021-12-16 RX ORDER — METHYLPREDNISOLONE SODIUM SUCCINATE 40 MG/ML
40 INJECTION, POWDER, LYOPHILIZED, FOR SOLUTION INTRAMUSCULAR; INTRAVENOUS EVERY 12 HOURS
Status: DISCONTINUED | OUTPATIENT
Start: 2021-12-16 | End: 2021-12-17

## 2021-12-16 RX ORDER — IPRATROPIUM BROMIDE AND ALBUTEROL SULFATE 2.5; .5 MG/3ML; MG/3ML
1 SOLUTION RESPIRATORY (INHALATION) 2 TIMES DAILY
Status: DISCONTINUED | OUTPATIENT
Start: 2021-12-16 | End: 2021-12-18 | Stop reason: HOSPADM

## 2021-12-16 RX ORDER — DIPHENHYDRAMINE HYDROCHLORIDE 50 MG/ML
25 INJECTION INTRAMUSCULAR; INTRAVENOUS ONCE
Status: COMPLETED | OUTPATIENT
Start: 2021-12-16 | End: 2021-12-16

## 2021-12-16 RX ORDER — BUTALBITAL, ACETAMINOPHEN AND CAFFEINE 50; 325; 40 MG/1; MG/1; MG/1
1 TABLET ORAL EVERY 4 HOURS PRN
Status: DISCONTINUED | OUTPATIENT
Start: 2021-12-16 | End: 2021-12-18 | Stop reason: HOSPADM

## 2021-12-16 RX ORDER — HYDRALAZINE HYDROCHLORIDE 25 MG/1
25 TABLET, FILM COATED ORAL EVERY 8 HOURS SCHEDULED
Status: DISCONTINUED | OUTPATIENT
Start: 2021-12-16 | End: 2021-12-18 | Stop reason: HOSPADM

## 2021-12-16 RX ORDER — PROMETHAZINE HYDROCHLORIDE 25 MG/ML
25 INJECTION, SOLUTION INTRAMUSCULAR; INTRAVENOUS ONCE
Status: COMPLETED | OUTPATIENT
Start: 2021-12-16 | End: 2021-12-16

## 2021-12-16 RX ADMIN — BUTALBITAL, ACETAMINOPHEN, AND CAFFEINE 1 TABLET: 50; 325; 40 TABLET ORAL at 23:19

## 2021-12-16 RX ADMIN — GUAIFENESIN 600 MG: 600 TABLET, EXTENDED RELEASE ORAL at 21:23

## 2021-12-16 RX ADMIN — SODIUM CHLORIDE, PRESERVATIVE FREE 10 ML: 5 INJECTION INTRAVENOUS at 22:25

## 2021-12-16 RX ADMIN — PROMETHAZINE HYDROCHLORIDE 25 MG: 25 INJECTION INTRAMUSCULAR; INTRAVENOUS at 11:31

## 2021-12-16 RX ADMIN — SODIUM CHLORIDE, PRESERVATIVE FREE 10 ML: 5 INJECTION INTRAVENOUS at 17:23

## 2021-12-16 RX ADMIN — HYDRALAZINE HYDROCHLORIDE 25 MG: 25 TABLET, FILM COATED ORAL at 22:25

## 2021-12-16 RX ADMIN — ENOXAPARIN SODIUM 40 MG: 100 INJECTION SUBCUTANEOUS at 08:50

## 2021-12-16 RX ADMIN — GUAIFENESIN 600 MG: 600 TABLET, EXTENDED RELEASE ORAL at 08:50

## 2021-12-16 RX ADMIN — TIZANIDINE 4 MG: 4 TABLET ORAL at 08:52

## 2021-12-16 RX ADMIN — KETOROLAC TROMETHAMINE 30 MG: 30 INJECTION, SOLUTION INTRAMUSCULAR; INTRAVENOUS at 11:31

## 2021-12-16 RX ADMIN — BUDESONIDE 500 MCG: 0.5 SUSPENSION RESPIRATORY (INHALATION) at 09:18

## 2021-12-16 RX ADMIN — SODIUM CHLORIDE, PRESERVATIVE FREE 10 ML: 5 INJECTION INTRAVENOUS at 08:50

## 2021-12-16 RX ADMIN — BUSPIRONE HYDROCHLORIDE 10 MG: 5 TABLET ORAL at 06:11

## 2021-12-16 RX ADMIN — DIPHENHYDRAMINE HYDROCHLORIDE 25 MG: 50 INJECTION, SOLUTION INTRAMUSCULAR; INTRAVENOUS at 11:31

## 2021-12-16 RX ADMIN — IBUPROFEN 800 MG: 800 TABLET, FILM COATED ORAL at 08:49

## 2021-12-16 RX ADMIN — HYDROCHLOROTHIAZIDE 25 MG: 25 TABLET ORAL at 08:50

## 2021-12-16 RX ADMIN — HYDRALAZINE HYDROCHLORIDE 25 MG: 25 TABLET, FILM COATED ORAL at 17:23

## 2021-12-16 RX ADMIN — IPRATROPIUM BROMIDE AND ALBUTEROL SULFATE 1 AMPULE: .5; 3 SOLUTION RESPIRATORY (INHALATION) at 20:02

## 2021-12-16 RX ADMIN — BUSPIRONE HYDROCHLORIDE 10 MG: 5 TABLET ORAL at 17:23

## 2021-12-16 RX ADMIN — PREGABALIN 150 MG: 75 CAPSULE ORAL at 08:50

## 2021-12-16 RX ADMIN — PANTOPRAZOLE SODIUM 40 MG: 40 TABLET, DELAYED RELEASE ORAL at 17:23

## 2021-12-16 RX ADMIN — BUDESONIDE 500 MCG: 0.5 SUSPENSION RESPIRATORY (INHALATION) at 20:02

## 2021-12-16 RX ADMIN — PANTOPRAZOLE SODIUM 40 MG: 40 TABLET, DELAYED RELEASE ORAL at 06:11

## 2021-12-16 RX ADMIN — DULOXETINE HYDROCHLORIDE 60 MG: 60 CAPSULE, DELAYED RELEASE ORAL at 08:49

## 2021-12-16 RX ADMIN — TIZANIDINE 4 MG: 4 TABLET ORAL at 21:30

## 2021-12-16 RX ADMIN — IPRATROPIUM BROMIDE AND ALBUTEROL SULFATE 1 AMPULE: .5; 3 SOLUTION RESPIRATORY (INHALATION) at 09:18

## 2021-12-16 RX ADMIN — METHYLPREDNISOLONE SODIUM SUCCINATE 40 MG: 40 INJECTION, POWDER, FOR SOLUTION INTRAMUSCULAR; INTRAVENOUS at 17:22

## 2021-12-16 RX ADMIN — PREDNISONE 40 MG: 20 TABLET ORAL at 08:50

## 2021-12-16 RX ADMIN — IPRATROPIUM BROMIDE AND ALBUTEROL SULFATE 1 AMPULE: .5; 3 SOLUTION RESPIRATORY (INHALATION) at 00:02

## 2021-12-16 RX ADMIN — AMLODIPINE BESYLATE 10 MG: 5 TABLET ORAL at 08:49

## 2021-12-16 RX ADMIN — PREGABALIN 150 MG: 75 CAPSULE ORAL at 21:23

## 2021-12-16 ASSESSMENT — PAIN DESCRIPTION - LOCATION
LOCATION: HEAD
LOCATION: HEAD

## 2021-12-16 ASSESSMENT — PAIN SCALES - GENERAL
PAINLEVEL_OUTOF10: 9
PAINLEVEL_OUTOF10: 10
PAINLEVEL_OUTOF10: 8

## 2021-12-16 ASSESSMENT — PAIN DESCRIPTION - PAIN TYPE
TYPE: ACUTE PAIN
TYPE: ACUTE PAIN

## 2021-12-16 ASSESSMENT — PULMONARY FUNCTION TESTS: PEFR_L/MIN: 135

## 2021-12-16 NOTE — PROGRESS NOTES
Physician Progress Note      Tae HERNANDEZ #:                  [id number]  :                       1980  ADMIT DATE:       2021 5:19 PM  [address] [place] DATE:  RESPONDING  PROVIDER #:        Efren León          QUERY TEXT:    Patient admitted with Acute asthma exacerbation. If possible, please document   in progress notes and discharge summary further specificity regarding asthma   as follows: The medical record reflects the following:  Risk Factors: Asthma  Clinical Indicators: Patient to ED with concerns for shortness of breath with   green sputum production. HP notes with, cannot exclude asthma exacerbation,   placed on scheduled Prednisone and breathing treatments. Patient was Covid   positive in February and has had frequent asthma flares since then is not   followed by Pulmonology. Treatment: Imaging, Lab work, Prednisone, Proventil, Pulmicort, Mucinex,   DuoNeb. *Mild intermittent- symptoms < 2 x/week &/or nocturnal awakenings < 2x/month. Use of inhalers ? 2 x/week. No limits to normal activity. *Mild persistent - symptoms > 2x/week but not daily &/or nocturnal awakenings   3-4 x/month. Use of inhalers > 2x/week but not daily. Minor impact on normal   activity. *Moderate persistent - daily symptoms and/or nocturnal awakenings > 1x/week   but not nightly. Daily use of inhalers. Some impact on normal activity. *Severe persistent - symptoms throughout the day and/or nocturnal awakenings   up to 7x/week. Use of inhalers several times per day. Normal activity is   extremely limited. extremely limited.   Options provided:  -- Asthma severity is mild intermittent  -- Asthma severity is mild persistent  -- Asthma severity is moderate persistent  -- Asthma severity is severe persistent  -- Other - I will add my own diagnosis  -- Disagree - Not applicable / Not valid  -- Disagree - Clinically unable to determine / Unknown  -- Refer to Clinical Documentation Reviewer    PROVIDER RESPONSE TEXT:    This patient has moderate persistent asthma.     Query created by: April Soto on 12/16/2021 9:59 AM      Electronically signed by:  Courtney Veloz 12/16/2021 12:15 PM

## 2021-12-16 NOTE — RT PROTOCOL NOTE
RT Inhaler-Nebulizer Bronchodilator Protocol Note    There is a bronchodilator order in the chart from a provider indicating to follow the RT Bronchodilator Protocol and there is an Initiate RT Inhaler-Nebulizer Bronchodilator Protocol order as well (see protocol at bottom of note). CXR Findings:  XR CHEST PORTABLE    Result Date: 12/14/2021  Stable chest with no acute abnormality seen. The findings from the last RT Protocol Assessment were as follows:   History Pulmonary Disease: None or smoker <15 pack years  Respiratory Pattern: Dyspnea on exertion or RR 21-25 bpm  Breath Sounds: Slightly diminished and/or crackles  Cough: Strong, spontaneous, non-productive  Indication for Bronchodilator Therapy: Decreased or absent breath sounds  Bronchodilator Assessment Score: 4    Aerosolized bronchodilator medication orders have been revised according to the RT Inhaler-Nebulizer Bronchodilator Protocol below. Respiratory Therapist to perform RT Therapy Protocol Assessment initially then follow the protocol. Repeat RT Therapy Protocol Assessment PRN for score 0-3 or on second treatment, BID, and PRN for scores above 3. No Indications - adjust the frequency to every 6 hours PRN wheezing or bronchospasm, if no treatments needed after 48 hours then discontinue using Per Protocol order mode. If indication present, adjust the RT bronchodilator orders based on the Bronchodilator Assessment Score as indicated below. Use Inhaler orders unless patient has one or more of the following: on home nebulizer, not able to hold breath for 10 seconds, is not alert and oriented, cannot activate and use MDI correctly, or respiratory rate 25 breaths per minute or more, then use the equivalent nebulizer order(s) with same Frequency and PRN reasons based on the score. If a patient is on this medication at home then do not decrease Frequency below that used at home.     0-3 - enter or revise RT bronchodilator order(s) to equivalent RT Bronchodilator order with Frequency of every 4 hours PRN for wheezing or increased work of breathing using Per Protocol order mode. 4-6 - enter or revise RT Bronchodilator order(s) to two equivalent RT bronchodilator orders with one order with BID Frequency and one order with Frequency of every 4 hours PRN wheezing or increased work of breathing using Per Protocol order mode. 7-10 - enter or revise RT Bronchodilator order(s) to two equivalent RT bronchodilator orders with one order with TID Frequency and one order with Frequency of every 4 hours PRN wheezing or increased work of breathing using Per Protocol order mode. 11-13 - enter or revise RT Bronchodilator order(s) to one equivalent RT bronchodilator order with QID Frequency and an Albuterol order with Frequency of every 4 hours PRN wheezing or increased work of breathing using Per Protocol order mode. Greater than 13 - enter or revise RT Bronchodilator order(s) to one equivalent RT bronchodilator order with every 4 hours Frequency and an Albuterol order with Frequency of every 2 hours PRN wheezing or increased work of breathing using Per Protocol order mode. RT to enter RT Home Evaluation for COPD & MDI Assessment order using Per Protocol order mode.     Electronically signed by Rg Sweeney RCP on 12/16/2021 at 12:06 AM

## 2021-12-16 NOTE — PROGRESS NOTES
12/16/21 0006   RT Protocol   History Pulmonary Disease 0   Respiratory pattern 2   Breath sounds 2   Cough 0   Indications for Bronchodilator Therapy Decreased or absent breath sounds   Bronchodilator Assessment Score 4

## 2021-12-16 NOTE — PLAN OF CARE
Problem: Pain:  Goal: Pain level will decrease  Description: Pain level will decrease  Outcome: Ongoing     Problem: Pain:  Goal: Control of acute pain  Description: Control of acute pain  Outcome: Ongoing     Problem: Pain:  Goal: Control of chronic pain  Description: Control of chronic pain  Outcome: Ongoing     Problem: Falls - Risk of:  Goal: Will remain free from falls  Description: Will remain free from falls  Outcome: Ongoing     Problem: Falls - Risk of:  Goal: Absence of physical injury  Description: Absence of physical injury  Outcome: Ongoing     Problem:  Activity Intolerance:  Goal: Ability to perform activities of daily living will improve  Description: Ability to perform activities of daily living will improve  Outcome: Ongoing     Problem: Airway Clearance - Ineffective:  Goal: Ability to maintain a clear airway will improve  Description: Ability to maintain a clear airway will improve  Outcome: Ongoing

## 2021-12-17 PROCEDURE — 94640 AIRWAY INHALATION TREATMENT: CPT

## 2021-12-17 PROCEDURE — G0378 HOSPITAL OBSERVATION PER HR: HCPCS

## 2021-12-17 PROCEDURE — 2580000003 HC RX 258: Performed by: INTERNAL MEDICINE

## 2021-12-17 PROCEDURE — 2500000003 HC RX 250 WO HCPCS: Performed by: NURSE PRACTITIONER

## 2021-12-17 PROCEDURE — 6370000000 HC RX 637 (ALT 250 FOR IP): Performed by: INTERNAL MEDICINE

## 2021-12-17 PROCEDURE — 2580000003 HC RX 258: Performed by: PHYSICIAN ASSISTANT

## 2021-12-17 PROCEDURE — 94761 N-INVAS EAR/PLS OXIMETRY MLT: CPT

## 2021-12-17 PROCEDURE — 6370000000 HC RX 637 (ALT 250 FOR IP): Performed by: NURSE PRACTITIONER

## 2021-12-17 PROCEDURE — 6360000002 HC RX W HCPCS: Performed by: NURSE PRACTITIONER

## 2021-12-17 PROCEDURE — 96372 THER/PROPH/DIAG INJ SC/IM: CPT

## 2021-12-17 PROCEDURE — 6360000002 HC RX W HCPCS: Performed by: PHYSICIAN ASSISTANT

## 2021-12-17 PROCEDURE — 96376 TX/PRO/DX INJ SAME DRUG ADON: CPT

## 2021-12-17 PROCEDURE — 6360000002 HC RX W HCPCS: Performed by: INTERNAL MEDICINE

## 2021-12-17 PROCEDURE — 6370000000 HC RX 637 (ALT 250 FOR IP): Performed by: PHYSICIAN ASSISTANT

## 2021-12-17 PROCEDURE — 2060000000 HC ICU INTERMEDIATE R&B

## 2021-12-17 RX ORDER — LORAZEPAM 0.5 MG/1
0.5 TABLET ORAL EVERY 6 HOURS PRN
Status: DISCONTINUED | OUTPATIENT
Start: 2021-12-17 | End: 2021-12-18 | Stop reason: HOSPADM

## 2021-12-17 RX ORDER — SODIUM CHLORIDE 9 MG/ML
INJECTION, SOLUTION INTRAVENOUS CONTINUOUS
Status: DISCONTINUED | OUTPATIENT
Start: 2021-12-17 | End: 2021-12-18 | Stop reason: HOSPADM

## 2021-12-17 RX ORDER — PANTOPRAZOLE SODIUM 40 MG/10ML
40 INJECTION, POWDER, LYOPHILIZED, FOR SOLUTION INTRAVENOUS DAILY
Status: DISCONTINUED | OUTPATIENT
Start: 2021-12-18 | End: 2021-12-18 | Stop reason: HOSPADM

## 2021-12-17 RX ADMIN — TIZANIDINE 4 MG: 4 TABLET ORAL at 15:09

## 2021-12-17 RX ADMIN — PREGABALIN 150 MG: 75 CAPSULE ORAL at 21:38

## 2021-12-17 RX ADMIN — SODIUM CHLORIDE, PRESERVATIVE FREE 10 ML: 5 INJECTION INTRAVENOUS at 04:55

## 2021-12-17 RX ADMIN — TIZANIDINE 4 MG: 4 TABLET ORAL at 21:38

## 2021-12-17 RX ADMIN — HYDRALAZINE HYDROCHLORIDE 25 MG: 25 TABLET, FILM COATED ORAL at 21:38

## 2021-12-17 RX ADMIN — GUAIFENESIN 600 MG: 600 TABLET, EXTENDED RELEASE ORAL at 07:59

## 2021-12-17 RX ADMIN — HYDROCHLOROTHIAZIDE 25 MG: 25 TABLET ORAL at 08:00

## 2021-12-17 RX ADMIN — IPRATROPIUM BROMIDE AND ALBUTEROL SULFATE 1 AMPULE: .5; 3 SOLUTION RESPIRATORY (INHALATION) at 20:51

## 2021-12-17 RX ADMIN — LABETALOL HYDROCHLORIDE 10 MG: 5 INJECTION INTRAVENOUS at 15:09

## 2021-12-17 RX ADMIN — IPRATROPIUM BROMIDE AND ALBUTEROL SULFATE 1 AMPULE: .5; 3 SOLUTION RESPIRATORY (INHALATION) at 09:13

## 2021-12-17 RX ADMIN — LORAZEPAM 0.5 MG: 0.5 TABLET ORAL at 21:38

## 2021-12-17 RX ADMIN — BUDESONIDE 500 MCG: 0.5 SUSPENSION RESPIRATORY (INHALATION) at 20:51

## 2021-12-17 RX ADMIN — ONDANSETRON 4 MG: 2 INJECTION INTRAMUSCULAR; INTRAVENOUS at 15:11

## 2021-12-17 RX ADMIN — SODIUM CHLORIDE: 9 INJECTION, SOLUTION INTRAVENOUS at 15:17

## 2021-12-17 RX ADMIN — HYDRALAZINE HYDROCHLORIDE 25 MG: 25 TABLET, FILM COATED ORAL at 15:09

## 2021-12-17 RX ADMIN — GUAIFENESIN 600 MG: 600 TABLET, EXTENDED RELEASE ORAL at 21:38

## 2021-12-17 RX ADMIN — BUTALBITAL, ACETAMINOPHEN, AND CAFFEINE 1 TABLET: 50; 325; 40 TABLET ORAL at 19:22

## 2021-12-17 RX ADMIN — METHYLPREDNISOLONE SODIUM SUCCINATE 40 MG: 40 INJECTION, POWDER, FOR SOLUTION INTRAMUSCULAR; INTRAVENOUS at 04:54

## 2021-12-17 RX ADMIN — SODIUM CHLORIDE, PRESERVATIVE FREE 10 ML: 5 INJECTION INTRAVENOUS at 07:28

## 2021-12-17 RX ADMIN — DULOXETINE HYDROCHLORIDE 60 MG: 60 CAPSULE, DELAYED RELEASE ORAL at 07:59

## 2021-12-17 RX ADMIN — HYDRALAZINE HYDROCHLORIDE 25 MG: 25 TABLET, FILM COATED ORAL at 06:35

## 2021-12-17 RX ADMIN — AMLODIPINE BESYLATE 10 MG: 5 TABLET ORAL at 07:59

## 2021-12-17 RX ADMIN — ENOXAPARIN SODIUM 40 MG: 100 INJECTION SUBCUTANEOUS at 08:00

## 2021-12-17 RX ADMIN — BUTALBITAL, ACETAMINOPHEN, AND CAFFEINE 1 TABLET: 50; 325; 40 TABLET ORAL at 11:15

## 2021-12-17 RX ADMIN — LORAZEPAM 0.5 MG: 0.5 TABLET ORAL at 15:09

## 2021-12-17 RX ADMIN — BUDESONIDE 500 MCG: 0.5 SUSPENSION RESPIRATORY (INHALATION) at 09:13

## 2021-12-17 RX ADMIN — PANTOPRAZOLE SODIUM 40 MG: 40 TABLET, DELAYED RELEASE ORAL at 06:35

## 2021-12-17 RX ADMIN — TIZANIDINE 4 MG: 4 TABLET ORAL at 05:02

## 2021-12-17 RX ADMIN — PREGABALIN 150 MG: 75 CAPSULE ORAL at 07:59

## 2021-12-17 ASSESSMENT — PAIN DESCRIPTION - PAIN TYPE
TYPE: ACUTE PAIN

## 2021-12-17 ASSESSMENT — PAIN SCALES - GENERAL
PAINLEVEL_OUTOF10: 0
PAINLEVEL_OUTOF10: 5
PAINLEVEL_OUTOF10: 8
PAINLEVEL_OUTOF10: 8

## 2021-12-17 ASSESSMENT — PAIN DESCRIPTION - DESCRIPTORS: DESCRIPTORS: ACHING

## 2021-12-17 ASSESSMENT — PAIN DESCRIPTION - LOCATION
LOCATION: HEAD

## 2021-12-17 ASSESSMENT — PULMONARY FUNCTION TESTS: PEFR_L/MIN: 160

## 2021-12-17 ASSESSMENT — PAIN DESCRIPTION - FREQUENCY: FREQUENCY: CONTINUOUS

## 2021-12-17 NOTE — PLAN OF CARE
Problem: Pain:  Goal: Pain level will decrease  Description: Pain level will decrease  Outcome: Completed     Problem: Falls - Risk of:  Goal: Will remain free from falls  Description: Will remain free from falls  Outcome: Completed     Problem:  Activity Intolerance:  Goal: Ability to perform activities of daily living will improve  Description: Ability to perform activities of daily living will improve  Outcome: Completed     Problem: Airway Clearance - Ineffective:  Goal: Ability to maintain a clear airway will improve  Description: Ability to maintain a clear airway will improve  Outcome: Completed

## 2021-12-17 NOTE — PROGRESS NOTES
Pt complaining of nausea, attempted to give PRN zofran but IV has infiltrated. PICC RN called for peripheral IV placement.

## 2021-12-17 NOTE — PROGRESS NOTES
Assessment complete, BP elevated. Pt has a headache and chest pain from coughing. Pt is complaining of very poor sleep due to not taking her home medication, we discussed a family member bringing it in so that she can have it while here. Still SOB with exertion. On IV steroids.

## 2021-12-17 NOTE — PROGRESS NOTES
Hospitalist Progress Note      PCP: Clint Mancia APRN - CNP    Date of Admission: 12/14/2021    Chief Complaint: Shortness of breath    Hospital Course: This is a pleasant 46 y.o. female with history of anxiety disorder, asthma, depression, fibromyalgia, essential hypertension, who presents to the emergency room with complaints of cough that is productive of greenish sputum. She also reports shortness of breath. Symptoms have been ongoing for the past 2 days. There is no fever or chills. CT-PE protocol was negative for pulmonary embolism; reveals questionable left lung base opacity concerning for atelectasis or \"developing infiltrates. \"  Admitted for further work-up. Subjective: Patient seen this am. Not feeling much better but felt ok to go home. Not sleeping at all    Assessment/Plan:    1. Acute asthma exacerbation  -CT PE study had motion degradation. No definitive pulmonary emboli seen. Question left base opacity covers represent atelectasis or developing infiltrates.  -respiratory cultures positive for human metapneumvirus. Likely etiology for asthma flare.   -Continue duo nebs every 4 hours, Pulmicort nebs  -Procalcitonin normal, 0.05  -dc solumedrol. Can have prednisone daily starting Saturday. 2. Hypertensive urgency  -/105 on arrival  -bp 150/100 this afternoon  -Continue amlodipine 10 and hydrochlorothiazide 25  -continue hydralazine    4.  Nausea  -iv lprotonix, IV fluids        Active Hospital Problems    Diagnosis     Acute asthma exacerbation [J45.901]     Shortness of breath [R06.02]        Medications:  Reviewed    Infusion Medications    sodium chloride 100 mL/hr at 12/17/21 1517    sodium chloride       Scheduled Medications    [START ON 12/18/2021] pantoprazole  40 mg IntraVENous Daily    ipratropium-albuterol  1 ampule Inhalation BID    hydrALAZINE  25 mg Oral 3 times per day    amLODIPine  10 mg Oral Daily    DULoxetine  60 mg Oral Daily    pregabalin  150 mg Oral BID    sodium chloride flush  5-40 mL IntraVENous 2 times per day    enoxaparin  40 mg SubCUTAneous Daily    budesonide  0.5 mg Nebulization BID    guaiFENesin  600 mg Oral BID     PRN Meds: LORazepam, butalbital-acetaminophen-caffeine, busPIRone, sodium chloride flush, sodium chloride, ondansetron **OR** ondansetron, polyethylene glycol, acetaminophen **OR** acetaminophen, albuterol, guaiFENesin-dextromethorphan, benzocaine-menthol, ibuprofen, tiZANidine, labetalol      Intake/Output Summary (Last 24 hours) at 12/17/2021 1751  Last data filed at 12/17/2021 1117  Gross per 24 hour   Intake 140 ml   Output --   Net 140 ml       Physical Exam Performed:    BP (!) 152/107   Pulse 71   Temp 97.9 °F (36.6 °C) (Oral)   Resp 16   Ht 5' 7\" (1.702 m)   Wt 169 lb 6.4 oz (76.8 kg)   SpO2 98%   BMI 26.53 kg/m²     General appearance: No apparent distress, appears stated age and cooperative. HEENT: Pupils equal, round, and reactive to light. Conjunctivae/corneas clear. Neck: Supple, with full range of motion. No jugular venous distention. Trachea midline. Respiratory: Dyspnea with conversation. Expiratory wheeze. Tight lung sounds  Cardiovascular: Regular rate and rhythm with normal S1/S2 without murmurs, rubs or gallops. Abdomen: Soft, non-tender, non-distended with normal bowel sounds. Musculoskeletal: No clubbing, cyanosis or edema bilaterally. Full range of motion without deformity. Skin: Skin color, texture, turgor normal.  No rashes or lesions. Neurologic:  Neurovascularly intact without any focal sensory/motor deficits.  Cranial nerves: II-XII intact, grossly non-focal.  Psychiatric: Alert and oriented, thought content appropriate, normal insight  Capillary Refill: Brisk,< 3 seconds   Peripheral Pulses: +2 palpable, equal bilaterally       Labs:   Recent Labs     12/15/21  0531   WBC 5.1   HGB 11.1*   HCT 34.8*        Recent Labs     12/15/21  0531      K 4.0      CO2 17*   BUN 16   CREATININE 0.9   CALCIUM 9.4     Recent Labs     12/15/21  0531   AST 12*   ALT 7*   BILITOT <0.2   ALKPHOS 90     No results for input(s): INR in the last 72 hours. No results for input(s): Roya Comber in the last 72 hours. Urinalysis:      Lab Results   Component Value Date    NITRU Negative 02/15/2021    WBCUA 51 02/15/2021    BACTERIA 4+ 02/15/2021    RBCUA 5 02/15/2021    BLOODU SMALL 02/15/2021    SPECGRAV 1.013 02/15/2021    GLUCOSEU Negative 02/15/2021       Radiology:  CT CHEST PULMONARY EMBOLISM W CONTRAST   Final Result   Motion degradation. No definite central or proximal lobar pulmonary emboli. If there is persistent concern, repeat examination or CT scan may be   considered. Question left lung base opacities could represent atelectasis or developing   infiltrates. Please correlate exam findings. RECOMMENDATIONS:   Unavailable         XR CHEST PORTABLE   Final Result   Stable chest with no acute abnormality seen. DVT Prophylaxis: Lovenox  Diet: ADULT DIET; Regular  Code Status: Full Code    PT/OT Eval Status: No acute needs    Dispo -possible dc home tomorrow    Ben Edmond PA-C      NOTE:  This report was transcribed using voice recognition software. Every effort was made to ensure accuracy; however, inadvertent computerized transcription errors may be present.

## 2021-12-17 NOTE — PROGRESS NOTES
Pt is tearful and anxious, has not slept well since admitted. Takes a sleep med at home that we do not carry. Ativan ordered Q 6 hours. Zanaflex given. Fluids started due to poor intake. Zofran given for nausea. Blinds lowered and lights turned out so aid in rest, pt will call if needed. Primary Defect Width (In Cm): 0

## 2021-12-17 NOTE — PROGRESS NOTES
Hospitalist Progress Note      PCP: Angélica Gallego, APRN - CNP    Date of Admission: 12/14/2021    Chief Complaint: Shortness of breath    Hospital Course: This is a pleasant 46 y.o. female with history of anxiety disorder, asthma, depression, fibromyalgia, essential hypertension, who presents to the emergency room with complaints of cough that is productive of greenish sputum. She also reports shortness of breath. Symptoms have been ongoing for the past 2 days. There is no fever or chills. CT-PE protocol was negative for pulmonary embolism; reveals questionable left lung base opacity concerning for atelectasis or \"developing infiltrates. \"  Admitted for further work-up. Subjective: Patient sitting up in bed, continues to have migraine. She has about 6 migraines a month. States she still feels short of breath when she gets up and moves and her heart rate goes up. Remains on room air. States she had Covid pneumonia in February and this flared her asthma. She was hospitalized for this. Since then her asthma flares frequently. She would like to follow-up with the pulmonologist.      Assessment/Plan:    1. Acute asthma exacerbation  -CT PE study had motion degradation. No definitive pulmonary emboli seen. Question left base opacity covers represent atelectasis or developing infiltrates.  -respiratory cultures positive for human metapneumvirus. Likely etiology for asthma flare.   -Continue duo nebs every 4 hours, Pulmicort nebs  -Procalcitonin normal, 0.05  -change steroids to bid solu medrol to see if symptoms improve. 2. Hypertensive urgency  -/105 on arrival  -bp 150/100 this afternoon  -Continue amlodipine 10 and hydrochlorothiazide 25  -add hydralazine    4.  Migraine  -Gave cocktail of Toradol, Benadryl, Phenergan IV  -Monitor        Active Hospital Problems    Diagnosis     Acute asthma exacerbation [J45.901]     Shortness of breath [R06.02]        Medications:  Reviewed    Infusion Medications    sodium chloride       Scheduled Medications    ipratropium-albuterol  1 ampule Inhalation BID    hydrALAZINE  25 mg Oral 3 times per day    methylPREDNISolone  40 mg IntraVENous Q12H    amLODIPine  10 mg Oral Daily    DULoxetine  60 mg Oral Daily    hydroCHLOROthiazide  25 mg Oral Daily    pantoprazole  40 mg Oral BID AC    pregabalin  150 mg Oral BID    sodium chloride flush  5-40 mL IntraVENous 2 times per day    enoxaparin  40 mg SubCUTAneous Daily    budesonide  0.5 mg Nebulization BID    guaiFENesin  600 mg Oral BID     PRN Meds: busPIRone, sodium chloride flush, sodium chloride, ondansetron **OR** ondansetron, polyethylene glycol, acetaminophen **OR** acetaminophen, albuterol, guaiFENesin-dextromethorphan, benzocaine-menthol, ibuprofen, tiZANidine, LORazepam, labetalol      Intake/Output Summary (Last 24 hours) at 12/16/2021 1923  Last data filed at 12/16/2021 1133  Gross per 24 hour   Intake 500 ml   Output --   Net 500 ml       Physical Exam Performed:    /77   Pulse 90   Temp 98.1 °F (36.7 °C) (Oral)   Resp 17   Ht 5' 7\" (1.702 m)   Wt 169 lb 8 oz (76.9 kg)   SpO2 100%   BMI 26.55 kg/m²     General appearance: No apparent distress, appears stated age and cooperative. HEENT: Pupils equal, round, and reactive to light. Conjunctivae/corneas clear. Neck: Supple, with full range of motion. No jugular venous distention. Trachea midline. Respiratory: Dyspnea with conversation. Expiratory wheeze. Tight lung sounds  Cardiovascular: Regular rate and rhythm with normal S1/S2 without murmurs, rubs or gallops. Abdomen: Soft, non-tender, non-distended with normal bowel sounds. Musculoskeletal: No clubbing, cyanosis or edema bilaterally. Full range of motion without deformity. Skin: Skin color, texture, turgor normal.  No rashes or lesions. Neurologic:  Neurovascularly intact without any focal sensory/motor deficits.  Cranial nerves: II-XII intact, grossly non-focal.  Psychiatric: Alert and oriented, thought content appropriate, normal insight  Capillary Refill: Brisk,< 3 seconds   Peripheral Pulses: +2 palpable, equal bilaterally       Labs:   Recent Labs     12/14/21  1745 12/15/21  0531   WBC 5.1 5.1   HGB 11.8* 11.1*   HCT 36.5 34.8*    270     Recent Labs     12/14/21  1745 12/15/21  0531   * 137   K 4.2 4.0    104   CO2 19* 17*   BUN 13 16   CREATININE 0.8 0.9   CALCIUM 9.9 9.4     Recent Labs     12/14/21 1745 12/15/21  0531   AST 14* 12*   ALT 8* 7*   BILITOT 0.4 <0.2   ALKPHOS 100 90     No results for input(s): INR in the last 72 hours. Recent Labs     12/14/21 1745   TROPONINI <0.01       Urinalysis:      Lab Results   Component Value Date    NITRU Negative 02/15/2021    WBCUA 51 02/15/2021    BACTERIA 4+ 02/15/2021    RBCUA 5 02/15/2021    BLOODU SMALL 02/15/2021    SPECGRAV 1.013 02/15/2021    GLUCOSEU Negative 02/15/2021       Radiology:  CT CHEST PULMONARY EMBOLISM W CONTRAST   Final Result   Motion degradation. No definite central or proximal lobar pulmonary emboli. If there is persistent concern, repeat examination or CT scan may be   considered. Question left lung base opacities could represent atelectasis or developing   infiltrates. Please correlate exam findings. RECOMMENDATIONS:   Unavailable         XR CHEST PORTABLE   Final Result   Stable chest with no acute abnormality seen. DVT Prophylaxis: Lovenox  Diet: ADULT DIET; Regular  Code Status: Full Code    PT/OT Eval Status: No acute needs    Dispo -possible dc home tomorrow    Glo Ferreira PA-C      NOTE:  This report was transcribed using voice recognition software. Every effort was made to ensure accuracy; however, inadvertent computerized transcription errors may be present.

## 2021-12-18 VITALS
BODY MASS INDEX: 26.51 KG/M2 | WEIGHT: 168.9 LBS | TEMPERATURE: 98.8 F | HEART RATE: 70 BPM | OXYGEN SATURATION: 97 % | HEIGHT: 67 IN | RESPIRATION RATE: 18 BRPM | DIASTOLIC BLOOD PRESSURE: 87 MMHG | SYSTOLIC BLOOD PRESSURE: 138 MMHG

## 2021-12-18 LAB
ANION GAP SERPL CALCULATED.3IONS-SCNC: 12 MMOL/L (ref 3–16)
BUN BLDV-MCNC: 24 MG/DL (ref 7–20)
CALCIUM SERPL-MCNC: 9.5 MG/DL (ref 8.3–10.6)
CHLORIDE BLD-SCNC: 100 MMOL/L (ref 99–110)
CO2: 22 MMOL/L (ref 21–32)
CREAT SERPL-MCNC: 0.7 MG/DL (ref 0.6–1.1)
GFR AFRICAN AMERICAN: >60
GFR NON-AFRICAN AMERICAN: >60
GLUCOSE BLD-MCNC: 86 MG/DL (ref 70–99)
POTASSIUM SERPL-SCNC: 4.1 MMOL/L (ref 3.5–5.1)
SODIUM BLD-SCNC: 134 MMOL/L (ref 136–145)

## 2021-12-18 PROCEDURE — G0378 HOSPITAL OBSERVATION PER HR: HCPCS

## 2021-12-18 PROCEDURE — 94640 AIRWAY INHALATION TREATMENT: CPT

## 2021-12-18 PROCEDURE — C9113 INJ PANTOPRAZOLE SODIUM, VIA: HCPCS | Performed by: PHYSICIAN ASSISTANT

## 2021-12-18 PROCEDURE — 6370000000 HC RX 637 (ALT 250 FOR IP): Performed by: INTERNAL MEDICINE

## 2021-12-18 PROCEDURE — 2580000003 HC RX 258: Performed by: PHYSICIAN ASSISTANT

## 2021-12-18 PROCEDURE — 36415 COLL VENOUS BLD VENIPUNCTURE: CPT

## 2021-12-18 PROCEDURE — 6360000002 HC RX W HCPCS: Performed by: PHYSICIAN ASSISTANT

## 2021-12-18 PROCEDURE — 94761 N-INVAS EAR/PLS OXIMETRY MLT: CPT

## 2021-12-18 PROCEDURE — 96375 TX/PRO/DX INJ NEW DRUG ADDON: CPT

## 2021-12-18 PROCEDURE — 6370000000 HC RX 637 (ALT 250 FOR IP): Performed by: PHYSICIAN ASSISTANT

## 2021-12-18 PROCEDURE — 6360000002 HC RX W HCPCS: Performed by: NURSE PRACTITIONER

## 2021-12-18 PROCEDURE — 80048 BASIC METABOLIC PNL TOTAL CA: CPT

## 2021-12-18 PROCEDURE — 6370000000 HC RX 637 (ALT 250 FOR IP): Performed by: NURSE PRACTITIONER

## 2021-12-18 PROCEDURE — 2580000003 HC RX 258: Performed by: INTERNAL MEDICINE

## 2021-12-18 RX ORDER — PREDNISONE 20 MG/1
20 TABLET ORAL DAILY
Qty: 5 TABLET | Refills: 0 | Status: SHIPPED | OUTPATIENT
Start: 2021-12-18 | End: 2021-12-23

## 2021-12-18 RX ORDER — HYDRALAZINE HYDROCHLORIDE 25 MG/1
25 TABLET, FILM COATED ORAL EVERY 8 HOURS SCHEDULED
Qty: 90 TABLET | Refills: 1 | Status: SHIPPED | OUTPATIENT
Start: 2021-12-18 | End: 2022-09-14 | Stop reason: SDUPTHER

## 2021-12-18 RX ORDER — TIZANIDINE 4 MG/1
6 TABLET ORAL EVERY 8 HOURS PRN
Status: DISCONTINUED | OUTPATIENT
Start: 2021-12-18 | End: 2021-12-18 | Stop reason: HOSPADM

## 2021-12-18 RX ADMIN — AMLODIPINE BESYLATE 10 MG: 5 TABLET ORAL at 09:57

## 2021-12-18 RX ADMIN — DULOXETINE HYDROCHLORIDE 60 MG: 60 CAPSULE, DELAYED RELEASE ORAL at 09:57

## 2021-12-18 RX ADMIN — BUTALBITAL, ACETAMINOPHEN, AND CAFFEINE 1 TABLET: 50; 325; 40 TABLET ORAL at 04:00

## 2021-12-18 RX ADMIN — BUDESONIDE 500 MCG: 0.5 SUSPENSION RESPIRATORY (INHALATION) at 09:09

## 2021-12-18 RX ADMIN — PREGABALIN 150 MG: 75 CAPSULE ORAL at 09:57

## 2021-12-18 RX ADMIN — TIZANIDINE 6 MG: 4 TABLET ORAL at 04:18

## 2021-12-18 RX ADMIN — HYDRALAZINE HYDROCHLORIDE 25 MG: 25 TABLET, FILM COATED ORAL at 04:18

## 2021-12-18 RX ADMIN — SODIUM CHLORIDE: 9 INJECTION, SOLUTION INTRAVENOUS at 01:26

## 2021-12-18 RX ADMIN — SODIUM CHLORIDE, PRESERVATIVE FREE 10 ML: 5 INJECTION INTRAVENOUS at 09:58

## 2021-12-18 RX ADMIN — IPRATROPIUM BROMIDE AND ALBUTEROL SULFATE 1 AMPULE: .5; 3 SOLUTION RESPIRATORY (INHALATION) at 09:07

## 2021-12-18 RX ADMIN — GUAIFENESIN 600 MG: 600 TABLET, EXTENDED RELEASE ORAL at 09:57

## 2021-12-18 RX ADMIN — PANTOPRAZOLE SODIUM 40 MG: 40 INJECTION, POWDER, FOR SOLUTION INTRAVENOUS at 09:58

## 2021-12-18 ASSESSMENT — PAIN DESCRIPTION - DESCRIPTORS
DESCRIPTORS: ACHING

## 2021-12-18 ASSESSMENT — PAIN DESCRIPTION - LOCATION
LOCATION: HEAD

## 2021-12-18 ASSESSMENT — PAIN DESCRIPTION - PAIN TYPE
TYPE: ACUTE PAIN

## 2021-12-18 ASSESSMENT — PAIN SCALES - GENERAL
PAINLEVEL_OUTOF10: 6
PAINLEVEL_OUTOF10: 7
PAINLEVEL_OUTOF10: 7

## 2021-12-18 ASSESSMENT — PAIN DESCRIPTION - FREQUENCY
FREQUENCY: CONTINUOUS

## 2021-12-18 NOTE — PROGRESS NOTES
Patient received her discharge instruction, she was able to verbalize understand and she did not have any questions. Patient was picked up by her boyfriend and she had her belongings along with her phone and  in her bags. She was pleased with her care but said she was glad to be going home to sleep in her own bed.

## 2021-12-20 ENCOUNTER — CARE COORDINATION (OUTPATIENT)
Dept: CASE MANAGEMENT | Age: 41
End: 2021-12-20

## 2021-12-20 DIAGNOSIS — J45.901 ASTHMA WITH ACUTE EXACERBATION, UNSPECIFIED ASTHMA SEVERITY, UNSPECIFIED WHETHER PERSISTENT: Primary | ICD-10-CM

## 2021-12-20 NOTE — CARE COORDINATION
[facility] Transitions Initial Follow Up Call:    (Patient will call PCP to schedule follow up.)    Patient reports that she is doing much better and breathing without any difficulty. Discussed discharge instructions and reviewed medications, 1111F completed. She will call PCP to schedule hospital follow up. CTN will continue with outreach follow up calls. Call within 2 business days of discharge: Yes    Patient: Ivy Heller Patient : 1980   MRN: [id number]  Reason for Admission: Acute asthma exacerbation; Hypertensive urgency  Discharge Date: 21 RARS: Readmission Risk Score: 9.3 ( )      Last Discharge [facility]       Complaint Diagnosis Description Type Department Provider    21 Shortness of Breath Acute bronchospasm . .. ED to Hosp-Admission (Discharged) (ADMITTED) Rohit Rodriguez MD; [facility]... Spoke with: Ivy Heller      Transitions of Care Initial Call    Was this an external facility discharge? No Discharge Facility:     Challenges to be reviewed by the provider   Additional needs identified to be addressed with provider: No  none             Method of communication with provider : none      Advance Care Planning:   Does patient have an Advance Directive: reviewed and current. Was this a readmission? No  Patient stated reason for admission: Acute asthma exacerbation; Hypertensive urgency  Patients top risk factors for readmission: medical condition-.    Care Transition Nurse (CTN) contacted the patient by telephone to perform post hospital discharge assessment. Verified name and  with patient as identifiers. Provided introduction to self, and explanation of the CTN role. CTN reviewed discharge instructions, medical action plan and red flags with patient who verbalized understanding. Patient given an opportunity to ask questions and does not have any further questions or concerns at this time.  Were discharge instructions available to patient? Yes. Reviewed appropriate site of care based on symptoms and resources available to patient including: PCP, Urgent care clinics and When to call 911. The patient agrees to contact the PCP office for questions related to their healthcare. Medication reconciliation was performed with patient, who verbalizes understanding of administration of home medications. Advised obtaining a 90-day supply of all daily and as-needed medications. Covid Risk Education     Educated patient about risk for severe COVID-19 due to risk factors according to CDC guidelines. CTN reviewed discharge instructions, medical action plan and red flag symptoms with the patient who verbalized understanding. Discussed COVID vaccination status: No. Education provided on COVID-19 vaccination as appropriate. Discussed exposure protocols and quarantine with CDC Guidelines. Patient was given an opportunity to verbalize any questions and concerns and agrees to contact CTN or health care provider for questions related to their healthcare. Reviewed and educated patient on any new and changed medications related to discharge diagnosis. Was patient discharged with a pulse oximeter? No Discussed and confirmed pulse oximeter discharge instructions and when to notify provider or seek emergency care. CTN provided contact information. Plan for follow-up call in 5-7 days based on severity of symptoms and risk factors. Plan for next call: symptom management-.  self management-.  follow up appointment-.             Non-face-to-face services provided:  Obtained and reviewed discharge summary and/or continuity of care documents    Care Transitions 24 Hour Call    Do you have any ongoing symptoms?: No  Do you have a copy of your discharge instructions?: Yes  Do you have all of your prescriptions and are they filled?: Yes  Have you been contacted by a [id number] [employer] [facility] Pharmacist?: No  Have you scheduled your follow up appointment?: No  Were

## 2021-12-26 NOTE — DISCHARGE SUMMARY
[facility]    Patient Demographics    Patient. Dagmar Echevarria  Date of Birth. 1980  MRN. [id number]     Primary care provider. LEESA Mcdonald CNP  (Tel: [phone])    Admit date: 12/14/2021    Discharge date (blank if same as Note Date): 12/18/2021  Note Date: 12/26/2021     Reason for Hospitalization. Chief Complaint   Patient presents with    Shortness of Breath     from home c/o shortness of breath x2 days, hx asthma         Significant Findings. Active Problems:    Shortness of breath    Acute asthma exacerbation  Resolved Problems:    * No resolved hospital problems. *       Problems and results from this hospitalization that need follow up. 1. Asthma exacerbation  2. metapneumovirus    Significant test results and incidental findings. 1. CT pulmonary  Motion degradation.  No definite central or proximal lobar pulmonary emboli. If there is persistent concern, repeat examination or CT scan may be   considered.       Question left lung base opacities could represent atelectasis or developing   infiltrates.  Please correlate exam findings. Respiratory Panel  Organism Human Metapneumovirus by PCR Abnormal     Respiratory Panel PCR POSITIVE FOR   See additional report for complete Respiratory Pathogen Panel      Invasive procedures and treatments. 1. None     Problem-based Hospital Course. Patient is a 79-year-old female with asthma and recurrent exacerbations following Covid which was diagnosed earlier this year who presented to hospital with increased shortness of breath and cough. Patient was admitted with asthma exacerbation. She was started on IV steroids as well as nebs. Covid testing was negative and her procalcitonin was normal however respiratory culture showed human metapneumovirus which is likely the cause of her asthma flare.   Patient did have a persistent hypertension during her stay. She was started on hydralazine. She was feeling better and was she was discharged home in stable condition. She did not require oxygen during her hospitalization. Consults. None    Physical examination on discharge day. /87   Pulse 70   Temp 98.8 °F (37.1 °C) (Oral)   Resp 18   Ht 5' 7\" (1.702 m)   Wt 168 lb 14.4 oz (76.6 kg)   SpO2 97%   BMI 26.45 kg/m²   General appearance. Alert. Looks comfortable. HEENT. Sclera clear. Moist mucus membranes. Cardiovascular. Regular rate and rhythm, normal S1, S2. No murmur. Respiratory. Not using accessory muscles. Clear to auscultation bilaterally, no wheeze. Gastrointestinal. Abdomen soft, non-tender, not distended, normal bowel sounds  Neurology. Facial symmetry. No speech deficits. Moving all extremities equally. Extremities. No edema in lower extremities. Skin. Warm, dry, normal turgor    Condition at time of discharge stable    Medication instructions provided to patient at discharge.      Medication List      START taking these medications    hydrALAZINE 25 MG tablet  Commonly known as: APRESOLINE  Take 1 tablet by mouth every 8 hours  Notes to patient: Hydralazine  Use: treat heart failure, lower blood pressure  Side effects: headache and dizziness        CONTINUE taking these medications    albuterol sulfate  (90 Base) MCG/ACT inhaler  Inhale 2 puffs into the lungs 4 times daily as needed for Wheezing  Notes to patient: Albuterol is used to treat respiratory problems:  Common side effects include:  Nervousness, headache, palpitations, nausea, muscle cramps, insomnia     amLODIPine 10 MG tablet  Commonly known as: NORVASC  Take 1 tablet by mouth daily  Notes to patient: Calcium Channel Blocker  Use: to treat high blood pressure, chest pain  Side effects: swelling of ankles, dizziness, fast or irregular heartbeat, shortness of breath     beclomethasone 40 MCG/ACT Aerb inhaler  Commonly known as: Qvar RediHaler  Inhale 1 puff into the lungs 2 times daily  Notes to patient: Use: for prevention of asthma attack  Side effects: general body aches, cough, congestion, sore throat      Blood Pressure Kit  1 kit by Does not apply route daily     busPIRone 10 MG tablet  Commonly known as: BUSPAR  Take 1 tablet by mouth 3 times daily as needed (anxiety)  Notes to patient: Use:  It can treat anxiety. Side Effects:  headache; dizziness, drowsiness; sleep problems (insomnia); nausea, upset stomach; or. feeling nervous or excited. Compressor/Nebulizer Misc  1 each by Does not apply route daily     DULoxetine 60 MG extended release capsule  Commonly known as: CYMBALTA  Take 1 capsule by mouth daily  Notes to patient: Duloxitine, Cymbalta  Use: for treatment of depression and/or anxiety disorder  Side effects: (notify physician) abdominal pain or discomfort, visual distubances, confusion, dizziness, fast and/or irregular heartbeat     EPINEPHrine 0.3 MG/0.3ML Soaj injection  Commonly known as: EpiPen 2-Yg  Inject 0.3 mLs into the muscle once for 1 dose Use as directed for allergic reaction     Handicap Placard Misc  by Does not apply route Unable able to walk long distance due to medical condition. Expires in 5 years. hydrocortisone 2.5 % Crea rectal cream  Commonly known as: ANUSOL-HC  Apply to hemorrhoid twice daily  Notes to patient: Use: topical cream for itching or rash  Side effects: skin irritation     hydrOXYzine 25 MG tablet  Commonly known as: ATARAX  Notes to patient: Use: Antihistamine that can treat anxiety, nausea, vomiting, allergies, skin rash, hives, and itching. It can also be used with anesthesia before medical procedures. Side Effects: Drowsiness, dizziness, blurred vision, constipation, or dry mouth may occur.      ipratropium-albuterol 0.5-2.5 (3) MG/3ML Soln nebulizer solution  Commonly known as: DUONEB  Inhale 3 mLs into the lungs 4 times daily     ondansetron 4 MG tablet  Commonly known PM

## 2021-12-28 ENCOUNTER — TELEPHONE (OUTPATIENT)
Dept: INTERNAL MEDICINE CLINIC | Age: 41
End: 2021-12-28

## 2021-12-28 NOTE — TELEPHONE ENCOUNTER
[address] / [facility] oxy [id number] - oxygen order was faxed to the company on 12- . I do not see this scanned into media. Can you see if you have this form before I have them initiate a new one.

## 2021-12-30 ENCOUNTER — CARE COORDINATION (OUTPATIENT)
Dept: CARE COORDINATION | Age: 41
End: 2021-12-30

## 2021-12-30 NOTE — CARE COORDINATION
[facility] Transitions Follow Up Call    2021    Patient: Dontrell Reed  Patient : 1980   MRN: [id number]  Reason for Admission: Acute asthma exacerbation; Hypertensive urgency  Discharge Date: 21 RARS: Readmission Risk Score: 9.3 ( )      Spoke with: [address] Transitions Follow Up Call  Pt doing ok, taking prednisone. No appt with pulmonologist due to wait time. States PCP office was to work on that. She does not have a BP cuff. Needs to be reviewed by the provider   Additional needs identified to be addressed with provider: No  DME-pt needs BP cuff  RX to go to [employer], she is looking into whomever her walker came from. Also, she states office was to f/u finding a pulmonologist that can see her sooner. She has not heard back. Method of communication with provider : chart routing      Care Transition Nurse (CTN) contacted the patient by telephone to follow up after admission on 2021. Verified name and  with patient as identifiers. Addressed changes since last contact: medications-still taking prednisone  needs pulmonology f/u and BP cuff. Discussed follow-up appointments. If no appointment was previously scheduled, appointment scheduling offered: No.   Is follow up appointment scheduled within 7 days of discharge? Yes. CTN reviewed discharge instructions, medical action plan and red flags with patient and discussed any barriers to care and/or understanding of plan of care after discharge. Discussed appropriate site of care based on symptoms and resources available to patient including: PCP, Specialist and When to call 911. The patient agrees to contact the PCP office for questions related to their healthcare. Patients top risk factors for readmission: medical condition-Acute asthma exacerbation;  Hypertensive urgency  Interventions to address risk factors: Obtained and reviewed discharge summary and/or continuity of care documents      Non-BS follow up appointment(s): hal    CTN provided contact information for future needs. Plan for follow-up call in 5-7 days based on severity of symptoms and risk factors. Plan for next call: symptom management-how is her breathing  self management-able to remain calm  follow up appointment-pulmonogist  medication management-still taking steroid?  she was to check DME company and call CM with Medicaid to get BP cuff              Care Transitions Subsequent and Final Call    Schedule Follow Up Appointment with PCP: Completed  Subsequent and Final Calls  Do you have any ongoing symptoms?: No  Have your medications changed?: No  Do you have any questions related to your medications?: No  Do you currently have any active services?: No  Are you currently active with any services?: Home Health  Do you have any needs or concerns that I can assist you with?: No  Identified Barriers: None  Care Transitions Interventions  No Identified Needs  Other Interventions: Follow Up  No future appointments.     Sofia Sierra RN

## 2022-01-03 DIAGNOSIS — J45.40 MODERATE PERSISTENT ASTHMA, UNSPECIFIED WHETHER COMPLICATED: Primary | ICD-10-CM

## 2022-01-03 DIAGNOSIS — R09.89 LABILE HYPERTENSION: ICD-10-CM

## 2022-01-04 ENCOUNTER — CARE COORDINATION (OUTPATIENT)
Dept: CASE MANAGEMENT | Age: 42
End: 2022-01-04

## 2022-01-04 DIAGNOSIS — F41.1 ANXIETY IN ACUTE STRESS REACTION: ICD-10-CM

## 2022-01-04 DIAGNOSIS — F43.0 ANXIETY IN ACUTE STRESS REACTION: ICD-10-CM

## 2022-01-04 DIAGNOSIS — F43.10 PTSD (POST-TRAUMATIC STRESS DISORDER): ICD-10-CM

## 2022-01-04 DIAGNOSIS — M79.7 FIBROMYALGIA: ICD-10-CM

## 2022-01-04 DIAGNOSIS — M62.838 MUSCLE SPASM: ICD-10-CM

## 2022-01-04 RX ORDER — TIZANIDINE HYDROCHLORIDE 6 MG/1
CAPSULE, GELATIN COATED ORAL
Qty: 90 CAPSULE | Refills: 0 | Status: SHIPPED | OUTPATIENT
Start: 2022-01-04 | End: 2022-02-22 | Stop reason: SDUPTHER

## 2022-01-04 RX ORDER — CLONAZEPAM 0.5 MG/1
0.5 TABLET ORAL NIGHTLY PRN
Qty: 30 TABLET | Refills: 0 | OUTPATIENT
Start: 2022-01-04 | End: 2022-02-03

## 2022-01-04 RX ORDER — HYDROXYZINE HYDROCHLORIDE 25 MG/1
25 TABLET, FILM COATED ORAL 2 TIMES DAILY
Qty: 60 TABLET | Refills: 0 | Status: SHIPPED | OUTPATIENT
Start: 2022-01-04 | End: 2022-02-22 | Stop reason: SDUPTHER

## 2022-01-04 NOTE — TELEPHONE ENCOUNTER
Refill request for CLONAZEPAM, HYDROXYZINE, TIANIDINE medication.      Name of Magdiel Allen[place]anby [place]      Last visit - 3-2-2021     Pending visit - N/A    Last refill - 1-, 11-5-2021, 12-       Medication Contract signed - 2-   Last Mirian Smith ran- 2-        Additional Comments

## 2022-01-04 NOTE — CARE COORDINATION
[facility] Transitions Follow Up Call    2022    Patient: Дмитрий Slater  Patient : 1980   MRN: [id number]  Reason for Admission: asthma  Discharge Date: 21 RARS: Readmission Risk Score: 9.3 ( )     Spoke with Forrest Rosales after 2 IDS. She states she is doing better/improving. She has a walker. She has all her meds. The office is trying to get her her a b/p cuff and move up the pulmonary appt. The MA is the one helping her. She is happy with her progress. She is able to manage her SOB. She is needing 4 lpm of O2 at night. Appetite is improving. Will check to confirm pulmonary appt and b/p cuff and continued improvement. Advised to call PCP for increased SOB. Also advised to take the b/p daily when she first gets cuff to know trend      Care Transitions Subsequent and Final Call    Subsequent and Final Calls  Care Transitions Interventions  Other Interventions: Follow Up  No future appointments.     KIP Marroquin, RN   [address] [place] [address] Transition Nurse  [phone]

## 2022-01-11 ENCOUNTER — CARE COORDINATION (OUTPATIENT)
Dept: CASE MANAGEMENT | Age: 42
End: 2022-01-11

## 2022-01-11 NOTE — CARE COORDINATION
Care Transitions Outreach Attempt    Call within 2 business days of discharge: Yes   Attempted to reach patient for transitions of care follow up. Unable to reach patient. Left HIPPA Compliant VM. Nikhil Son LPN, [facility]: [phone]      Patient: Дмитрий Slater Patient : 1980 MRN: [id number]    Last Discharge [facility]       Complaint Diagnosis Description Type Department Provider    21 Shortness of Breath Acute bronchospasm . .. ED to Hosp-Admission (Discharged) (ADMITTED) Tona Hassan MD; Chaparro Betancourt.. Was this an external facility discharge? No Discharge Facility:     Noted following upcoming appointments from discharge chart review:   [facility] follow up appointment(s): No future appointments.   Non-[facility] follow up appointment(s):

## 2022-01-12 ENCOUNTER — CARE COORDINATION (OUTPATIENT)
Dept: CASE MANAGEMENT | Age: 42
End: 2022-01-12

## 2022-01-12 NOTE — CARE COORDINATION
[facility] Transitions Follow Up Call    2022    Patient: Jessy Moser  Patient : 1980   MRN: [id number]  Reason for Admission: SOB  Discharge Date: 21 RARS: Readmission Risk Score: 9.3 ( )         Spoke with: [facility] Transitions Follow Up Call    Needs to be reviewed by the provider   Additional needs identified to be addressed with provider: No  DME-BP Cuff             Method of communication with provider : none      Care Transition Nurse (CTN) contacted the patient by telephone to follow up after admission on 21. Verified name and  with patient as identifiers. Addressed changes since last contact: medications-Refills and Zanaflex added  Discussed follow-up appointments. If no appointment was previously scheduled, appointment scheduling offered: Yes. Is follow up appointment scheduled within 7 days of discharge? Yes. Advance Care Planning:   Does patient have an Advance Directive: Reviewed and current  CTN reviewed discharge instructions, medical action plan and red flags with patient and discussed any barriers to care and/or understanding of plan of care after discharge. Discussed appropriate site of care based on symptoms and resources available to patient including: PCP, Specialist, Urgent care clinics, When to call 911 and [address]. The patient agrees to contact the PCP office for questions related to their healthcare. Patients top risk factors for readmission: ineffective coping  Interventions to address risk factors: Obtained and reviewed discharge summary and/or continuity of care documents and Request discharge documents       Non-[facility] follow up appointment(s):     CTN provided contact information for future needs. Plan for follow-up call in 5-7 days based on severity of symptoms and risk factors. Plan for next call: follow up appointment-With PCP? This [facility] spoke with pt and pt stated that she is doing well.  Patient denied any worsening symptoms. Denied fever, chills, N/V and any difficulty breathing at this time. Denied difficulty with urination, BMs or appetite. Refills and Zanaflex added to meds. Denied chest pain and SOB. Pt stated that she is feeling much better. Pt is requesting that a copy of her discharge summary be emailed or mailed to her asap. Writer routed a message and sent e-mail to Ric Polanco to request. Denied any other needs and concerns at this time. Advised pt to immediately report any worsening symptoms to the PCP. Patient verbalized understanding and agreed. Deonna Chandler LPN, [facility]  PH: [phone]              Care Transitions Subsequent and Final Call    Schedule Follow Up Appointment with PCP: Completed  Subsequent and Final Calls  Do you have any ongoing symptoms?: No  Have your medications changed?: No  Do you have any questions related to your medications?: No  Do you currently have any active services?: No  Are you currently active with any services?: Home Health  Do you have any needs or concerns that I can assist you with?: Yes  Patient-reported Needs or Concerns: Needs a copy of Discharge Summary Emailed or mailed to her   Identified Barriers: None  Care Transitions Interventions     DME Assistance: Completed   Other Interventions: Follow Up  No future appointments.     Deonna Chandler LPN

## 2022-01-13 ENCOUNTER — CARE COORDINATION (OUTPATIENT)
Dept: CASE MANAGEMENT | Age: 42
End: 2022-01-13

## 2022-01-17 ENCOUNTER — TELEPHONE (OUTPATIENT)
Dept: INTERNAL MEDICINE CLINIC | Age: 42
End: 2022-01-17

## 2022-01-17 DIAGNOSIS — F99 INSOMNIA DUE TO OTHER MENTAL DISORDER: ICD-10-CM

## 2022-01-17 DIAGNOSIS — F51.05 INSOMNIA DUE TO OTHER MENTAL DISORDER: ICD-10-CM

## 2022-01-17 NOTE — TELEPHONE ENCOUNTER
Refill request for TEMAZEPAM medication. Refill request for  [facility]    Name of Kevin Nance      Last visit - 3-2-21     Pending visit - NONE    Last refill - 7-2-20  2-15-21      PDMP Monitoring:    Last PDMP Denise Galindo as Reviewed [facility]):  Review User Review Instant Review Result   Kika Oreilly 2/14/2021 10:46 PM Reviewed PDMP [1]     [unfilled]  Urine Drug Screenings (1 yr)    No resulted procedures found. Medication Contract and Consent for Opioid Use Documents Filed      No documents found                  Additional Comments patient states she needs a refill of her birth control and since being discharged from the hospital she has not been sleeping well.

## 2022-01-17 NOTE — TELEPHONE ENCOUNTER
Patient needs a return to work note with the date of 12/18/21, which was her discharge date from the hospital.      Fax to work - [phone]  # [phone]      Please advise      [phone] (home)

## 2022-01-17 NOTE — LETTER
formerly [facility] EMILIANO Lopez [address] [facility]  Phone: [phone]  Fax: [phone]    LEESA Ugarte CNP        January 18, 2022     Patient: Fritz Orozco   YOB: 1980   Date of Visit: 1/17/2022   # [id number]    To Whom It May Concern: It is my medical opinion that Hamida Mukherjee may return to work on 12-28-21. If you have any questions or concerns, please don't hesitate to call.     Sincerely,        LEESA Ugarte CNP

## 2022-01-18 ENCOUNTER — CARE COORDINATION (OUTPATIENT)
Dept: CASE MANAGEMENT | Age: 42
End: 2022-01-18

## 2022-01-18 RX ORDER — TEMAZEPAM 30 MG/1
30 CAPSULE ORAL NIGHTLY PRN
Qty: 30 CAPSULE | Refills: 1 | OUTPATIENT
Start: 2022-01-18 | End: 2022-02-17

## 2022-01-18 RX ORDER — LEVONORGESTREL AND ETHINYL ESTRADIOL 0.15-0.03
1 KIT ORAL DAILY
Qty: 1 PACKET | Refills: 3 | Status: SHIPPED | OUTPATIENT
Start: 2022-01-18 | End: 2022-09-14 | Stop reason: SDUPTHER

## 2022-01-18 NOTE — TELEPHONE ENCOUNTER
Placed work note on Jennifer-Clarisa    Did you need patient to make an appointment if she would like her temazepam refilled?

## 2022-01-18 NOTE — CARE COORDINATION
[facility] Transitions Follow Up Call    2022    Patient: Zulma Clay  Patient : 1980   MRN: [id number]  Reason for Admission: Asthma  Discharge Date: 21 RARS: Readmission Risk Score: 9.3 ( )    Left HIPPA compliant message regarding the nature of the call and a request for a return call with my contact information      KIP Cooper, [phone]  [facility] / [facility] Transition Nurse         Care Transitions Subsequent and Final Call    Subsequent and Final Calls  Care Transitions Interventions  Other Interventions: Follow Up  No future appointments.     Geovanna Kumar, RN

## 2022-01-19 NOTE — TELEPHONE ENCOUNTER
Faxed letter to her place of employment and also mailed letter out to the patient and scanned into media.

## 2022-02-22 DIAGNOSIS — M62.838 MUSCLE SPASM: ICD-10-CM

## 2022-02-22 DIAGNOSIS — M79.7 FIBROMYALGIA: ICD-10-CM

## 2022-02-22 RX ORDER — TIZANIDINE HYDROCHLORIDE 6 MG/1
CAPSULE, GELATIN COATED ORAL
Qty: 90 CAPSULE | Refills: 0 | Status: SHIPPED | OUTPATIENT
Start: 2022-02-22 | End: 2022-09-14 | Stop reason: SDUPTHER

## 2022-02-22 RX ORDER — PREGABALIN 150 MG/1
150 CAPSULE ORAL 2 TIMES DAILY
Qty: 60 CAPSULE | Refills: 0 | Status: SHIPPED | OUTPATIENT
Start: 2022-02-22 | End: 2022-09-14 | Stop reason: SDUPTHER

## 2022-02-22 RX ORDER — HYDROXYZINE HYDROCHLORIDE 25 MG/1
25 TABLET, FILM COATED ORAL 2 TIMES DAILY
Qty: 60 TABLET | Refills: 0 | Status: SHIPPED | OUTPATIENT
Start: 2022-02-22 | End: 2022-04-26

## 2022-02-22 NOTE — TELEPHONE ENCOUNTER
Refill request for medication. Lewis Cisneros    Name of Kevin Nance      Last visit - 3-2-21     Pending visit - NONE    Last refill -8-31-21; 1-4-22; 1-4-22      PDMP Monitoring:    Last PDMP Dorinda Dyer as Reviewed [facility]):  Review User Review Instant Review Result   Snow Paul 2/14/2021 10:46 PM Reviewed PDMP [1]     [unfilled]  Urine Drug Screenings (1 yr)    No resulted procedures found.        Medication Contract and Consent for Opioid Use Documents Filed      No documents found                    Additional Comments

## 2022-04-26 DIAGNOSIS — K29.70 GASTRITIS WITHOUT BLEEDING, UNSPECIFIED CHRONICITY, UNSPECIFIED GASTRITIS TYPE: ICD-10-CM

## 2022-04-26 DIAGNOSIS — I10 ESSENTIAL HYPERTENSION: ICD-10-CM

## 2022-04-26 DIAGNOSIS — J45.20 MILD INTERMITTENT ASTHMA WITHOUT COMPLICATION: ICD-10-CM

## 2022-04-26 DIAGNOSIS — F41.9 ANXIETY: ICD-10-CM

## 2022-04-26 DIAGNOSIS — J45.40 MODERATE PERSISTENT ASTHMA WITHOUT COMPLICATION: ICD-10-CM

## 2022-04-26 RX ORDER — HYDROCHLOROTHIAZIDE 25 MG/1
25 TABLET ORAL DAILY
Qty: 90 TABLET | Refills: 1 | Status: SHIPPED | OUTPATIENT
Start: 2022-04-26 | End: 2022-09-14 | Stop reason: SDUPTHER

## 2022-04-26 RX ORDER — PANTOPRAZOLE SODIUM 20 MG/1
20 TABLET, DELAYED RELEASE ORAL 2 TIMES DAILY
Qty: 180 TABLET | Refills: 0 | Status: SHIPPED | OUTPATIENT
Start: 2022-04-26 | End: 2022-09-14 | Stop reason: SDUPTHER

## 2022-04-26 RX ORDER — BUSPIRONE HYDROCHLORIDE 10 MG/1
10 TABLET ORAL 3 TIMES DAILY PRN
Qty: 270 TABLET | Refills: 0 | Status: SHIPPED | OUTPATIENT
Start: 2022-04-26 | End: 2022-09-14 | Stop reason: SDUPTHER

## 2022-04-26 RX ORDER — ONDANSETRON 4 MG/1
4 TABLET, FILM COATED ORAL DAILY PRN
Qty: 30 TABLET | Refills: 0 | Status: SHIPPED | OUTPATIENT
Start: 2022-04-26 | End: 2022-09-14 | Stop reason: SDUPTHER

## 2022-04-26 RX ORDER — BENZONATATE 100 MG/1
100 CAPSULE ORAL 3 TIMES DAILY PRN
Qty: 42 CAPSULE | Refills: 0 | Status: SHIPPED | OUTPATIENT
Start: 2022-04-26 | End: 2022-05-10

## 2022-04-26 RX ORDER — AMLODIPINE BESYLATE 10 MG/1
10 TABLET ORAL DAILY
Qty: 270 TABLET | Refills: 1 | Status: SHIPPED | OUTPATIENT
Start: 2022-04-26 | End: 2022-09-01 | Stop reason: SDUPTHER

## 2022-04-26 RX ORDER — ALBUTEROL SULFATE 90 UG/1
2 AEROSOL, METERED RESPIRATORY (INHALATION) 4 TIMES DAILY PRN
Qty: 1 EACH | Refills: 2 | Status: SHIPPED | OUTPATIENT
Start: 2022-04-26 | End: 2022-09-14 | Stop reason: SDUPTHER

## 2022-04-26 NOTE — TELEPHONE ENCOUNTER
Refill request for BENZONATE, ALBUTEROL, ONDANSETRON, PANTOPRAZOLE, BUSPIRONE, AMPLODIPINE, HYDROCHLORTHIAZIDE medication. Name of [address] [place] [address]      Last visit - 3-2-2021     Pending visit - 6-    Last refill - 2-2-2022, 5-, 12-, 8-      Medication Contract signed -   Noman bass-         Additional Comments     Patient is wanting 90 Day prescriptions.

## 2022-04-27 ENCOUNTER — TELEPHONE (OUTPATIENT)
Dept: ADMINISTRATIVE | Age: 42
End: 2022-04-27

## 2022-04-27 NOTE — TELEPHONE ENCOUNTER
PA COVER MY MEDS  Medication:Albuterol Sulfate  (90 Base)MCG/ACT aerosol  Key:[id number] - PA Case ID: [id number] - Rx #: [id number]  Status:PENDING

## 2022-05-05 DIAGNOSIS — M79.7 FIBROMYALGIA: ICD-10-CM

## 2022-05-05 DIAGNOSIS — M62.838 MUSCLE SPASM: ICD-10-CM

## 2022-05-05 NOTE — TELEPHONE ENCOUNTER
Refill request for medication. HYDRALAZINE  PREGABALIN  TIZANIDINE    Name of Kevin Miller Holstein      Last visit - 3-2-21     Pending visit - none    Last refill -12-18-21; 2-22-22      PDMP Monitoring:    Last PDMP Inder Alfonso as Reviewed [facility]):  Review User Review Instant Review Result   PRETTY Rai 2/22/2022 11:21 AM Reviewed PDMP [1]     [unfilled]  Urine Drug Screenings (1 yr)    No resulted procedures found.        Medication Contract and Consent for Opioid Use Documents Filed      No documents found                  Additional Comments

## 2022-05-06 RX ORDER — PREGABALIN 150 MG/1
150 CAPSULE ORAL 2 TIMES DAILY
Qty: 60 CAPSULE | Refills: 0 | OUTPATIENT
Start: 2022-05-06 | End: 2022-06-05

## 2022-05-06 RX ORDER — HYDRALAZINE HYDROCHLORIDE 25 MG/1
25 TABLET, FILM COATED ORAL EVERY 8 HOURS SCHEDULED
Qty: 90 TABLET | Refills: 1 | OUTPATIENT
Start: 2022-05-06

## 2022-05-06 RX ORDER — TIZANIDINE HYDROCHLORIDE 6 MG/1
CAPSULE, GELATIN COATED ORAL
Qty: 90 CAPSULE | Refills: 0 | OUTPATIENT
Start: 2022-05-06

## 2022-09-01 ENCOUNTER — TELEPHONE (OUTPATIENT)
Dept: INTERNAL MEDICINE CLINIC | Age: 42
End: 2022-09-01

## 2022-09-01 RX ORDER — AMLODIPINE BESYLATE 10 MG/1
10 TABLET ORAL DAILY
Qty: 90 TABLET | Refills: 0 | Status: SHIPPED | OUTPATIENT
Start: 2022-09-01 | End: 2022-09-14 | Stop reason: SDUPTHER

## 2022-09-01 NOTE — TELEPHONE ENCOUNTER
Refill request for amLODIPine (NORVASC) 10 MG tablet  medication. Name of Kevin Nance      Last visit -  3/2/21     Pending visit - 9/14/22    Last refill - 4/26/22      Medication Contract signed - PDMP Monitoring:    Last PDMP Darcie Toro as Reviewed:  Review User Review Instant Review Result   PRETTY CASTILLO 2/22/2022 11:21 AM Reviewed PDMP [1]     [unfilled]  Urine Drug Screenings (1 yr)    No resulted procedures found.        Medication Contract and Consent for Opioid Use Documents Filed        No documents found                    Last Trenton Ramirez ran-         Additional Comments

## 2022-09-01 NOTE — TELEPHONE ENCOUNTER
----- Message from Pavel Moncada sent at 9/1/2022  1:49 PM EDT -----  Subject: Refill Request    QUESTIONS  Name of Medication? amLODIPine (NORVASC) 10 MG tablet  Patient-reported dosage and instructions? Once daily  How many days do you have left? 0  [facility]acy? [facility] [id number]  Pharmacy phone number (if available)? [phone]  Additional Information for Provider? Patient's last well visit was   12/18/20, she's had a virtual sick visit on 3/2/21 and missed her appt on   5/6/22 because she started a new job and lost track. Next office visit is   on 9/14/22. Patient is completely out of meds. ---------------------------------------------------------------------------  --------------,  Name of Medication? hydroCHLOROthiazide (HYDRODIURIL) 25 MG tablet  Patient-reported dosage and instructions? 25mg once wayne  How many days do you have left? 0  Preferred Pharmacy? [facility] [id number]  Pharmacy phone number (if available)? [phone]  ---------------------------------------------------------------------------  --------------,  Name of Medication? tiZANidine (ZANAFLEX) 6 MG capsule  Patient-reported dosage and instructions? 6mg twice daily   How many days do you have left? 0  Preferred Pharmacy? [facility] [id number]  Pharmacy phone number (if available)? [phone]  ---------------------------------------------------------------------------  --------------,  Name of Medication? pregabalin (LYRICA) 150 MG capsule  Patient-reported dosage and instructions? 150mg once daily  How many days do you have left? 0  Preferred Pharmacy? [facility] [id number]  Pharmacy phone number (if available)? [phone]  ---------------------------------------------------------------------------  --------------,  Name of Medication? hydrOXYzine (ATARAX) 25 MG tablet  Patient-reported dosage and instructions? 25mg once daily   How many days do you have left? 0  [facility]erred [facility] [id number]  Pharmacy phone number (if available)? [phone]  ---------------------------------------------------------------------------  --------------,  Name of Medication? busPIRone (BUSPAR) 10 MG tablet  Patient-reported dosage and instructions? 10mg once daily  How many days do you have left? 0  [facility]  [id number]  Pharmacy phone number (if available)? [phone]  ---------------------------------------------------------------------------  --------------,  Name of Medication? albuterol sulfate  (90 Base) MCG/ACT inhaler  Patient-reported dosage and instructions? daily   How many days do you have left? 25  Preferred Pharmacy? [facility] [id number]  Pharmacy phone number (if available)? [phone]  Additional Information for Provider? Patient is beginning to have asthma   flare ups due to being out of her inhalers. ---------------------------------------------------------------------------  --------------,  Name of Medication? Other - beclomethasone HFA (QVAR REDIHALER) 40   mcg/puff   Patient-reported dosage and instructions? 4 puffs daily   How many days do you have left? 0  [facility] [id number]  Pharmacy phone number (if available)? [phone]  Additional Information for Provider? Patient is beginning to have asthma   flare ups due to being out of her inhalers. ---------------------------------------------------------------------------  --------------,  Name of Medication? ipratropium-albuterol (DUONEB) 0.5-2.5 (3) MG/3ML SOLN   nebulizer solution  Patient-reported dosage and instructions? two containers every 6hrs  How many days do you have left? 7  Preferred Pharmacy? [facility] [id number]  Pharmacy phone number (if available)? [phone]  ---------------------------------------------------------------------------  --------------,  Name of Medication? pantoprazole (PROTONIX) 20 MG tablet  Patient-reported dosage and instructions? 20mg twice daily  How many days do you have left? 0  Preferred Pharmacy? [facility] [id number]  Pharmacy phone number (if available)? [phone]  ---------------------------------------------------------------------------  --------------,  Name of Medication? ondansetron (ZOFRAN) 4 MG tablet  Patient-reported dosage and instructions? 4mg twice daily  How many days do you have left? 0  Preferred Pharmacy? [facility] [id number]  Pharmacy phone number (if available)? [phone]  Additional Information for Provider? wants the tablets. .. not the   chewables. ---------------------------------------------------------------------------  --------------  Dean VUONG  What is the best way for the office to contact you? OK to leave message on   voicemail, OK to respond with electronic message via [employer] portal (only   for patients who have registered [employer] account)  Preferred Call Back Phone Number? [phone]  ---------------------------------------------------------------------------  --------------  SCRIPT ANSWERS  Relationship to Patient?  Self

## 2022-09-14 ENCOUNTER — HOSPITAL ENCOUNTER (OUTPATIENT)
Age: 42
Discharge: HOME | End: 2022-09-14
Payer: MEDICAID

## 2022-09-14 ENCOUNTER — TELEPHONE (OUTPATIENT)
Dept: ADMINISTRATIVE | Age: 42
End: 2022-09-14

## 2022-09-14 ENCOUNTER — OFFICE VISIT (OUTPATIENT)
Dept: INTERNAL MEDICINE CLINIC | Age: 42
End: 2022-09-14
Payer: MEDICAID

## 2022-09-14 VITALS
HEART RATE: 96 BPM | WEIGHT: 183 LBS | SYSTOLIC BLOOD PRESSURE: 128 MMHG | DIASTOLIC BLOOD PRESSURE: 80 MMHG | BODY MASS INDEX: 28.72 KG/M2 | TEMPERATURE: 97.3 F | OXYGEN SATURATION: 99 % | RESPIRATION RATE: 12 BRPM | HEIGHT: 67 IN

## 2022-09-14 DIAGNOSIS — Z13.220 SCREENING, LIPID: ICD-10-CM

## 2022-09-14 DIAGNOSIS — I10 ESSENTIAL HYPERTENSION: ICD-10-CM

## 2022-09-14 DIAGNOSIS — J45.20 MILD INTERMITTENT ASTHMA WITHOUT COMPLICATION: ICD-10-CM

## 2022-09-14 DIAGNOSIS — Z13.1 SCREENING FOR DIABETES MELLITUS (DM): ICD-10-CM

## 2022-09-14 DIAGNOSIS — K29.70 GASTRITIS WITHOUT BLEEDING, UNSPECIFIED CHRONICITY, UNSPECIFIED GASTRITIS TYPE: ICD-10-CM

## 2022-09-14 DIAGNOSIS — M62.838 MUSCLE SPASM: ICD-10-CM

## 2022-09-14 DIAGNOSIS — Z00.01 ENCOUNTER FOR WELL ADULT EXAM WITH ABNORMAL FINDINGS: Primary | ICD-10-CM

## 2022-09-14 DIAGNOSIS — F41.1 GENERALIZED ANXIETY DISORDER: ICD-10-CM

## 2022-09-14 DIAGNOSIS — M32.9 LUPUS ARTHRITIS (HCC): ICD-10-CM

## 2022-09-14 DIAGNOSIS — F41.9 ANXIETY: ICD-10-CM

## 2022-09-14 DIAGNOSIS — F33.1 MODERATE EPISODE OF RECURRENT MAJOR DEPRESSIVE DISORDER (HCC): ICD-10-CM

## 2022-09-14 DIAGNOSIS — I10 PRIMARY HYPERTENSION: ICD-10-CM

## 2022-09-14 DIAGNOSIS — J45.40 MODERATE PERSISTENT ASTHMA WITHOUT COMPLICATION: ICD-10-CM

## 2022-09-14 DIAGNOSIS — F32.9 REACTIVE DEPRESSION: ICD-10-CM

## 2022-09-14 DIAGNOSIS — M79.7 FIBROMYALGIA: ICD-10-CM

## 2022-09-14 PROBLEM — J45.901 ACUTE ASTHMA EXACERBATION: Status: RESOLVED | Noted: 2021-12-15 | Resolved: 2022-09-14

## 2022-09-14 PROBLEM — N17.9 AKI (ACUTE KIDNEY INJURY) (HCC): Status: RESOLVED | Noted: 2021-02-14 | Resolved: 2022-09-14

## 2022-09-14 PROBLEM — R29.898 FOCAL MOTOR DEFICIT: Status: RESOLVED | Noted: 2020-08-31 | Resolved: 2022-09-14

## 2022-09-14 PROBLEM — R06.02 SHORTNESS OF BREATH: Status: RESOLVED | Noted: 2021-12-14 | Resolved: 2022-09-14

## 2022-09-14 PROBLEM — R20.2 PARESTHESIA OF RIGHT ARM AND LEG: Status: RESOLVED | Noted: 2020-08-31 | Resolved: 2022-09-14

## 2022-09-14 PROBLEM — N20.2 NEPHROURETEROLITHIASIS: Status: RESOLVED | Noted: 2018-07-31 | Resolved: 2022-09-14

## 2022-09-14 PROBLEM — N20.0 KIDNEY STONE: Status: RESOLVED | Noted: 2019-09-28 | Resolved: 2022-09-14

## 2022-09-14 LAB
CHOLESTEROL, TOTAL: 170 MG/DL (ref 0–199)
HDLC SERPL-MCNC: 54 MG/DL (ref 40–60)
LDL CHOLESTEROL CALCULATED: 97 MG/DL
TRIGL SERPL-MCNC: 97 MG/DL (ref 0–150)
VLDLC SERPL CALC-MCNC: 19 MG/DL

## 2022-09-14 PROCEDURE — G8427 DOCREV CUR MEDS BY ELIG CLIN: HCPCS | Performed by: NURSE PRACTITIONER

## 2022-09-14 PROCEDURE — 80061 LIPID PANEL: CPT

## 2022-09-14 PROCEDURE — 1036F TOBACCO NON-USER: CPT | Performed by: NURSE PRACTITIONER

## 2022-09-14 PROCEDURE — 99214 OFFICE O/P EST MOD 30 MIN: CPT | Performed by: NURSE PRACTITIONER

## 2022-09-14 PROCEDURE — 83036 HEMOGLOBIN GLYCOSYLATED A1C: CPT

## 2022-09-14 PROCEDURE — 36415 COLL VENOUS BLD VENIPUNCTURE: CPT

## 2022-09-14 PROCEDURE — G8419 CALC BMI OUT NRM PARAM NOF/U: HCPCS | Performed by: NURSE PRACTITIONER

## 2022-09-14 RX ORDER — TIZANIDINE HYDROCHLORIDE 6 MG/1
6 CAPSULE, GELATIN COATED ORAL 3 TIMES DAILY PRN
Status: CANCELLED | OUTPATIENT
Start: 2022-09-14

## 2022-09-14 RX ORDER — TIZANIDINE HYDROCHLORIDE 6 MG/1
CAPSULE, GELATIN COATED ORAL
Qty: 90 CAPSULE | Refills: 0 | Status: SHIPPED | OUTPATIENT
Start: 2022-09-14 | End: 2022-10-31

## 2022-09-14 RX ORDER — PREGABALIN 300 MG/1
300 CAPSULE ORAL 2 TIMES DAILY
Qty: 60 CAPSULE | Refills: 0 | Status: SHIPPED | OUTPATIENT
Start: 2022-09-14 | End: 2022-10-31

## 2022-09-14 RX ORDER — ALBUTEROL SULFATE 90 UG/1
2 AEROSOL, METERED RESPIRATORY (INHALATION) 4 TIMES DAILY PRN
Qty: 3 EACH | Refills: 0 | Status: CANCELLED | OUTPATIENT
Start: 2022-09-14

## 2022-09-14 RX ORDER — LIDOCAINE 50 MG/G
1 PATCH TOPICAL DAILY
Qty: 30 PATCH | Refills: 0 | Status: SHIPPED | OUTPATIENT
Start: 2022-09-14 | End: 2022-09-23 | Stop reason: SDUPTHER

## 2022-09-14 RX ORDER — HYDROCHLOROTHIAZIDE 25 MG/1
25 TABLET ORAL DAILY
Qty: 90 TABLET | Refills: 1 | Status: SHIPPED | OUTPATIENT
Start: 2022-09-14

## 2022-09-14 RX ORDER — HYDROXYZINE 50 MG/1
50 TABLET, FILM COATED ORAL 2 TIMES DAILY
Qty: 60 TABLET | Refills: 1 | Status: SHIPPED | OUTPATIENT
Start: 2022-09-14

## 2022-09-14 RX ORDER — TIZANIDINE HYDROCHLORIDE 6 MG/1
1 CAPSULE, GELATIN COATED ORAL 3 TIMES DAILY PRN
COMMUNITY
Start: 2021-01-17 | End: 2022-10-31

## 2022-09-14 RX ORDER — DULOXETIN HYDROCHLORIDE 60 MG/1
60 CAPSULE, DELAYED RELEASE ORAL DAILY
Qty: 30 CAPSULE | Refills: 0 | Status: SHIPPED | OUTPATIENT
Start: 2022-09-14

## 2022-09-14 RX ORDER — BLOOD PRESSURE TEST KIT
1 KIT MISCELLANEOUS DAILY
Qty: 1 KIT | Refills: 0 | Status: SHIPPED | OUTPATIENT
Start: 2022-09-14

## 2022-09-14 RX ORDER — BUSPIRONE HYDROCHLORIDE 15 MG/1
15 TABLET ORAL 3 TIMES DAILY
Qty: 270 TABLET | Refills: 1 | Status: SHIPPED | OUTPATIENT
Start: 2022-09-14 | End: 2023-03-13

## 2022-09-14 RX ORDER — HYDRALAZINE HYDROCHLORIDE 25 MG/1
25 TABLET, FILM COATED ORAL EVERY 8 HOURS SCHEDULED
Qty: 90 TABLET | Refills: 1 | Status: SHIPPED | OUTPATIENT
Start: 2022-09-14

## 2022-09-14 RX ORDER — ALBUTEROL SULFATE 90 UG/1
2 AEROSOL, METERED RESPIRATORY (INHALATION) 4 TIMES DAILY PRN
Qty: 1 EACH | Refills: 2 | Status: SHIPPED | OUTPATIENT
Start: 2022-09-14

## 2022-09-14 RX ORDER — IPRATROPIUM BROMIDE AND ALBUTEROL SULFATE 2.5; .5 MG/3ML; MG/3ML
1 SOLUTION RESPIRATORY (INHALATION) 4 TIMES DAILY
Qty: 360 ML | Refills: 2 | Status: SHIPPED | OUTPATIENT
Start: 2022-09-14

## 2022-09-14 RX ORDER — PANTOPRAZOLE SODIUM 20 MG/1
20 TABLET, DELAYED RELEASE ORAL 2 TIMES DAILY
Qty: 180 TABLET | Refills: 0 | Status: SHIPPED | OUTPATIENT
Start: 2022-09-14 | End: 2023-03-13

## 2022-09-14 RX ORDER — EPINEPHRINE 0.3 MG/.3ML
0.3 INJECTION SUBCUTANEOUS ONCE
Qty: 1 EACH | Refills: 0 | Status: SHIPPED | OUTPATIENT
Start: 2022-09-14 | End: 2022-09-14

## 2022-09-14 RX ORDER — HYDROCORTISONE 25 MG/G
CREAM TOPICAL
Qty: 1 EACH | Refills: 0 | Status: CANCELLED | OUTPATIENT
Start: 2022-09-14

## 2022-09-14 RX ORDER — LEVONORGESTREL AND ETHINYL ESTRADIOL 0.15-0.03
1 KIT ORAL DAILY
Qty: 1 PACKET | Refills: 3 | Status: SHIPPED | OUTPATIENT
Start: 2022-09-14

## 2022-09-14 RX ORDER — AMLODIPINE BESYLATE 10 MG/1
10 TABLET ORAL DAILY
Qty: 90 TABLET | Refills: 1 | Status: SHIPPED | OUTPATIENT
Start: 2022-09-14 | End: 2023-03-13

## 2022-09-14 RX ORDER — ONDANSETRON 4 MG/1
4 TABLET, FILM COATED ORAL DAILY PRN
Qty: 30 TABLET | Refills: 0 | Status: SHIPPED | OUTPATIENT
Start: 2022-09-14

## 2022-09-14 SDOH — ECONOMIC STABILITY: FOOD INSECURITY: WITHIN THE PAST 12 MONTHS, YOU WORRIED THAT YOUR FOOD WOULD RUN OUT BEFORE YOU GOT MONEY TO BUY MORE.: NEVER TRUE

## 2022-09-14 SDOH — ECONOMIC STABILITY: FOOD INSECURITY: WITHIN THE PAST 12 MONTHS, THE FOOD YOU BOUGHT JUST DIDN'T LAST AND YOU DIDN'T HAVE MONEY TO GET MORE.: NEVER TRUE

## 2022-09-14 ASSESSMENT — PATIENT HEALTH QUESTIONNAIRE - PHQ9
9. THOUGHTS THAT YOU WOULD BE BETTER OFF DEAD, OR OF HURTING YOURSELF: 0
SUM OF ALL RESPONSES TO PHQ QUESTIONS 1-9: 24
1. LITTLE INTEREST OR PLEASURE IN DOING THINGS: 3
SUM OF ALL RESPONSES TO PHQ QUESTIONS 1-9: 24
SUM OF ALL RESPONSES TO PHQ QUESTIONS 1-9: 24
4. FEELING TIRED OR HAVING LITTLE ENERGY: 3
6. FEELING BAD ABOUT YOURSELF - OR THAT YOU ARE A FAILURE OR HAVE LET YOURSELF OR YOUR FAMILY DOWN: 3
7. TROUBLE CONCENTRATING ON THINGS, SUCH AS READING THE NEWSPAPER OR WATCHING TELEVISION: 3
10. IF YOU CHECKED OFF ANY PROBLEMS, HOW DIFFICULT HAVE THESE PROBLEMS MADE IT FOR YOU TO DO YOUR WORK, TAKE CARE OF THINGS AT HOME, OR GET ALONG WITH OTHER PEOPLE: 3
5. POOR APPETITE OR OVEREATING: 3
SUM OF ALL RESPONSES TO PHQ QUESTIONS 1-9: 24
8. MOVING OR SPEAKING SO SLOWLY THAT OTHER PEOPLE COULD HAVE NOTICED. OR THE OPPOSITE, BEING SO FIGETY OR RESTLESS THAT YOU HAVE BEEN MOVING AROUND A LOT MORE THAN USUAL: 3
3. TROUBLE FALLING OR STAYING ASLEEP: 3
SUM OF ALL RESPONSES TO PHQ9 QUESTIONS 1 & 2: 6
2. FEELING DOWN, DEPRESSED OR HOPELESS: 3

## 2022-09-14 ASSESSMENT — COLUMBIA-SUICIDE SEVERITY RATING SCALE - C-SSRS
2. HAVE YOU ACTUALLY HAD ANY THOUGHTS OF KILLING YOURSELF?: NO
6. HAVE YOU EVER DONE ANYTHING, STARTED TO DO ANYTHING, OR PREPARED TO DO ANYTHING TO END YOUR LIFE?: NO
1. WITHIN THE PAST MONTH, HAVE YOU WISHED YOU WERE DEAD OR WISHED YOU COULD GO TO SLEEP AND NOT WAKE UP?: NO

## 2022-09-14 ASSESSMENT — ANXIETY QUESTIONNAIRES
5. BEING SO RESTLESS THAT IT IS HARD TO SIT STILL: 3
3. WORRYING TOO MUCH ABOUT DIFFERENT THINGS: 3
4. TROUBLE RELAXING: 3
GAD7 TOTAL SCORE: 21
IF YOU CHECKED OFF ANY PROBLEMS ON THIS QUESTIONNAIRE, HOW DIFFICULT HAVE THESE PROBLEMS MADE IT FOR YOU TO DO YOUR WORK, TAKE CARE OF THINGS AT HOME, OR GET ALONG WITH OTHER PEOPLE: EXTREMELY DIFFICULT
1. FEELING NERVOUS, ANXIOUS, OR ON EDGE: 3
6. BECOMING EASILY ANNOYED OR IRRITABLE: 3
2. NOT BEING ABLE TO STOP OR CONTROL WORRYING: 3
7. FEELING AFRAID AS IF SOMETHING AWFUL MIGHT HAPPEN: 3

## 2022-09-14 ASSESSMENT — ENCOUNTER SYMPTOMS
BACK PAIN: 1
CHEST TIGHTNESS: 0
NAUSEA: 0
CONSTIPATION: 0
ABDOMINAL DISTENTION: 0
VOMITING: 0
DIARRHEA: 0
SHORTNESS OF BREATH: 0

## 2022-09-14 ASSESSMENT — SOCIAL DETERMINANTS OF HEALTH (SDOH): HOW HARD IS IT FOR YOU TO PAY FOR THE VERY BASICS LIKE FOOD, HOUSING, MEDICAL CARE, AND HEATING?: NOT HARD AT ALL

## 2022-09-14 NOTE — PROGRESS NOTES
Jeanine [address]  [address] [facility] Medicine  [address] [facility], [address]  Tel:[phone]    Savi Jeronimo is a 46 y.o. female who presents today for her medical conditions/complaints as noted below. Savi Jeronimo is c/o of Annual Exam      Chief Complaint   Patient presents with    Annual Exam       HPI:     Ms. Jennifer Johnson presents for her routine check up. She states she is doing ok overall. She states her father recently passed away which has placed a large amount of stress on her. She now has to care for her mother and extended family. She also has 5 children in which she cares for. She continues to work full time to support all. She states this has caused her anxiety to peak thusly triggering fibromyalgia symptoms. She describes generalized muscular pain throughout her back/shoulders/neck. She states she stays in considerable pain most of the day. She also states her depression/anxiety is quite high. She states she is stressed often and feels her temper get out of hand on occasion. She has had to stretch her dose of buspar as she was running out of supply as well which she feels could have been worsening her symptoms. When taking regularly she notices moderate improvement of symptoms. Feels this interrupts the aforementioned care of her family and loved ones. Overall states her asthma/breathing symptoms are stable. Denies wheezing, cough, shortness of breath. HTN well controlled with current medications. Denies s/e of note. She states she occasionally forgets a dose at her normal time, however tries her best to be strict with taking.      Past Medical History:   Diagnosis Date    Acute asthma exacerbation 12/15/2021    JUAN (acute kidney injury) ([facility] [address] 2/14/2021    Anxiety     Asthma     Constipation 1/13/2014    Conversion disorder     Depression     Fibromyalgia     Focal motor deficit 8/31/2020    Graves disease     History of blood transfusion Hx of blood clots      LT LUNG W/ PNEUMONIA    Hypertension     Hyperthyroidism 2016    Joint pain 2014    Various joints (back, hands, knees, hips), recurrent flares since age 27, RF+ at one point, never fully worked up for rheumatologic dz yet    Kidney stone     Multiple    Localized rash 2014    Recurrent, on inner surface of upper arms, q 3 months, sometimes on chest, no facial involvement    Lupus ([address]     Lupus arthritis ([address] 2022    MDRO (multiple drug resistant organisms) resistance     Nephroureterolithiasis 2018    Paresthesia of right arm and leg 2020    PONV (postoperative nausea and vomiting)     Right sided weakness     Shortness of breath 2021    Yeast vaginitis 2014        Past Surgical History:   Procedure Laterality Date     SECTION       X 2    CYSTOSCOPY  2018    CYSTOSCOPY URETEROSCOPY WITH HOLMIUM LASER LITHOTRIPSY FOR 5 MM STONE, RIGHT STENT PLACEMENT    OTHER SURGICAL HISTORY  2016    CYSTOSCOPY, BILATERAL RETROGRADES, RIGHT URETEROSCOPY,    TONSILLECTOMY      TUBAL LIGATION         Family History   Problem Relation Age of Onset    Cancer Maternal Grandmother         Lung Ca       Social History     Tobacco Use    Smoking status: Never    Smokeless tobacco: Never   Substance Use Topics    Alcohol use: Yes     Alcohol/week: 0.0 standard drinks     Comment: occasional         Current Outpatient Medications   Medication Sig Dispense Refill    tiZANidine (ZANAFLEX) 6 MG capsule Take 1 capsule by mouth 3 times daily as needed      albuterol sulfate HFA (PROVENTIL;VENTOLIN;PROAIR) 108 (90 Base) MCG/ACT inhaler Inhale 2 puffs into the lungs 4 times daily as needed for Wheezing 1 each 2    amLODIPine (NORVASC) 10 MG tablet Take 1 tablet by mouth daily Take 1 tablet by mouth daily 90 tablet 1    beclomethasone (QVAR REDIHALER) 40 MCG/ACT AERB inhaler Inhale 1 puff into the lungs in the morning and 1 puff in the evening.  1 each 5 Blood Pressure KIT 1 kit by Does not apply route daily 1 kit 0    busPIRone (BUSPAR) 15 MG tablet Take 15 mg by mouth 3 times daily 270 tablet 1    DULoxetine (CYMBALTA) 60 MG extended release capsule Take 1 capsule by mouth daily 30 capsule 0    EPINEPHrine (EPIPEN 2-LUANNE) 0.3 MG/0.3ML SOAJ injection Inject 0.3 mLs into the muscle once for 1 dose Use as directed for allergic reaction 1 each 0    Handicap Placard MISC by Does not apply route Unable able to walk long distance due to medical condition. Expires in 5 years. 1 each 0    hydrALAZINE (APRESOLINE) 25 MG tablet Take 1 tablet by mouth every 8 hours 90 tablet 1    hydroCHLOROthiazide (HYDRODIURIL) 25 MG tablet Take 1 tablet by mouth daily 90 tablet 1    ipratropium-albuterol (DUONEB) 0.5-2.5 (3) MG/3ML SOLN nebulizer solution Inhale 3 mLs into the lungs 4 times daily 360 mL 2    levonorgestrel-ethinyl estradiol (SEASONALE) 0.15-0.03 MG per tablet Take 1 tablet by mouth daily 1 packet 3    Nebulizers (COMPRESSOR/NEBULIZER) MISC 1 each by Does not apply route daily 1 each 3    ondansetron (ZOFRAN) 4 MG tablet Take 1 tablet by mouth daily as needed for Nausea or Vomiting 30 tablet 0    pantoprazole (PROTONIX) 20 MG tablet Take 1 tablet by mouth 2 times daily 180 tablet 0    pregabalin (LYRICA) 300 MG capsule Take 1 capsule by mouth 2 times daily for 30 days. Take one in am with breakfast and one at dinner time. 60 capsule 0    tiZANidine (ZANAFLEX) 6 MG capsule TAKE ONE CAPSULE BY MOUTH THREE TIMES A DAY AT LEAST EIGHT HOURS APART 90 capsule 0    hydrOXYzine HCl (ATARAX) 50 MG tablet Take 1 tablet by mouth 2 times daily 60 tablet 1    lidocaine (LIDODERM) 5 % Place 1 patch onto the skin daily 12 hours on, 12 hours off.  30 patch 0    albuterol sulfate  (90 Base) MCG/ACT inhaler Inhale 2 puffs into the lungs 4 times daily as needed for Wheezing 3 each 0    hydrocortisone (ANUSOL-HC) 2.5 % CREA rectal cream Apply to hemorrhoid twice daily 1 Tube 0     No current facility-administered medications for this visit. Allergies   Allergen Reactions    Ambien [Zolpidem Tartrate] Shortness Of Breath     Tongue swelling    Sudafed [Pseudoephedrine Hcl] Other (See Comments)     Heart rate speeds up  Side effect, not an allergy      Amitriptyline Hcl Other (See Comments)     Increased sedation and no relief of headache. Side effect, not an allergy         Subjective:      Review of Systems   Constitutional:  Positive for fatigue. Negative for activity change and unexpected weight change. Respiratory:  Negative for chest tightness and shortness of breath. Cardiovascular:  Negative for chest pain, palpitations and leg swelling. Gastrointestinal:  Negative for abdominal distention, constipation, diarrhea, nausea and vomiting. Genitourinary:  Negative for difficulty urinating and urgency. Musculoskeletal:  Positive for arthralgias, back pain, myalgias, neck pain and neck stiffness. Skin:  Negative for rash. Neurological:  Positive for headaches. Negative for dizziness, weakness and light-headedness. Psychiatric/Behavioral:  Positive for agitation, dysphoric mood and sleep disturbance. Negative for decreased concentration, self-injury and suicidal ideas. The patient is nervous/anxious. The patient is not hyperactive. Objective:     Vitals:    09/14/22 0840   BP: 128/80   Site: Right Upper Arm   Position: Sitting   Cuff Size: Medium Adult   Pulse: 96   Resp: 12   Temp: 97.3 °F (36.3 °C)   TempSrc: Temporal   SpO2: 99%   Weight: 183 lb (83 kg)   Height: 5' 7.25\" (1.708 m)       Physical Exam  Constitutional:       Appearance: Normal appearance. She is well-developed. HENT:      Head: Normocephalic. Right Ear: Hearing and external ear normal.      Left Ear: Hearing and external ear normal.      Nose: Nose normal.   Eyes:      General: Lids are normal. Lids are everted, no foreign bodies appreciated.       Conjunctiva/sclera: Conjunctivae normal. Pupils: Pupils are equal, round, and reactive to light. Neck:      Thyroid: No thyroid mass. Vascular: Normal carotid pulses. No carotid bruit or JVD. Cardiovascular:      Rate and Rhythm: Normal rate and regular rhythm. No extrasystoles are present. Chest Wall: PMI is not displaced. Pulses:           Radial pulses are 2+ on the right side and 2+ on the left side. Dorsalis pedis pulses are 2+ on the right side and 2+ on the left side. Heart sounds: Normal heart sounds, S1 normal and S2 normal. Heart sounds not distant. No murmur heard. No friction rub. No gallop. No S3 or S4 sounds. Pulmonary:      Effort: Pulmonary effort is normal. No respiratory distress. Breath sounds: Normal breath sounds. No decreased breath sounds, wheezing, rhonchi or rales. Abdominal:      General: Bowel sounds are normal. There is no distension. Palpations: Abdomen is soft. Tenderness: There is no abdominal tenderness. There is no rebound. Musculoskeletal:         General: Normal range of motion. Cervical back: Full passive range of motion without pain, normal range of motion and neck supple. Skin:     General: Skin is warm and dry. Neurological:      Cranial Nerves: No cranial nerve deficit. Psychiatric:         Mood and Affect: Affect is tearful. Speech: Speech normal.         Behavior: Behavior normal.         Thought Content: Thought content normal.         Judgment: Judgment normal.       Assessment & Plan: The following diagnoses and conditions are stable with no further orders unless indicated:    1. Encounter for well adult exam with abnormal findings    2. Mild intermittent asthma without complication    3. Essential hypertension    4. Anxiety    5. Fibromyalgia    6. Moderate persistent asthma without complication    7. Gastritis without bleeding, unspecified chronicity, unspecified gastritis type    8. Muscle spasm    9. Lupus arthritis ([facility] [facility]    10. Reactive depression    11. Generalized anxiety disorder    12. Screening, lipid    13. Screening for diabetes mellitus (DM)    14. Moderate episode of recurrent major depressive disorder ([facility] [address]    15. Primary hypertension        David Reid was seen today for annual exam.    Diagnoses and all orders for this visit:    Encounter for well adult exam with abnormal findings  -     levonorgestrel-ethinyl estradiol (SEASONALE) 0.15-0.03 MG per tablet; Take 1 tablet by mouth daily  -     TSH with Reflex; Future    Discussed healthy lifestyle habits at length (encouraged regular exercise, healthy diet, no smoking, adequate sleep, sunscreen, safety items (car/driving, illness prevention.)    · A preventive eye exam performed by an eye specialist is recommended every 1-2 years to screen for glaucoma; cataracts, macular degeneration, and other eye disorders. · A preventive dental visit is recommended every 6 months. · Try to get at least 150 minutes of exercise per week or 10,000 steps per day on a pedometer . · You need 6291-6966 mg of calcium and 9202-1679 IU of vitamin D per day. It is possible to meet your calcium requirement with diet alone, but a vitamin D supplement is usually necessary to meet this goal.  · When exposed to the sun, use a sunscreen that protects against both UVA and UVB radiation with an SPF of 30 or greater. Reapply every 2 to 3 hours or after sweating, drying off with a towel, or swimming. · Always wear a seat belt when traveling in a car. Always wear a helmet when riding a bicycle or motorcycle. Moderate persistent asthma without complication  -     albuterol sulfate HFA (PROVENTIL;VENTOLIN;PROAIR) 108 (90 Base) MCG/ACT inhaler; Inhale 2 puffs into the lungs 4 times daily as needed for Wheezing  -     beclomethasone (QVAR REDIHALER) 40 MCG/ACT AERB inhaler; Inhale 1 puff into the lungs in the morning and 1 puff in the evening.  -     EPINEPHrine (EPIPEN 2-LUANNE) 0.3 MG/0.3ML SOAJ injection;  Inject for Nausea or Vomiting  -     pantoprazole (PROTONIX) 20 MG tablet; Take 1 tablet by mouth 2 times daily    Stable. Continue current treatment. Lupus arthritis ([address]    Stable. Continue to monitor. Screening, lipid  -     Lipid Panel; Future    Screening for diabetes mellitus (DM)  -     Hemoglobin A1C; Future    Return in about 6 months (around 3/14/2023) for 40 min. Patientshould call the office immediately with new or ongoing signs or symptoms or worsening, or proceed to the emergency room. If you are on medications which could impair your senses, you are at risk of weakness, falls,dizziness, or drowsiness. You should be careful during activities which could place you at risk of harm, such as climbing, using stairs, operating machinery, or driving vehicles. If you feel you cannot safely do theseactivities, you should request others to help you, or avoid the activities altogether. If you are drowsy for any other reason, you should use the same precautions as listed above. Call if pattern of symptoms change or persists for an extended time.       Cindy Hudson

## 2022-09-15 ENCOUNTER — TELEPHONE (OUTPATIENT)
Dept: ADMINISTRATIVE | Age: 42
End: 2022-09-15

## 2022-09-15 LAB
ESTIMATED AVERAGE GLUCOSE: 111.2 MG/DL
HBA1C MFR BLD: 5.5 %

## 2022-09-15 NOTE — TELEPHONE ENCOUNTER
Submitted PA for QVAR  Via CMM Key: [id number] STATUS: DENIED; [address] letter uploaded to MED[employer]. If this requires a response please respond to the pool ( P [facility] [address]). Thank you please advise patient.

## 2022-09-15 NOTE — TELEPHONE ENCOUNTER
Submitted PA for BLOOD PRESSURE KIT  Via [facility] TIZANIDINE CAP 6MG is approved through 09/14/2023. STATUS: \"We received a prior authorization request for the member and product listed above. [facility] Prior Authorization Team is not able to review this request because the requested product is not a covered pharmacy benefit. \"    This is considered a DME product and the PA must be done through the medical benefit. This team only does medication PA's through the pharmacy benefit. This must be done by the clinic. If this requires a response please respond to the pool ( P [facility] [address]). Thank you please advise patient.

## 2022-09-15 NOTE — TELEPHONE ENCOUNTER
The medication is \"TIZANIDINE CAP 6MG is approved through 09/14/2023. \"    If this requires a response please respond to the pool ( P [facility] [address]). Thank you please advise patient.

## 2022-09-23 DIAGNOSIS — M79.7 FIBROMYALGIA: ICD-10-CM

## 2022-09-23 RX ORDER — LIDOCAINE 50 MG/G
1 PATCH TOPICAL DAILY
Qty: 30 PATCH | Refills: 0 | Status: SHIPPED | OUTPATIENT
Start: 2022-09-23 | End: 2022-10-23

## 2022-09-23 NOTE — TELEPHONE ENCOUNTER
Patient is calling today asking for a prescription for lidocaine patches. She states she is unable to take her Lyrica during the day, so she uses the patches.      Pended medication    Please advise

## 2022-10-31 DIAGNOSIS — M62.838 MUSCLE SPASM: ICD-10-CM

## 2022-10-31 DIAGNOSIS — M79.7 FIBROMYALGIA: ICD-10-CM

## 2022-10-31 RX ORDER — TIZANIDINE 4 MG/1
TABLET ORAL
Qty: 135 TABLET | Refills: 1 | Status: SHIPPED | OUTPATIENT
Start: 2022-10-31

## 2022-10-31 RX ORDER — PREGABALIN 300 MG/1
CAPSULE ORAL
Qty: 60 CAPSULE | Refills: 0 | Status: SHIPPED | OUTPATIENT
Start: 2022-10-31 | End: 2022-11-30

## 2022-10-31 NOTE — TELEPHONE ENCOUNTER
Refill request for PREGABALIN 300 MG CAPSULE,  tiZANidine HCL 4MG TABLETmedication.      Name of [address] [place] [address]      Last visit - 9-     Pending visit - 3-    Last refill - 9-      Medication Contract signed -   Last Saeed bass-         Additional Comments

## 2022-11-17 ENCOUNTER — TELEPHONE (OUTPATIENT)
Dept: INTERNAL MEDICINE CLINIC | Age: 42
End: 2022-11-17

## 2022-11-17 DIAGNOSIS — M79.7 FIBROMYALGIA: Primary | ICD-10-CM

## 2022-11-17 NOTE — TELEPHONE ENCOUNTER
Patient is requesting her handicapped plackerd RX. States that she had thought it was done in September, however she did not receive it. Please enter RX. Thank you. Clinical Team, please call patient when available for .

## 2022-11-21 ENCOUNTER — TELEPHONE (OUTPATIENT)
Dept: INTERNAL MEDICINE CLINIC | Age: 42
End: 2022-11-21

## 2022-11-21 DIAGNOSIS — M79.7 FIBROMYALGIA: ICD-10-CM

## 2022-11-21 NOTE — TELEPHONE ENCOUNTER
Patient calling for status on handicap placard. She would like to . Ehsan Approved but it has not been placed at the .

## 2023-01-02 DIAGNOSIS — F41.9 ANXIETY: ICD-10-CM

## 2023-01-02 DIAGNOSIS — M79.7 FIBROMYALGIA: ICD-10-CM

## 2023-01-03 RX ORDER — HYDROXYZINE 50 MG/1
TABLET, FILM COATED ORAL
Qty: 60 TABLET | Refills: 1 | Status: SHIPPED | OUTPATIENT
Start: 2023-01-03

## 2023-01-03 RX ORDER — PREGABALIN 300 MG/1
CAPSULE ORAL
Qty: 60 CAPSULE | Refills: 2 | Status: SHIPPED | OUTPATIENT
Start: 2023-01-03 | End: 2023-04-03

## 2023-01-03 NOTE — TELEPHONE ENCOUNTER
Refill request for PREGABALIN 300 MG CAPSULE, HYDROXYZINE HCL 50 MG TABLET medication.      Name of [address] [place] [address]      Last visit - 9-     Pending visit - 3-    Last refill - 11-2-2022, 11-3-2022      Medication Contract signed -  Last Kristin bass-         Additional Comments

## 2023-01-12 ENCOUNTER — TELEPHONE (OUTPATIENT)
Dept: INTERNAL MEDICINE CLINIC | Age: 43
End: 2023-01-12

## 2023-01-12 ENCOUNTER — APPOINTMENT (OUTPATIENT)
Dept: GENERAL RADIOLOGY | Age: 43
DRG: 346 | End: 2023-01-12
Payer: MEDICAID

## 2023-01-12 ENCOUNTER — HOSPITAL ENCOUNTER (INPATIENT)
Age: 43
LOS: 1 days | Discharge: HOME | DRG: 346 | End: 2023-01-14
Attending: EMERGENCY MEDICINE | Admitting: FAMILY MEDICINE
Payer: MEDICAID

## 2023-01-12 DIAGNOSIS — M32.9 SLE EXACERBATION (HCC): ICD-10-CM

## 2023-01-12 DIAGNOSIS — R52 INTRACTABLE PAIN: ICD-10-CM

## 2023-01-12 DIAGNOSIS — I16.0 HYPERTENSIVE URGENCY: Primary | ICD-10-CM

## 2023-01-12 DIAGNOSIS — M32.9 LUPUS ARTHRITIS (HCC): ICD-10-CM

## 2023-01-12 DIAGNOSIS — R00.0 SINUS TACHYCARDIA: ICD-10-CM

## 2023-01-12 LAB
A/G RATIO: 1.2 (ref 1.1–2.2)
ALBUMIN SERPL-MCNC: 4.4 G/DL (ref 3.4–5)
ALP BLD-CCNC: 99 U/L (ref 40–129)
ALT SERPL-CCNC: 20 U/L (ref 10–40)
ANION GAP SERPL CALCULATED.3IONS-SCNC: 14 MMOL/L (ref 3–16)
AST SERPL-CCNC: 26 U/L (ref 15–37)
BASOPHILS ABSOLUTE: 0 K/UL (ref 0–0.2)
BASOPHILS RELATIVE PERCENT: 0.5 %
BILIRUB SERPL-MCNC: 0.7 MG/DL (ref 0–1)
BUN BLDV-MCNC: 17 MG/DL (ref 7–20)
CALCIUM SERPL-MCNC: 8.7 MG/DL (ref 8.3–10.6)
CHLORIDE BLD-SCNC: 102 MMOL/L (ref 99–110)
CO2: 23 MMOL/L (ref 21–32)
CREAT SERPL-MCNC: 0.8 MG/DL (ref 0.6–1.1)
EOSINOPHILS ABSOLUTE: 0.1 K/UL (ref 0–0.6)
EOSINOPHILS RELATIVE PERCENT: 0.8 %
GFR SERPL CREATININE-BSD FRML MDRD: >60 ML/MIN/{1.73_M2}
GLUCOSE BLD-MCNC: 97 MG/DL (ref 70–99)
HCG QUALITATIVE: NEGATIVE
HCT VFR BLD CALC: 41.2 % (ref 36–48)
HEMOGLOBIN: 12.9 G/DL (ref 12–16)
LACTIC ACID, SEPSIS: 1 MMOL/L (ref 0.4–1.9)
LYMPHOCYTES ABSOLUTE: 0.9 K/UL (ref 1–5.1)
LYMPHOCYTES RELATIVE PERCENT: 13.6 %
MCH RBC QN AUTO: 24.6 PG (ref 26–34)
MCHC RBC AUTO-ENTMCNC: 31.3 G/DL (ref 31–36)
MCV RBC AUTO: 78.5 FL (ref 80–100)
MONOCYTES ABSOLUTE: 0.5 K/UL (ref 0–1.3)
MONOCYTES RELATIVE PERCENT: 7.8 %
NEUTROPHILS ABSOLUTE: 5.3 K/UL (ref 1.7–7.7)
NEUTROPHILS RELATIVE PERCENT: 77.3 %
PDW BLD-RTO: 16.8 % (ref 12.4–15.4)
PLATELET # BLD: 339 K/UL (ref 135–450)
PMV BLD AUTO: 8.4 FL (ref 5–10.5)
POTASSIUM SERPL-SCNC: 3.8 MMOL/L (ref 3.5–5.1)
PRO-BNP: 115 PG/ML (ref 0–124)
RAPID INFLUENZA  B AGN: NEGATIVE
RAPID INFLUENZA A AGN: NEGATIVE
RBC # BLD: 5.25 M/UL (ref 4–5.2)
SARS-COV-2, NAAT: NOT DETECTED
SODIUM BLD-SCNC: 139 MMOL/L (ref 136–145)
TOTAL PROTEIN: 8 G/DL (ref 6.4–8.2)
TROPONIN: <0.01 NG/ML
WBC # BLD: 6.8 K/UL (ref 4–11)

## 2023-01-12 PROCEDURE — 71045 X-RAY EXAM CHEST 1 VIEW: CPT

## 2023-01-12 PROCEDURE — 96361 HYDRATE IV INFUSION ADD-ON: CPT

## 2023-01-12 PROCEDURE — 6360000002 HC RX W HCPCS: Performed by: PHYSICIAN ASSISTANT

## 2023-01-12 PROCEDURE — 6360000002 HC RX W HCPCS: Performed by: NURSE PRACTITIONER

## 2023-01-12 PROCEDURE — 6370000000 HC RX 637 (ALT 250 FOR IP): Performed by: NURSE PRACTITIONER

## 2023-01-12 PROCEDURE — 87804 INFLUENZA ASSAY W/OPTIC: CPT

## 2023-01-12 PROCEDURE — 2500000003 HC RX 250 WO HCPCS: Performed by: PHYSICIAN ASSISTANT

## 2023-01-12 PROCEDURE — 96375 TX/PRO/DX INJ NEW DRUG ADDON: CPT

## 2023-01-12 PROCEDURE — 84484 ASSAY OF TROPONIN QUANT: CPT

## 2023-01-12 PROCEDURE — 2580000003 HC RX 258: Performed by: FAMILY MEDICINE

## 2023-01-12 PROCEDURE — 83605 ASSAY OF LACTIC ACID: CPT

## 2023-01-12 PROCEDURE — 83880 ASSAY OF NATRIURETIC PEPTIDE: CPT

## 2023-01-12 PROCEDURE — 84703 CHORIONIC GONADOTROPIN ASSAY: CPT

## 2023-01-12 PROCEDURE — 6360000002 HC RX W HCPCS: Performed by: FAMILY MEDICINE

## 2023-01-12 PROCEDURE — G0378 HOSPITAL OBSERVATION PER HR: HCPCS

## 2023-01-12 PROCEDURE — 85025 COMPLETE CBC W/AUTO DIFF WBC: CPT

## 2023-01-12 PROCEDURE — 6370000000 HC RX 637 (ALT 250 FOR IP): Performed by: PHYSICIAN ASSISTANT

## 2023-01-12 PROCEDURE — 93005 ELECTROCARDIOGRAM TRACING: CPT | Performed by: PHYSICIAN ASSISTANT

## 2023-01-12 PROCEDURE — 96376 TX/PRO/DX INJ SAME DRUG ADON: CPT

## 2023-01-12 PROCEDURE — 96374 THER/PROPH/DIAG INJ IV PUSH: CPT

## 2023-01-12 PROCEDURE — 2580000003 HC RX 258: Performed by: PHYSICIAN ASSISTANT

## 2023-01-12 PROCEDURE — 80053 COMPREHEN METABOLIC PANEL: CPT

## 2023-01-12 PROCEDURE — 93005 ELECTROCARDIOGRAM TRACING: CPT | Performed by: EMERGENCY MEDICINE

## 2023-01-12 PROCEDURE — 6370000000 HC RX 637 (ALT 250 FOR IP): Performed by: FAMILY MEDICINE

## 2023-01-12 PROCEDURE — 6360000002 HC RX W HCPCS: Performed by: EMERGENCY MEDICINE

## 2023-01-12 PROCEDURE — 87635 SARS-COV-2 COVID-19 AMP PRB: CPT

## 2023-01-12 PROCEDURE — 99285 EMERGENCY DEPT VISIT HI MDM: CPT

## 2023-01-12 RX ORDER — AMLODIPINE BESYLATE 5 MG/1
10 TABLET ORAL DAILY
Status: DISCONTINUED | OUTPATIENT
Start: 2023-01-12 | End: 2023-01-14 | Stop reason: HOSPADM

## 2023-01-12 RX ORDER — SODIUM CHLORIDE 9 MG/ML
INJECTION, SOLUTION INTRAVENOUS CONTINUOUS
Status: DISCONTINUED | OUTPATIENT
Start: 2023-01-12 | End: 2023-01-14 | Stop reason: HOSPADM

## 2023-01-12 RX ORDER — 0.9 % SODIUM CHLORIDE 0.9 %
500 INTRAVENOUS SOLUTION INTRAVENOUS ONCE
Status: COMPLETED | OUTPATIENT
Start: 2023-01-12 | End: 2023-01-12

## 2023-01-12 RX ORDER — 0.9 % SODIUM CHLORIDE 0.9 %
1000 INTRAVENOUS SOLUTION INTRAVENOUS ONCE
Status: COMPLETED | OUTPATIENT
Start: 2023-01-12 | End: 2023-01-12

## 2023-01-12 RX ORDER — DIPHENHYDRAMINE HCL 25 MG
25 TABLET ORAL EVERY 6 HOURS PRN
Status: DISCONTINUED | OUTPATIENT
Start: 2023-01-12 | End: 2023-01-14 | Stop reason: HOSPADM

## 2023-01-12 RX ORDER — POLYETHYLENE GLYCOL 3350 17 G/17G
17 POWDER, FOR SOLUTION ORAL DAILY PRN
Status: DISCONTINUED | OUTPATIENT
Start: 2023-01-12 | End: 2023-01-14 | Stop reason: HOSPADM

## 2023-01-12 RX ORDER — OXYCODONE HYDROCHLORIDE AND ACETAMINOPHEN 5; 325 MG/1; MG/1
1 TABLET ORAL ONCE
Status: COMPLETED | OUTPATIENT
Start: 2023-01-12 | End: 2023-01-12

## 2023-01-12 RX ORDER — KETOROLAC TROMETHAMINE 30 MG/ML
15 INJECTION, SOLUTION INTRAMUSCULAR; INTRAVENOUS ONCE
Status: COMPLETED | OUTPATIENT
Start: 2023-01-12 | End: 2023-01-12

## 2023-01-12 RX ORDER — HYDROMORPHONE HYDROCHLORIDE 1 MG/ML
1 INJECTION, SOLUTION INTRAMUSCULAR; INTRAVENOUS; SUBCUTANEOUS ONCE
Status: COMPLETED | OUTPATIENT
Start: 2023-01-12 | End: 2023-01-12

## 2023-01-12 RX ORDER — BUSPIRONE HYDROCHLORIDE 5 MG/1
15 TABLET ORAL 3 TIMES DAILY
Status: DISCONTINUED | OUTPATIENT
Start: 2023-01-12 | End: 2023-01-14 | Stop reason: HOSPADM

## 2023-01-12 RX ORDER — SODIUM CHLORIDE 0.9 % (FLUSH) 0.9 %
5-40 SYRINGE (ML) INJECTION EVERY 12 HOURS SCHEDULED
Status: DISCONTINUED | OUTPATIENT
Start: 2023-01-12 | End: 2023-01-14 | Stop reason: HOSPADM

## 2023-01-12 RX ORDER — POTASSIUM CHLORIDE 7.45 MG/ML
10 INJECTION INTRAVENOUS PRN
Status: DISCONTINUED | OUTPATIENT
Start: 2023-01-12 | End: 2023-01-14 | Stop reason: HOSPADM

## 2023-01-12 RX ORDER — KETOROLAC TROMETHAMINE 30 MG/ML
15 INJECTION, SOLUTION INTRAMUSCULAR; INTRAVENOUS ONCE
Status: DISCONTINUED | OUTPATIENT
Start: 2023-01-12 | End: 2023-01-12

## 2023-01-12 RX ORDER — HYDRALAZINE HYDROCHLORIDE 20 MG/ML
10 INJECTION INTRAMUSCULAR; INTRAVENOUS EVERY 6 HOURS PRN
Status: DISCONTINUED | OUTPATIENT
Start: 2023-01-12 | End: 2023-01-14 | Stop reason: HOSPADM

## 2023-01-12 RX ORDER — ACETAMINOPHEN 650 MG/1
650 SUPPOSITORY RECTAL EVERY 6 HOURS PRN
Status: DISCONTINUED | OUTPATIENT
Start: 2023-01-12 | End: 2023-01-14 | Stop reason: HOSPADM

## 2023-01-12 RX ORDER — ONDANSETRON 4 MG/1
4 TABLET, ORALLY DISINTEGRATING ORAL EVERY 8 HOURS PRN
Status: DISCONTINUED | OUTPATIENT
Start: 2023-01-12 | End: 2023-01-14 | Stop reason: HOSPADM

## 2023-01-12 RX ORDER — MAGNESIUM SULFATE IN WATER 40 MG/ML
2000 INJECTION, SOLUTION INTRAVENOUS PRN
Status: DISCONTINUED | OUTPATIENT
Start: 2023-01-12 | End: 2023-01-14 | Stop reason: HOSPADM

## 2023-01-12 RX ORDER — ENOXAPARIN SODIUM 100 MG/ML
40 INJECTION SUBCUTANEOUS DAILY
Status: DISCONTINUED | OUTPATIENT
Start: 2023-01-13 | End: 2023-01-14 | Stop reason: HOSPADM

## 2023-01-12 RX ORDER — SODIUM CHLORIDE 0.9 % (FLUSH) 0.9 %
5-40 SYRINGE (ML) INJECTION PRN
Status: DISCONTINUED | OUTPATIENT
Start: 2023-01-12 | End: 2023-01-14 | Stop reason: HOSPADM

## 2023-01-12 RX ORDER — OXYCODONE HYDROCHLORIDE 5 MG/1
5 TABLET ORAL EVERY 4 HOURS PRN
Status: DISCONTINUED | OUTPATIENT
Start: 2023-01-12 | End: 2023-01-14 | Stop reason: HOSPADM

## 2023-01-12 RX ORDER — ONDANSETRON 2 MG/ML
4 INJECTION INTRAMUSCULAR; INTRAVENOUS EVERY 6 HOURS PRN
Status: DISCONTINUED | OUTPATIENT
Start: 2023-01-12 | End: 2023-01-14 | Stop reason: HOSPADM

## 2023-01-12 RX ORDER — PANTOPRAZOLE SODIUM 40 MG/1
40 TABLET, DELAYED RELEASE ORAL 2 TIMES DAILY
Status: DISCONTINUED | OUTPATIENT
Start: 2023-01-12 | End: 2023-01-14 | Stop reason: HOSPADM

## 2023-01-12 RX ORDER — LABETALOL HYDROCHLORIDE 5 MG/ML
10 INJECTION, SOLUTION INTRAVENOUS ONCE
Status: COMPLETED | OUTPATIENT
Start: 2023-01-12 | End: 2023-01-12

## 2023-01-12 RX ORDER — SODIUM CHLORIDE 9 MG/ML
INJECTION, SOLUTION INTRAVENOUS PRN
Status: DISCONTINUED | OUTPATIENT
Start: 2023-01-12 | End: 2023-01-14 | Stop reason: HOSPADM

## 2023-01-12 RX ORDER — PROMETHAZINE HYDROCHLORIDE 25 MG/ML
6.25 INJECTION, SOLUTION INTRAMUSCULAR; INTRAVENOUS EVERY 6 HOURS PRN
Status: DISCONTINUED | OUTPATIENT
Start: 2023-01-12 | End: 2023-01-14 | Stop reason: HOSPADM

## 2023-01-12 RX ORDER — DIAZEPAM 5 MG/1
5 TABLET ORAL ONCE
Status: COMPLETED | OUTPATIENT
Start: 2023-01-12 | End: 2023-01-12

## 2023-01-12 RX ORDER — TIZANIDINE 4 MG/1
6 TABLET ORAL EVERY 8 HOURS PRN
Status: DISCONTINUED | OUTPATIENT
Start: 2023-01-12 | End: 2023-01-14 | Stop reason: HOSPADM

## 2023-01-12 RX ORDER — ACETAMINOPHEN 325 MG/1
650 TABLET ORAL EVERY 6 HOURS PRN
Status: DISCONTINUED | OUTPATIENT
Start: 2023-01-12 | End: 2023-01-14 | Stop reason: HOSPADM

## 2023-01-12 RX ORDER — PANTOPRAZOLE SODIUM 40 MG/1
40 TABLET, DELAYED RELEASE ORAL
Status: DISCONTINUED | OUTPATIENT
Start: 2023-01-13 | End: 2023-01-12

## 2023-01-12 RX ORDER — HYDROXYZINE HYDROCHLORIDE 25 MG/1
50 TABLET, FILM COATED ORAL 2 TIMES DAILY
Status: DISCONTINUED | OUTPATIENT
Start: 2023-01-12 | End: 2023-01-14 | Stop reason: HOSPADM

## 2023-01-12 RX ORDER — HYDRALAZINE HYDROCHLORIDE 25 MG/1
25 TABLET, FILM COATED ORAL EVERY 8 HOURS SCHEDULED
Status: DISCONTINUED | OUTPATIENT
Start: 2023-01-12 | End: 2023-01-14 | Stop reason: HOSPADM

## 2023-01-12 RX ORDER — DULOXETIN HYDROCHLORIDE 60 MG/1
60 CAPSULE, DELAYED RELEASE ORAL DAILY
Status: DISCONTINUED | OUTPATIENT
Start: 2023-01-12 | End: 2023-01-14 | Stop reason: HOSPADM

## 2023-01-12 RX ORDER — ONDANSETRON 4 MG/1
8 TABLET, ORALLY DISINTEGRATING ORAL ONCE
Status: COMPLETED | OUTPATIENT
Start: 2023-01-12 | End: 2023-01-12

## 2023-01-12 RX ORDER — HYDROMORPHONE HYDROCHLORIDE 1 MG/ML
1 INJECTION, SOLUTION INTRAMUSCULAR; INTRAVENOUS; SUBCUTANEOUS
Status: DISCONTINUED | OUTPATIENT
Start: 2023-01-12 | End: 2023-01-14 | Stop reason: HOSPADM

## 2023-01-12 RX ORDER — PREGABALIN 100 MG/1
300 CAPSULE ORAL 2 TIMES DAILY
Status: DISCONTINUED | OUTPATIENT
Start: 2023-01-12 | End: 2023-01-14 | Stop reason: HOSPADM

## 2023-01-12 RX ADMIN — HYDRALAZINE HYDROCHLORIDE 25 MG: 25 TABLET, FILM COATED ORAL at 23:43

## 2023-01-12 RX ADMIN — HYDROMORPHONE HYDROCHLORIDE 1 MG: 1 INJECTION, SOLUTION INTRAMUSCULAR; INTRAVENOUS; SUBCUTANEOUS at 16:09

## 2023-01-12 RX ADMIN — OXYCODONE AND ACETAMINOPHEN 1 TABLET: 5; 325 TABLET ORAL at 14:35

## 2023-01-12 RX ADMIN — DULOXETINE HYDROCHLORIDE 60 MG: 60 CAPSULE, DELAYED RELEASE ORAL at 22:10

## 2023-01-12 RX ADMIN — HYDROMORPHONE HYDROCHLORIDE 1 MG: 1 INJECTION, SOLUTION INTRAMUSCULAR; INTRAVENOUS; SUBCUTANEOUS at 19:07

## 2023-01-12 RX ADMIN — SODIUM CHLORIDE: 9 INJECTION, SOLUTION INTRAVENOUS at 22:30

## 2023-01-12 RX ADMIN — PANTOPRAZOLE SODIUM 40 MG: 40 TABLET, DELAYED RELEASE ORAL at 22:10

## 2023-01-12 RX ADMIN — DIAZEPAM 5 MG: 5 TABLET ORAL at 14:36

## 2023-01-12 RX ADMIN — HYDROXYZINE HYDROCHLORIDE 50 MG: 25 TABLET, FILM COATED ORAL at 22:21

## 2023-01-12 RX ADMIN — OXYCODONE 5 MG: 5 TABLET ORAL at 23:44

## 2023-01-12 RX ADMIN — SODIUM CHLORIDE 500 ML: 9 INJECTION, SOLUTION INTRAVENOUS at 20:56

## 2023-01-12 RX ADMIN — AMLODIPINE BESYLATE 10 MG: 5 TABLET ORAL at 22:10

## 2023-01-12 RX ADMIN — HYDROMORPHONE HYDROCHLORIDE 1 MG: 1 INJECTION, SOLUTION INTRAMUSCULAR; INTRAVENOUS; SUBCUTANEOUS at 22:04

## 2023-01-12 RX ADMIN — SODIUM CHLORIDE 1000 ML: 9 INJECTION, SOLUTION INTRAVENOUS at 16:11

## 2023-01-12 RX ADMIN — HYDRALAZINE HYDROCHLORIDE 10 MG: 20 INJECTION INTRAMUSCULAR; INTRAVENOUS at 20:58

## 2023-01-12 RX ADMIN — PREGABALIN 300 MG: 100 CAPSULE ORAL at 22:10

## 2023-01-12 RX ADMIN — KETOROLAC TROMETHAMINE 15 MG: 30 INJECTION, SOLUTION INTRAMUSCULAR; INTRAVENOUS at 22:23

## 2023-01-12 RX ADMIN — ONDANSETRON 8 MG: 4 TABLET, ORALLY DISINTEGRATING ORAL at 14:36

## 2023-01-12 RX ADMIN — SODIUM CHLORIDE, PRESERVATIVE FREE 10 ML: 5 INJECTION INTRAVENOUS at 22:03

## 2023-01-12 RX ADMIN — LABETALOL HYDROCHLORIDE 10 MG: 5 INJECTION INTRAVENOUS at 19:06

## 2023-01-12 RX ADMIN — DIPHENHYDRAMINE HYDROCHLORIDE 25 MG: 25 TABLET ORAL at 22:53

## 2023-01-12 RX ADMIN — BUSPIRONE HYDROCHLORIDE 15 MG: 5 TABLET ORAL at 22:10

## 2023-01-12 RX ADMIN — KETOROLAC TROMETHAMINE 15 MG: 30 INJECTION, SOLUTION INTRAMUSCULAR; INTRAVENOUS at 17:24

## 2023-01-12 ASSESSMENT — PAIN DESCRIPTION - ORIENTATION
ORIENTATION: LOWER

## 2023-01-12 ASSESSMENT — PAIN SCALES - GENERAL
PAINLEVEL_OUTOF10: 10
PAINLEVEL_OUTOF10: 8
PAINLEVEL_OUTOF10: 8
PAINLEVEL_OUTOF10: 10
PAINLEVEL_OUTOF10: 8
PAINLEVEL_OUTOF10: 10

## 2023-01-12 ASSESSMENT — PAIN DESCRIPTION - DESCRIPTORS
DESCRIPTORS: BURNING;SHARP
DESCRIPTORS: BURNING;SHARP
DESCRIPTORS: BURNING;SHARP;SHOOTING
DESCRIPTORS: BURNING;SHARP

## 2023-01-12 ASSESSMENT — ENCOUNTER SYMPTOMS
WHEEZING: 0
BACK PAIN: 1
STRIDOR: 0
NAUSEA: 1
COLOR CHANGE: 0
COUGH: 0
SHORTNESS OF BREATH: 0
ABDOMINAL PAIN: 0
VOMITING: 0
DIARRHEA: 0
CONSTIPATION: 0

## 2023-01-12 ASSESSMENT — PAIN DESCRIPTION - LOCATION
LOCATION: BACK;NECK
LOCATION: GENERALIZED
LOCATION: NECK;SHOULDER
LOCATION: BACK;NECK
LOCATION: GENERALIZED
LOCATION: BACK;NECK

## 2023-01-12 NOTE — LETTER
[address] [id number]  Phone: [phone]    No name on file. January 14, 2023     Patient: Charanjit Robbins   YOB: 1980   Date of Visit: 1/12/2023       To Whom It May Concern:    Boyd Campbell was admitted to [facility] [place] [place] on 1/12/2023-1/14/2023. It is my medical opinion that Boyd Capmbell may return to work on 1/18/2023. If you have any questions or concerns, please don't hesitate to call.     Sincerely,        Eliud Abernathy, treated by Dr. Roopa Hooks

## 2023-01-12 NOTE — TELEPHONE ENCOUNTER
FYI:  Patient called. She is in a lot of pain. Burning sensation all over her body. She is headed to [facility] ED today.

## 2023-01-12 NOTE — ED PROVIDER NOTES
[facility]        Pt Name: Caryn Fish  MRN: [id number]  [place] 1980  Date of evaluation: 1/12/2023  Provider: Sebastian Rodriguez PA-C  PCP: LEESA Lowry CNP  Note Started: 2:30 PM EST 1/12/23       I have seen and evaluated this patient with my supervising physician Bereket Medeiros MD.      [address] [place] [address]       Chief Complaint   Patient presents with    Generalized Body Aches     Hx lupus. Generalized pain everywhere. HISTORY OF PRESENT ILLNESS: 1 or more Elements     History From: patient  Limitations to history : None    Caryn Fish is a 38 y.o. female who presents to the emergency department with complaints of generalized pain since last night. Patient reports that pain got worse today. She ran out of Lyrica and Zanaflex, which she takes for chronic lupus pain. She got the prescription sent to her pharmacy but has not filled the prescription yet because she had to work today and these medications tend to sedate her. She rates her generalized pain to be a 10 out of 10 on pain scale. She feels little dizzy and nauseated. Denies chest pain, shortness of breath, abdominal pain, vomiting or acute urinary symptoms. She is tearful and appears very anxious. Nursing Notes were all reviewed and agreed with or any disagreements were addressed in the HPI. REVIEW OF SYSTEMS :      Review of Systems   Constitutional:  Negative for chills and fever. HENT: Negative. Eyes:  Negative for visual disturbance. Respiratory:  Negative for cough, shortness of breath, wheezing and stridor. Cardiovascular:  Negative for chest pain, palpitations and leg swelling. Gastrointestinal:  Positive for nausea. Negative for abdominal pain, constipation, diarrhea and vomiting. Genitourinary: Negative. Musculoskeletal:  Positive for back pain and myalgias. Negative for neck pain and neck stiffness.    Skin: Negative for color change, pallor, rash and wound. Neurological:  Positive for dizziness. Negative for tremors, seizures, syncope, facial asymmetry, speech difficulty, weakness, light-headedness, numbness and headaches. Psychiatric/Behavioral:  Negative for confusion. The patient is nervous/anxious. All other systems reviewed and are negative. Positives and Pertinent negatives as per HPI. SURGICAL HISTORY     Past Surgical History:   Procedure Laterality Date     SECTION       X 2    CYSTOSCOPY  2018    CYSTOSCOPY URETEROSCOPY WITH HOLMIUM LASER LITHOTRIPSY FOR 5 MM STONE, RIGHT STENT PLACEMENT    OTHER SURGICAL HISTORY  2016    CYSTOSCOPY, BILATERAL RETROGRADES, RIGHT URETEROSCOPY,    TONSILLECTOMY      TUBAL LIGATION         CURRENTMEDICATIONS       Previous Medications    ALBUTEROL SULFATE HFA (PROVENTIL;VENTOLIN;PROAIR) 108 (90 BASE) MCG/ACT INHALER    Inhale 2 puffs into the lungs 4 times daily as needed for Wheezing    ALBUTEROL SULFATE  (90 BASE) MCG/ACT INHALER    Inhale 2 puffs into the lungs 4 times daily as needed for Wheezing    AMLODIPINE (NORVASC) 10 MG TABLET    Take 1 tablet by mouth daily Take 1 tablet by mouth daily    BECLOMETHASONE (QVAR REDIHALER) 40 MCG/ACT AERB INHALER    Inhale 1 puff into the lungs in the morning and 1 puff in the evening.     BLOOD PRESSURE KIT    1 kit by Does not apply route daily    BUSPIRONE (BUSPAR) 15 MG TABLET    Take 15 mg by mouth 3 times daily    DULOXETINE (CYMBALTA) 60 MG EXTENDED RELEASE CAPSULE    Take 1 capsule by mouth daily    EPINEPHRINE (EPIPEN 2-LUANNE) 0.3 MG/0.3ML SOAJ INJECTION    Inject 0.3 mLs into the muscle once for 1 dose Use as directed for allergic reaction    HANDICAP PLACARD MISC    by Does not apply route Expiration 5 years    HYDRALAZINE (APRESOLINE) 25 MG TABLET    Take 1 tablet by mouth every 8 hours    HYDROCHLOROTHIAZIDE (HYDRODIURIL) 25 MG TABLET    Take 1 tablet by mouth daily    HYDROCORTISONE (ANUSOL-HC) 2.5 % CREA RECTAL CREAM    Apply to hemorrhoid twice daily    HYDROXYZINE HCL (ATARAX) 50 MG TABLET    TAKE ONE TABLET BY MOUTH TWICE A DAY    IPRATROPIUM-ALBUTEROL (DUONEB) 0.5-2.5 (3) MG/3ML SOLN NEBULIZER SOLUTION    Inhale 3 mLs into the lungs 4 times daily    LEVONORGESTREL-ETHINYL ESTRADIOL (SEASONALE) 0.15-0.03 MG PER TABLET    Take 1 tablet by mouth daily    NEBULIZERS (COMPRESSOR/NEBULIZER) MISC    1 each by Does not apply route daily    ONDANSETRON (ZOFRAN) 4 MG TABLET    Take 1 tablet by mouth daily as needed for Nausea or Vomiting    PANTOPRAZOLE (PROTONIX) 20 MG TABLET    Take 1 tablet by mouth 2 times daily    PREGABALIN (LYRICA) 300 MG CAPSULE    TAKE ONE CAPSULE BY MOUTH EVERY MORNING AND ONE CAPSULE BY MOUTH AT DINNER TIME    TIZANIDINE (ZANAFLEX) 4 MG TABLET    TAKE 1 AND 1/2 TABLETS (6MG) BY MOUTH THREE TIMES A DAY AT LEAST 8 HOURS APART       ALLERGIES     Ambien [zolpidem tartrate], Sudafed [pseudoephedrine hcl], and Amitriptyline hcl    FAMILYHISTORY       Family History   Problem Relation Age of Onset    Cancer Maternal Grandmother         Lung Ca        SOCIAL HISTORY       Social History     Tobacco Use    Smoking status: Never    Smokeless tobacco: Never   Vaping Use    Vaping Use: Never used   Substance Use Topics    Alcohol use: Yes     Alcohol/week: 0.0 standard drinks     Comment: occasional     Drug use: Yes     Types: Marijuana Srinivas Jackelyn)     Comment: medical card       SCREENINGS                         [facility] Assessment  BP: (!) 183/115  Heart Rate: (!) 106           PHYSICAL EXAM  1 or more Elements     ED Triage Vitals [01/12/23 1415]   BP Temp Temp Source Heart Rate Resp SpO2 Height Weight   (!) 181/114 98 °F (36.7 °C) Oral (!) 134 20 99 % -- --       Physical Exam  Vitals and nursing note reviewed. Constitutional:       Appearance: Normal appearance. She is well-developed. She is not toxic-appearing or diaphoretic. HENT:      Head: Normocephalic and atraumatic. Right Ear: External ear normal.      Left Ear: External ear normal.      Nose: Nose normal.      Mouth/Throat:      Mouth: Mucous membranes are moist.      Pharynx: Oropharynx is clear. Eyes:      General: No scleral icterus. Right eye: No discharge. Left eye: No discharge. Extraocular Movements: Extraocular movements intact. Conjunctiva/sclera: Conjunctivae normal.      Pupils: Pupils are equal, round, and reactive to light. Cardiovascular:      Rate and Rhythm: Tachycardia present. Pulmonary:      Effort: Pulmonary effort is normal.      Breath sounds: Normal breath sounds. Abdominal:      General: Bowel sounds are normal.      Palpations: Abdomen is soft. Tenderness: There is no abdominal tenderness. There is no right CVA tenderness or left CVA tenderness. Musculoskeletal:         General: Normal range of motion. Cervical back: Normal range of motion. Comments: Multiple trigger point tenderness on the posterior trunk, neck, back, arms and legs. No posterior calf or thigh tenderness, palpable cord, discoloration. No midline vertebral tenderness or step-off deformity. Negative straight leg raise. No saddle paresthesia or foot drop. Able to heel and toe walk without difficulty or deficit. 5/5 strength in all four extremities without focal weakness, paresthesia or radiculopathy  No extremity edema. Negative homans. Skin:     General: Skin is warm and dry. Coloration: Skin is not jaundiced or pale. Findings: No bruising, erythema, lesion or rash. Neurological:      General: No focal deficit present. Mental Status: She is alert and oriented to person, place, and time. Psychiatric:         Attention and Perception: Attention and perception normal.         Mood and Affect: Mood is anxious (tearful). Speech: Speech normal.         Behavior: Behavior normal. Behavior is cooperative. Thought Content:  Thought content normal. Cognition and Memory: Cognition and memory normal.         Judgment: Judgment normal.       Upon reevaluation, patient still has persistent pain despite several rounds of narcotic medication and Toradol. Blood pressure and heart rate are also persistently elevated. Therefore, will order labetalol and consider admission. DIAGNOSTIC RESULTS   LABS:    Labs Reviewed   CBC WITH AUTO DIFFERENTIAL - Abnormal; Notable for the following components:       Result Value    RBC 5.25 (*)     MCV 78.5 (*)     MCH 24.6 (*)     RDW 16.8 (*)     Lymphocytes Absolute 0.9 (*)     All other components within normal limits   COVID-19, RAPID   RAPID INFLUENZA A/B ANTIGENS   COMPREHENSIVE METABOLIC PANEL   HCG, SERUM, QUALITATIVE   TROPONIN   LACTATE, SEPSIS   BRAIN NATRIURETIC PEPTIDE       When ordered only abnormal lab results are displayed. All other labs were within normal range or not returned as of this dictation. EKG: When ordered, EKG's are interpreted by the Emergency Department Physician in the absence of a cardiologist.  Please see their note for interpretation of EKG. RADIOLOGY:   Non-plain film images such as CT, Ultrasound and MRI are read by the radiologist. Plain radiographic images are visualized and preliminarily interpreted by the ED Provider with the below findings:        Interpretation per the Radiologist below, if available at the time of this note:    XR CHEST PORTABLE   Final Result   No acute cardiopulmonary findings               PROCEDURES   Unless otherwise noted below, none     Procedures    CRITICAL CARE TIME (.cctime)   There was a high probability of life-threatening clinical deterioration in the patient's condition requiring my urgent intervention. I personally saw the patient and independently provided 45 minutes of non-concurrent critical care out of the total shared critical care time provided, excluding separately reportable procedures.          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 EMERGENCY DEPARTMENT COURSE and DIFFERENTIAL DIAGNOSIS/MDM:   Vitals:    Vitals:    01/12/23 1700 01/12/23 1725 01/12/23 1755 01/12/23 1830   BP: (!) 193/128 (!) 178/100 (!) 185/117 (!) 183/115   Pulse: (!) 106 (!) 108 (!) 114 (!) 106   Resp: 17 16 18 21   Temp:       TempSrc:       SpO2: 98% 100% 100% 99%       Patient was given the following medications:  Medications   HYDROmorphone HCl PF (DILAUDID) injection 1 mg (has no administration in time range)   labetalol (NORMODYNE;TRANDATE) injection 10 mg (has no administration in time range)   diazePAM (VALIUM) tablet 5 mg (5 mg Oral Given 1/12/23 1436)   oxyCODONE-acetaminophen (PERCOCET) 5-325 MG per tablet 1 tablet (1 tablet Oral Given 1/12/23 1435)   ondansetron (ZOFRAN-ODT) disintegrating tablet 8 mg (8 mg Oral Given 1/12/23 1436)   HYDROmorphone HCl PF (DILAUDID) injection 1 mg (1 mg IntraVENous Given 1/12/23 1609)   0.9 % sodium chloride bolus (1,000 mLs IntraVENous New Bag 1/12/23 1611)   ketorolac (TORADOL) injection 15 mg (15 mg IntraVENous Given 1/12/23 6561)             Is this patient to be included in the SEP-1 Core Measure due to severe sepsis or septic shock?    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 CONSULTS: (Who and What was discussed)  IP CONSULT TO HOSPITALIST  Hospitalist paged at [phone]    Social Determinants : Marijuana use    Records Reviewed (Source): prior PCP records    CC/HPI Summary, DDx, ED Course, and Reassessment: This patient presents to the emergency department complaining of generalized body pain. She thinks that she has having a lupus flare because she ran out of her medication several days ago and just got it filled today. I tried several different medications to control her pain however was unable to do so. Blood pressure was persistently elevated despite being given the narcotic medications. She is also persistently tachycardic. She denies chest pain or shortness of breath. Chest x-ray is unremarkable. She is not hypoxic or tachypneic. Troponin is negative. Her heart rate might be linked to the elevated blood pressure or her pain. I have low suspicion for PE or infection. Labetalol is ordered for tachycardia and hypertensive urgency.     Disposition Considerations (tests considered but not done, Admit vs D/C, Shared Decision Making, Pt Expectation of Test or Tx.): admit for intractable pain, hypertensive urgency and sinus tachycardia      I am the Primary Clinician of Record. FINAL IMPRESSION      1. Hypertensive urgency    2. Intractable pain    3. Sinus tachycardia          DISPOSITION/PLAN     DISPOSITION Decision To Admit 01/12/2023 06:33:49 PM      PATIENT REFERRED TO:  No follow-up provider specified.     DISCHARGE MEDICATIONS:  New Prescriptions    No medications on file       DISCONTINUED MEDICATIONS:  Discontinued Medications    No medications on file              (Please note that portions of this note were completed with a voice recognition program.  Efforts were made to edit the dictations but occasionally words are mis-transcribed.)    Addi Luna PA-C (electronically signed)       Addi Luna PA-C  01/12/23 0890

## 2023-01-12 NOTE — H&P
Hospital Medicine History & Physical      PCP: Alana Hunt, APRN - CNP    Date of Admission: 1/12/2023    Impression: This is a 46-year-old female medical history significant for lupus chronic pain GERD HTN. Presents with generalized pain concern for possible lupus flare. A/P:    1. Generalized pain concern for lupus exacerbation: cont pain control cont IV hydration. 2. hypertensive urgency: hx htn, current elevations suspected to be related to pain. 3. N&V: cont antiemetic, CLD for now    4. Microcytosis check iron levels    CODE STATUS: Full code    DVT prophylaxis: Lovenox subcu    Transition of care Home    Anticipated date of discharge: Possibly 1 or 2 days    24 care plan:    Pain control IV fluids antiemetics clear liquid diet. Advance diet as tolerated hopefully the patient nausea will subside and we will refill her home medicines and build to discharge in the next day or so close outpatient follow-up. Chief complaint generalized pain    HPI:    46-year-old female medical history significant for lupus chronic pain GERD HTN. Presents with generalized pain concern for possible lupus flare. Patient relates to running out of her lupus medications. She is in the emergency department she was significantly hypertensive with systolic blood pressure greater than 190 this has improved with some pain control and nausea medication and IV fluids. Otherwise her labs are relatively unremarkable other than microcytosis for which we will check her iron levels. Hopefully the patient will improve over the next couple days and be able to follow-up outpatient with her rheumatologist.    Social history denies tobacco alcohol or drug use    Family history: History of lung cancer    Allergies: Ambien Sudafed amitriptyline    Hospitalizations medically necessary for this patient due to intractable pain as well as nausea vomiting due to lupus exacerbation.   Requires intravenous fluids intravenous pain control. Admit to observation status. Past Medical History:          Diagnosis Date    Acute asthma exacerbation 12/15/2021    JUAN (acute kidney injury) ([facility] 2021    Anxiety     Asthma     Constipation 2014    Conversion disorder     Depression     Fibromyalgia     Focal motor deficit 2020    Graves disease     History of blood transfusion     Hx of blood clots      LT LUNG W/ PNEUMONIA    Hypertension     Hyperthyroidism 2016    Joint pain 2014    Various joints (back, hands, knees, hips), recurrent flares since age 27, RF+ at one point, never fully worked up for rheumatologic dz yet    Kidney stone     Multiple    Localized rash 2014    Recurrent, on inner surface of upper arms, q 3 months, sometimes on chest, no facial involvement    Lupus ([facility]     Lupus arthritis ([facility] 2022    MDRO (multiple drug resistant organisms) resistance     Nephroureterolithiasis 2018    Paresthesia of right arm and leg 2020    PONV (postoperative nausea and vomiting)     Right sided weakness     Shortness of breath 2021    Yeast vaginitis 2014       Past Surgical History:          Procedure Laterality Date     SECTION       X 2    CYSTOSCOPY  2018    CYSTOSCOPY URETEROSCOPY WITH HOLMIUM LASER LITHOTRIPSY FOR 5 MM STONE, RIGHT STENT PLACEMENT    OTHER SURGICAL HISTORY  2016    CYSTOSCOPY, BILATERAL RETROGRADES, RIGHT URETEROSCOPY,    TONSILLECTOMY      TUBAL LIGATION         Medications Prior to Admission:      Prior to Admission medications    Medication Sig Start Date End Date Taking?  Authorizing Provider   Handicap Placard MISC by Does not apply route Expiration 5 years 22   LEESA Cardoza CNP   pregabalin (LYRICA) 300 MG capsule TAKE ONE CAPSULE BY MOUTH EVERY MORNING AND ONE CAPSULE BY MOUTH AT [facility] TIME 1/3/23 4/3/23  LEESA Cardoza CNP   hydrOXYzine HCl (ATARAX) 50 MG tablet TAKE ONE TABLET BY MOUTH TWICE A DAY 1/3/23   Ehsan Amanda, APRN - CNP   tiZANidine (ZANAFLEX) 4 MG tablet TAKE 1 AND 1/2 TABLETS (6MG) BY MOUTH THREE TIMES A DAY AT LEAST 8 HOURS APART 10/31/22   LEESA Roe CNP   albuterol sulfate HFA (PROVENTIL;VENTOLIN;PROAIR) 108 (90 Base) MCG/ACT inhaler Inhale 2 puffs into the lungs 4 times daily as needed for Wheezing 9/14/22   LEESA Roe CNP   amLODIPine (NORVASC) 10 MG tablet Take 1 tablet by mouth daily Take 1 tablet by mouth daily 9/14/22 3/13/23  LEESA Roe CNP   beclomethasone (QVAR REDIHALER) 40 MCG/ACT AERB inhaler Inhale 1 puff into the lungs in the morning and 1 puff in the evening.  9/14/22   LEESA Roe CNP   Blood Pressure KIT 1 kit by Does not apply route daily 9/14/22   LEESA Roe CNP   busPIRone (BUSPAR) 15 MG tablet Take 15 mg by mouth 3 times daily 9/14/22 3/13/23  Mark Parra, LEESA - CNP   DULoxetine (CYMBALTA) 60 MG extended release capsule Take 1 capsule by mouth daily 9/14/22   LEESA Roe CNP   EPINEPHrine (EPIPEN 2-LUANNE) 0.3 MG/0.3ML SOAJ injection Inject 0.3 mLs into the muscle once for 1 dose Use as directed for allergic reaction 9/14/22 9/14/22  LEESA Roe CNP   hydrALAZINE (APRESOLINE) 25 MG tablet Take 1 tablet by mouth every 8 hours 9/14/22   Mark Parra, LEESA - MARTIN   hydroCHLOROthiazide (HYDRODIURIL) 25 MG tablet Take 1 tablet by mouth daily 9/14/22   Mark Parra, LEESA - CNP   ipratropium-albuterol (DUONEB) 0.5-2.5 (3) MG/3ML SOLN nebulizer solution Inhale 3 mLs into the lungs 4 times daily 9/14/22   Mark Parra, LEESA - CNP   levonorgestrel-ethinyl estradiol (SEASONALE) 0.15-0.03 MG per tablet Take 1 tablet by mouth daily 9/14/22   LEESA Roe CNP   Nebulizers (COMPRESSOR/NEBULIZER) MISC 1 each by Does not apply route daily 9/14/22   LEESA Roe CNP   ondansetron (ZOFRAN) 4 MG tablet Take 1 tablet by mouth daily as needed for Nausea or Vomiting 9/14/22   LEESA Roe CNP   pantoprazole (PROTONIX) 20 MG tablet Take 1 tablet by mouth 2 times daily 9/14/22 3/13/23  Garlozaria Anaya APRN - CNP   albuterol sulfate  (90 Base) MCG/ACT inhaler Inhale 2 puffs into the lungs 4 times daily as needed for Wheezing 11/24/21   Ori Martinez MD   hydrocortisone (ANUSOL-HC) 2.5 % CREA rectal cream Apply to hemorrhoid twice daily 3/2/21   LEESA Pierce CNP       Allergies:  Ambien [zolpidem tartrate], Sudafed [pseudoephedrine hcl], and Amitriptyline hcl    Social History:           TOBACCO:   reports that she has never smoked. She has never used smokeless tobacco.  ETOH:   reports current alcohol use. Family History:             Problem Relation Age of Onset    Cancer Maternal Grandmother         Lung Ca       REVIEW OF SYSTEMS:   Pertinent positives as noted in the HPI. All other systems reviewed and negative. PHYSICAL EXAM:    BP (!) 183/115   Pulse (!) 106   Temp 98 °F (36.7 °C) (Oral)   Resp 21   SpO2 99%     General appearance:  No apparent distress, appears stated age and cooperative. HEENT:  Normal cephalic, atraumatic without obvious deformity. Pupils equal, round, and reactive to light. Extra ocular muscles intact. Conjunctivae/corneas clear. Neck: Supple, with full range of motion. No jugular venous distention. Trachea midline. Respiratory:  Normal respiratory effort. Clear to auscultation, bilaterally without Rales/Wheezes/Rhonchi. Cardiovascular:  Regular rate and rhythm with normal S1/S2 without murmurs, rubs or gallops. Abdomen: Soft, non-tender, non-distended with normal bowel sounds. Musculoskeletal:  No clubbing, cyanosis or edema bilaterally. Full range of motion without deformity. Skin: Skin color, texture, turgor normal.  No rashes or lesions. Neurologic:  Neurovascularly intact without any focal sensory/motor deficits.  Cranial nerves: II-XII intact, grossly non-focal.  Psychiatric:  Alert and oriented, thought content appropriate, normal insight  Capillary Refill: Brisk,< 3 seconds Peripheral Pulses: +2 palpable, equal bilaterally       Labs:     Recent Labs     01/12/23  1441   WBC 6.8   HGB 12.9   HCT 41.2        Recent Labs     01/12/23  1441      K 3.8      CO2 23   BUN 17   CREATININE 0.8   CALCIUM 8.7     Recent Labs     01/12/23  1441   AST 26   ALT 20   BILITOT 0.7   ALKPHOS 99     No results for input(s): INR in the last 72 hours. Recent Labs     01/12/23  1441   TROPONINI <0.01       Urinalysis:      Lab Results   Component Value Date/Time    NITRU Negative 02/15/2021 12:14 AM    WBCUA 51 02/15/2021 12:14 AM    BACTERIA 4+ 02/15/2021 12:14 AM    RBCUA 5 02/15/2021 12:14 AM    BLOODU SMALL 02/15/2021 12:14 AM    SPECGRAV 1.013 02/15/2021 12:14 AM    GLUCOSEU Negative 02/15/2021 12:14 AM       Radiology:     XR CHEST PORTABLE   Final Result   No acute cardiopulmonary findings             ASSESSMENT:    There are no active hospital problems to display for this patient. Kell Sabillon,     Thank you LEESA Tejeda CNP for the opportunity to be involved in this patient's care. If you have any questions or concerns please feel free to contact me at [phone].

## 2023-01-12 NOTE — ED PROVIDER NOTES
In addition to the advanced practice provider, I personally saw Tang Logan and performed a substantive portion of the visit including all aspects of the medical decision making. Medical Decision Making  Patient presents to the emergency department with diffuse body aches starting last night after running out of her medications for lupus. She will be able to  the medications as prescriptions are at the pharmacy, but symptoms became out of control before then. She has also felt lightheaded and nauseous. This is very similar to previous exacerbations of her lupus pain. She has not had shortness of breath or chest pain. When I was evaluating her, her blood pressure read very high, in the 180s/140s. I adjusted her cuff and rechecked. We also tried a new cuff on the opposite arm. It remained elevated at 190/140. I ordered a repeat EKG and a BNP to evaluate for signs of hypertensive emergency. The repeat EKG was essentially unchanged from previous, although her tachycardia had improved. Her BNP was not elevated. Her blood pressure continued to be significantly hypertensive despite multiple times to control her pain. Although her heart rate improved from her initial heart rate of 134, she remained tachycardic throughout her visit. She continued to look unwell. Given the above, we are going admit her to the hospital for continued management of her lupus exacerbation as well as blood pressure management for hypertensive urgency    I personally saw the patient and independently provided 10 minutes of non-concurrent critical care out of the total shared critical care time provided. EKG  The Ekg interpreted by me in the absence of a cardiologist shows. sinus tachycardia, [id number]  Axis is   Normal  QTc is  normal  Intervals and Durations are unremarkable. No specific ST-T wave changes appreciated. No evidence of acute ischemia.    In comparison to previous EKG from 12/14/2021, patient's tachycardia is more severe today. EKG #2  The Ekg interpreted by me in the absence of a cardiologist shows. sinus tachycardia, vhlv=770  Axis is   Normal  QTc is  normal  Intervals and Durations are unremarkable. No specific ST-T wave changes appreciated. No evidence of acute ischemia. Tachycardia is improved in comparison to previous EKG performed earlier. FINAL IMPRESSION  1. Hypertensive urgency    2. Intractable pain    3. Sinus tachycardia        Blood pressure (!) 183/115, pulse (!) 106, temperature 98 °F (36.7 °C), temperature source Oral, resp. rate 21, SpO2 99 %, not currently breastfeeding.      For further details of [facility] emergency department encounter, please see documentation by advanced practice provider, ROXANA Riggins.        Alfreda Vincent MD  01/12/23 6009

## 2023-01-13 LAB
A/G RATIO: 1.2 (ref 1.1–2.2)
ALBUMIN SERPL-MCNC: 3 G/DL (ref 3.4–5)
ALP BLD-CCNC: 57 U/L (ref 40–129)
ALT SERPL-CCNC: 13 U/L (ref 10–40)
ANION GAP SERPL CALCULATED.3IONS-SCNC: 9 MMOL/L (ref 3–16)
AST SERPL-CCNC: 18 U/L (ref 15–37)
BASOPHILS ABSOLUTE: 0.1 K/UL (ref 0–0.2)
BASOPHILS RELATIVE PERCENT: 1.9 %
BILIRUB SERPL-MCNC: 0.3 MG/DL (ref 0–1)
BUN BLDV-MCNC: 15 MG/DL (ref 7–20)
CALCIUM SERPL-MCNC: 7.1 MG/DL (ref 8.3–10.6)
CHLORIDE BLD-SCNC: 110 MMOL/L (ref 99–110)
CO2: 19 MMOL/L (ref 21–32)
CREAT SERPL-MCNC: 1.3 MG/DL (ref 0.6–1.1)
EKG ATRIAL RATE: 108 BPM
EKG ATRIAL RATE: 134 BPM
EKG DIAGNOSIS: NORMAL
EKG DIAGNOSIS: NORMAL
EKG P AXIS: 69 DEGREES
EKG P AXIS: 73 DEGREES
EKG P-R INTERVAL: 134 MS
EKG P-R INTERVAL: 152 MS
EKG Q-T INTERVAL: 284 MS
EKG Q-T INTERVAL: 324 MS
EKG QRS DURATION: 74 MS
EKG QRS DURATION: 78 MS
EKG QTC CALCULATION (BAZETT): 424 MS
EKG QTC CALCULATION (BAZETT): 434 MS
EKG R AXIS: 35 DEGREES
EKG R AXIS: 7 DEGREES
EKG T AXIS: 27 DEGREES
EKG T AXIS: 61 DEGREES
EKG VENTRICULAR RATE: 108 BPM
EKG VENTRICULAR RATE: 134 BPM
EOSINOPHILS ABSOLUTE: 0 K/UL (ref 0–0.6)
EOSINOPHILS RELATIVE PERCENT: 1.2 %
FERRITIN: 35.1 NG/ML (ref 15–150)
GFR SERPL CREATININE-BSD FRML MDRD: 53 ML/MIN/{1.73_M2}
GLUCOSE BLD-MCNC: 105 MG/DL (ref 70–99)
HCT VFR BLD CALC: 32.3 % (ref 36–48)
HEMOGLOBIN: 10.1 G/DL (ref 12–16)
IRON SATURATION: 6 % (ref 15–50)
IRON: 20 UG/DL (ref 37–145)
LYMPHOCYTES ABSOLUTE: 1.2 K/UL (ref 1–5.1)
LYMPHOCYTES RELATIVE PERCENT: 33.7 %
MAGNESIUM: 1.5 MG/DL (ref 1.8–2.4)
MCH RBC QN AUTO: 24.9 PG (ref 26–34)
MCHC RBC AUTO-ENTMCNC: 31.3 G/DL (ref 31–36)
MCV RBC AUTO: 79.5 FL (ref 80–100)
MONOCYTES ABSOLUTE: 0.4 K/UL (ref 0–1.3)
MONOCYTES RELATIVE PERCENT: 12.8 %
NEUTROPHILS ABSOLUTE: 1.8 K/UL (ref 1.7–7.7)
NEUTROPHILS RELATIVE PERCENT: 50.4 %
PDW BLD-RTO: 17.1 % (ref 12.4–15.4)
PLATELET # BLD: 272 K/UL (ref 135–450)
PMV BLD AUTO: 8.3 FL (ref 5–10.5)
POTASSIUM REFLEX MAGNESIUM: 3.5 MMOL/L (ref 3.5–5.1)
RBC # BLD: 4.06 M/UL (ref 4–5.2)
SODIUM BLD-SCNC: 138 MMOL/L (ref 136–145)
TOTAL IRON BINDING CAPACITY: 338 UG/DL (ref 260–445)
TOTAL PROTEIN: 5.6 G/DL (ref 6.4–8.2)
WBC # BLD: 3.5 K/UL (ref 4–11)

## 2023-01-13 PROCEDURE — 96376 TX/PRO/DX INJ SAME DRUG ADON: CPT

## 2023-01-13 PROCEDURE — 6370000000 HC RX 637 (ALT 250 FOR IP): Performed by: FAMILY MEDICINE

## 2023-01-13 PROCEDURE — 97162 PT EVAL MOD COMPLEX 30 MIN: CPT

## 2023-01-13 PROCEDURE — 83550 IRON BINDING TEST: CPT

## 2023-01-13 PROCEDURE — 97116 GAIT TRAINING THERAPY: CPT

## 2023-01-13 PROCEDURE — 83735 ASSAY OF MAGNESIUM: CPT

## 2023-01-13 PROCEDURE — 36415 COLL VENOUS BLD VENIPUNCTURE: CPT

## 2023-01-13 PROCEDURE — G0378 HOSPITAL OBSERVATION PER HR: HCPCS

## 2023-01-13 PROCEDURE — 96361 HYDRATE IV INFUSION ADD-ON: CPT

## 2023-01-13 PROCEDURE — 96372 THER/PROPH/DIAG INJ SC/IM: CPT

## 2023-01-13 PROCEDURE — 85025 COMPLETE CBC W/AUTO DIFF WBC: CPT

## 2023-01-13 PROCEDURE — 2580000003 HC RX 258: Performed by: FAMILY MEDICINE

## 2023-01-13 PROCEDURE — 6360000002 HC RX W HCPCS: Performed by: NURSE PRACTITIONER

## 2023-01-13 PROCEDURE — 96366 THER/PROPH/DIAG IV INF ADDON: CPT

## 2023-01-13 PROCEDURE — 93010 ELECTROCARDIOGRAM REPORT: CPT | Performed by: INTERNAL MEDICINE

## 2023-01-13 PROCEDURE — 83540 ASSAY OF IRON: CPT

## 2023-01-13 PROCEDURE — 96365 THER/PROPH/DIAG IV INF INIT: CPT

## 2023-01-13 PROCEDURE — 6360000002 HC RX W HCPCS: Performed by: FAMILY MEDICINE

## 2023-01-13 PROCEDURE — 82728 ASSAY OF FERRITIN: CPT

## 2023-01-13 PROCEDURE — 6370000000 HC RX 637 (ALT 250 FOR IP): Performed by: NURSE PRACTITIONER

## 2023-01-13 PROCEDURE — 80053 COMPREHEN METABOLIC PANEL: CPT

## 2023-01-13 RX ORDER — HYDROMORPHONE HYDROCHLORIDE 1 MG/ML
1 INJECTION, SOLUTION INTRAMUSCULAR; INTRAVENOUS; SUBCUTANEOUS
Status: COMPLETED | OUTPATIENT
Start: 2023-01-13 | End: 2023-01-13

## 2023-01-13 RX ADMIN — BUSPIRONE HYDROCHLORIDE 15 MG: 5 TABLET ORAL at 08:56

## 2023-01-13 RX ADMIN — MAGNESIUM SULFATE HEPTAHYDRATE 2000 MG: 40 INJECTION, SOLUTION INTRAVENOUS at 09:02

## 2023-01-13 RX ADMIN — SODIUM CHLORIDE, PRESERVATIVE FREE 10 ML: 5 INJECTION INTRAVENOUS at 02:18

## 2023-01-13 RX ADMIN — TIZANIDINE 6 MG: 4 TABLET ORAL at 21:10

## 2023-01-13 RX ADMIN — DIPHENHYDRAMINE HYDROCHLORIDE 25 MG: 25 TABLET ORAL at 13:17

## 2023-01-13 RX ADMIN — HYDROMORPHONE HYDROCHLORIDE 1 MG: 1 INJECTION, SOLUTION INTRAMUSCULAR; INTRAVENOUS; SUBCUTANEOUS at 02:17

## 2023-01-13 RX ADMIN — BUSPIRONE HYDROCHLORIDE 15 MG: 5 TABLET ORAL at 14:35

## 2023-01-13 RX ADMIN — HYDROMORPHONE HYDROCHLORIDE 1 MG: 1 INJECTION, SOLUTION INTRAMUSCULAR; INTRAVENOUS; SUBCUTANEOUS at 09:02

## 2023-01-13 RX ADMIN — PREGABALIN 300 MG: 100 CAPSULE ORAL at 08:56

## 2023-01-13 RX ADMIN — OXYCODONE 5 MG: 5 TABLET ORAL at 19:56

## 2023-01-13 RX ADMIN — SODIUM CHLORIDE: 9 INJECTION, SOLUTION INTRAVENOUS at 13:19

## 2023-01-13 RX ADMIN — HYDROMORPHONE HYDROCHLORIDE 1 MG: 1 INJECTION, SOLUTION INTRAMUSCULAR; INTRAVENOUS; SUBCUTANEOUS at 01:06

## 2023-01-13 RX ADMIN — PANTOPRAZOLE SODIUM 40 MG: 40 TABLET, DELAYED RELEASE ORAL at 08:56

## 2023-01-13 RX ADMIN — DULOXETINE HYDROCHLORIDE 60 MG: 60 CAPSULE, DELAYED RELEASE ORAL at 08:57

## 2023-01-13 RX ADMIN — ENOXAPARIN SODIUM 40 MG: 100 INJECTION SUBCUTANEOUS at 08:57

## 2023-01-13 RX ADMIN — TIZANIDINE 6 MG: 4 TABLET ORAL at 01:05

## 2023-01-13 RX ADMIN — HYDRALAZINE HYDROCHLORIDE 25 MG: 25 TABLET, FILM COATED ORAL at 21:02

## 2023-01-13 RX ADMIN — DIPHENHYDRAMINE HYDROCHLORIDE 25 MG: 25 TABLET ORAL at 06:19

## 2023-01-13 RX ADMIN — PANTOPRAZOLE SODIUM 40 MG: 40 TABLET, DELAYED RELEASE ORAL at 21:01

## 2023-01-13 RX ADMIN — HYDROXYZINE HYDROCHLORIDE 50 MG: 25 TABLET, FILM COATED ORAL at 08:57

## 2023-01-13 RX ADMIN — SODIUM CHLORIDE: 9 INJECTION, SOLUTION INTRAVENOUS at 06:22

## 2023-01-13 RX ADMIN — BUSPIRONE HYDROCHLORIDE 15 MG: 5 TABLET ORAL at 21:02

## 2023-01-13 RX ADMIN — TIZANIDINE 6 MG: 4 TABLET ORAL at 09:09

## 2023-01-13 RX ADMIN — SODIUM CHLORIDE: 9 INJECTION, SOLUTION INTRAVENOUS at 21:21

## 2023-01-13 RX ADMIN — PREGABALIN 300 MG: 100 CAPSULE ORAL at 21:01

## 2023-01-13 RX ADMIN — OXYCODONE 5 MG: 5 TABLET ORAL at 06:19

## 2023-01-13 RX ADMIN — HYDROMORPHONE HYDROCHLORIDE 1 MG: 1 INJECTION, SOLUTION INTRAMUSCULAR; INTRAVENOUS; SUBCUTANEOUS at 13:17

## 2023-01-13 RX ADMIN — HYDROXYZINE HYDROCHLORIDE 50 MG: 25 TABLET, FILM COATED ORAL at 21:02

## 2023-01-13 ASSESSMENT — PAIN DESCRIPTION - LOCATION
LOCATION: GENERALIZED
LOCATION: BACK
LOCATION: BACK;NECK;SHOULDER
LOCATION: BACK;SHOULDER
LOCATION: BACK;NECK
LOCATION: GENERALIZED
LOCATION: BACK;NECK;SHOULDER
LOCATION: GENERALIZED
LOCATION: GENERALIZED

## 2023-01-13 ASSESSMENT — PAIN SCALES - GENERAL
PAINLEVEL_OUTOF10: 9
PAINLEVEL_OUTOF10: 8
PAINLEVEL_OUTOF10: 9
PAINLEVEL_OUTOF10: 8
PAINLEVEL_OUTOF10: 9
PAINLEVEL_OUTOF10: 8

## 2023-01-13 ASSESSMENT — PAIN DESCRIPTION - DESCRIPTORS
DESCRIPTORS: ACHING
DESCRIPTORS: ACHING;SHARP;BURNING
DESCRIPTORS: ACHING;SHARP;BURNING
DESCRIPTORS: BURNING;SHARP

## 2023-01-13 ASSESSMENT — PAIN DESCRIPTION - PAIN TYPE: TYPE: INTRACTABLE PAIN

## 2023-01-13 ASSESSMENT — PAIN - FUNCTIONAL ASSESSMENT: PAIN_FUNCTIONAL_ASSESSMENT: PREVENTS OR INTERFERES SOME ACTIVE ACTIVITIES AND ADLS

## 2023-01-13 ASSESSMENT — PAIN DESCRIPTION - ORIENTATION
ORIENTATION: LOWER

## 2023-01-13 ASSESSMENT — PAIN DESCRIPTION - ONSET: ONSET: ON-GOING

## 2023-01-13 ASSESSMENT — PAIN DESCRIPTION - FREQUENCY: FREQUENCY: CONTINUOUS

## 2023-01-13 NOTE — PROGRESS NOTES
[facility] Department   Phone: [phone]    Physical Therapy    [x] Initial Evaluation            [] Daily Treatment Note         [] Discharge Summary      Patient: Charanjit Robbins   : 1980   MRN: [id number]   Date of Service:  2023  Admitting Diagnosis: SLE exacerbation ([facility] [address]  Current Admission Summary: Charanjit Robbins is a 38 y.o. female who presents to the emergency department with complaints of generalized pain since last night. Patient reports that pain got worse today. She ran out of Lyrica and Zanaflex, which she takes for chronic lupus pain. She got the prescription sent to her pharmacy but has not filled the prescription yet because she had to work today and these medications tend to sedate her. She rates her generalized pain to be a 10 out of 10 on pain scale. She feels little dizzy and nauseated. Denies chest pain, shortness of breath, abdominal pain, vomiting or acute urinary symptoms. She is tearful and appears very anxious. Past Medical History:  has a past medical history of Acute asthma exacerbation, JUAN (acute kidney injury) ([address] Anxiety, Asthma, Constipation, Conversion disorder, Depression, Fibromyalgia, Focal motor deficit, Graves disease, History of blood transfusion, Hx of blood clots, Hypertension, Hyperthyroidism, Joint pain, Kidney stone, Localized rash, Lupus ([facility] [address] Lupus arthritis ([facility] [address] MDRO (multiple drug resistant organisms) resistance, Nephroureterolithiasis, Paresthesia of right arm and leg, PONV (postoperative nausea and vomiting), Right sided weakness, Shortness of breath, and Yeast vaginitis. Past Surgical History:  has a past surgical history that includes Tubal ligation;  section; Tonsillectomy; other surgical history (2016); and Cystoscopy (2018). Discharge Recommendations: Charanjit Robbins scored a 18/24 on the AM-PAC short mobility form.  Current research shows that an AM-PAC score of 18 or greater is typically associated with a discharge to the patient's home setting. Based on the patient's AM-PAC score and their current functional mobility deficits, it is recommended that the patient have 2-3 sessions per week of Physical Therapy at d/c to increase the patient's independence. At this time, this patient demonstrates the endurance and safety to discharge home with outpatient services  and a follow up treatment frequency of 2-3x/wk. Please see assessment section for further patient specific details. If patient discharges prior to next session this note will serve as a discharge summary. Please see below for the latest assessment towards goals. DME Required For Discharge: rolling walker  Precautions/Restrictions: high fall risk  Weight Bearing Restrictions: no restrictions  [] Right Upper Extremity  [] Left Upper Extremity [] Right Lower Extremity  [] Left Lower Extremity     Required Braces/Orthotics: no braces required   [] Right  [] Left  Positional Restrictions:no positional restrictions    Pre-Admission Information   Lives With:  Alone with adult children     Type of Home:  [facility]  Home Layout: two level  Home Access:  12 step to enter with handrail. Handrails are located on right side. Bathroom Layout: tub only  Bathroom Equipment:  none  Toilet Height: standard height  Home Equipment:  none  Transfer Assistance: Independent without use of device  Ambulation Assistance:Independent without use of device  ADL Assistance: independent with all ADL's  IADL Assistance: independent with homemaking tasks *kids can help   Active :        [x] Yes  [] No  Hand Dominance: [] Left  [x] Right  Current Employment: full time employment.   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 Pt agreeable to therapy   Pain: 8/10. Location: everywhere  Pain Interventions: Manual massage to BLE, K pad in place, RN aware       Functional Mobility  Bed Mobility  Supine to Sit: Independent  Sit to Supine: Independent  Comments:  Transfers  Sit to stand transfer: stand by assistance  Stand to sit transfer: stand by assistance  Comments:  Ambulation  Surface:level surface  Assistive Device: standard walker  Assistance: contact guard assistance  Distance: 50 feet  Gait Mechanics: dec step length, dec gait speed, heavy WB on UEs on walker to LE pain   Comments:    Stair Mobility  Stair mobility not completed on this date. Comments:  Wheelchair Mobility:  No w/c mobility completed on this date. Comments:  Balance  Static Sitting Balance: good: independent with functional balance in unsupported position  Dynamic Sitting Balance: fair (+): maintains balance at SBA/supervision without use of UE support  Static Standing Balance: fair (+): maintains balance at SBA/supervision without use of UE support  Dynamic Standing Balance: fair: maintains balance at CGA without use of UE support  Comments:    Other Therapeutic Interventions  BLE massage to decrease pain. Pt performed ADL's standing for ~5 minutes SBA    Functional Outcomes  AM-PAC Inpatient Mobility Raw Score : 18              Cognition  WFL  Orientation:    alert and oriented x 4  Command Following:   [facility]    Education  Barriers To Learning: none  Patient Education: patient educated on goals, PT role and benefits, plan of care, discharge recommendations  Learning Assessment:  patient verbalizes and demonstrates understanding    Assessment  Activity Tolerance: Pt has decreased activity tolerance due to medication and overall fatigue.  Pt needed multiple standing rest breaks during ambulation   Impairments Requiring Therapeutic Intervention: decreased functional mobility, decreased endurance, decreased balance  Prognosis: good  Clinical Assessment: Pt has decreased endurance and strength due to overall condition. Pt will benefit from therapy during hospitla stay in order to regain strength and get back to baseline. Pt needed periodic standing rest breaks in order to complete short distance ambulation due to pain level. Upon discharge, outpatient therapy is reccommended to achieve baseline   Safety Interventions: patient left in bed, bed alarm in place, call light within reach, and nurse notified    Plan  Frequency: 3-5 x/per week  Current Treatment Recommendations: strengthening, balance training, functional mobility training, endurance training, and pain management    Goals  Patient Goals: To discharge to home   Short Term Goals:  Time Frame: discharge  Patient will complete bed mobility at Independent   Patient will complete transfers at Independent   Patient will ambulate 150 ft with use of no device at Independent  Patient will ascend/descend 12 stairs with (R) ascending handrail at [address]   Time In 1348       Time Out 1420       Minutes 32         Timed Code Treatment Minutes:  17 Minutes  Total Treatment Minutes:  32       Electronically Signed By: Judson Solis, PT  Mariaa Garcia SPT    I agree with the above note. PT directly observed the SPT with the patient.   Andrey Gabriel DPT [id number]

## 2023-01-13 NOTE — PROGRESS NOTES
Vitals:    01/12/23 2119   BP: (!) 163/96   Pulse: (!) 114   Resp: 18   Temp: 98.3 °F (36.8 °C)   SpO2: 98%      Message sent to hospitalist  pt here for SLE exacerbation; pain out of control, applied warm blanket & looking heating pad (at pt request). pt does have telemetry orders; PRN dialaudid not due for 30 minutes;  please consider adding add'l PRN & tele orders. Waiting on pharmacy to verify other medications. Thank you. Addendum @ 0100: pt stated pain goal is \"3 with aching only, instead of this burning and stabbing pain. \"  PRN medications given per MAR; heating pad applied at pt request; visitor at bedside for emotional support. Pt's pain goal is not met. Pt continues to be in pain rated between 8-9 on scale 0-10. Pt is not steady on feet. Pt is a contact guard assist due to trembling and swelling related to pain. At baseline, pt ambulates without assistive devices. Pt is only able to ambulate from bed to commode due to pain. Addendum @ [phone]:  pt remains tearful due to pain; pain unchanged; secure message sent to hospitalist.  Formed BM - no diarrhea    Addendum @ 0400: pt sleeping; friend support at bedside.

## 2023-01-13 NOTE — PLAN OF CARE
Patient admitted from ED this evening, uncontrolled pain 8's and 9's/10.        Problem: Discharge Planning  Goal: Discharge to home or other facility with appropriate resources  Outcome: Progressing     Problem: Pain  Goal: Verbalizes/displays adequate comfort level or baseline comfort level  Outcome: Progressing     Problem: ABCDS Injury Assessment  Goal: Absence of physical injury  Outcome: Progressing     Problem: Safety - Adult  Goal: Free from fall injury  Outcome: Progressing

## 2023-01-13 NOTE — PLAN OF CARE
Pt updated on current plan of care, pt stating she has generalized 8/10 during morning assessment, PRN diluadid given.   Problem: Discharge Planning  Goal: Discharge to home or other facility with appropriate resources  1/13/2023 1043 by Briseyda Chaparro RN  Outcome: Progressing  Flowsheets (Taken 1/13/2023 0855)  Discharge to home or other facility with appropriate resources: Identify barriers to discharge with patient and caregiver     Problem: Pain  Goal: Verbalizes/displays adequate comfort level or baseline comfort level  1/13/2023 1043 by Briseyda Chaparro RN  Outcome: Progressing     Problem: ABCDS Injury Assessment  Goal: Absence of physical injury  1/13/2023 1043 by Briseyda Chaparro RN  Outcome: Progressing     Problem: Safety - Adult  Goal: Free from fall injury  1/13/2023 1043 by Briseyda Chaparro RN  Outcome: Progressing

## 2023-01-13 NOTE — PROGRESS NOTES
Hospitalist Progress Note      PCP: LEESA Tejeda - CNP    Date of Admission: 1/12/2023    Hospital Course:     313   46-year-old female medical history significant for lupus chronic pain GERD HTN. Presents with generalized pain concern for possible lupus flare. 1/13 slightly better, will try advancing diet. A/P:     1. Generalized pain concern for lupus exacerbation: cont pain control cont IV hydration. 2. hypertensive urgency: hx htn, current elevations suspected to be related to pain. 3. N&V: cont antiemetic, CLD for now     4. Microcytosis check iron levels     CODE STATUS: Full code     DVT prophylaxis: Lovenox subcu     Transition of care Home     Anticipated date of discharge: Possibly 24 hours. 24 care plan:     Pain control IV fluids antiemetics clear liquid diet. Advance diet as today, possibly discharge early tomorrow.        Medications:  Reviewed    Infusion Medications    sodium chloride 125 mL/hr at 01/13/23 1319    sodium chloride       Scheduled Medications    sodium chloride flush  5-40 mL IntraVENous 2 times per day    enoxaparin  40 mg SubCUTAneous Daily    amLODIPine  10 mg Oral Daily    busPIRone  15 mg Oral TID    DULoxetine  60 mg Oral Daily    hydrALAZINE  25 mg Oral 3 times per day    hydrOXYzine HCl  50 mg Oral BID    pantoprazole  40 mg Oral BID    pregabalin  300 mg Oral BID     PRN Meds: HYDROmorphone, promethazine, sodium chloride flush, sodium chloride, ondansetron **OR** ondansetron, polyethylene glycol, acetaminophen **OR** acetaminophen, potassium chloride, magnesium sulfate, hydrALAZINE, tiZANidine, oxyCODONE, diphenhydrAMINE      Intake/Output Summary (Last 24 hours) at 1/13/2023 1410  Last data filed at 1/13/2023 0715  Gross per 24 hour   Intake 1017.44 ml   Output --   Net 1017.44 ml       Exam:    /63   Pulse 90   Temp 98.6 °F (37 °C) (Oral)   Resp 18   Ht 5' 7\" (1.702 m)   Wt 195 lb 3.2 oz (88.5 kg)   SpO2 95%   BMI 30.57 kg/m² General appearance: No apparent distress, appears stated age and cooperative. HEENT: Pupils equal, round, and reactive to light. Conjunctivae/corneas clear. Neck: Supple, with full range of motion. No jugular venous distention. Trachea midline. Respiratory:  Normal respiratory effort. Clear to auscultation, bilaterally without Rales/Wheezes/Rhonchi. Cardiovascular: Regular rate and rhythm with normal S1/S2 without murmurs, rubs or gallops. Abdomen: Soft, non-tender, non-distended with normal bowel sounds. Musculoskeletal: No clubbing, cyanosis or edema bilaterally. Full range of motion without deformity. Skin: Skin color, texture, turgor normal.  No rashes or lesions. Neurologic:  Neurovascularly intact without any focal sensory/motor deficits. Cranial nerves: II-XII intact, grossly non-focal.  Psychiatric: Alert and oriented, thought content appropriate, normal insight  Capillary Refill: Brisk,< 3 seconds   Peripheral Pulses: +2 palpable, equal bilaterally       Labs:   Recent Labs     01/12/23  1441 01/13/23  0635   WBC 6.8 3.5*   HGB 12.9 10.1*   HCT 41.2 32.3*    272     Recent Labs     01/12/23  1441 01/13/23  0635    138   K 3.8 3.5    110   CO2 23 19*   BUN 17 15   CREATININE 0.8 1.3*   CALCIUM 8.7 7.1*     Recent Labs     01/12/23  1441 01/13/23  0635   AST 26 18   ALT 20 13   BILITOT 0.7 0.3   ALKPHOS 99 57     No results for input(s): INR in the last 72 hours.   Recent Labs     01/12/23 1441   TROPONINI <0.01       Urinalysis:      Lab Results   Component Value Date/Time    NITRU Negative 02/15/2021 12:14 AM    WBCUA 51 02/15/2021 12:14 AM    BACTERIA 4+ 02/15/2021 12:14 AM    RBCUA 5 02/15/2021 12:14 AM    BLOODU SMALL 02/15/2021 12:14 AM    SPECGRAV 1.013 02/15/2021 12:14 AM    GLUCOSEU Negative 02/15/2021 12:14 AM       Radiology:  XR CHEST PORTABLE   Final Result   No acute cardiopulmonary findings                 Assessment/Plan:    Active Hospital Problems    Diagnosis Date Noted    SLE exacerbation ([facility] [M32.9] 01/12/2023     Priority: [address], DO

## 2023-01-13 NOTE — ED NOTES
Gave report to RN, Pt being transported by transport with infusion still running      [employer], RN  01/12/23 7336

## 2023-01-14 VITALS
TEMPERATURE: 97.8 F | RESPIRATION RATE: 18 BRPM | HEIGHT: 67 IN | DIASTOLIC BLOOD PRESSURE: 73 MMHG | WEIGHT: 202.6 LBS | OXYGEN SATURATION: 95 % | SYSTOLIC BLOOD PRESSURE: 113 MMHG | HEART RATE: 90 BPM | BODY MASS INDEX: 31.8 KG/M2

## 2023-01-14 LAB
A/G RATIO: 1.2 (ref 1.1–2.2)
ALBUMIN SERPL-MCNC: 3.3 G/DL (ref 3.4–5)
ALP BLD-CCNC: 66 U/L (ref 40–129)
ALT SERPL-CCNC: 18 U/L (ref 10–40)
ANION GAP SERPL CALCULATED.3IONS-SCNC: 10 MMOL/L (ref 3–16)
AST SERPL-CCNC: 25 U/L (ref 15–37)
BASOPHILS ABSOLUTE: 0 K/UL (ref 0–0.2)
BASOPHILS RELATIVE PERCENT: 0.4 %
BILIRUB SERPL-MCNC: <0.2 MG/DL (ref 0–1)
BUN BLDV-MCNC: 12 MG/DL (ref 7–20)
CALCIUM SERPL-MCNC: 7.6 MG/DL (ref 8.3–10.6)
CHLORIDE BLD-SCNC: 110 MMOL/L (ref 99–110)
CO2: 18 MMOL/L (ref 21–32)
CREAT SERPL-MCNC: 1 MG/DL (ref 0.6–1.1)
EOSINOPHILS ABSOLUTE: 0.2 K/UL (ref 0–0.6)
EOSINOPHILS RELATIVE PERCENT: 3.8 %
GFR SERPL CREATININE-BSD FRML MDRD: >60 ML/MIN/{1.73_M2}
GLUCOSE BLD-MCNC: 117 MG/DL (ref 70–99)
HCT VFR BLD CALC: 32.1 % (ref 36–48)
HEMOGLOBIN: 10.1 G/DL (ref 12–16)
LYMPHOCYTES ABSOLUTE: 1.6 K/UL (ref 1–5.1)
LYMPHOCYTES RELATIVE PERCENT: 33.4 %
MAGNESIUM: 1.9 MG/DL (ref 1.8–2.4)
MCH RBC QN AUTO: 24.9 PG (ref 26–34)
MCHC RBC AUTO-ENTMCNC: 31.4 G/DL (ref 31–36)
MCV RBC AUTO: 79.3 FL (ref 80–100)
MONOCYTES ABSOLUTE: 0.7 K/UL (ref 0–1.3)
MONOCYTES RELATIVE PERCENT: 14.3 %
NEUTROPHILS ABSOLUTE: 2.3 K/UL (ref 1.7–7.7)
NEUTROPHILS RELATIVE PERCENT: 48.1 %
PDW BLD-RTO: 17 % (ref 12.4–15.4)
PLATELET # BLD: 260 K/UL (ref 135–450)
PMV BLD AUTO: 7.4 FL (ref 5–10.5)
POTASSIUM REFLEX MAGNESIUM: 4 MMOL/L (ref 3.5–5.1)
RBC # BLD: 4.05 M/UL (ref 4–5.2)
SODIUM BLD-SCNC: 138 MMOL/L (ref 136–145)
TOTAL PROTEIN: 6.1 G/DL (ref 6.4–8.2)
WBC # BLD: 4.8 K/UL (ref 4–11)

## 2023-01-14 PROCEDURE — 80053 COMPREHEN METABOLIC PANEL: CPT

## 2023-01-14 PROCEDURE — 96372 THER/PROPH/DIAG INJ SC/IM: CPT

## 2023-01-14 PROCEDURE — G0378 HOSPITAL OBSERVATION PER HR: HCPCS

## 2023-01-14 PROCEDURE — 83735 ASSAY OF MAGNESIUM: CPT

## 2023-01-14 PROCEDURE — 1200000000 HC SEMI PRIVATE

## 2023-01-14 PROCEDURE — 85025 COMPLETE CBC W/AUTO DIFF WBC: CPT

## 2023-01-14 PROCEDURE — 36415 COLL VENOUS BLD VENIPUNCTURE: CPT

## 2023-01-14 PROCEDURE — 96376 TX/PRO/DX INJ SAME DRUG ADON: CPT

## 2023-01-14 PROCEDURE — 97535 SELF CARE MNGMENT TRAINING: CPT

## 2023-01-14 PROCEDURE — 97165 OT EVAL LOW COMPLEX 30 MIN: CPT

## 2023-01-14 PROCEDURE — 96361 HYDRATE IV INFUSION ADD-ON: CPT

## 2023-01-14 PROCEDURE — 6370000000 HC RX 637 (ALT 250 FOR IP): Performed by: FAMILY MEDICINE

## 2023-01-14 PROCEDURE — 6360000002 HC RX W HCPCS: Performed by: FAMILY MEDICINE

## 2023-01-14 PROCEDURE — 6370000000 HC RX 637 (ALT 250 FOR IP): Performed by: NURSE PRACTITIONER

## 2023-01-14 PROCEDURE — 2580000003 HC RX 258: Performed by: FAMILY MEDICINE

## 2023-01-14 RX ORDER — OXYCODONE HYDROCHLORIDE 5 MG/1
5 TABLET ORAL 3 TIMES DAILY PRN
Qty: 9 TABLET | Refills: 0 | Status: SHIPPED | OUTPATIENT
Start: 2023-01-14 | End: 2023-01-17

## 2023-01-14 RX ADMIN — BUSPIRONE HYDROCHLORIDE 15 MG: 5 TABLET ORAL at 08:32

## 2023-01-14 RX ADMIN — OXYCODONE 5 MG: 5 TABLET ORAL at 05:14

## 2023-01-14 RX ADMIN — PANTOPRAZOLE SODIUM 40 MG: 40 TABLET, DELAYED RELEASE ORAL at 08:32

## 2023-01-14 RX ADMIN — HYDROMORPHONE HYDROCHLORIDE 1 MG: 1 INJECTION, SOLUTION INTRAMUSCULAR; INTRAVENOUS; SUBCUTANEOUS at 03:10

## 2023-01-14 RX ADMIN — OXYCODONE 5 MG: 5 TABLET ORAL at 12:25

## 2023-01-14 RX ADMIN — HYDRALAZINE HYDROCHLORIDE 25 MG: 25 TABLET, FILM COATED ORAL at 05:12

## 2023-01-14 RX ADMIN — PREGABALIN 300 MG: 100 CAPSULE ORAL at 08:32

## 2023-01-14 RX ADMIN — HYDROMORPHONE HYDROCHLORIDE 1 MG: 1 INJECTION, SOLUTION INTRAMUSCULAR; INTRAVENOUS; SUBCUTANEOUS at 09:29

## 2023-01-14 RX ADMIN — DULOXETINE HYDROCHLORIDE 60 MG: 60 CAPSULE, DELAYED RELEASE ORAL at 08:32

## 2023-01-14 RX ADMIN — HYDROXYZINE HYDROCHLORIDE 50 MG: 25 TABLET, FILM COATED ORAL at 08:32

## 2023-01-14 RX ADMIN — SODIUM CHLORIDE: 9 INJECTION, SOLUTION INTRAVENOUS at 05:08

## 2023-01-14 RX ADMIN — TIZANIDINE 6 MG: 4 TABLET ORAL at 06:44

## 2023-01-14 RX ADMIN — ENOXAPARIN SODIUM 40 MG: 100 INJECTION SUBCUTANEOUS at 08:31

## 2023-01-14 RX ADMIN — AMLODIPINE BESYLATE 10 MG: 5 TABLET ORAL at 08:32

## 2023-01-14 ASSESSMENT — PAIN SCALES - GENERAL
PAINLEVEL_OUTOF10: 7
PAINLEVEL_OUTOF10: 7
PAINLEVEL_OUTOF10: 6
PAINLEVEL_OUTOF10: 7
PAINLEVEL_OUTOF10: 9
PAINLEVEL_OUTOF10: 8
PAINLEVEL_OUTOF10: 10
PAINLEVEL_OUTOF10: 8

## 2023-01-14 ASSESSMENT — PAIN DESCRIPTION - LOCATION
LOCATION: GENERALIZED
LOCATION: NECK;LEG
LOCATION: GENERALIZED
LOCATION: GENERALIZED
LOCATION: LEG
LOCATION: GENERALIZED

## 2023-01-14 ASSESSMENT — PAIN - FUNCTIONAL ASSESSMENT
PAIN_FUNCTIONAL_ASSESSMENT: PREVENTS OR INTERFERES SOME ACTIVE ACTIVITIES AND ADLS
PAIN_FUNCTIONAL_ASSESSMENT: ACTIVITIES ARE NOT PREVENTED

## 2023-01-14 ASSESSMENT — PAIN DESCRIPTION - DESCRIPTORS
DESCRIPTORS: CRAMPING
DESCRIPTORS: ACHING;BURNING
DESCRIPTORS: ACHING;BURNING;SHARP

## 2023-01-14 ASSESSMENT — PAIN DESCRIPTION - ORIENTATION: ORIENTATION: LOWER

## 2023-01-14 ASSESSMENT — PAIN DESCRIPTION - FREQUENCY: FREQUENCY: CONTINUOUS

## 2023-01-14 ASSESSMENT — PAIN DESCRIPTION - ONSET: ONSET: ON-GOING

## 2023-01-14 ASSESSMENT — PAIN DESCRIPTION - PAIN TYPE
TYPE: INTRACTABLE PAIN
TYPE: INTRACTABLE PAIN

## 2023-01-14 NOTE — DISCHARGE SUMMARY
Pt discharge instructions given with verbal teach back and understanding. IV and tele removed. Pt discharged to lobby with all patient belongings with no complications.

## 2023-01-14 NOTE — PLAN OF CARE
Problem: Discharge Planning  Goal: Discharge to home or other facility with appropriate resources  Outcome: Progressing     Problem: Pain  Goal: Verbalizes/displays adequate comfort level or baseline comfort level  Outcome: Progressing  Flowsheets  Taken 1/14/2023 0310  Verbalizes/displays adequate comfort level or baseline comfort level:   Encourage patient to monitor pain and request assistance   Assess pain using appropriate pain scale   Administer analgesics based on type and severity of pain and evaluate response   Implement non-pharmacological measures as appropriate and evaluate response  Taken 1/13/2023 2110  Verbalizes/displays adequate comfort level or baseline comfort level:   Implement non-pharmacological measures as appropriate and evaluate response   Consider cultural and social influences on pain and pain management   Administer analgesics based on type and severity of pain and evaluate response   Assess pain using appropriate pain scale   Encourage patient to monitor pain and request assistance     Problem: ABCDS Injury Assessment  Goal: Absence of physical injury  Outcome: Progressing     Problem: Safety - Adult  Goal: Free from fall injury  Outcome: Progressing

## 2023-01-14 NOTE — PROGRESS NOTES
[facility],[address] Msb Department   Phone: [phone]    Occupational Therapy    [x] Initial Evaluation            [] Daily Treatment Note         [] Discharge Summary      Patient: Mark Zhang   : 1980   MRN: [id number]   Date of Service:  2023    Admitting Diagnosis:  SLE exacerbation ([facility] [address]  Current Admission Summary: Mark Zhang is a 38 y.o. female who presents to the emergency department with complaints of generalized pain since last night. Patient reports that pain got worse today. She ran out of Lyrica and Zanaflex, which she takes for chronic lupus pain. She got the prescription sent to her pharmacy but has not filled the prescription yet because she had to work today and these medications tend to sedate her. She rates her generalized pain to be a 10 out of 10 on pain scale. She feels little dizzy and nauseated. Denies chest pain, shortness of breath, abdominal pain, vomiting or acute urinary symptoms. She is tearful and appears very anxious. Past Medical History:  has a past medical history of Acute asthma exacerbation, JUAN (acute kidney injury) ([facility] [address] Anxiety, Asthma, Constipation, Conversion disorder, Depression, Fibromyalgia, Focal motor deficit, Graves disease, History of blood transfusion, Hx of blood clots, Hypertension, Hyperthyroidism, Joint pain, Kidney stone, Localized rash, Lupus ([facility] [address] Lupus arthritis ([facility] [address] MDRO (multiple drug resistant organisms) resistance, Nephroureterolithiasis, Paresthesia of right arm and leg, PONV (postoperative nausea and vomiting), Right sided weakness, Shortness of breath, and Yeast vaginitis. Past Surgical History:  has a past surgical history that includes Tubal ligation;  section; Tonsillectomy; other surgical history (2016); and Cystoscopy (2018). Discharge Recommendations: Mark Zhang scored a 21/24 on the AM-PAC ADL Inpatient form.  Current research shows that an AM-PAC score of 18 or greater is typically associated with a discharge to the patient's home setting. Based on the patient's AM-PAC score, and their current ADL deficits, it is recommended that the patient have 2-3 sessions per week of Occupational Therapy at d/c to increase the patient's independence. At this time, this patient demonstrates the endurance and safety to discharge home with [facility] and a follow up treatment frequency of 2-3x/wk. Please see assessment section for further patient specific details. If patient discharges prior to next session this note will serve as a discharge summary. Please see below for the latest assessment towards goals. DME Required For Discharge: tub transfer bench, hand-held shower head    Precautions/Restrictions: high fall risk  Weight Bearing Restrictions: weight bearing as tolerated  [] Right Upper Extremity  [] Left Upper Extremity [] Right Lower Extremity  [] Left Lower Extremity     Required Braces/Orthotics: no braces required   [] Right  [] Left  Positional Restrictions:no positional restrictions    Pre-Admission Information   Lives With:  Alone with adult children  - states she will stay at her boyfriends how initially                        Type of Home:  [facility]  Home Layout: two level  Home Access:  12 step to enter with handrail. Handrails are located on right side. Bathroom Layout: tub only  Bathroom Equipment:  none  Toilet Height: standard height  Home Equipment:  none  Transfer Assistance: Independent without use of device  Ambulation Assistance:Independent without use of device  ADL Assistance: independent with all ADL's  IADL Assistance: independent with homemaking tasks *kids can help   Active :        [x] Yes                 [] No  Hand Dominance: [] Left                 [x] Right  Current Employment: full time employment.   Occupation: [employer]  Hobbies: writing book and starting a boys clothing   Recent Falls: none    Examination Vision:   Vision Gross Assessment: Impaired and Vision Corrective Device: wears contacts  Hearing:   WFL  Perception:   WFL  Observation:   General Observation:  in bed on arrival   Posture:   Good, forward flexed posture with ambulation   Sensation:   WFL  Proprioception:    WFL  Tone:   Normotonic  Coordination Testing:   WFL  Finger to Nose: WFL  Finger/Thumb Opposition: WFL    ROM:   (B) UE AROM WFL  Strength:   (B) UE strength grossly WFL    Therapist Clinical Decision Making (Complexity): low complexity  Clinical Presentation: stable      Subjective  General: pt in bed on arrival - agreeable to evaluation -   Pain: 8/10. Location: upper back, neck, legs   Pain Interventions: RN notified of patient request for pain medication        Activities of Daily Living  Basic Activities of Daily Living  Grooming: stand by assistance  Toileting: contact guard assistance. Toileting Equipment: none  Toileting Comments: use of RW and grab bar   Instrumental Activities of Daily Living  No IADL completed on this date. Functional Mobility  Bed Mobility  Supine to Sit: supervision  Rolling Right: supervision  Scooting: supervision  Comments: HOB slightly elevated   Transfers  Sit to stand transfer:contact guard assistance  Stand to sit transfer: contact guard assistance  Toilet transfer: contact guard assistance  Toilet transfer equipment: standard toilet, grab bars  Toilet transfer comments: mild unsteadiness with approach to toilet, no LOB   Comments:  Functional Mobility:  Sitting Balance: contact guard assistance. Standing Balance: contact guard assistance.     Functional Mobility: .  contact guard assistance  Functional Mobility Activity: to/from bathroom  Functional Mobility Device Use: rolling walker  Functional Mobility Comment: good safety awareness with RW     Other Therapeutic Interventions    Functional Outcomes       Cognition  WFL  Orientation:    alert and oriented x 4  Command Following: WFL     Education  Barriers To Learning: none  Patient Education: patient educated on OT role and benefits, plan of care, ADL adaptive strategies, energy conservation, disease specific education, discharge recommendations  Learning Assessment:  patient verbalizes and demonstrates understanding    Assessment  Activity Tolerance: good, limited by pain   Impairments Requiring Therapeutic Intervention: decreased functional mobility, decreased ADL status, decreased strength, decreased endurance  Prognosis: good  Clinical Assessment: Patient presents below baseline function secondary to lupus exacerbation. Patient will benefit from OT services to address the above deficits. Patient is typically indep with ADLs and functional mobility. Patient is primarily limited by decreased strength, endurance and balance. Patient will benefit from OT services to maximize safety and independence in ADLs, transfers and functional mobility prior to returning home. Patient currently requiring assistance : CGA/min A ADLs and CGA with RW for functional mobility.     Safety Interventions: patient left in chair, chair alarm in place, call light within reach, and nurse notified    Plan  Frequency: 3-5 x/per week  Current Treatment Recommendations: strengthening, balance training, functional mobility training, transfer training, and ADL/self-care training    Goals  Patient Goals: to return home    Short Term Goals:  Time Frame: discharge   Patient will complete upper body ADL at supervision   Patient will complete lower body ADL at supervision   Patient will complete toileting at supervision   Patient will complete grooming at supervision   Patient will complete functional transfers at supervision   Patient will complete functional mobility at supervision     Therapy Session Time     Individual Group Co-treatment   Time In  1134      Time Out  1203      Minutes  29           Timed Code Treatment Minutes:   24  Total Treatment Minutes:  29 Electronically Signed By: Josefa Leigh, OT

## 2023-01-14 NOTE — DISCHARGE SUMMARY
Hospital Medicine Discharge Summary    Patient ID: Lesley Slater      Patient's PCP: LEESA Stearns - CNP    Admit Date: 1/12/2023     Discharge Date:   1/14/2023    Admitting Physician: Donald Saeed DO     Discharge Physician: Donald Saeed DO     Discharge Diagnoses: Active Hospital Problems    Diagnosis Date Noted    SLE exacerbation ([address] [M32.9] 01/12/2023     Priority: Medium       The patient was seen and examined on day of discharge and this discharge summary is in conjunction with any daily progress note from day of discharge. Hospital Course:     53-year-old female medical history significant for lupus chronic arthritis chronic pain GERD hypertension. Presented with generalized pain concern for possible SLE exacerbation she had run out of her medications. She was treated with IV hydration and pain control antiemetics her diet was advanced the patient proved over the course of her stay will be discharged home close outpatient follow-up with her rheumatologist as recommended. Exam:     BP (!) 158/97   Pulse 87   Temp 98 °F (36.7 °C) (Oral)   Resp 18   Ht 5' 7\" (1.702 m)   Wt 202 lb 9.6 oz (91.9 kg)   SpO2 97%   BMI 31.73 kg/m²       General appearance:  No apparent distress, appears stated age and cooperative. HEENT:  Normal cephalic, atraumatic without obvious deformity. Pupils equal, round, and reactive to light. Extra ocular muscles intact. Conjunctivae/corneas clear. Neck: Supple, with full range of motion. No jugular venous distention. Trachea midline. Respiratory:  Normal respiratory effort. Clear to auscultation, bilaterally without Rales/Wheezes/Rhonchi. Cardiovascular:  Regular rate and rhythm with normal S1/S2 without murmurs, rubs or gallops. Abdomen: Soft, non-tender, non-distended with normal bowel sounds. Musculoskeletal:  No clubbing, cyanosis or edema bilaterally. Full range of motion without deformity.   Skin: Skin color, texture, turgor normal.  No rashes or lesions. Neurologic:  Neurovascularly intact without any focal sensory/motor deficits. Cranial nerves: II-XII intact, grossly non-focal.  Psychiatric:  Alert and oriented, thought content appropriate, normal insight  Capillary Refill: Brisk,< 3 seconds   Peripheral Pulses: +2 palpable, equal bilaterally       Labs: For convenience and continuity at follow-up the following most recent labs are provided:      CBC:    Lab Results   Component Value Date/Time    WBC 4.8 01/14/2023 05:20 AM    HGB 10.1 01/14/2023 05:20 AM    HCT 32.1 01/14/2023 05:20 AM     01/14/2023 05:20 AM       Renal:    Lab Results   Component Value Date/Time     01/14/2023 05:20 AM    K 4.0 01/14/2023 05:20 AM     01/14/2023 05:20 AM    CO2 18 01/14/2023 05:20 AM    BUN 12 01/14/2023 05:20 AM    CREATININE 1.0 01/14/2023 05:20 AM    CALCIUM 7.6 01/14/2023 05:20 AM    PHOS 2.5 02/17/2021 05:42 AM         Significant Diagnostic Studies    Radiology:   XR CHEST PORTABLE   Final Result   No acute cardiopulmonary findings                Consults:     IP CONSULT TO HOSPITALIST    Disposition:   home outpatient therapy. Condition at Discharge: Stable    Discharge Instructions/Follow-up:  pcp in 1 week, rheumatology as scheduled. Code Status:  Full Code      Activity: activity as tolerated    Diet: regular diet      Discharge Medications:     Current Discharge Medication List             Details   oxyCODONE (ROXICODONE) 5 MG immediate release tablet Take 1 tablet by mouth 3 times daily as needed for Pain for up to 3 days.  Max Daily Amount: 15 mg  Qty: 9 tablet, Refills: 0    Comments: Reduce doses taken as pain becomes manageable  Associated Diagnoses: SLE exacerbation ([facility] [address]                Details   Handicap Placard MISC by Does not apply route Expiration 5 years  Qty: 1 each, Refills: 0    Associated Diagnoses: Fibromyalgia      pregabalin (LYRICA) 300 MG capsule TAKE ONE CAPSULE BY MOUTH EVERY MORNING AND ONE CAPSULE BY MOUTH AT DINNER TIME  Qty: 60 capsule, Refills: 2    Associated Diagnoses: Fibromyalgia      hydrOXYzine HCl (ATARAX) 50 MG tablet TAKE ONE TABLET BY MOUTH TWICE A DAY  Qty: 60 tablet, Refills: 1    Associated Diagnoses: Anxiety      tiZANidine (ZANAFLEX) 4 MG tablet TAKE 1 AND 1/2 TABLETS (6MG) BY MOUTH THREE TIMES A DAY AT LEAST 8 HOURS APART  Qty: 135 tablet, Refills: 1    Associated Diagnoses: Fibromyalgia; Muscle spasm      !! albuterol sulfate HFA (PROVENTIL;VENTOLIN;PROAIR) 108 (90 Base) MCG/ACT inhaler Inhale 2 puffs into the lungs 4 times daily as needed for Wheezing  Qty: 1 each, Refills: 2    Comments: Rescue inhaler not to be used every day unless Asthma wheezing/SOB  Associated Diagnoses: Mild intermittent asthma without complication      amLODIPine (NORVASC) 10 MG tablet Take 1 tablet by mouth daily Take 1 tablet by mouth daily  Qty: 90 tablet, Refills: 1    Associated Diagnoses: Essential hypertension      beclomethasone (QVAR REDIHALER) 40 MCG/ACT AERB inhaler Inhale 1 puff into the lungs in the morning and 1 puff in the evening. Qty: 1 each, Refills: 5    Comments: Maintenance Inhaler for Asthma to be used every day. Associated Diagnoses: Mild intermittent asthma without complication      Blood Pressure KIT 1 kit by Does not apply route daily  Qty: 1 kit, Refills: 0    Comments: Please fill what is covered by the patient's insurance.   Associated Diagnoses: Essential hypertension      busPIRone (BUSPAR) 15 MG tablet Take 15 mg by mouth 3 times daily  Qty: 270 tablet, Refills: 1    Associated Diagnoses: Anxiety      DULoxetine (CYMBALTA) 60 MG extended release capsule Take 1 capsule by mouth daily  Qty: 30 capsule, Refills: 0    Associated Diagnoses: Fibromyalgia      EPINEPHrine (EPIPEN 2-LUANNE) 0.3 MG/0.3ML SOAJ injection Inject 0.3 mLs into the muscle once for 1 dose Use as directed for allergic reaction  Qty: 1 each, Refills: 0    Associated Diagnoses: Mild intermittent asthma without complication      hydrALAZINE (APRESOLINE) 25 MG tablet Take 1 tablet by mouth every 8 hours  Qty: 90 tablet, Refills: 1    Associated Diagnoses: Essential hypertension      hydroCHLOROthiazide (HYDRODIURIL) 25 MG tablet Take 1 tablet by mouth daily  Qty: 90 tablet, Refills: 1    Associated Diagnoses: Essential hypertension      ipratropium-albuterol (DUONEB) 0.5-2.5 (3) MG/3ML SOLN nebulizer solution Inhale 3 mLs into the lungs 4 times daily  Qty: 360 mL, Refills: 2    Associated Diagnoses: Moderate persistent asthma without complication      levonorgestrel-ethinyl estradiol (SEASONALE) 0.15-0.03 MG per tablet Take 1 tablet by mouth daily  Qty: 1 packet, Refills: 3    Associated Diagnoses: Encounter for well adult exam with abnormal findings      Nebulizers (COMPRESSOR/NEBULIZER) MISC 1 each by Does not apply route daily  Qty: 1 each, Refills: 3    Comments: Please fill what is covered by the patient's insurance. Associated Diagnoses: Moderate persistent asthma without complication      ondansetron (ZOFRAN) 4 MG tablet Take 1 tablet by mouth daily as needed for Nausea or Vomiting  Qty: 30 tablet, Refills: 0    Associated Diagnoses: Gastritis without bleeding, unspecified chronicity, unspecified gastritis type      pantoprazole (PROTONIX) 20 MG tablet Take 1 tablet by mouth 2 times daily  Qty: 180 tablet, Refills: 0    Associated Diagnoses: Gastritis without bleeding, unspecified chronicity, unspecified gastritis type      !! albuterol sulfate  (90 Base) MCG/ACT inhaler Inhale 2 puffs into the lungs 4 times daily as needed for Wheezing  Qty: 3 each, Refills: 0    Comments: Rescue inhaler not to be used every day unless Asthma wheezing/SOB  Associated Diagnoses: Mild intermittent asthma without complication      hydrocortisone (ANUSOL-HC) 2.5 % CREA rectal cream Apply to hemorrhoid twice daily  Qty: 1 Tube, Refills: 0       !! - Potential duplicate medications found.  Please discuss with provider. Time Spent on discharge is more than 45 minutes in the examination, evaluation, counseling and review of medications and discharge plan. Signed: Javier Richards DO   1/14/2023      Thank you LEESA Kwon CNP for the opportunity to be involved in this patient's care. If you have any questions or concerns please feel free to contact me at [phone].

## 2023-02-20 ENCOUNTER — TELEPHONE (OUTPATIENT)
Dept: INTERNAL MEDICINE CLINIC | Age: 43
End: 2023-02-20

## 2023-02-20 NOTE — TELEPHONE ENCOUNTER
Patient is completely out of her tizanidine. She stated that she has to take extra because the dosage is lower than it should be due to her insurance not covering the higher dosage. Patient needs us to call the [address] to allow her to get this filled 4 days early.  She said if she has to wait she will have a flare up and will end up back in the hospital.  Patient can be reached at [phone]

## 2023-02-21 DIAGNOSIS — I10 ESSENTIAL HYPERTENSION: ICD-10-CM

## 2023-02-21 DIAGNOSIS — M79.7 FIBROMYALGIA: ICD-10-CM

## 2023-02-21 DIAGNOSIS — M62.838 MUSCLE SPASM: ICD-10-CM

## 2023-02-21 DIAGNOSIS — F41.9 ANXIETY: ICD-10-CM

## 2023-02-21 RX ORDER — TIZANIDINE 4 MG/1
TABLET ORAL
Qty: 135 TABLET | Refills: 2 | Status: SHIPPED | OUTPATIENT
Start: 2023-02-21 | End: 2023-04-17 | Stop reason: SDUPTHER

## 2023-02-21 NOTE — TELEPHONE ENCOUNTER
Refill request for medication. [address]    Name of Kevin Nance      Last visit - 9-14-22     Pending visit - 3-17-23    Last refill -9-14-22; 1-3-23      PDMP Monitoring:    Last PDMP Lacie Rodriges as Reviewed:  Review User Review Instant Review Result   PRETTY Samaniego 2/22/2022 11:21 AM Reviewed PDMP [1]     [unfilled]  Urine Drug Screenings (1 yr)    No resulted procedures found. Medication Contract and Consent for Opioid Use Documents Filed        No documents found                          Additional Comments PATIENT STATES SHE IS OUT OF HER BUSPAR.

## 2023-02-22 RX ORDER — BUSPIRONE HYDROCHLORIDE 15 MG/1
15 TABLET ORAL 3 TIMES DAILY
Qty: 270 TABLET | Refills: 1 | Status: SHIPPED | OUTPATIENT
Start: 2023-02-22 | End: 2023-08-21

## 2023-02-22 RX ORDER — HYDRALAZINE HYDROCHLORIDE 25 MG/1
25 TABLET, FILM COATED ORAL EVERY 8 HOURS SCHEDULED
Qty: 90 TABLET | Refills: 1 | Status: SHIPPED | OUTPATIENT
Start: 2023-02-22

## 2023-03-31 DIAGNOSIS — I10 ESSENTIAL HYPERTENSION: ICD-10-CM

## 2023-03-31 DIAGNOSIS — F41.9 ANXIETY: ICD-10-CM

## 2023-03-31 RX ORDER — BUSPIRONE HYDROCHLORIDE 15 MG/1
15 TABLET ORAL 3 TIMES DAILY
Qty: 270 TABLET | Refills: 1 | Status: SHIPPED | OUTPATIENT
Start: 2023-03-31 | End: 2023-09-27

## 2023-03-31 RX ORDER — AMLODIPINE BESYLATE 10 MG/1
10 TABLET ORAL DAILY
Qty: 90 TABLET | Refills: 1 | Status: SHIPPED | OUTPATIENT
Start: 2023-03-31 | End: 2023-09-27

## 2023-03-31 RX ORDER — HYDROCHLOROTHIAZIDE 25 MG/1
25 TABLET ORAL DAILY
Qty: 90 TABLET | Refills: 1 | Status: SHIPPED | OUTPATIENT
Start: 2023-03-31

## 2023-03-31 NOTE — TELEPHONE ENCOUNTER
Refill request for Buspirone, Hydrochlorothiazide; Amlodipine  medication.      Name of Danielle Rdz       Last visit - 9/14/22     Pending visit - 4/17/23    Last refill -2/22/23, 1/3/23, 9/14/22      Medication Contract signed -   Last Oarrs ran-         Additional Comments

## 2023-04-17 ENCOUNTER — OFFICE VISIT (OUTPATIENT)
Dept: INTERNAL MEDICINE CLINIC | Age: 43
End: 2023-04-17
Payer: MEDICAID

## 2023-04-17 VITALS
HEART RATE: 90 BPM | RESPIRATION RATE: 12 BRPM | OXYGEN SATURATION: 100 % | SYSTOLIC BLOOD PRESSURE: 122 MMHG | BODY MASS INDEX: 30.7 KG/M2 | TEMPERATURE: 98.1 F | WEIGHT: 196 LBS | DIASTOLIC BLOOD PRESSURE: 78 MMHG

## 2023-04-17 DIAGNOSIS — M79.7 FIBROMYALGIA: ICD-10-CM

## 2023-04-17 DIAGNOSIS — M32.9 LUPUS ARTHRITIS (HCC): ICD-10-CM

## 2023-04-17 DIAGNOSIS — Z71.84 TRAVEL ADVICE ENCOUNTER: Primary | ICD-10-CM

## 2023-04-17 DIAGNOSIS — F43.10 PTSD (POST-TRAUMATIC STRESS DISORDER): ICD-10-CM

## 2023-04-17 DIAGNOSIS — I10 PRIMARY HYPERTENSION: ICD-10-CM

## 2023-04-17 DIAGNOSIS — F41.1 GENERALIZED ANXIETY DISORDER: ICD-10-CM

## 2023-04-17 DIAGNOSIS — M25.50 ARTHRALGIA, UNSPECIFIED JOINT: ICD-10-CM

## 2023-04-17 DIAGNOSIS — M62.838 MUSCLE SPASM: ICD-10-CM

## 2023-04-17 DIAGNOSIS — F41.9 ANXIETY: ICD-10-CM

## 2023-04-17 DIAGNOSIS — F33.1 MODERATE EPISODE OF RECURRENT MAJOR DEPRESSIVE DISORDER (HCC): ICD-10-CM

## 2023-04-17 DIAGNOSIS — J45.20 MILD INTERMITTENT ASTHMA WITHOUT COMPLICATION: ICD-10-CM

## 2023-04-17 DIAGNOSIS — J45.40 MODERATE PERSISTENT ASTHMA WITHOUT COMPLICATION: ICD-10-CM

## 2023-04-17 DIAGNOSIS — I10 ESSENTIAL HYPERTENSION: ICD-10-CM

## 2023-04-17 DIAGNOSIS — K29.70 GASTRITIS WITHOUT BLEEDING, UNSPECIFIED CHRONICITY, UNSPECIFIED GASTRITIS TYPE: ICD-10-CM

## 2023-04-17 PROCEDURE — 3074F SYST BP LT 130 MM HG: CPT | Performed by: NURSE PRACTITIONER

## 2023-04-17 PROCEDURE — 1036F TOBACCO NON-USER: CPT | Performed by: NURSE PRACTITIONER

## 2023-04-17 PROCEDURE — 3078F DIAST BP <80 MM HG: CPT | Performed by: NURSE PRACTITIONER

## 2023-04-17 PROCEDURE — G8427 DOCREV CUR MEDS BY ELIG CLIN: HCPCS | Performed by: NURSE PRACTITIONER

## 2023-04-17 PROCEDURE — 99214 OFFICE O/P EST MOD 30 MIN: CPT | Performed by: NURSE PRACTITIONER

## 2023-04-17 PROCEDURE — G8419 CALC BMI OUT NRM PARAM NOF/U: HCPCS | Performed by: NURSE PRACTITIONER

## 2023-04-17 RX ORDER — HYDRALAZINE HYDROCHLORIDE 25 MG/1
25 TABLET, FILM COATED ORAL EVERY 8 HOURS SCHEDULED
Qty: 90 TABLET | Refills: 1 | Status: SHIPPED | OUTPATIENT
Start: 2023-04-17

## 2023-04-17 RX ORDER — BUSPIRONE HYDROCHLORIDE 10 MG/1
20 TABLET ORAL 3 TIMES DAILY
Qty: 540 TABLET | Refills: 3 | Status: SHIPPED | OUTPATIENT
Start: 2023-04-17 | End: 2024-04-11

## 2023-04-17 RX ORDER — HYDROXYZINE 50 MG/1
50 TABLET, FILM COATED ORAL EVERY 6 HOURS PRN
Qty: 360 TABLET | Refills: 1 | Status: SHIPPED | OUTPATIENT
Start: 2023-04-17 | End: 2023-10-14

## 2023-04-17 RX ORDER — ONDANSETRON 4 MG/1
4 TABLET, FILM COATED ORAL DAILY PRN
Qty: 30 TABLET | Refills: 0 | Status: SHIPPED | OUTPATIENT
Start: 2023-04-17

## 2023-04-17 RX ORDER — PREGABALIN 300 MG/1
CAPSULE ORAL
Qty: 180 CAPSULE | Refills: 0 | Status: SHIPPED | OUTPATIENT
Start: 2023-04-17 | End: 2023-07-16

## 2023-04-17 RX ORDER — TIZANIDINE 4 MG/1
TABLET ORAL
Qty: 405 TABLET | Refills: 1 | Status: SHIPPED | OUTPATIENT
Start: 2023-04-17

## 2023-04-17 RX ORDER — AZITHROMYCIN 250 MG/1
250 TABLET, FILM COATED ORAL SEE ADMIN INSTRUCTIONS
Qty: 6 TABLET | Refills: 0 | Status: SHIPPED | OUTPATIENT
Start: 2023-04-17 | End: 2023-04-22

## 2023-04-17 RX ORDER — BENZONATATE 100 MG/1
100 CAPSULE ORAL 3 TIMES DAILY PRN
Qty: 42 CAPSULE | Refills: 0 | Status: SHIPPED | OUTPATIENT
Start: 2023-04-17 | End: 2023-05-01

## 2023-04-17 RX ORDER — SCOLOPAMINE TRANSDERMAL SYSTEM 1 MG/1
1 PATCH, EXTENDED RELEASE TRANSDERMAL
Qty: 2 PATCH | Refills: 0 | Status: SHIPPED | OUTPATIENT
Start: 2023-04-17

## 2023-04-17 RX ORDER — PANTOPRAZOLE SODIUM 20 MG/1
20 TABLET, DELAYED RELEASE ORAL 2 TIMES DAILY
Qty: 180 TABLET | Refills: 2 | Status: SHIPPED | OUTPATIENT
Start: 2023-04-17 | End: 2024-01-12

## 2023-04-17 RX ORDER — PREDNISONE 20 MG/1
40 TABLET ORAL DAILY
Qty: 10 TABLET | Refills: 0 | Status: SHIPPED | OUTPATIENT
Start: 2023-04-17 | End: 2023-04-22

## 2023-04-17 SDOH — ECONOMIC STABILITY: FOOD INSECURITY: WITHIN THE PAST 12 MONTHS, THE FOOD YOU BOUGHT JUST DIDN'T LAST AND YOU DIDN'T HAVE MONEY TO GET MORE.: NEVER TRUE

## 2023-04-17 SDOH — ECONOMIC STABILITY: HOUSING INSECURITY
IN THE LAST 12 MONTHS, WAS THERE A TIME WHEN YOU DID NOT HAVE A STEADY PLACE TO SLEEP OR SLEPT IN A SHELTER (INCLUDING NOW)?: NO

## 2023-04-17 SDOH — ECONOMIC STABILITY: INCOME INSECURITY: HOW HARD IS IT FOR YOU TO PAY FOR THE VERY BASICS LIKE FOOD, HOUSING, MEDICAL CARE, AND HEATING?: NOT HARD AT ALL

## 2023-04-17 SDOH — ECONOMIC STABILITY: FOOD INSECURITY: WITHIN THE PAST 12 MONTHS, YOU WORRIED THAT YOUR FOOD WOULD RUN OUT BEFORE YOU GOT MONEY TO BUY MORE.: NEVER TRUE

## 2023-04-17 ASSESSMENT — ENCOUNTER SYMPTOMS
ABDOMINAL DISTENTION: 0
CONSTIPATION: 0
SHORTNESS OF BREATH: 0
VOMITING: 0
CHEST TIGHTNESS: 0
DIARRHEA: 0
NAUSEA: 0

## 2023-04-17 ASSESSMENT — PATIENT HEALTH QUESTIONNAIRE - PHQ9
SUM OF ALL RESPONSES TO PHQ QUESTIONS 1-9: 4
2. FEELING DOWN, DEPRESSED OR HOPELESS: 1
9. THOUGHTS THAT YOU WOULD BE BETTER OFF DEAD, OR OF HURTING YOURSELF: 0
7. TROUBLE CONCENTRATING ON THINGS, SUCH AS READING THE NEWSPAPER OR WATCHING TELEVISION: 0
4. FEELING TIRED OR HAVING LITTLE ENERGY: 1
SUM OF ALL RESPONSES TO PHQ QUESTIONS 1-9: 4
8. MOVING OR SPEAKING SO SLOWLY THAT OTHER PEOPLE COULD HAVE NOTICED. OR THE OPPOSITE, BEING SO FIGETY OR RESTLESS THAT YOU HAVE BEEN MOVING AROUND A LOT MORE THAN USUAL: 0
6. FEELING BAD ABOUT YOURSELF - OR THAT YOU ARE A FAILURE OR HAVE LET YOURSELF OR YOUR FAMILY DOWN: 0
1. LITTLE INTEREST OR PLEASURE IN DOING THINGS: 1
10. IF YOU CHECKED OFF ANY PROBLEMS, HOW DIFFICULT HAVE THESE PROBLEMS MADE IT FOR YOU TO DO YOUR WORK, TAKE CARE OF THINGS AT HOME, OR GET ALONG WITH OTHER PEOPLE: 0
SUM OF ALL RESPONSES TO PHQ9 QUESTIONS 1 & 2: 2
3. TROUBLE FALLING OR STAYING ASLEEP: 1
SUM OF ALL RESPONSES TO PHQ QUESTIONS 1-9: 4
SUM OF ALL RESPONSES TO PHQ QUESTIONS 1-9: 4

## 2023-04-17 ASSESSMENT — ANXIETY QUESTIONNAIRES
4. TROUBLE RELAXING: 1
7. FEELING AFRAID AS IF SOMETHING AWFUL MIGHT HAPPEN: 0
3. WORRYING TOO MUCH ABOUT DIFFERENT THINGS: 1
5. BEING SO RESTLESS THAT IT IS HARD TO SIT STILL: 0
2. NOT BEING ABLE TO STOP OR CONTROL WORRYING: 1
GAD7 TOTAL SCORE: 4
IF YOU CHECKED OFF ANY PROBLEMS ON THIS QUESTIONNAIRE, HOW DIFFICULT HAVE THESE PROBLEMS MADE IT FOR YOU TO DO YOUR WORK, TAKE CARE OF THINGS AT HOME, OR GET ALONG WITH OTHER PEOPLE: NOT DIFFICULT AT ALL
1. FEELING NERVOUS, ANXIOUS, OR ON EDGE: 1
6. BECOMING EASILY ANNOYED OR IRRITABLE: 0

## 2023-04-17 NOTE — PROGRESS NOTES
Return in about 6 months (around 10/17/2023). Patientshould call the office immediately with new or ongoing signs or symptoms or worsening, or proceed to the emergency room. If you are on medications which could impair your senses, you are at risk of weakness, falls,dizziness, or drowsiness. You should be careful during activities which could place you at risk of harm, such as climbing, using stairs, operating machinery, or driving vehicles. If you feel you cannot safely do theseactivities, you should request others to help you, or avoid the activities altogether. If you are drowsy for any other reason, you should use the same precautions as listed above. Call if pattern of symptoms change or persists for an extended time.       Bradley Gorman

## 2023-04-18 DIAGNOSIS — J45.40 MODERATE PERSISTENT ASTHMA WITHOUT COMPLICATION: ICD-10-CM

## 2023-04-18 NOTE — TELEPHONE ENCOUNTER
*patient also requesting birth control refill but I do not see in the list.    Refill request for Nebulize  medication.      Name of 1 Lazarus Weathers      Last visit - 4/17/23     Pending visit - 10/17/23    Last refill -9/14/22      Medication Contract signed -   Last Oarrs ran-         Additional Comments

## 2023-06-21 ENCOUNTER — APPOINTMENT (OUTPATIENT)
Dept: GENERAL RADIOLOGY | Age: 43
DRG: 346 | End: 2023-06-21
Payer: MEDICAID

## 2023-06-21 ENCOUNTER — TELEPHONE (OUTPATIENT)
Dept: INTERNAL MEDICINE CLINIC | Age: 43
End: 2023-06-21

## 2023-06-21 ENCOUNTER — HOSPITAL ENCOUNTER (INPATIENT)
Age: 43
LOS: 1 days | Discharge: HOME | DRG: 346 | End: 2023-06-22
Attending: STUDENT IN AN ORGANIZED HEALTH CARE EDUCATION/TRAINING PROGRAM | Admitting: STUDENT IN AN ORGANIZED HEALTH CARE EDUCATION/TRAINING PROGRAM
Payer: MEDICAID

## 2023-06-21 DIAGNOSIS — M32.9 LUPUS ARTHRITIS (HCC): ICD-10-CM

## 2023-06-21 DIAGNOSIS — R52 INTRACTABLE PAIN: Primary | ICD-10-CM

## 2023-06-21 DIAGNOSIS — G89.4 CHRONIC PAIN SYNDROME: Primary | ICD-10-CM

## 2023-06-21 LAB
AMPHETAMINES UR QL SCN>1000 NG/ML: ABNORMAL
ANION GAP SERPL CALCULATED.3IONS-SCNC: 14 MMOL/L (ref 3–16)
BACTERIA URNS QL MICRO: ABNORMAL /HPF
BARBITURATES UR QL SCN>200 NG/ML: ABNORMAL
BASOPHILS # BLD: 0.1 K/UL (ref 0–0.2)
BASOPHILS NFR BLD: 1.1 %
BENZODIAZ UR QL SCN>200 NG/ML: ABNORMAL
BILIRUB UR QL STRIP.AUTO: NEGATIVE
BUN SERPL-MCNC: 11 MG/DL (ref 7–20)
CALCIUM SERPL-MCNC: 9 MG/DL (ref 8.3–10.6)
CANNABINOIDS UR QL SCN>50 NG/ML: POSITIVE
CHARACTER UR: ABNORMAL
CHLORIDE SERPL-SCNC: 102 MMOL/L (ref 99–110)
CLARITY UR: ABNORMAL
CO2 SERPL-SCNC: 23 MMOL/L (ref 21–32)
COCAINE UR QL SCN: ABNORMAL
COLOR UR: YELLOW
CREAT SERPL-MCNC: 0.9 MG/DL (ref 0.6–1.1)
CRP SERPL-MCNC: 9.7 MG/L (ref 0–5.1)
DEPRECATED RDW RBC AUTO: 14.6 % (ref 12.4–15.4)
DRUG SCREEN COMMENT UR-IMP: ABNORMAL
EKG ATRIAL RATE: 68 BPM
EKG DIAGNOSIS: NORMAL
EKG P AXIS: 52 DEGREES
EKG P-R INTERVAL: 170 MS
EKG Q-T INTERVAL: 428 MS
EKG QRS DURATION: 86 MS
EKG QTC CALCULATION (BAZETT): 455 MS
EKG R AXIS: 54 DEGREES
EKG T AXIS: 49 DEGREES
EKG VENTRICULAR RATE: 68 BPM
EOSINOPHIL # BLD: 0.4 K/UL (ref 0–0.6)
EOSINOPHIL NFR BLD: 4.9 %
EPI CELLS #/AREA URNS AUTO: 13 /HPF (ref 0–5)
FENTANYL SCREEN, URINE: ABNORMAL
GFR SERPLBLD CREATININE-BSD FMLA CKD-EPI: >60 ML/MIN/{1.73_M2}
GLUCOSE BLD-MCNC: 111 MG/DL (ref 70–99)
GLUCOSE BLD-MCNC: 119 MG/DL (ref 70–99)
GLUCOSE SERPL-MCNC: 126 MG/DL (ref 70–99)
GLUCOSE UR STRIP.AUTO-MCNC: NEGATIVE MG/DL
HCG SERPL QL: NEGATIVE
HCG UR QL: NEGATIVE
HCT VFR BLD AUTO: 38 % (ref 36–48)
HGB BLD-MCNC: 12.3 G/DL (ref 12–16)
HGB UR QL STRIP.AUTO: NEGATIVE
HYALINE CASTS #/AREA URNS LPF: ABNORMAL /LPF (ref 0–2)
KETONES UR STRIP.AUTO-MCNC: NEGATIVE MG/DL
LEUKOCYTE ESTERASE UR QL STRIP.AUTO: ABNORMAL
LYMPHOCYTES # BLD: 2 K/UL (ref 1–5.1)
LYMPHOCYTES NFR BLD: 25 %
MCH RBC QN AUTO: 25.8 PG (ref 26–34)
MCHC RBC AUTO-ENTMCNC: 32.3 G/DL (ref 31–36)
MCV RBC AUTO: 79.9 FL (ref 80–100)
METHADONE UR QL SCN>300 NG/ML: ABNORMAL
MONOCYTES # BLD: 0.6 K/UL (ref 0–1.3)
MONOCYTES NFR BLD: 7.2 %
NEUTROPHILS # BLD: 5 K/UL (ref 1.7–7.7)
NEUTROPHILS NFR BLD: 61.8 %
NITRITE UR QL STRIP.AUTO: NEGATIVE
OPIATES UR QL SCN>300 NG/ML: POSITIVE
OXYCODONE UR QL SCN: ABNORMAL
PCP UR QL SCN>25 NG/ML: ABNORMAL
PERFORMED ON: ABNORMAL
PERFORMED ON: ABNORMAL
PH UR STRIP.AUTO: 7 [PH] (ref 5–8)
PH UR STRIP: 6 [PH]
PLATELET # BLD AUTO: 272 K/UL (ref 135–450)
PMV BLD AUTO: 8.4 FL (ref 5–10.5)
POTASSIUM SERPL-SCNC: 3.8 MMOL/L (ref 3.5–5.1)
PROT UR STRIP.AUTO-MCNC: 100 MG/DL
RBC # BLD AUTO: 4.75 M/UL (ref 4–5.2)
RBC CLUMPS #/AREA URNS AUTO: 0 /HPF (ref 0–4)
SODIUM SERPL-SCNC: 139 MMOL/L (ref 136–145)
SP GR UR STRIP.AUTO: 1.01 (ref 1–1.03)
UA COMPLETE W REFLEX CULTURE PNL UR: ABNORMAL
UA DIPSTICK W REFLEX MICRO PNL UR: YES
URN SPEC COLLECT METH UR: ABNORMAL
UROBILINOGEN UR STRIP-ACNC: 0.2 E.U./DL
WBC # BLD AUTO: 8.1 K/UL (ref 4–11)
WBC #/AREA URNS AUTO: 7 /HPF (ref 0–5)

## 2023-06-21 PROCEDURE — 6370000000 HC RX 637 (ALT 250 FOR IP): Performed by: STUDENT IN AN ORGANIZED HEALTH CARE EDUCATION/TRAINING PROGRAM

## 2023-06-21 PROCEDURE — 2500000003 HC RX 250 WO HCPCS: Performed by: PHYSICIAN ASSISTANT

## 2023-06-21 PROCEDURE — 80048 BASIC METABOLIC PNL TOTAL CA: CPT

## 2023-06-21 PROCEDURE — 84703 CHORIONIC GONADOTROPIN ASSAY: CPT

## 2023-06-21 PROCEDURE — 96374 THER/PROPH/DIAG INJ IV PUSH: CPT

## 2023-06-21 PROCEDURE — 94640 AIRWAY INHALATION TREATMENT: CPT

## 2023-06-21 PROCEDURE — 6360000002 HC RX W HCPCS: Performed by: PHYSICIAN ASSISTANT

## 2023-06-21 PROCEDURE — 81001 URINALYSIS AUTO W/SCOPE: CPT

## 2023-06-21 PROCEDURE — 93010 ELECTROCARDIOGRAM REPORT: CPT | Performed by: INTERNAL MEDICINE

## 2023-06-21 PROCEDURE — 86140 C-REACTIVE PROTEIN: CPT

## 2023-06-21 PROCEDURE — 71045 X-RAY EXAM CHEST 1 VIEW: CPT

## 2023-06-21 PROCEDURE — 99285 EMERGENCY DEPT VISIT HI MDM: CPT

## 2023-06-21 PROCEDURE — 85025 COMPLETE CBC W/AUTO DIFF WBC: CPT

## 2023-06-21 PROCEDURE — 1200000000 HC SEMI PRIVATE

## 2023-06-21 PROCEDURE — 6360000002 HC RX W HCPCS: Performed by: STUDENT IN AN ORGANIZED HEALTH CARE EDUCATION/TRAINING PROGRAM

## 2023-06-21 PROCEDURE — A4216 STERILE WATER/SALINE, 10 ML: HCPCS | Performed by: PHYSICIAN ASSISTANT

## 2023-06-21 PROCEDURE — 93005 ELECTROCARDIOGRAM TRACING: CPT | Performed by: PHYSICIAN ASSISTANT

## 2023-06-21 PROCEDURE — 6370000000 HC RX 637 (ALT 250 FOR IP): Performed by: NURSE PRACTITIONER

## 2023-06-21 PROCEDURE — 94760 N-INVAS EAR/PLS OXIMETRY 1: CPT

## 2023-06-21 PROCEDURE — 80307 DRUG TEST PRSMV CHEM ANLYZR: CPT

## 2023-06-21 PROCEDURE — 96375 TX/PRO/DX INJ NEW DRUG ADDON: CPT

## 2023-06-21 PROCEDURE — 2580000003 HC RX 258: Performed by: PHYSICIAN ASSISTANT

## 2023-06-21 RX ORDER — SODIUM CHLORIDE 9 MG/ML
INJECTION, SOLUTION INTRAVENOUS PRN
Status: DISCONTINUED | OUTPATIENT
Start: 2023-06-21 | End: 2023-06-22 | Stop reason: HOSPADM

## 2023-06-21 RX ORDER — SODIUM CHLORIDE 0.9 % (FLUSH) 0.9 %
5-40 SYRINGE (ML) INJECTION PRN
Status: DISCONTINUED | OUTPATIENT
Start: 2023-06-21 | End: 2023-06-22 | Stop reason: HOSPADM

## 2023-06-21 RX ORDER — SODIUM CHLORIDE 0.9 % (FLUSH) 0.9 %
5-40 SYRINGE (ML) INJECTION EVERY 12 HOURS SCHEDULED
Status: DISCONTINUED | OUTPATIENT
Start: 2023-06-21 | End: 2023-06-22 | Stop reason: HOSPADM

## 2023-06-21 RX ORDER — DULOXETIN HYDROCHLORIDE 60 MG/1
60 CAPSULE, DELAYED RELEASE ORAL DAILY
Status: DISCONTINUED | OUTPATIENT
Start: 2023-06-21 | End: 2023-06-22 | Stop reason: HOSPADM

## 2023-06-21 RX ORDER — SODIUM CHLORIDE, SODIUM LACTATE, POTASSIUM CHLORIDE, AND CALCIUM CHLORIDE .6; .31; .03; .02 G/100ML; G/100ML; G/100ML; G/100ML
1000 INJECTION, SOLUTION INTRAVENOUS ONCE
Status: COMPLETED | OUTPATIENT
Start: 2023-06-21 | End: 2023-06-21

## 2023-06-21 RX ORDER — ONDANSETRON 4 MG/1
4 TABLET, ORALLY DISINTEGRATING ORAL EVERY 8 HOURS PRN
Status: DISCONTINUED | OUTPATIENT
Start: 2023-06-21 | End: 2023-06-22 | Stop reason: HOSPADM

## 2023-06-21 RX ORDER — ENOXAPARIN SODIUM 100 MG/ML
40 INJECTION SUBCUTANEOUS NIGHTLY
Status: DISCONTINUED | OUTPATIENT
Start: 2023-06-21 | End: 2023-06-22 | Stop reason: HOSPADM

## 2023-06-21 RX ORDER — AMLODIPINE BESYLATE 10 MG/1
10 TABLET ORAL DAILY
Status: DISCONTINUED | OUTPATIENT
Start: 2023-06-21 | End: 2023-06-22 | Stop reason: HOSPADM

## 2023-06-21 RX ORDER — HYDRALAZINE HYDROCHLORIDE 25 MG/1
25 TABLET, FILM COATED ORAL EVERY 8 HOURS SCHEDULED
Status: DISCONTINUED | OUTPATIENT
Start: 2023-06-21 | End: 2023-06-22 | Stop reason: HOSPADM

## 2023-06-21 RX ORDER — HYDROXYZINE HYDROCHLORIDE 25 MG/1
50 TABLET, FILM COATED ORAL EVERY 6 HOURS PRN
Status: DISCONTINUED | OUTPATIENT
Start: 2023-06-21 | End: 2023-06-22 | Stop reason: HOSPADM

## 2023-06-21 RX ORDER — KETAMINE HCL IN NACL, ISO-OSM 100MG/10ML
0.2 SYRINGE (ML) INJECTION ONCE
Status: COMPLETED | OUTPATIENT
Start: 2023-06-21 | End: 2023-06-21

## 2023-06-21 RX ORDER — FLUTICASONE PROPIONATE 110 UG/1
1 AEROSOL, METERED RESPIRATORY (INHALATION) 2 TIMES DAILY
Status: DISCONTINUED | OUTPATIENT
Start: 2023-06-21 | End: 2023-06-22 | Stop reason: HOSPADM

## 2023-06-21 RX ORDER — BUSPIRONE HYDROCHLORIDE 10 MG/1
20 TABLET ORAL 3 TIMES DAILY
Status: DISCONTINUED | OUTPATIENT
Start: 2023-06-21 | End: 2023-06-22 | Stop reason: HOSPADM

## 2023-06-21 RX ORDER — IPRATROPIUM BROMIDE AND ALBUTEROL SULFATE 2.5; .5 MG/3ML; MG/3ML
1 SOLUTION RESPIRATORY (INHALATION) EVERY 4 HOURS PRN
Status: DISCONTINUED | OUTPATIENT
Start: 2023-06-21 | End: 2023-06-22 | Stop reason: HOSPADM

## 2023-06-21 RX ORDER — TIZANIDINE 4 MG/1
4 TABLET ORAL EVERY 6 HOURS PRN
Status: DISCONTINUED | OUTPATIENT
Start: 2023-06-21 | End: 2023-06-22 | Stop reason: HOSPADM

## 2023-06-21 RX ORDER — MORPHINE SULFATE 4 MG/ML
4 INJECTION, SOLUTION INTRAMUSCULAR; INTRAVENOUS ONCE
Status: COMPLETED | OUTPATIENT
Start: 2023-06-21 | End: 2023-06-21

## 2023-06-21 RX ORDER — HYDROCHLOROTHIAZIDE 25 MG/1
25 TABLET ORAL DAILY
Status: DISCONTINUED | OUTPATIENT
Start: 2023-06-21 | End: 2023-06-22 | Stop reason: HOSPADM

## 2023-06-21 RX ORDER — ONDANSETRON 2 MG/ML
4 INJECTION INTRAMUSCULAR; INTRAVENOUS ONCE
Status: COMPLETED | OUTPATIENT
Start: 2023-06-21 | End: 2023-06-21

## 2023-06-21 RX ORDER — ACETAMINOPHEN 650 MG/1
650 SUPPOSITORY RECTAL EVERY 6 HOURS PRN
Status: DISCONTINUED | OUTPATIENT
Start: 2023-06-21 | End: 2023-06-22 | Stop reason: HOSPADM

## 2023-06-21 RX ORDER — PANTOPRAZOLE SODIUM 20 MG/1
20 TABLET, DELAYED RELEASE ORAL 2 TIMES DAILY
Status: DISCONTINUED | OUTPATIENT
Start: 2023-06-21 | End: 2023-06-22 | Stop reason: HOSPADM

## 2023-06-21 RX ORDER — KETOROLAC TROMETHAMINE 15 MG/ML
30 INJECTION, SOLUTION INTRAMUSCULAR; INTRAVENOUS ONCE
Status: COMPLETED | OUTPATIENT
Start: 2023-06-21 | End: 2023-06-21

## 2023-06-21 RX ORDER — ACETAMINOPHEN 325 MG/1
650 TABLET ORAL EVERY 6 HOURS PRN
Status: DISCONTINUED | OUTPATIENT
Start: 2023-06-21 | End: 2023-06-22 | Stop reason: HOSPADM

## 2023-06-21 RX ORDER — OXYCODONE HYDROCHLORIDE 5 MG/1
5 TABLET ORAL EVERY 6 HOURS PRN
Status: DISCONTINUED | OUTPATIENT
Start: 2023-06-21 | End: 2023-06-22 | Stop reason: HOSPADM

## 2023-06-21 RX ORDER — PREGABALIN 100 MG/1
300 CAPSULE ORAL DAILY
Status: DISCONTINUED | OUTPATIENT
Start: 2023-06-21 | End: 2023-06-22 | Stop reason: HOSPADM

## 2023-06-21 RX ORDER — POLYETHYLENE GLYCOL 3350 17 G/17G
17 POWDER, FOR SOLUTION ORAL DAILY PRN
Status: DISCONTINUED | OUTPATIENT
Start: 2023-06-21 | End: 2023-06-22 | Stop reason: HOSPADM

## 2023-06-21 RX ORDER — ONDANSETRON 2 MG/ML
4 INJECTION INTRAMUSCULAR; INTRAVENOUS EVERY 6 HOURS PRN
Status: DISCONTINUED | OUTPATIENT
Start: 2023-06-21 | End: 2023-06-22 | Stop reason: HOSPADM

## 2023-06-21 RX ORDER — IBUPROFEN 400 MG/1
200 TABLET ORAL
Status: DISCONTINUED | OUTPATIENT
Start: 2023-06-21 | End: 2023-06-22 | Stop reason: HOSPADM

## 2023-06-21 RX ADMIN — HYDROMORPHONE HYDROCHLORIDE 0.5 MG: 1 INJECTION, SOLUTION INTRAMUSCULAR; INTRAVENOUS; SUBCUTANEOUS at 14:32

## 2023-06-21 RX ADMIN — PANTOPRAZOLE SODIUM 20 MG: 20 TABLET, DELAYED RELEASE ORAL at 20:43

## 2023-06-21 RX ADMIN — HYDROXYZINE HYDROCHLORIDE 50 MG: 25 TABLET, FILM COATED ORAL at 21:29

## 2023-06-21 RX ADMIN — PREGABALIN 300 MG: 100 CAPSULE ORAL at 14:31

## 2023-06-21 RX ADMIN — BUSPIRONE HYDROCHLORIDE 20 MG: 10 TABLET ORAL at 20:43

## 2023-06-21 RX ADMIN — HYDROCHLOROTHIAZIDE 25 MG: 25 TABLET ORAL at 14:30

## 2023-06-21 RX ADMIN — SODIUM CHLORIDE, POTASSIUM CHLORIDE, SODIUM LACTATE AND CALCIUM CHLORIDE 1000 ML: 600; 310; 30; 20 INJECTION, SOLUTION INTRAVENOUS at 07:14

## 2023-06-21 RX ADMIN — BUSPIRONE HYDROCHLORIDE 20 MG: 10 TABLET ORAL at 14:31

## 2023-06-21 RX ADMIN — FLUTICASONE PROPIONATE 1 PUFF: 110 AEROSOL, METERED RESPIRATORY (INHALATION) at 20:33

## 2023-06-21 RX ADMIN — KETOROLAC TROMETHAMINE 30 MG: 15 INJECTION, SOLUTION INTRAMUSCULAR; INTRAVENOUS at 07:17

## 2023-06-21 RX ADMIN — OXYCODONE 5 MG: 5 TABLET ORAL at 19:40

## 2023-06-21 RX ADMIN — AMLODIPINE BESYLATE 10 MG: 10 TABLET ORAL at 14:31

## 2023-06-21 RX ADMIN — ONDANSETRON 4 MG: 2 INJECTION INTRAMUSCULAR; INTRAVENOUS at 22:12

## 2023-06-21 RX ADMIN — DICLOFENAC SODIUM 4 G: 10 GEL TOPICAL at 14:40

## 2023-06-21 RX ADMIN — ONDANSETRON 4 MG: 2 INJECTION INTRAMUSCULAR; INTRAVENOUS at 07:15

## 2023-06-21 RX ADMIN — HYDROMORPHONE HYDROCHLORIDE 0.5 MG: 1 INJECTION, SOLUTION INTRAMUSCULAR; INTRAVENOUS; SUBCUTANEOUS at 22:03

## 2023-06-21 RX ADMIN — Medication 17.3 MG: at 08:08

## 2023-06-21 RX ADMIN — MORPHINE SULFATE 4 MG: 4 INJECTION, SOLUTION INTRAMUSCULAR; INTRAVENOUS at 07:15

## 2023-06-21 RX ADMIN — OXYCODONE 5 MG: 5 TABLET ORAL at 12:45

## 2023-06-21 RX ADMIN — HYDROMORPHONE HYDROCHLORIDE 0.5 MG: 1 INJECTION, SOLUTION INTRAMUSCULAR; INTRAVENOUS; SUBCUTANEOUS at 18:16

## 2023-06-21 RX ADMIN — ENOXAPARIN SODIUM 40 MG: 100 INJECTION SUBCUTANEOUS at 20:42

## 2023-06-21 RX ADMIN — FAMOTIDINE 20 MG: 10 INJECTION, SOLUTION INTRAVENOUS at 09:27

## 2023-06-21 RX ADMIN — DICLOFENAC SODIUM 4 G: 10 GEL TOPICAL at 20:58

## 2023-06-21 RX ADMIN — TIZANIDINE 4 MG: 4 TABLET ORAL at 14:32

## 2023-06-21 RX ADMIN — DULOXETINE HYDROCHLORIDE 60 MG: 60 CAPSULE, DELAYED RELEASE ORAL at 14:31

## 2023-06-21 RX ADMIN — HYDRALAZINE HYDROCHLORIDE 25 MG: 25 TABLET, FILM COATED ORAL at 14:31

## 2023-06-21 RX ADMIN — IBUPROFEN 200 MG: 400 TABLET, FILM COATED ORAL at 18:17

## 2023-06-21 RX ADMIN — TIZANIDINE 4 MG: 4 TABLET ORAL at 20:42

## 2023-06-21 ASSESSMENT — PAIN DESCRIPTION - LOCATION
LOCATION: GENERALIZED
LOCATION: BACK;NECK
LOCATION: BACK;NECK
LOCATION: GENERALIZED
LOCATION: GENERALIZED;BACK;NECK
LOCATION: GENERALIZED;BACK
LOCATION: BACK;NECK
LOCATION: BACK;NECK

## 2023-06-21 ASSESSMENT — PAIN SCALES - GENERAL
PAINLEVEL_OUTOF10: 7
PAINLEVEL_OUTOF10: 10
PAINLEVEL_OUTOF10: 7
PAINLEVEL_OUTOF10: 0
PAINLEVEL_OUTOF10: 10
PAINLEVEL_OUTOF10: 9
PAINLEVEL_OUTOF10: 10
PAINLEVEL_OUTOF10: 9
PAINLEVEL_OUTOF10: 10
PAINLEVEL_OUTOF10: 10

## 2023-06-21 ASSESSMENT — PAIN - FUNCTIONAL ASSESSMENT
PAIN_FUNCTIONAL_ASSESSMENT: PREVENTS OR INTERFERES SOME ACTIVE ACTIVITIES AND ADLS
PAIN_FUNCTIONAL_ASSESSMENT: ACTIVITIES ARE NOT PREVENTED
PAIN_FUNCTIONAL_ASSESSMENT: ACTIVITIES ARE NOT PREVENTED
PAIN_FUNCTIONAL_ASSESSMENT: PREVENTS OR INTERFERES SOME ACTIVE ACTIVITIES AND ADLS
PAIN_FUNCTIONAL_ASSESSMENT: ACTIVITIES ARE NOT PREVENTED
PAIN_FUNCTIONAL_ASSESSMENT: 0-10
PAIN_FUNCTIONAL_ASSESSMENT: PREVENTS OR INTERFERES SOME ACTIVE ACTIVITIES AND ADLS
PAIN_FUNCTIONAL_ASSESSMENT: ACTIVITIES ARE NOT PREVENTED

## 2023-06-21 ASSESSMENT — PAIN DESCRIPTION - ORIENTATION
ORIENTATION: MID;UPPER

## 2023-06-21 ASSESSMENT — PAIN DESCRIPTION - ONSET
ONSET: ON-GOING

## 2023-06-21 ASSESSMENT — PAIN DESCRIPTION - DESCRIPTORS
DESCRIPTORS: SHARP
DESCRIPTORS: ACHING
DESCRIPTORS: ACHING
DESCRIPTORS: SHARP;BURNING
DESCRIPTORS: BURNING;SHARP
DESCRIPTORS: BURNING
DESCRIPTORS: ACHING

## 2023-06-21 ASSESSMENT — PAIN DESCRIPTION - FREQUENCY
FREQUENCY: CONTINUOUS

## 2023-06-21 ASSESSMENT — PAIN DESCRIPTION - PAIN TYPE
TYPE: ACUTE PAIN

## 2023-06-21 NOTE — PROGRESS NOTES
Pt arrived to room and resting comfortably in bed. Vitals obtained and stable. Pt oriented to unit procedures and intrusted how to order food. Fall precautions initiated and pt agreeable to call for help before ambulating. Assessment complete and admission question answered. Pt complaining of some pain. PRN Roxicodone 5mg not enough per pt statement. Perfect serve sent to MD Dr. Osmany Molina for MD recommendation r/t pain control. MD to place orders. Pt up as tolerated and able to make needs known. Pt denies further needs at this time.  Electronically signed by Irene Golden RN on 6/21/2023 at 5:13 PM

## 2023-06-21 NOTE — ED NOTES
Patient does not wish to be in a gown at this time      Emogene Cotton, [place]  06/21/23 [id number]

## 2023-06-21 NOTE — TELEPHONE ENCOUNTER
Patient called and said she just got admitted to the hospital for abdominal pain. The doctor there told her to let her PCP know that she is there so he can look at the notes that the doctors enter. Patient said she will need pain medicine and a referral to a pain management doctor when she leaves the hospital. She was told she will be there for a couple of days. Patient said you can call her with any questions.  [phone]

## 2023-06-21 NOTE — ED PROVIDER NOTES
right arm and leg (8/31/2020), PONV (postoperative nausea and vomiting), Right sided weakness, Shortness of breath (12/14/2021), SLE exacerbation ([address] (1/12/2023), and Yeast vaginitis (11/27/2014). CONSULTS: (Who and What was discussed)  None          Records Reviewed (External and Source)NARx    CC/HPI Summary, DDx, ED Course, and Reassessment: This patient presents as above and evaluation and treatment is begun here. Vital signs stable. Physical exam findings as above. EMR reviewed and shows that patient did have an admission related to her lupus flareup and January of this year at another [facility], was stabilized and eventually discharged home. Patient is given 4 of morphine IV as well as 4 of Zofran initially and 15 mg Toradol. Patient indicates that this did help her pain a little bit but now it is returning normal.  Lab work starts to return including CRP at 9.7. BMP shows glucose 126. CBC no elevated white cell count, no thrombocytopenia. Patient now given ketamine for pain. Also EKG and chest x-ray added. 1 view chest x-ray negative for acute infiltrate or effusion as interpreted by myself in absence of radiologist.  EKG normal sinus rhythm with no acute ischemic changes per ED physician interpretation. Please see that note for details. Labs otherwise looks reasonably good. Disposition Considerations (tests considered but not done, Admit vs D/C, Shared Decision Making, Pt Expectation of Test or Tx.):   This 46-year-old lady with history of lupus treated outpatient by her primary care provider indicates that she came in this morning because her lupus pain is too severe. No indication of acute ischemic endorgan injury or other system compromise but this does appear consistent with lupus flareup with pain out of control.   Multiple medications for pain control used here including 4 of morphine, Toradol 30, and ketamine for pain yet patient indicates her pain is still out of

## 2023-06-21 NOTE — ED NOTES
Bedside report received from [facility]; all questions and concerns answered; receiving RN acknowledged and had no further questions at this time; patient resting w/o distress noted; VSS w/ exception of an elevated BP; RN to medicate patient;      Fawn lOsen RN  06/21/23 0132

## 2023-06-21 NOTE — H&P
Hospital Medicine History & Physical      PCP: LEESA Salazar - CNP    Date of Admission: 6/21/2023    Date of Service: Pt seen/examined on 06/21/23   and Admitted to Inpatient with expected LOS greater than two midnights due to medical therapy. =    Chief Complaint:  generalized pain       History Of Present Illness:    38 y.o. female who presented to [address] [place] [address] with generalized pain   Patient with a past medical history of anxiety, hypertension, hypothyroid, lupus with lupus arthritis. Patient presented to emergency with 1 day history of progressive multijoint pain, patient stated that pain had started initially in her neck and back 1 night prior to presentation, pain had progressively worsened, to involve her entire back, both knees, small joints of hand and feet as well as ankles, patient denied any swelling however did report 10/10 pain throughout all her joints. Patient stated that she usually has flares of her lupus, roughly around 6-year, has been maintained on prednisone 20 mg daily, follows with pain management. Patient usually takes hot baths that helped however failed to help, there is no alleviating factors and patient subsequently presented to emergency. In emergency patient was given ketorolac, ketamine, morphine with no improvement, subsequently admitted for pain management of lupus arthritis.      Patient works as a  at [employer], lives with her kids    Past Medical History:          Diagnosis Date    Acute asthma exacerbation 12/15/2021    JUAN (acute kidney injury) ([facility] [address] 2/14/2021    Anxiety     Asthma     Constipation 1/13/2014    Conversion disorder     Depression     Fibromyalgia     Focal motor deficit 8/31/2020    Graves disease     History of blood transfusion     Hx of blood clots     2015 LT LUNG W/ PNEUMONIA    Hypertension     Hyperthyroidism 9/30/2016    Joint pain 1/13/2014    Various joints (back, hands, knees, hips), recurrent flares since age 27, RF+ at one

## 2023-06-21 NOTE — PROGRESS NOTES
Pt refusing oral pain medication because it was ineffective per pt statement.  PRN dilaudid administered at pt request

## 2023-06-21 NOTE — ED NOTES
ED SBAR report provider to 3W, RN. Patient to be transported to Room 3129 via stretcher by ED tech. Patient transported with bedside cardiac monitor. IV site clean, dry, and intact. MEWS score and pain assessed as 7/10 and documented. Updated patient and family on plan of care.        Yeni Norris RN  06/21/23 [id number]

## 2023-06-21 NOTE — ED NOTES
Patient requesting something for pain mediation; EDPA made aware and states no new orders at this time wait to see how the pepcid help first      Nevaeh Alston RN  06/21/23 2309

## 2023-06-21 NOTE — PROGRESS NOTES
Pharmacy Medication Reconciliation Note     List of medications patient is currently taking is complete. Source of information:   1. Conversation with patient   2. EMR    Notes regarding home medications:   1. Patient did not take any of her home medication doses today before presenting to the ER. 2. Patient reports she has been taking hydrochlorothiazide in place of hydralazine lately.       [facility] Pepper Gonzales  6/21/2023 11:02 AM

## 2023-06-21 NOTE — ED NOTES
Patient called this RN in bedside stating pain medication is no longer helping only brought pain down to 9/10, requesting something else for pain;     EDPA made aware; awaiting new orders      Bernadette Vila RN  06/21/23 8546

## 2023-06-22 VITALS
RESPIRATION RATE: 16 BRPM | BODY MASS INDEX: 30.93 KG/M2 | WEIGHT: 197.09 LBS | HEART RATE: 64 BPM | SYSTOLIC BLOOD PRESSURE: 112 MMHG | DIASTOLIC BLOOD PRESSURE: 73 MMHG | TEMPERATURE: 98.1 F | OXYGEN SATURATION: 94 % | HEIGHT: 67 IN

## 2023-06-22 LAB
ANION GAP SERPL CALCULATED.3IONS-SCNC: 13 MMOL/L (ref 3–16)
BASOPHILS # BLD: 0 K/UL (ref 0–0.2)
BASOPHILS NFR BLD: 0.7 %
BUN SERPL-MCNC: 19 MG/DL (ref 7–20)
CALCIUM SERPL-MCNC: 9.3 MG/DL (ref 8.3–10.6)
CHLORIDE SERPL-SCNC: 103 MMOL/L (ref 99–110)
CO2 SERPL-SCNC: 23 MMOL/L (ref 21–32)
CREAT SERPL-MCNC: 0.9 MG/DL (ref 0.6–1.1)
DEPRECATED RDW RBC AUTO: 14.7 % (ref 12.4–15.4)
EOSINOPHIL # BLD: 0.3 K/UL (ref 0–0.6)
EOSINOPHIL NFR BLD: 4.7 %
GFR SERPLBLD CREATININE-BSD FMLA CKD-EPI: >60 ML/MIN/{1.73_M2}
GLUCOSE BLD-MCNC: 124 MG/DL (ref 70–99)
GLUCOSE SERPL-MCNC: 97 MG/DL (ref 70–99)
HCT VFR BLD AUTO: 35 % (ref 36–48)
HGB BLD-MCNC: 11.4 G/DL (ref 12–16)
LYMPHOCYTES # BLD: 2.5 K/UL (ref 1–5.1)
LYMPHOCYTES NFR BLD: 37.6 %
MCH RBC QN AUTO: 26.1 PG (ref 26–34)
MCHC RBC AUTO-ENTMCNC: 32.5 G/DL (ref 31–36)
MCV RBC AUTO: 80.3 FL (ref 80–100)
MONOCYTES # BLD: 0.7 K/UL (ref 0–1.3)
MONOCYTES NFR BLD: 9.8 %
NEUTROPHILS # BLD: 3.2 K/UL (ref 1.7–7.7)
NEUTROPHILS NFR BLD: 47.2 %
PERFORMED ON: ABNORMAL
PLATELET # BLD AUTO: 238 K/UL (ref 135–450)
PMV BLD AUTO: 8.8 FL (ref 5–10.5)
POTASSIUM SERPL-SCNC: 4.1 MMOL/L (ref 3.5–5.1)
RBC # BLD AUTO: 4.36 M/UL (ref 4–5.2)
SODIUM SERPL-SCNC: 139 MMOL/L (ref 136–145)
WBC # BLD AUTO: 6.7 K/UL (ref 4–11)

## 2023-06-22 PROCEDURE — 85025 COMPLETE CBC W/AUTO DIFF WBC: CPT

## 2023-06-22 PROCEDURE — 80048 BASIC METABOLIC PNL TOTAL CA: CPT

## 2023-06-22 PROCEDURE — 6370000000 HC RX 637 (ALT 250 FOR IP): Performed by: STUDENT IN AN ORGANIZED HEALTH CARE EDUCATION/TRAINING PROGRAM

## 2023-06-22 PROCEDURE — 2580000003 HC RX 258: Performed by: STUDENT IN AN ORGANIZED HEALTH CARE EDUCATION/TRAINING PROGRAM

## 2023-06-22 PROCEDURE — 36415 COLL VENOUS BLD VENIPUNCTURE: CPT

## 2023-06-22 PROCEDURE — 94760 N-INVAS EAR/PLS OXIMETRY 1: CPT

## 2023-06-22 PROCEDURE — 6360000002 HC RX W HCPCS: Performed by: STUDENT IN AN ORGANIZED HEALTH CARE EDUCATION/TRAINING PROGRAM

## 2023-06-22 PROCEDURE — 94640 AIRWAY INHALATION TREATMENT: CPT

## 2023-06-22 RX ORDER — OXYCODONE HYDROCHLORIDE 5 MG/1
5 TABLET ORAL EVERY 8 HOURS PRN
Qty: 9 TABLET | Refills: 0 | Status: SHIPPED | OUTPATIENT
Start: 2023-06-22 | End: 2023-06-25

## 2023-06-22 RX ORDER — IBUPROFEN 200 MG
200 TABLET ORAL EVERY 8 HOURS PRN
Qty: 15 TABLET | Refills: 0 | Status: SHIPPED | OUTPATIENT
Start: 2023-06-22 | End: 2023-06-27

## 2023-06-22 RX ORDER — DOCUSATE SODIUM 100 MG/1
100 CAPSULE, LIQUID FILLED ORAL 2 TIMES DAILY
Qty: 60 CAPSULE | Refills: 0 | Status: SHIPPED | OUTPATIENT
Start: 2023-06-22 | End: 2023-07-22

## 2023-06-22 RX ORDER — OXYCODONE HYDROCHLORIDE 5 MG/1
5 TABLET ORAL EVERY 8 HOURS PRN
Qty: 9 TABLET | Refills: 0 | Status: SHIPPED | OUTPATIENT
Start: 2023-06-22 | End: 2023-06-22 | Stop reason: SDUPTHER

## 2023-06-22 RX ADMIN — OXYCODONE 5 MG: 5 TABLET ORAL at 01:52

## 2023-06-22 RX ADMIN — OXYCODONE 5 MG: 5 TABLET ORAL at 08:15

## 2023-06-22 RX ADMIN — Medication 10 ML: at 08:15

## 2023-06-22 RX ADMIN — PREGABALIN 300 MG: 100 CAPSULE ORAL at 08:15

## 2023-06-22 RX ADMIN — HYDRALAZINE HYDROCHLORIDE 25 MG: 25 TABLET, FILM COATED ORAL at 14:27

## 2023-06-22 RX ADMIN — IBUPROFEN 200 MG: 400 TABLET, FILM COATED ORAL at 12:48

## 2023-06-22 RX ADMIN — DICLOFENAC SODIUM 4 G: 10 GEL TOPICAL at 08:14

## 2023-06-22 RX ADMIN — FLUTICASONE PROPIONATE 1 PUFF: 110 AEROSOL, METERED RESPIRATORY (INHALATION) at 08:35

## 2023-06-22 RX ADMIN — BUSPIRONE HYDROCHLORIDE 20 MG: 10 TABLET ORAL at 08:15

## 2023-06-22 RX ADMIN — PANTOPRAZOLE SODIUM 20 MG: 20 TABLET, DELAYED RELEASE ORAL at 08:15

## 2023-06-22 RX ADMIN — DULOXETINE HYDROCHLORIDE 60 MG: 60 CAPSULE, DELAYED RELEASE ORAL at 08:15

## 2023-06-22 RX ADMIN — AMLODIPINE BESYLATE 10 MG: 10 TABLET ORAL at 08:16

## 2023-06-22 RX ADMIN — BUSPIRONE HYDROCHLORIDE 20 MG: 10 TABLET ORAL at 14:27

## 2023-06-22 RX ADMIN — HYDROCHLOROTHIAZIDE 25 MG: 25 TABLET ORAL at 08:15

## 2023-06-22 RX ADMIN — HYDROMORPHONE HYDROCHLORIDE 0.5 MG: 1 INJECTION, SOLUTION INTRAMUSCULAR; INTRAVENOUS; SUBCUTANEOUS at 02:45

## 2023-06-22 RX ADMIN — IBUPROFEN 200 MG: 400 TABLET, FILM COATED ORAL at 08:15

## 2023-06-22 RX ADMIN — TIZANIDINE 4 MG: 4 TABLET ORAL at 08:20

## 2023-06-22 ASSESSMENT — PAIN DESCRIPTION - LOCATION
LOCATION: FLANK;BACK;NECK
LOCATION: BACK;NECK

## 2023-06-22 ASSESSMENT — PAIN SCALES - GENERAL
PAINLEVEL_OUTOF10: 8
PAINLEVEL_OUTOF10: 2
PAINLEVEL_OUTOF10: 10
PAINLEVEL_OUTOF10: 9
PAINLEVEL_OUTOF10: 0
PAINLEVEL_OUTOF10: 9
PAINLEVEL_OUTOF10: 4

## 2023-06-22 ASSESSMENT — PAIN DESCRIPTION - ONSET
ONSET: ON-GOING

## 2023-06-22 ASSESSMENT — PAIN DESCRIPTION - ORIENTATION
ORIENTATION: MID;UPPER
ORIENTATION: RIGHT;MID;UPPER

## 2023-06-22 ASSESSMENT — PAIN DESCRIPTION - PAIN TYPE
TYPE: ACUTE PAIN
TYPE: ACUTE PAIN;CHRONIC PAIN
TYPE: ACUTE PAIN
TYPE: ACUTE PAIN;CHRONIC PAIN

## 2023-06-22 ASSESSMENT — PAIN DESCRIPTION - DESCRIPTORS
DESCRIPTORS: ACHING;SHARP
DESCRIPTORS: ACHING

## 2023-06-22 ASSESSMENT — PAIN DESCRIPTION - FREQUENCY
FREQUENCY: CONTINUOUS

## 2023-06-22 ASSESSMENT — PAIN - FUNCTIONAL ASSESSMENT
PAIN_FUNCTIONAL_ASSESSMENT: ACTIVITIES ARE NOT PREVENTED

## 2023-06-22 NOTE — PROGRESS NOTES
Patient in bed, A&O X4. VSS. IV dressing CDI, flushes easily, NS locked at this time. Patient reports a pain of 10/10 in her upper back and neck area. Voltaren cream and pain mediation administered as ordered. Patient requested PRN Zanaflex, as well. All other AM medications administered PO without complaint (see eMAR). Patient appetite adequate and tolerating PO intake. Patient reports she has not had a BM in a few days. Denies a need for PRN glycol ax at this time. No other needs verbalized. Bed in low and locked position and call light within reach.  Electronically signed by Kala Lanes, RN on 6/22/2023 at [address] [facility] AM.

## 2023-06-22 NOTE — PROGRESS NOTES
Patient discharged to home. Transportation provided by patient as she has a car at the hospital. Accompanied by self. Discharge instructions, RX information, and personal belongings given to patient. Explanation of discharge, medications, and instructions understood by a verbal response. No questions, comments, or concerns at this time.   Electronically signed by Felix Heller RN on 6/22/2023 at 5:33 PM

## 2023-06-22 NOTE — PROGRESS NOTES
Pt is alert and oriented x4, resting quietly in bed. Nightly medications and intake tolerated well. Pt having some nausea and pain - PRN medications given as ordered. Visitor at bedside. No other needs made known at this time. Fall precautions in place. Call light within reach. Will continue to monitor.     Electronically signed by Leveda Phalen, RN on 6/22/2023 at 3:05 AM

## 2023-06-22 NOTE — PLAN OF CARE
Problem: Discharge Planning  Goal: Discharge to home or other facility with appropriate resources  6/22/2023 1304 by Yue Ho RN  Outcome: Progressing  Flowsheets (Taken 6/22/2023 1304)  Discharge to home or other facility with appropriate resources:   Identify barriers to discharge with patient and caregiver   Identify discharge learning needs (meds, wound care, etc)   Refer to discharge planning if patient needs post-hospital services based on physician order or complex needs related to functional status, cognitive ability or social support system   Arrange for needed discharge resources and transportation as appropriate  6/22/2023 0305 by Zulma Cardona RN  Outcome: Progressing  Flowsheets (Taken 6/22/2023 0305)  Discharge to home or other facility with appropriate resources:   Identify barriers to discharge with patient and caregiver   Arrange for needed discharge resources and transportation as appropriate     Problem: Pain  Goal: Verbalizes/displays adequate comfort level or baseline comfort level  6/22/2023 1304 by Yue Ho RN  Outcome: Progressing  Flowsheets (Taken 6/22/2023 1304)  Verbalizes/displays adequate comfort level or baseline comfort level:   Encourage patient to monitor pain and request assistance   Administer analgesics based on type and severity of pain and evaluate response   Consider cultural and social influences on pain and pain management   Assess pain using appropriate pain scale   Implement non-pharmacological measures as appropriate and evaluate response  6/22/2023 0305 by Zulma Cardona RN  Outcome: Progressing  Flowsheets (Taken 6/22/2023 0305)  Verbalizes/displays adequate comfort level or baseline comfort level:   Encourage patient to monitor pain and request assistance   Assess pain using appropriate pain scale   Administer analgesics based on type and severity of pain and evaluate response   Implement non-pharmacological measures as appropriate and evaluate

## 2023-06-22 NOTE — ACP (ADVANCE CARE PLANNING)
Advance Care Planning     Advance Care Planning Inpatient Note  [facility] Department    Today's Date: 6/22/2023  Unit: [facility] 3W ORTHOPEDICS    Received request from [facility] Provider. Upon review of chart and communication with care team, patient's decision making abilities are not in question. . Patient was/were present in the room during visit. Goals of ACP Conversation:  Discuss advance care planning documents    Health Care Decision Makers:     No healthcare decision makers have been documented. Click here to complete [address] including selection of the Healthcare Decision Maker Relationship (ie \"Primary\")  Summary:  No Decision Maker named by patient at this time    Advance Care Planning Documents (Patient Wishes):  None     Assessment:   provided education on HCPOA and Living Will documents. Ms. Raquel Sterling would like to speak with family before completing.  provided blank documents with instructions on completing independently.     Interventions:  Provided education on documents for clarity and greater understanding  Discussed and provided education on state decision maker hierarchy  Encouraged ongoing ACP conversation with future decision makers and loved ones    Care Preferences Communicated:   No    Outcomes/Plan:  ACP Discussion: Completed    Electronically signed by Delvis Pereyra, [address] on 6/22/2023 at 4:14 PM

## 2023-06-22 NOTE — DISCHARGE SUMMARY
Hospital Medicine Discharge Summary    Patient ID: Mica Shelton      Patient's PCP: LEESA Sanchez CNP    Admit Date: 6/21/2023     Discharge Date:   06/22/23      Admitting Provider: Robin Whatley MD     Discharge Provider: Robin Whatley MD     Discharge Diagnoses: Active Hospital Problems    Diagnosis     Lupus arthritis ([facility] [address] [M32.9]      Priority: Medium       The patient was seen and examined on day of discharge and this discharge summary is in conjunction with any daily progress note from day of discharge. Hospital Course:     38 y.o. female who [facility] with generalized [facility]   Patient with a past medical history of anxiety, hypertension, hypothyroid, lupus with lupus arthritis. Patient presented to emergency with 1 day history of progressive multijoint pain, patient stated that pain had started initially in her neck and back 1 night prior to presentation, pain had progressively worsened, to involve her entire back, both knees, small joints of hand and feet as well as ankles, patient denied any swelling however did report 10/10 pain throughout all her joints. Patient stated that she usually has flares of her lupus, roughly around 6-year, has been maintained on prednisone 20 mg daily, follows with pain management. Patient usually takes hot baths that helped however failed to help, there is no alleviating factors and patient subsequently presented to emergency. In emergency patient was given ketorolac, ketamine, morphine with no improvement, subsequently admitted for pain management of lupus arthritis. Lupus arthritis exacerbation. Uncontrolled pain. Patient presented to emergency with progressively worsening pain in her neck, back, knees, small joints of the hands and feet as well as ankles.   Multiple IV medications given in the emergency with no improvement, patient to subsequently admitted for pain management.  -Pain was managed with

## 2023-06-22 NOTE — PLAN OF CARE
Problem: Discharge Planning  Goal: Discharge to home or other facility with appropriate resources  Outcome: Progressing  Flowsheets (Taken 6/22/2023 0305)  Discharge to home or other facility with appropriate resources:   Identify barriers to discharge with patient and caregiver   Arrange for needed discharge resources and transportation as appropriate     Problem: Pain  Goal: Verbalizes/displays adequate comfort level or baseline comfort level  Outcome: Progressing  Flowsheets (Taken 6/22/2023 0305)  Verbalizes/displays adequate comfort level or baseline comfort level:   Encourage patient to monitor pain and request assistance   Assess pain using appropriate pain scale   Administer analgesics based on type and severity of pain and evaluate response   Implement non-pharmacological measures as appropriate and evaluate response     Problem: Safety - Adult  Goal: Free from fall injury  Outcome: Progressing  Flowsheets (Taken 6/22/2023 0305)  Free From Fall Injury: Instruct family/caregiver on patient safety     Problem: ABCDS Injury Assessment  Goal: Absence of physical injury  Outcome: Progressing  Flowsheets  Taken 6/22/2023 0305 by Odette Rodas RN  Absence of Physical Injury: Implement safety measures based on patient assessment  Taken 6/21/2023 1447 by Negrito Macias RN  Absence of Physical Injury: Implement safety measures based on patient assessment     Problem: Skin/Tissue Integrity  Goal: Absence of new skin breakdown  Description: 1. Monitor for areas of redness and/or skin breakdown  2. Assess vascular access sites hourly  3. Every 4-6 hours minimum:  Change oxygen saturation probe site  4. Every 4-6 hours:  If on nasal continuous positive airway pressure, respiratory therapy assess nares and determine need for appliance change or resting period.   Outcome: Progressing

## 2023-06-23 ENCOUNTER — TELEPHONE (OUTPATIENT)
Dept: INTERNAL MEDICINE CLINIC | Age: 43
End: 2023-06-23

## 2023-06-23 NOTE — TELEPHONE ENCOUNTER
Care Transitions Initial Follow Up Call    Outreach made within 2 business days of discharge: Yes    Patient: Raquel Lopez Patient : 1980   MRN: [id number]  Reason for Admission: There are no discharge diagnoses documented for the most recent discharge. Discharge Date: 23       Spoke with: Patient    [facility]    [facility] Interactive Patient Contact:  Was patient able to fill all prescriptions: Yes  Was patient instructed to bring all medications to the follow-up visit: Yes  Is patient taking all medications as directed in the discharge summary?  Yes  Does patient understand their discharge instructions: Yes  Does patient have questions or concerns that need addressed prior to 7-14 day follow up office visit: no    Scheduled appointment with PCP within 7-14 days    Follow Up  Future Appointments   Date Time Provider Delicia Vega   10/17/2023 10:40 AM LEESA Watkins - [address]

## 2023-06-26 ENCOUNTER — OFFICE VISIT (OUTPATIENT)
Dept: INTERNAL MEDICINE CLINIC | Age: 43
End: 2023-06-26
Payer: MEDICAID

## 2023-06-26 VITALS
OXYGEN SATURATION: 100 % | SYSTOLIC BLOOD PRESSURE: 118 MMHG | RESPIRATION RATE: 14 BRPM | WEIGHT: 187 LBS | HEIGHT: 67 IN | HEART RATE: 70 BPM | BODY MASS INDEX: 29.35 KG/M2 | TEMPERATURE: 97.8 F | DIASTOLIC BLOOD PRESSURE: 80 MMHG

## 2023-06-26 DIAGNOSIS — M79.7 FIBROMYALGIA: ICD-10-CM

## 2023-06-26 DIAGNOSIS — F99 INSOMNIA DUE TO OTHER MENTAL DISORDER: ICD-10-CM

## 2023-06-26 DIAGNOSIS — M25.50 ARTHRALGIA, UNSPECIFIED JOINT: ICD-10-CM

## 2023-06-26 DIAGNOSIS — M32.9 LUPUS ARTHRITIS (HCC): Primary | ICD-10-CM

## 2023-06-26 DIAGNOSIS — F51.05 INSOMNIA DUE TO OTHER MENTAL DISORDER: ICD-10-CM

## 2023-06-26 DIAGNOSIS — Z09 HOSPITAL DISCHARGE FOLLOW-UP: ICD-10-CM

## 2023-06-26 PROCEDURE — 1111F DSCHRG MED/CURRENT MED MERGE: CPT | Performed by: NURSE PRACTITIONER

## 2023-06-26 PROCEDURE — 99214 OFFICE O/P EST MOD 30 MIN: CPT | Performed by: NURSE PRACTITIONER

## 2023-06-26 RX ORDER — PREDNISONE 10 MG/1
TABLET ORAL
Qty: 30 TABLET | Refills: 0 | Status: SHIPPED | OUTPATIENT
Start: 2023-06-26 | End: 2023-06-26

## 2023-06-26 RX ORDER — PREDNISONE 10 MG/1
TABLET ORAL
Qty: 30 TABLET | Refills: 0 | Status: SHIPPED | OUTPATIENT
Start: 2023-06-26

## 2023-06-26 SDOH — ECONOMIC STABILITY: FOOD INSECURITY: WITHIN THE PAST 12 MONTHS, THE FOOD YOU BOUGHT JUST DIDN'T LAST AND YOU DIDN'T HAVE MONEY TO GET MORE.: NEVER TRUE

## 2023-06-26 SDOH — ECONOMIC STABILITY: FOOD INSECURITY: WITHIN THE PAST 12 MONTHS, YOU WORRIED THAT YOUR FOOD WOULD RUN OUT BEFORE YOU GOT MONEY TO BUY MORE.: NEVER TRUE

## 2023-06-26 SDOH — ECONOMIC STABILITY: INCOME INSECURITY: HOW HARD IS IT FOR YOU TO PAY FOR THE VERY BASICS LIKE FOOD, HOUSING, MEDICAL CARE, AND HEATING?: NOT HARD AT ALL

## 2023-08-15 ENCOUNTER — TELEPHONE (OUTPATIENT)
Dept: INTERNAL MEDICINE CLINIC | Age: 43
End: 2023-08-15

## 2023-08-15 NOTE — TELEPHONE ENCOUNTER
Danielle, [phone], calling today stating they need a re-certification for patient's oxygen order. They have not been able to reach the patient. I will be calling the patient to see if she is still using the oxygen and if so, does she need it. Also need to ask patient who ordered the oxygen originally.     If patient is to continue oxygen, she will need to be scheduled with Ehsan/PCP for an appt to do a walk test.

## 2023-09-05 ENCOUNTER — OFFICE VISIT (OUTPATIENT)
Dept: PAIN MANAGEMENT | Age: 43
End: 2023-09-05
Payer: MEDICAID

## 2023-09-05 VITALS
BODY MASS INDEX: 29.35 KG/M2 | HEIGHT: 67 IN | HEART RATE: 92 BPM | DIASTOLIC BLOOD PRESSURE: 87 MMHG | TEMPERATURE: 98.2 F | SYSTOLIC BLOOD PRESSURE: 141 MMHG | WEIGHT: 187 LBS | OXYGEN SATURATION: 99 %

## 2023-09-05 DIAGNOSIS — G43.101 MIGRAINE WITH AURA AND WITH STATUS MIGRAINOSUS, NOT INTRACTABLE: ICD-10-CM

## 2023-09-05 DIAGNOSIS — Z51.81 ENCOUNTER FOR THERAPEUTIC DRUG MONITORING: ICD-10-CM

## 2023-09-05 DIAGNOSIS — M62.830 BACK SPASM: ICD-10-CM

## 2023-09-05 DIAGNOSIS — M54.40 CHRONIC MIDLINE LOW BACK PAIN WITH SCIATICA, SCIATICA LATERALITY UNSPECIFIED: ICD-10-CM

## 2023-09-05 DIAGNOSIS — M32.9 LUPUS ARTHRITIS (HCC): ICD-10-CM

## 2023-09-05 DIAGNOSIS — M54.2 CERVICAL PAIN: ICD-10-CM

## 2023-09-05 DIAGNOSIS — J45.20 MILD INTERMITTENT ASTHMA WITHOUT COMPLICATION: ICD-10-CM

## 2023-09-05 DIAGNOSIS — G89.29 CHRONIC MIDLINE LOW BACK PAIN WITH SCIATICA, SCIATICA LATERALITY UNSPECIFIED: ICD-10-CM

## 2023-09-05 DIAGNOSIS — F41.1 GENERALIZED ANXIETY DISORDER: ICD-10-CM

## 2023-09-05 DIAGNOSIS — M79.7 FIBROMYALGIA: ICD-10-CM

## 2023-09-05 DIAGNOSIS — G89.4 CHRONIC PAIN SYNDROME: ICD-10-CM

## 2023-09-05 PROCEDURE — G8427 DOCREV CUR MEDS BY ELIG CLIN: HCPCS | Performed by: NURSE PRACTITIONER

## 2023-09-05 PROCEDURE — 1036F TOBACCO NON-USER: CPT | Performed by: NURSE PRACTITIONER

## 2023-09-05 PROCEDURE — 99214 OFFICE O/P EST MOD 30 MIN: CPT | Performed by: NURSE PRACTITIONER

## 2023-09-05 PROCEDURE — G8417 CALC BMI ABV UP PARAM F/U: HCPCS | Performed by: NURSE PRACTITIONER

## 2023-09-05 PROCEDURE — 3074F SYST BP LT 130 MM HG: CPT | Performed by: NURSE PRACTITIONER

## 2023-09-05 PROCEDURE — 3078F DIAST BP <80 MM HG: CPT | Performed by: NURSE PRACTITIONER

## 2023-09-05 RX ORDER — NALOXONE HYDROCHLORIDE 4 MG/.1ML
SPRAY NASAL
Qty: 1 EACH | Refills: 0 | Status: SHIPPED | OUTPATIENT
Start: 2023-09-05

## 2023-09-05 RX ORDER — HYDROCODONE BITARTRATE AND ACETAMINOPHEN 5; 325 MG/1; MG/1
1 TABLET ORAL EVERY 12 HOURS PRN
Qty: 20 TABLET | Refills: 0 | Status: SHIPPED | OUTPATIENT
Start: 2023-09-05 | End: 2023-09-15

## 2023-09-05 RX ORDER — LIDOCAINE 50 MG/G
1 PATCH TOPICAL DAILY
Qty: 30 PATCH | Refills: 0 | Status: SHIPPED | OUTPATIENT
Start: 2023-09-05 | End: 2023-10-05

## 2023-09-05 RX ORDER — BACLOFEN 10 MG/1
10 TABLET ORAL DAILY
Qty: 30 TABLET | Refills: 0 | Status: SHIPPED | OUTPATIENT
Start: 2023-09-05 | End: 2023-10-05

## 2023-09-05 RX ORDER — HYDROCODONE BITARTRATE AND ACETAMINOPHEN 5; 325 MG/1; MG/1
1 TABLET ORAL EVERY 8 HOURS PRN
Qty: 30 TABLET | Refills: 0 | Status: SHIPPED | OUTPATIENT
Start: 2023-09-05 | End: 2023-09-05 | Stop reason: SDUPTHER

## 2023-09-05 NOTE — TELEPHONE ENCOUNTER
Patient called and said she was stung by a bee today and she is allergic to bee stings. She is requesting a prescription for an Epi Pen to be sent to the [facility].

## 2023-09-05 NOTE — PROGRESS NOTES
Elaine Ashton  1980  [id number]    HISTORY OF PRESENT ILLNESS: Ms. Roxane Kay is a 38 y.o. female returns for a follow up visit for pain management  She has a diagnosis of   1. Chronic pain syndrome    2. Fibromyalgia    3. Cervical pain    4. Chronic midline low back pain with sciatica, sciatica laterality unspecified    5. Back spasm    6. Lupus arthritis ([address])    7. Migraine with aura and with status migrainosus, not intractable    8. Generalized anxiety disorder    9. Encounter for therapeutic drug monitoring      As per Information Obtained from the PADT (Patient Assessment and Documentation Tool)    She complains of pain in the  neck, both shoulders,. Down the spine, both arms, both legs, both feet  She rates the pain 8/10 and describes it as aching. Current treatment regimen has helped relieve about 30% of the pain. She denies any side effects from the current pain regimen. Patient reports that since the last follow up visit the physical functioning is worse, family/social relationships are worse, mood is worse sleep patterns are worse, and that the overall functioning is worse. Patient denies misusing/abusing her narcotic pain medications or using any illegal drugs. Upon obtaining medical history from Ms. Roxane Kay states that pain is not manageable on current pain therapy. Was last seen in June, 2020, opted to use medical marijuana. Reports pain seems to have gotten less relief over time on marijuana. Has not used marijuana in over a month. Pain mainly in the neck, lower back. Has an appointment with Rheumatology next month whom she has not seen for for a couple of years as pain was also manageable on marijuana until recently. Medical marijuana also became expensive over time. Maintains Lyrica 300 mg tabs twice a day, last had Oxycodone 5 mg tabs on 6/22/23 x3 days after an E.R visit. Mood/anxiety is low due to having lost her father, also caring for her mother who has dementia.  Sleep is

## 2023-09-06 RX ORDER — EPINEPHRINE 0.3 MG/.3ML
0.3 INJECTION SUBCUTANEOUS ONCE
Qty: 1 EACH | Refills: 0 | Status: SHIPPED | OUTPATIENT
Start: 2023-09-06 | End: 2023-09-06

## 2023-09-06 NOTE — TELEPHONE ENCOUNTER
Refill request for Epi Pen medication.      Name of [address] [place] [address]      Last visit - 6-     Pending visit - 10-    Last refill - 9-      Medication Contract signed -   Noman bass-         Additional Comments

## 2023-09-07 ENCOUNTER — TELEPHONE (OUTPATIENT)
Dept: INTERNAL MEDICINE CLINIC | Age: 43
End: 2023-09-07

## 2023-09-07 NOTE — TELEPHONE ENCOUNTER
Patient called 9/6/23 for Epi Pen refill due to bee sting, it was pended to the wrong pharmacy[facility]  So I had the script transferred to [facility]

## 2023-09-20 DIAGNOSIS — Z00.01 ENCOUNTER FOR WELL ADULT EXAM WITH ABNORMAL FINDINGS: ICD-10-CM

## 2023-09-20 RX ORDER — LEVONORGESTREL / ETHINYL ESTRADIOL 0.15-0.03
1 KIT ORAL DAILY
Qty: 91 TABLET | Refills: 2 | Status: SHIPPED | OUTPATIENT
Start: 2023-09-20

## 2023-09-20 NOTE — TELEPHONE ENCOUNTER
Refill request for JOLESSA 0.15 MG-0.03 MG TABLET medication.      Name of [address] [place] [address]      Last visit - 6-     Pending visit - 10-    Last refill - 11-      Medication Contract signed -   Last Dillan bass-         Additional Comments

## 2023-10-03 ENCOUNTER — HOSPITAL ENCOUNTER (OUTPATIENT)
Dept: CT IMAGING | Age: 43
Discharge: HOME | End: 2023-10-03
Payer: MEDICAID

## 2023-10-03 DIAGNOSIS — M54.40 CHRONIC MIDLINE LOW BACK PAIN WITH SCIATICA, SCIATICA LATERALITY UNSPECIFIED: ICD-10-CM

## 2023-10-03 DIAGNOSIS — G89.4 CHRONIC PAIN SYNDROME: ICD-10-CM

## 2023-10-03 DIAGNOSIS — M54.2 CERVICAL PAIN: ICD-10-CM

## 2023-10-03 DIAGNOSIS — G89.29 CHRONIC MIDLINE LOW BACK PAIN WITH SCIATICA, SCIATICA LATERALITY UNSPECIFIED: ICD-10-CM

## 2023-10-03 DIAGNOSIS — M62.830 BACK SPASM: ICD-10-CM

## 2023-10-03 PROCEDURE — 72131 CT LUMBAR SPINE W/O DYE: CPT

## 2023-10-03 PROCEDURE — 72125 CT NECK SPINE W/O DYE: CPT

## 2023-10-10 ENCOUNTER — TELEPHONE (OUTPATIENT)
Dept: PAIN MANAGEMENT | Age: 43
End: 2023-10-10

## 2023-10-10 DIAGNOSIS — M54.40 CHRONIC MIDLINE LOW BACK PAIN WITH SCIATICA, SCIATICA LATERALITY UNSPECIFIED: ICD-10-CM

## 2023-10-10 DIAGNOSIS — G89.29 CHRONIC MIDLINE LOW BACK PAIN WITH SCIATICA, SCIATICA LATERALITY UNSPECIFIED: ICD-10-CM

## 2023-10-10 DIAGNOSIS — G89.4 CHRONIC PAIN SYNDROME: ICD-10-CM

## 2023-10-10 DIAGNOSIS — M62.830 BACK SPASM: ICD-10-CM

## 2023-10-10 RX ORDER — BACLOFEN 10 MG/1
10 TABLET ORAL DAILY
Qty: 30 TABLET | Refills: 0 | Status: SHIPPED | OUTPATIENT
Start: 2023-10-10 | End: 2023-10-17 | Stop reason: SDUPTHER

## 2023-10-10 RX ORDER — HYDROCODONE BITARTRATE AND ACETAMINOPHEN 5; 325 MG/1; MG/1
1 TABLET ORAL EVERY 12 HOURS PRN
Qty: 14 TABLET | Refills: 0 | Status: SHIPPED | OUTPATIENT
Start: 2023-10-10 | End: 2023-10-12 | Stop reason: SDUPTHER

## 2023-10-10 RX ORDER — LIDOCAINE 50 MG/G
1 PATCH TOPICAL DAILY
Qty: 30 PATCH | Refills: 0 | Status: SHIPPED | OUTPATIENT
Start: 2023-10-10 | End: 2023-10-17 | Stop reason: SDUPTHER

## 2023-10-10 NOTE — TELEPHONE ENCOUNTER
. Medication Refill      Name of medication: baclofen, hyrdocodone     Pharmacy name and phone number   Trina [facility] [phone]    Last refill:9/6    Next appointment:10/24     Patient was already out of medication due to her appt being 10/10. She needed to reschedule the appt on 10/10 due to being sick .  Could you please call her to let her know what would be done with her medication

## 2023-10-10 NOTE — TELEPHONE ENCOUNTER
Spoke with pt letting her know that [address] can only send 7 days worth to the pharmacy.  That if she is sick and not tested positive to covid to come in for her appt and wear a maskl

## 2023-10-12 ENCOUNTER — TELEPHONE (OUTPATIENT)
Dept: PAIN MANAGEMENT | Age: 43
End: 2023-10-12

## 2023-10-12 DIAGNOSIS — M54.40 CHRONIC MIDLINE LOW BACK PAIN WITH SCIATICA, SCIATICA LATERALITY UNSPECIFIED: ICD-10-CM

## 2023-10-12 DIAGNOSIS — M62.830 BACK SPASM: ICD-10-CM

## 2023-10-12 DIAGNOSIS — G89.4 CHRONIC PAIN SYNDROME: ICD-10-CM

## 2023-10-12 DIAGNOSIS — G89.29 CHRONIC MIDLINE LOW BACK PAIN WITH SCIATICA, SCIATICA LATERALITY UNSPECIFIED: ICD-10-CM

## 2023-10-12 RX ORDER — HYDROCODONE BITARTRATE AND ACETAMINOPHEN 5; 325 MG/1; MG/1
1 TABLET ORAL EVERY 12 HOURS PRN
Qty: 14 TABLET | Refills: 0 | Status: SHIPPED | OUTPATIENT
Start: 2023-10-12 | End: 2023-10-17 | Stop reason: SDUPTHER

## 2023-10-12 NOTE — TELEPHONE ENCOUNTER
Patient called stating her medication got sent to the wrong pharmacy she would like her medications sent to [facility] on [address]

## 2023-10-17 ENCOUNTER — OFFICE VISIT (OUTPATIENT)
Dept: PAIN MANAGEMENT | Age: 43
End: 2023-10-17
Payer: MEDICAID

## 2023-10-17 VITALS
DIASTOLIC BLOOD PRESSURE: 85 MMHG | HEART RATE: 97 BPM | OXYGEN SATURATION: 99 % | SYSTOLIC BLOOD PRESSURE: 155 MMHG | WEIGHT: 192 LBS | TEMPERATURE: 97.5 F | BODY MASS INDEX: 29.85 KG/M2

## 2023-10-17 DIAGNOSIS — F33.1 MODERATE EPISODE OF RECURRENT MAJOR DEPRESSIVE DISORDER (HCC): ICD-10-CM

## 2023-10-17 DIAGNOSIS — M32.9 LUPUS ARTHRITIS (HCC): ICD-10-CM

## 2023-10-17 DIAGNOSIS — G89.29 CHRONIC MIDLINE LOW BACK PAIN WITH SCIATICA, SCIATICA LATERALITY UNSPECIFIED: ICD-10-CM

## 2023-10-17 DIAGNOSIS — E66.09 CLASS 1 OBESITY DUE TO EXCESS CALORIES WITH SERIOUS COMORBIDITY IN ADULT, UNSPECIFIED BMI: ICD-10-CM

## 2023-10-17 DIAGNOSIS — G43.101 MIGRAINE WITH AURA AND WITH STATUS MIGRAINOSUS, NOT INTRACTABLE: ICD-10-CM

## 2023-10-17 DIAGNOSIS — G89.4 CHRONIC PAIN SYNDROME: ICD-10-CM

## 2023-10-17 DIAGNOSIS — M62.830 BACK SPASM: ICD-10-CM

## 2023-10-17 DIAGNOSIS — F41.1 GENERALIZED ANXIETY DISORDER: ICD-10-CM

## 2023-10-17 DIAGNOSIS — M54.40 CHRONIC MIDLINE LOW BACK PAIN WITH SCIATICA, SCIATICA LATERALITY UNSPECIFIED: ICD-10-CM

## 2023-10-17 PROCEDURE — G8417 CALC BMI ABV UP PARAM F/U: HCPCS | Performed by: NURSE PRACTITIONER

## 2023-10-17 PROCEDURE — G8427 DOCREV CUR MEDS BY ELIG CLIN: HCPCS | Performed by: NURSE PRACTITIONER

## 2023-10-17 PROCEDURE — 99214 OFFICE O/P EST MOD 30 MIN: CPT | Performed by: NURSE PRACTITIONER

## 2023-10-17 PROCEDURE — 1036F TOBACCO NON-USER: CPT | Performed by: NURSE PRACTITIONER

## 2023-10-17 PROCEDURE — G8484 FLU IMMUNIZE NO ADMIN: HCPCS | Performed by: NURSE PRACTITIONER

## 2023-10-17 PROCEDURE — 3074F SYST BP LT 130 MM HG: CPT | Performed by: NURSE PRACTITIONER

## 2023-10-17 PROCEDURE — 3078F DIAST BP <80 MM HG: CPT | Performed by: NURSE PRACTITIONER

## 2023-10-17 RX ORDER — HYDROCODONE BITARTRATE AND ACETAMINOPHEN 5; 325 MG/1; MG/1
1 TABLET ORAL EVERY 8 HOURS PRN
Qty: 84 TABLET | Refills: 0 | Status: SHIPPED | OUTPATIENT
Start: 2023-10-17 | End: 2023-11-14

## 2023-10-17 RX ORDER — LIDOCAINE 50 MG/G
1 PATCH TOPICAL DAILY
Qty: 30 PATCH | Refills: 0 | Status: SHIPPED | OUTPATIENT
Start: 2023-10-17 | End: 2023-11-16

## 2023-10-17 RX ORDER — BACLOFEN 10 MG/1
10 TABLET ORAL DAILY
Qty: 30 TABLET | Refills: 0 | Status: SHIPPED | OUTPATIENT
Start: 2023-10-17 | End: 2023-11-16

## 2023-10-17 NOTE — PROGRESS NOTES
risk              Other Risk factors - (mood) Depression/Anxiety              Date of Last Medication Agreement: 9/7/23              Date Naloxone prescribed: 0              UDT:                          Date of last UDT: 9/5/23                          Adverse report: No              OARRS:                          Checked today: Yes                          Adverse report: No    IMPRESSION:   1. Chronic pain syndrome    2. Chronic midline low back pain with sciatica, sciatica laterality unspecified    3. Back spasm    4. Lupus arthritis ([address])    5. Migraine with aura and with status migrainosus, not intractable    6. Moderate episode of recurrent major depressive disorder ([address])    7. Generalized anxiety disorder    8. Class 1 obesity due to excess calories with serious comorbidity in adult, unspecified BMI      PLAN:  Informed verbal consent was obtained:  -Patient's opioid therapy will be adjusted today  -Home exercises/Shadia exercises recommended  -CBT techniques- relaxation therapies such as biofeedback, mindfulness based stress reduction, imagery, cognitive restructuring, problem solving discussed with patient   -She was advised weight reduction, diet changes- 800-1200 tracy diet, diet diary, exercising, nutritional  consult increased physical activity as tolerated   -Last UDS 9/5/23 consistent  -Return in about 4 weeks (around 11/14/2023). Analgesic Plan:              Continue present regimen: Norco 5 mg tabs orally q8h prn              Adjust dose of present analgesic: Yes              Switch analgesics: No              Add/Adjust concomitant therapy: Baclofen, Lidocaine, Narcan    I will continue her current medication regimen  which is part of the above treatment schedule.  It has been helping with Ms. Erica Hammer chronic  medical problems which for this visit include:   Diagnoses of Chronic pain syndrome, Chronic midline low back pain with sciatica, sciatica laterality unspecified, Back spasm, Lupus

## 2023-10-25 RX ORDER — TIZANIDINE 4 MG/1
TABLET ORAL
Qty: 135 TABLET | Refills: 2 | Status: SHIPPED | OUTPATIENT
Start: 2023-10-25

## 2023-10-25 NOTE — TELEPHONE ENCOUNTER
Refill request for  medication.    TIZANIDINE 4 MG     Name of [address] [address], [facility] visit - 6/23/23     Pending visit - 10/30/23    Last refill -4/17/23              Additional Comments

## 2023-10-30 ENCOUNTER — OFFICE VISIT (OUTPATIENT)
Dept: INTERNAL MEDICINE CLINIC | Age: 43
End: 2023-10-30
Payer: MEDICAID

## 2023-10-30 VITALS
HEART RATE: 89 BPM | WEIGHT: 191 LBS | BODY MASS INDEX: 29.98 KG/M2 | SYSTOLIC BLOOD PRESSURE: 128 MMHG | DIASTOLIC BLOOD PRESSURE: 80 MMHG | OXYGEN SATURATION: 99 % | HEIGHT: 67 IN

## 2023-10-30 DIAGNOSIS — I10 PRIMARY HYPERTENSION: ICD-10-CM

## 2023-10-30 DIAGNOSIS — F43.10 PTSD (POST-TRAUMATIC STRESS DISORDER): ICD-10-CM

## 2023-10-30 DIAGNOSIS — M79.7 FIBROMYALGIA: ICD-10-CM

## 2023-10-30 DIAGNOSIS — J45.20 MILD INTERMITTENT ASTHMA WITHOUT COMPLICATION: ICD-10-CM

## 2023-10-30 DIAGNOSIS — F99 INSOMNIA DUE TO OTHER MENTAL DISORDER: ICD-10-CM

## 2023-10-30 DIAGNOSIS — M32.9 LUPUS ARTHRITIS (HCC): Primary | ICD-10-CM

## 2023-10-30 DIAGNOSIS — F51.05 INSOMNIA DUE TO OTHER MENTAL DISORDER: ICD-10-CM

## 2023-10-30 DIAGNOSIS — F41.1 GENERALIZED ANXIETY DISORDER: ICD-10-CM

## 2023-10-30 DIAGNOSIS — I10 ESSENTIAL HYPERTENSION: ICD-10-CM

## 2023-10-30 DIAGNOSIS — G43.101 MIGRAINE WITH AURA AND WITH STATUS MIGRAINOSUS, NOT INTRACTABLE: ICD-10-CM

## 2023-10-30 DIAGNOSIS — F33.1 MODERATE EPISODE OF RECURRENT MAJOR DEPRESSIVE DISORDER (HCC): ICD-10-CM

## 2023-10-30 PROCEDURE — 99214 OFFICE O/P EST MOD 30 MIN: CPT | Performed by: NURSE PRACTITIONER

## 2023-10-30 PROCEDURE — 3079F DIAST BP 80-89 MM HG: CPT | Performed by: NURSE PRACTITIONER

## 2023-10-30 PROCEDURE — G8484 FLU IMMUNIZE NO ADMIN: HCPCS | Performed by: NURSE PRACTITIONER

## 2023-10-30 PROCEDURE — 1036F TOBACCO NON-USER: CPT | Performed by: NURSE PRACTITIONER

## 2023-10-30 PROCEDURE — G8427 DOCREV CUR MEDS BY ELIG CLIN: HCPCS | Performed by: NURSE PRACTITIONER

## 2023-10-30 PROCEDURE — G8417 CALC BMI ABV UP PARAM F/U: HCPCS | Performed by: NURSE PRACTITIONER

## 2023-10-30 PROCEDURE — 3074F SYST BP LT 130 MM HG: CPT | Performed by: NURSE PRACTITIONER

## 2023-10-30 RX ORDER — AMLODIPINE BESYLATE 10 MG/1
10 TABLET ORAL DAILY
Qty: 90 TABLET | Refills: 1 | Status: SHIPPED | OUTPATIENT
Start: 2023-10-30 | End: 2024-04-27

## 2023-10-30 RX ORDER — HYDROCHLOROTHIAZIDE 25 MG/1
25 TABLET ORAL DAILY
Qty: 90 TABLET | Refills: 1 | Status: SHIPPED | OUTPATIENT
Start: 2023-10-30

## 2023-10-30 RX ORDER — BENZONATATE 100 MG/1
100-200 CAPSULE ORAL 3 TIMES DAILY PRN
Qty: 60 CAPSULE | Refills: 0 | Status: SHIPPED | OUTPATIENT
Start: 2023-10-30 | End: 2023-11-06

## 2023-10-30 ASSESSMENT — ENCOUNTER SYMPTOMS
NAUSEA: 0
ABDOMINAL DISTENTION: 0
VOMITING: 0
CHEST TIGHTNESS: 0
DIARRHEA: 0
SHORTNESS OF BREATH: 0
CONSTIPATION: 0

## 2023-10-30 NOTE — PROGRESS NOTES
Dorsalis pedis pulses are 2+ on the right side and 2+ on the left side. Heart sounds: Normal heart sounds, S1 normal and S2 normal. Heart sounds not distant. No murmur heard. No friction rub. No gallop. No S3 or S4 sounds. Pulmonary:      Effort: Pulmonary effort is normal. No respiratory distress. Breath sounds: Normal breath sounds. No decreased breath sounds, wheezing, rhonchi or rales. Abdominal:      General: Bowel sounds are normal. There is no distension. Palpations: Abdomen is soft. Tenderness: There is no abdominal tenderness. There is no rebound. Musculoskeletal:         General: Normal range of motion. Cervical back: Full passive range of motion without pain, normal range of motion and neck supple. Skin:     General: Skin is warm and dry. Neurological:      Cranial Nerves: No cranial nerve deficit. Psychiatric:         Speech: Speech normal.         Behavior: Behavior normal.         Thought Content: Thought content normal.         Judgment: Judgment normal.         Assessment & Plan: The following diagnoses and conditions are stable with no further orders unless indicated:    1. Lupus arthritis ([address])    2. Essential hypertension    3. Primary hypertension    4. Migraine with aura and with status migrainosus, not intractable    5. Mild intermittent asthma without complication    6. Fibromyalgia    7. Insomnia due to other mental disorder    8. Moderate episode of recurrent major depressive disorder ([address])    9. Generalized anxiety disorder    10. PTSD (post-traumatic stress disorder)        [facility] was seen today for 6 month follow-up and other. Diagnoses and all orders for this visit:    Lupus arthritis [facility])  Fibromyalgia  -     External Referral To Rheumatology    Referral placed to rheum. Hopeful to achieve better control of Lupus/inflammatory conditions. Encouraged dietary improvement and regular aerobic exercise.      Essential hypertension  Primary

## 2023-11-06 ENCOUNTER — TELEPHONE (OUTPATIENT)
Dept: INTERNAL MEDICINE CLINIC | Age: 43
End: 2023-11-06

## 2023-11-06 NOTE — TELEPHONE ENCOUNTER
LVM with Lana from [address] in regards to pt's oxygen testing. Nothing stated the voicemail was sceured so she was told to call office back.      If she calls back read results from [place]: 99% on RA at arrivals

## 2023-11-06 NOTE — TELEPHONE ENCOUNTER
Sommer Booth from [employer] called and asked if patient had had updated oxygen testing done at room testing. She would like the doctors notes on this.   Please call Sommer Booth at [phone]

## 2023-11-14 ENCOUNTER — OFFICE VISIT (OUTPATIENT)
Dept: PAIN MANAGEMENT | Age: 43
End: 2023-11-14
Payer: MEDICAID

## 2023-11-14 VITALS
SYSTOLIC BLOOD PRESSURE: 150 MMHG | OXYGEN SATURATION: 99 % | WEIGHT: 189 LBS | HEART RATE: 85 BPM | TEMPERATURE: 97.3 F | DIASTOLIC BLOOD PRESSURE: 98 MMHG | BODY MASS INDEX: 29.38 KG/M2

## 2023-11-14 DIAGNOSIS — G89.29 CHRONIC MIDLINE LOW BACK PAIN WITH SCIATICA, SCIATICA LATERALITY UNSPECIFIED: ICD-10-CM

## 2023-11-14 DIAGNOSIS — M32.9 LUPUS ARTHRITIS (HCC): ICD-10-CM

## 2023-11-14 DIAGNOSIS — M79.7 FIBROMYALGIA: ICD-10-CM

## 2023-11-14 DIAGNOSIS — F32.A DEPRESSION, UNSPECIFIED DEPRESSION TYPE: ICD-10-CM

## 2023-11-14 DIAGNOSIS — G43.101 MIGRAINE WITH AURA AND WITH STATUS MIGRAINOSUS, NOT INTRACTABLE: ICD-10-CM

## 2023-11-14 DIAGNOSIS — M62.830 BACK SPASM: ICD-10-CM

## 2023-11-14 DIAGNOSIS — M54.40 CHRONIC MIDLINE LOW BACK PAIN WITH SCIATICA, SCIATICA LATERALITY UNSPECIFIED: ICD-10-CM

## 2023-11-14 DIAGNOSIS — E66.09 CLASS 1 OBESITY DUE TO EXCESS CALORIES WITH SERIOUS COMORBIDITY IN ADULT, UNSPECIFIED BMI: ICD-10-CM

## 2023-11-14 DIAGNOSIS — G89.4 CHRONIC PAIN SYNDROME: ICD-10-CM

## 2023-11-14 PROCEDURE — 3074F SYST BP LT 130 MM HG: CPT | Performed by: NURSE PRACTITIONER

## 2023-11-14 PROCEDURE — 3078F DIAST BP <80 MM HG: CPT | Performed by: NURSE PRACTITIONER

## 2023-11-14 PROCEDURE — G8427 DOCREV CUR MEDS BY ELIG CLIN: HCPCS | Performed by: NURSE PRACTITIONER

## 2023-11-14 PROCEDURE — 99214 OFFICE O/P EST MOD 30 MIN: CPT | Performed by: NURSE PRACTITIONER

## 2023-11-14 PROCEDURE — 1036F TOBACCO NON-USER: CPT | Performed by: NURSE PRACTITIONER

## 2023-11-14 PROCEDURE — G8484 FLU IMMUNIZE NO ADMIN: HCPCS | Performed by: NURSE PRACTITIONER

## 2023-11-14 PROCEDURE — G8417 CALC BMI ABV UP PARAM F/U: HCPCS | Performed by: NURSE PRACTITIONER

## 2023-11-14 RX ORDER — METHYLPREDNISOLONE 4 MG/1
TABLET ORAL
Qty: 1 KIT | Refills: 0 | Status: SHIPPED | OUTPATIENT
Start: 2023-11-14 | End: 2023-11-20

## 2023-11-14 RX ORDER — HYDROCODONE BITARTRATE AND ACETAMINOPHEN 5; 325 MG/1; MG/1
1 TABLET ORAL EVERY 8 HOURS PRN
Qty: 84 TABLET | Refills: 0 | Status: SHIPPED | OUTPATIENT
Start: 2023-11-14 | End: 2023-12-12

## 2023-11-14 RX ORDER — LIDOCAINE 50 MG/G
1 PATCH TOPICAL DAILY
Qty: 30 PATCH | Refills: 0 | Status: SHIPPED | OUTPATIENT
Start: 2023-11-14 | End: 2023-12-14

## 2023-11-14 RX ORDER — BACLOFEN 10 MG/1
10 TABLET ORAL DAILY
Qty: 30 TABLET | Refills: 0 | Status: SHIPPED | OUTPATIENT
Start: 2023-11-14 | End: 2023-12-14

## 2023-11-14 NOTE — PROGRESS NOTES
Date of Last Medication Agreement: 9/7/23              Date Naloxone prescribed: 0              UDT:                          Date of last UDT: 9/5/23                          Adverse report: No              OARRS:                          Checked today: Yes                          Adverse report: No    IMPRESSION:   1. Chronic pain syndrome    2. Fibromyalgia    3. Chronic midline low back pain with sciatica, sciatica laterality unspecified    4. Back spasm    5. Lupus arthritis ([address])    6. Migraine with aura and with status migrainosus, not intractable    7. Depression, unspecified depression type    8. Class 1 obesity due to excess calories with serious comorbidity in adult, unspecified BMI      PLAN:  Informed verbal consent was obtained:  -Patient's opioid therapy will be maintained at current dose  -Home exercises/Shadia exercises recommended  -Counseled patient on positive copying skills related to her daughter, currently getting help for her  -CBT techniques- relaxation therapies such as biofeedback, mindfulness based stress reduction, imagery, cognitive restructuring, problem solving discussed with patient   -She was advised weight reduction, diet changes- 800-1200 tracy diet, diet diary, exercising, nutritional  consult increased physical activity as tolerated   -Last UDS 9/5/23 consistent  -Return in about 4 weeks (around 12/12/2023). Analgesic Plan:              Continue present regimen: YES: Norco 5 mg tabs orally q8h prn              Adjust dose of present analgesic: No              Switch analgesics: No              Add/Adjust concomitant therapy: No: continue with Baclofen, Lidocaine, Narcan    I will continue her current medication regimen  which is part of the above treatment schedule.  It has been helping with Ms. Eulalia Lazo chronic  medical problems which for this visit include:   Diagnoses of Chronic pain syndrome, Fibromyalgia, Chronic midline low back pain with sciatica, sciatica

## 2023-11-16 ENCOUNTER — TELEPHONE (OUTPATIENT)
Dept: INTERNAL MEDICINE CLINIC | Age: 43
End: 2023-11-16

## 2023-11-16 RX ORDER — HYDROXYZINE HYDROCHLORIDE 25 MG/1
25 TABLET, FILM COATED ORAL 2 TIMES DAILY
Qty: 60 TABLET | Refills: 2 | Status: SHIPPED | OUTPATIENT
Start: 2023-11-16

## 2023-11-16 NOTE — TELEPHONE ENCOUNTER
Refill request for  medication.     HYDROXYZINE 25 MG     Name of Pharmacy- [facility]      Last visit - 10/30/23     Pending visit - 4/29/24    Last refill -10/14/23              Additional Comments

## 2023-11-16 NOTE — TELEPHONE ENCOUNTER
Patient is calling and states that she is taking 4 mg of the Tizandine and wants to know if she can take more than that? She thinks her body isn't affected by it now and thinks it isn't doing anything for her. Also patient wants to know if when she gets her anxiety medicine that it is called in as Hydroxyzine instead of the Atarax name for it? So because insurance will pay.

## 2023-11-17 ENCOUNTER — TELEPHONE (OUTPATIENT)
Dept: INTERNAL MEDICINE CLINIC | Age: 43
End: 2023-11-17

## 2023-11-17 NOTE — TELEPHONE ENCOUNTER
Called and spoke with patient and she is notified about the instructions of the 4mg Tizanidine and let her know that the Hydroxyzine was sent to the pharmacy. Also she stated that she needs [facility] Paperwork filled out again and needs it back by 11- and she is going to fax it over and it will say Attention Sunday Higuera.

## 2023-11-17 NOTE — TELEPHONE ENCOUNTER
Called and spoke with patient and she is notified about the instructions of the 4mg Tizanidine and let her know that the Hydroxyzine was sent to the pharmacy. Also she stated that she needs [facility] Paperwork filled out again and needs it back by 11- and she is going to fax it over and it will say Attention Lexie Hung.

## 2023-11-21 ENCOUNTER — TELEPHONE (OUTPATIENT)
Dept: INTERNAL MEDICINE CLINIC | Age: 43
End: 2023-11-21

## 2023-11-21 NOTE — TELEPHONE ENCOUNTER
Patient is calling and wants to know if her [place] paperwork has been done and filled out. If it isn't finished she wants to know if it can be adjusted because she was off Monday through Thursday and went back to work on Friday because she was off sick.         Fax Number for the [facility]    [phone]

## 2023-12-12 ENCOUNTER — OFFICE VISIT (OUTPATIENT)
Dept: PAIN MANAGEMENT | Age: 43
End: 2023-12-12
Payer: MEDICAID

## 2023-12-12 VITALS
OXYGEN SATURATION: 100 % | WEIGHT: 179 LBS | BODY MASS INDEX: 27.83 KG/M2 | SYSTOLIC BLOOD PRESSURE: 124 MMHG | HEART RATE: 88 BPM | DIASTOLIC BLOOD PRESSURE: 90 MMHG | TEMPERATURE: 97.2 F

## 2023-12-12 DIAGNOSIS — F41.1 GENERALIZED ANXIETY DISORDER: ICD-10-CM

## 2023-12-12 DIAGNOSIS — F51.01 PRIMARY INSOMNIA: ICD-10-CM

## 2023-12-12 DIAGNOSIS — M62.830 BACK SPASM: ICD-10-CM

## 2023-12-12 DIAGNOSIS — F32.A DEPRESSION, UNSPECIFIED DEPRESSION TYPE: ICD-10-CM

## 2023-12-12 DIAGNOSIS — M79.7 FIBROMYALGIA: ICD-10-CM

## 2023-12-12 DIAGNOSIS — G43.101 MIGRAINE WITH AURA AND WITH STATUS MIGRAINOSUS, NOT INTRACTABLE: ICD-10-CM

## 2023-12-12 DIAGNOSIS — G89.29 CHRONIC MIDLINE LOW BACK PAIN WITH SCIATICA, SCIATICA LATERALITY UNSPECIFIED: ICD-10-CM

## 2023-12-12 DIAGNOSIS — M54.40 CHRONIC MIDLINE LOW BACK PAIN WITH SCIATICA, SCIATICA LATERALITY UNSPECIFIED: ICD-10-CM

## 2023-12-12 DIAGNOSIS — G89.4 CHRONIC PAIN SYNDROME: ICD-10-CM

## 2023-12-12 DIAGNOSIS — E66.09 CLASS 1 OBESITY DUE TO EXCESS CALORIES WITH SERIOUS COMORBIDITY IN ADULT, UNSPECIFIED BMI: ICD-10-CM

## 2023-12-12 DIAGNOSIS — R09.81 SINUS CONGESTION: ICD-10-CM

## 2023-12-12 PROCEDURE — G8417 CALC BMI ABV UP PARAM F/U: HCPCS | Performed by: NURSE PRACTITIONER

## 2023-12-12 PROCEDURE — 3074F SYST BP LT 130 MM HG: CPT | Performed by: NURSE PRACTITIONER

## 2023-12-12 PROCEDURE — 1036F TOBACCO NON-USER: CPT | Performed by: NURSE PRACTITIONER

## 2023-12-12 PROCEDURE — G8427 DOCREV CUR MEDS BY ELIG CLIN: HCPCS | Performed by: NURSE PRACTITIONER

## 2023-12-12 PROCEDURE — G8484 FLU IMMUNIZE NO ADMIN: HCPCS | Performed by: NURSE PRACTITIONER

## 2023-12-12 PROCEDURE — 3078F DIAST BP <80 MM HG: CPT | Performed by: NURSE PRACTITIONER

## 2023-12-12 PROCEDURE — 99214 OFFICE O/P EST MOD 30 MIN: CPT | Performed by: NURSE PRACTITIONER

## 2023-12-12 RX ORDER — LIDOCAINE 50 MG/G
1 PATCH TOPICAL DAILY
Qty: 30 PATCH | Refills: 0 | Status: SHIPPED | OUTPATIENT
Start: 2023-12-12 | End: 2024-01-11

## 2023-12-12 RX ORDER — METHYLPREDNISOLONE 4 MG/1
TABLET ORAL
Qty: 1 KIT | Refills: 0 | Status: SHIPPED | OUTPATIENT
Start: 2023-12-12 | End: 2023-12-18

## 2023-12-12 RX ORDER — BACLOFEN 10 MG/1
10 TABLET ORAL DAILY
Qty: 30 TABLET | Refills: 0 | Status: SHIPPED | OUTPATIENT
Start: 2023-12-12 | End: 2024-01-11

## 2023-12-12 RX ORDER — TRAZODONE HYDROCHLORIDE 50 MG/1
50 TABLET ORAL NIGHTLY
Qty: 90 TABLET | Refills: 1 | Status: SHIPPED | OUTPATIENT
Start: 2023-12-12

## 2023-12-12 RX ORDER — HYDROCODONE BITARTRATE AND ACETAMINOPHEN 5; 325 MG/1; MG/1
1 TABLET ORAL EVERY 8 HOURS PRN
Qty: 84 TABLET | Refills: 0 | Status: SHIPPED | OUTPATIENT
Start: 2023-12-12 | End: 2024-01-09

## 2023-12-12 NOTE — PROGRESS NOTES
Ora Bailey  1980  [id number]    HISTORY OF PRESENT ILLNESS: Ms. Sandra Still is a 41 y.o. female returns for a follow up visit for pain management  She has a diagnosis of   1. Chronic pain syndrome    2. Fibromyalgia    3. Chronic midline low back pain with sciatica, sciatica laterality unspecified    4. Back spasm    5. Migraine with aura and with status migrainosus, not intractable    6. Generalized anxiety disorder    7. Depression, unspecified depression type    8. Primary insomnia    9. Class 1 obesity due to excess calories with serious comorbidity in adult, unspecified BMI    10. Sinus congestion      As per Information Obtained from the PADT (Patient Assessment and Documentation Tool)    She complains of pain in the  whole body  She rates the pain 9/10 and describes it as aching. Current treatment regimen has helped relieve about 10% of the pain. She denies any side effects from the current pain regimen. Patient reports that since the last follow up visit the physical functioning is unchanged, family/social relationships are unchanged, mood is unchanged sleep patterns are worse, and that the overall functioning is unchanged. Patient denies misusing/abusing her narcotic pain medications or using any illegal drugs. Upon obtaining medical history from Ms. Sandra Still states that pain is manageable on current pain therapy. Takes the pain medications as prescribed. Currently has sinus congestion. Mood/anxiety is stable. Sleep is poor with an average of 4 hours. Denies to having issues of constipation. Tolerating activities/house chores with moderate tenderness to the lower back. ALLERGIES: Patients list of allergies were reviewed     MEDICATIONS: Ms. Sandra Still list of medications were reviewed. Her current medications are   Outpatient Medications Prior to Visit   Medication Sig Dispense Refill    hydrOXYzine HCl (ATARAX) 25 MG tablet TAKE ONE TABLET BY MOUTH TWICE A DAY 60 tablet 2    amLODIPine

## 2023-12-13 DIAGNOSIS — M79.7 FIBROMYALGIA: ICD-10-CM

## 2023-12-13 NOTE — TELEPHONE ENCOUNTER
Refill request for PREGABALIN 300 MG CAPSULE medication.      Name of [address] [place] [address]      Last visit - 10-     Pending visit - 4-    Last refill - 8-      Medication Contract signed -   Last Boby bass-         Additional Comments

## 2023-12-14 RX ORDER — PREGABALIN 300 MG/1
CAPSULE ORAL
Qty: 60 CAPSULE | Refills: 0 | Status: SHIPPED | OUTPATIENT
Start: 2023-12-14 | End: 2024-01-12

## 2024-01-05 ENCOUNTER — HOSPITAL ENCOUNTER (EMERGENCY)
Age: 44
Discharge: HOME | End: 2024-01-05
Payer: OTHER MISCELLANEOUS

## 2024-01-05 ENCOUNTER — APPOINTMENT (OUTPATIENT)
Dept: GENERAL RADIOLOGY | Age: 44
End: 2024-01-05
Payer: OTHER MISCELLANEOUS

## 2024-01-05 VITALS
DIASTOLIC BLOOD PRESSURE: 88 MMHG | SYSTOLIC BLOOD PRESSURE: 138 MMHG | HEART RATE: 76 BPM | RESPIRATION RATE: 17 BRPM | HEIGHT: 67 IN | BODY MASS INDEX: 29.45 KG/M2 | TEMPERATURE: 97.3 F | WEIGHT: 187.61 LBS | OXYGEN SATURATION: 100 %
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
## 2024-01-05 PROCEDURE — 96372 THER/PROPH/DIAG INJ SC/IM: CPT

## 2024-01-05 PROCEDURE — 99284 EMERGENCY DEPT VISIT MOD MDM: CPT

## 2024-01-05 PROCEDURE — 73552 X-RAY EXAM OF FEMUR 2/>: CPT

## 2024-01-05 PROCEDURE — 73030 X-RAY EXAM OF SHOULDER: CPT

## 2024-01-05 PROCEDURE — 73502 X-RAY EXAM HIP UNI 2-3 VIEWS: CPT

## 2024-01-05 PROCEDURE — 6360000002 HC RX W HCPCS

## 2024-01-05 RX ORDER — KETOROLAC TROMETHAMINE 15 MG/ML
15 INJECTION, SOLUTION INTRAMUSCULAR; INTRAVENOUS ONCE
Status: COMPLETED | OUTPATIENT
Start: 2024-01-05 | End: 2024-01-05

## 2024-01-05 RX ORDER — LIDOCAINE 50 MG/G
1 PATCH TOPICAL DAILY
Qty: 10 PATCH | Refills: 0 | Status: SHIPPED | OUTPATIENT
Start: 2024-01-05 | End: 2024-01-15

## 2024-01-05 RX ORDER — LIDOCAINE 4 G/G
1 PATCH TOPICAL DAILY
Status: DISCONTINUED | OUTPATIENT
Start: 2024-01-05 | End: 2024-01-05 | Stop reason: HOSPADM

## 2024-01-05 RX ADMIN — KETOROLAC TROMETHAMINE 15 MG: 15 INJECTION, SOLUTION INTRAMUSCULAR; INTRAVENOUS at 13:58

## 2024-01-05 ASSESSMENT — PAIN DESCRIPTION - ORIENTATION
ORIENTATION: MID;LOWER
ORIENTATION: LEFT;RIGHT

## 2024-01-05 ASSESSMENT — PAIN - FUNCTIONAL ASSESSMENT: PAIN_FUNCTIONAL_ASSESSMENT: 0-10

## 2024-01-05 ASSESSMENT — PAIN SCALES - GENERAL
PAINLEVEL_OUTOF10: 10
PAINLEVEL_OUTOF10: 10

## 2024-01-05 ASSESSMENT — PAIN DESCRIPTION - DESCRIPTORS
DESCRIPTORS: ACHING;DISCOMFORT
DESCRIPTORS: SHARP

## 2024-01-05 ASSESSMENT — PAIN DESCRIPTION - LOCATION
LOCATION: BACK;HIP;LEG
LOCATION: BACK

## 2024-01-05 ASSESSMENT — PAIN DESCRIPTION - FREQUENCY: FREQUENCY: CONTINUOUS

## 2024-01-05 ASSESSMENT — PAIN DESCRIPTION - PAIN TYPE: TYPE: ACUTE PAIN

## 2024-01-05 NOTE — DISCHARGE INSTRUCTIONS
X-ray showed no fractures, please call your PCP as soon as possible for reassessment after ED visit.  Follow-up with pain management, take medications as prescribed.  Return any new or worsening of symptoms
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
## 2024-01-06 NOTE — ED PROVIDER NOTES
CYSTOSCOPY URETEROSCOPY WITH HOLMIUM LASER LITHOTRIPSY FOR 5 MM STONE, RIGHT STENT PLACEMENT    OTHER SURGICAL HISTORY  08/05/2016    CYSTOSCOPY, BILATERAL RETROGRADES, RIGHT URETEROSCOPY,    TONSILLECTOMY      TUBAL LIGATION         CURRENTMEDICATIONS       Discharge Medication List as of 1/5/2024  4:18 PM        CONTINUE these medications which have NOT CHANGED    Details   pregabalin (LYRICA) 300 MG capsule TAKE ONE CAPSULE BY MOUTH TWICE A DAY IN THE MORNING AND AT DINNER TIME, Disp-60 capsule, R-0Normal      baclofen (LIORESAL) 10 MG tablet Take 1 tablet by mouth daily, Disp-30 tablet, R-0Normal      HYDROcodone-acetaminophen (NORCO) 5-325 MG per tablet Take 1 tablet by mouth every 8 hours as needed for Pain (take for severe pain) for up to 28 days. Max Daily Amount: 3 tablets, Disp-84 tablet, R-0Normal      traZODone (DESYREL) 50 MG tablet Take 1 tablet by mouth nightly, Disp-90 tablet, R-1Normal      hydrOXYzine HCl (ATARAX) 25 MG tablet TAKE ONE TABLET BY MOUTH TWICE A DAY, Disp-60 tablet, R-2Normal      amLODIPine (NORVASC) 10 MG tablet Take 1 tablet by mouth daily Take 1 tablet by mouth daily, Disp-90 tablet, R-1Normal      hydroCHLOROthiazide (HYDRODIURIL) 25 MG tablet Take 1 tablet by mouth daily, Disp-90 tablet, R-1Normal      tiZANidine (ZANAFLEX) 4 MG tablet TAKE 1 AND 1/2 TABLETS BY MOUTH THREE TIMES A DAY AT LEAST 8 HOURS APART, Disp-135 tablet, R-2Normal      JOLESSA 0.15-0.03 MG per tablet TAKE ONE TABLET BY MOUTH DAILY, Disp-91 tablet, R-2Normal      EPINEPHrine (EPIPEN 2-LUANNE) 0.3 MG/0.3ML SOAJ injection Inject 0.3 mLs into the muscle once for 1 dose Use as directed for allergic reaction, Disp-1 each, R-0Normal      naloxone (NARCAN) 4 MG/0.1ML LIQD nasal spray Use as directed, Disp-1 each, R-0Normal      predniSONE (DELTASONE) 10 MG tablet Take 40 mg for 3 days then 30 mg for 3 days then 20 mg for 3 days then 10 mg for 3 days, Disp-30 tablet, R-0Normal      diclofenac sodium (VOLTAREN) 1 % GEL  Lupus (HCC), Lupus arthritis (HCC) (9/14/2022), MDRO (multiple drug resistant organisms) resistance, Nephroureterolithiasis (7/31/2018), Paresthesia of right arm and leg (8/31/2020), PONV (postoperative nausea and vomiting), Right sided weakness, Shortness of breath (12/14/2021), SLE exacerbation ([facility]) (1/12/2023), and Yeast vaginitis (11/27/2014).     EMERGENCY DEPARTMENT COURSE and DIFFERENTIAL DIAGNOSIS/MDM:   Vitals:    Vitals:    01/05/24 1246 01/05/24 1600   BP: (!) 141/92 138/88   Pulse: 77 76   Resp: 16 17   Temp: 97.3 °F (36.3 °C)    TempSrc: Oral    SpO2: 100% 100%   Weight: 85.1 kg (187 lb 9.8 oz)    Height: 1.702 m (5' 7\")        Patient was given the following medications:  Medications   ketorolac (TORADOL) injection 15 mg (15 mg IntraMUSCular Given 1/5/24 1358)             Is this patient to be included in the SEP-1 Core Measure due to severe sepsis or septic shock?   No   Exclusion criteria - the patient is NOT to be included for SEP-1 Core Measure due to:  Infection is not suspected        Chronic Conditions affecting care:    has a past medical history of Acute asthma exacerbation (12/15/2021), JUAN (acute kidney injury) ([facility]) (2/14/2021), Anxiety, Asthma, Constipation (1/13/2014), Conversion disorder, Depression, Fibromyalgia, Focal motor deficit (8/31/2020), Graves disease, History of blood transfusion, blood clots, Hypertension, Hyperthyroidism (9/30/2016), Joint pain (1/13/2014), Kidney stone, Localized rash (1/13/2014), Lupus (HCC), Lupus arthritis (HCC) (9/14/2022), MDRO (multiple drug resistant organisms) resistance, Nephroureterolithiasis (7/31/2018), Paresthesia of right arm and leg (8/31/2020), PONV (postoperative nausea and vomiting), Right sided weakness, Shortness of breath (12/14/2021), SLE exacerbation (HCC) (1/12/2023), and Yeast vaginitis (11/27/2014).    CONSULTS: (Who and What was discussed)  None      Records Reviewed (External and Source)     CC/HPI Summary, DDx, ED Course, and

## 2024-01-09 ENCOUNTER — TELEPHONE (OUTPATIENT)
Dept: PAIN MANAGEMENT | Age: 44
End: 2024-01-09

## 2024-01-09 DIAGNOSIS — M79.7 FIBROMYALGIA: ICD-10-CM

## 2024-01-09 DIAGNOSIS — M62.830 BACK SPASM: ICD-10-CM

## 2024-01-09 DIAGNOSIS — G89.4 CHRONIC PAIN SYNDROME: ICD-10-CM

## 2024-01-09 DIAGNOSIS — M54.40 CHRONIC MIDLINE LOW BACK PAIN WITH SCIATICA, SCIATICA LATERALITY UNSPECIFIED: ICD-10-CM

## 2024-01-09 DIAGNOSIS — G89.29 CHRONIC MIDLINE LOW BACK PAIN WITH SCIATICA, SCIATICA LATERALITY UNSPECIFIED: ICD-10-CM

## 2024-01-09 RX ORDER — HYDROCODONE BITARTRATE AND ACETAMINOPHEN 5; 325 MG/1; MG/1
1 TABLET ORAL EVERY 8 HOURS PRN
Qty: 18 TABLET | Refills: 0 | Status: SHIPPED | OUTPATIENT
Start: 2024-01-09 | End: 2024-01-16 | Stop reason: SDUPTHER

## 2024-01-09 NOTE — TELEPHONE ENCOUNTER
Reason for refill request: extension      Name of medication: HYDROcodone-acetaminophen (NORCO) 5-325 MG     baclofen (LIORESAL) 10 MG     Pharmacy name:   [facility] [id number]         Phone number: [phone]       Last refill: 12/12/23       Next appointment: 1/16/24    Patient confirmed the above pharmacy has their medication in stock

## 2024-01-16 ENCOUNTER — OFFICE VISIT (OUTPATIENT)
Dept: PAIN MANAGEMENT | Age: 44
End: 2024-01-16
Payer: MEDICAID

## 2024-01-16 VITALS
DIASTOLIC BLOOD PRESSURE: 65 MMHG | SYSTOLIC BLOOD PRESSURE: 101 MMHG | TEMPERATURE: 97 F | OXYGEN SATURATION: 98 % | HEART RATE: 102 BPM

## 2024-01-16 DIAGNOSIS — M54.40 CHRONIC MIDLINE LOW BACK PAIN WITH SCIATICA, SCIATICA LATERALITY UNSPECIFIED: ICD-10-CM

## 2024-01-16 DIAGNOSIS — G43.101 MIGRAINE WITH AURA AND WITH STATUS MIGRAINOSUS, NOT INTRACTABLE: ICD-10-CM

## 2024-01-16 DIAGNOSIS — G89.29 CHRONIC MIDLINE LOW BACK PAIN WITH SCIATICA, SCIATICA LATERALITY UNSPECIFIED: ICD-10-CM

## 2024-01-16 DIAGNOSIS — M62.830 BACK SPASM: ICD-10-CM

## 2024-01-16 DIAGNOSIS — F51.01 PRIMARY INSOMNIA: ICD-10-CM

## 2024-01-16 DIAGNOSIS — M79.7 FIBROMYALGIA: ICD-10-CM

## 2024-01-16 DIAGNOSIS — E66.09 CLASS 1 OBESITY DUE TO EXCESS CALORIES WITH SERIOUS COMORBIDITY IN ADULT, UNSPECIFIED BMI: ICD-10-CM

## 2024-01-16 DIAGNOSIS — F32.A DEPRESSION, UNSPECIFIED DEPRESSION TYPE: ICD-10-CM

## 2024-01-16 DIAGNOSIS — G89.4 CHRONIC PAIN SYNDROME: ICD-10-CM

## 2024-01-16 DIAGNOSIS — M32.9 LUPUS ARTHRITIS (HCC): ICD-10-CM
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
## 2024-01-16 PROCEDURE — G8427 DOCREV CUR MEDS BY ELIG CLIN: HCPCS | Performed by: NURSE PRACTITIONER

## 2024-01-16 PROCEDURE — 99214 OFFICE O/P EST MOD 30 MIN: CPT | Performed by: NURSE PRACTITIONER

## 2024-01-16 PROCEDURE — 3078F DIAST BP <80 MM HG: CPT | Performed by: NURSE PRACTITIONER

## 2024-01-16 PROCEDURE — G8484 FLU IMMUNIZE NO ADMIN: HCPCS | Performed by: NURSE PRACTITIONER

## 2024-01-16 PROCEDURE — 3074F SYST BP LT 130 MM HG: CPT | Performed by: NURSE PRACTITIONER

## 2024-01-16 PROCEDURE — G8417 CALC BMI ABV UP PARAM F/U: HCPCS | Performed by: NURSE PRACTITIONER

## 2024-01-16 PROCEDURE — 1036F TOBACCO NON-USER: CPT | Performed by: NURSE PRACTITIONER

## 2024-01-16 RX ORDER — HYDROCODONE BITARTRATE AND ACETAMINOPHEN 5; 325 MG/1; MG/1
1 TABLET ORAL EVERY 8 HOURS PRN
Qty: 90 TABLET | Refills: 0 | Status: SHIPPED | OUTPATIENT
Start: 2024-01-16 | End: 2024-02-15

## 2024-01-16 RX ORDER — LIDOCAINE 50 MG/G
1 PATCH TOPICAL DAILY
Qty: 10 PATCH | Refills: 0 | Status: SHIPPED | OUTPATIENT
Start: 2024-01-16 | End: 2024-01-26

## 2024-01-16 RX ORDER — TRAZODONE HYDROCHLORIDE 50 MG/1
50 TABLET ORAL NIGHTLY
Qty: 90 TABLET | Refills: 1 | Status: SHIPPED | OUTPATIENT
Start: 2024-01-16
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
## 2024-02-13 ENCOUNTER — OFFICE VISIT (OUTPATIENT)
Dept: PAIN MANAGEMENT | Age: 44
End: 2024-02-13
Payer: MEDICAID

## 2024-02-13 VITALS
BODY MASS INDEX: 28.19 KG/M2 | HEART RATE: 83 BPM | OXYGEN SATURATION: 98 % | DIASTOLIC BLOOD PRESSURE: 71 MMHG | WEIGHT: 180 LBS | TEMPERATURE: 96.8 F | SYSTOLIC BLOOD PRESSURE: 108 MMHG

## 2024-02-13 DIAGNOSIS — M54.40 CHRONIC MIDLINE LOW BACK PAIN WITH SCIATICA, SCIATICA LATERALITY UNSPECIFIED: ICD-10-CM

## 2024-02-13 DIAGNOSIS — M79.7 FIBROMYALGIA: ICD-10-CM

## 2024-02-13 DIAGNOSIS — G89.4 CHRONIC PAIN SYNDROME: ICD-10-CM

## 2024-02-13 DIAGNOSIS — M62.830 BACK SPASM: ICD-10-CM

## 2024-02-13 DIAGNOSIS — G89.29 CHRONIC MIDLINE LOW BACK PAIN WITH SCIATICA, SCIATICA LATERALITY UNSPECIFIED: ICD-10-CM

## 2024-02-13 DIAGNOSIS — F32.A DEPRESSION, UNSPECIFIED DEPRESSION TYPE: ICD-10-CM

## 2024-02-13 DIAGNOSIS — M32.9 LUPUS ARTHRITIS (HCC): ICD-10-CM

## 2024-02-13 DIAGNOSIS — G43.101 MIGRAINE WITH AURA AND WITH STATUS MIGRAINOSUS, NOT INTRACTABLE: ICD-10-CM

## 2024-02-13 PROCEDURE — 1036F TOBACCO NON-USER: CPT | Performed by: NURSE PRACTITIONER

## 2024-02-13 PROCEDURE — 3074F SYST BP LT 130 MM HG: CPT | Performed by: NURSE PRACTITIONER

## 2024-02-13 PROCEDURE — G8484 FLU IMMUNIZE NO ADMIN: HCPCS | Performed by: NURSE PRACTITIONER

## 2024-02-13 PROCEDURE — G8427 DOCREV CUR MEDS BY ELIG CLIN: HCPCS | Performed by: NURSE PRACTITIONER

## 2024-02-13 PROCEDURE — 99213 OFFICE O/P EST LOW 20 MIN: CPT | Performed by: NURSE PRACTITIONER

## 2024-02-13 PROCEDURE — G8417 CALC BMI ABV UP PARAM F/U: HCPCS | Performed by: NURSE PRACTITIONER

## 2024-02-13 PROCEDURE — 3078F DIAST BP <80 MM HG: CPT | Performed by: NURSE PRACTITIONER

## 2024-02-13 RX ORDER — HYDROCODONE BITARTRATE AND ACETAMINOPHEN 5; 325 MG/1; MG/1
1 TABLET ORAL EVERY 8 HOURS PRN
Qty: 90 TABLET | Refills: 0 | Status: SHIPPED | OUTPATIENT
Start: 2024-02-13 | End: 2024-03-14

## 2024-02-13 RX ORDER — BACLOFEN 10 MG/1
10 TABLET ORAL DAILY
Qty: 30 TABLET | Refills: 0 | Status: SHIPPED | OUTPATIENT
Start: 2024-02-13 | End: 2024-03-14

## 2024-02-13 RX ORDER — LIDOCAINE 50 MG/G
1 PATCH TOPICAL DAILY
Qty: 30 PATCH | Refills: 0 | Status: SHIPPED | OUTPATIENT
Start: 2024-02-13 | End: 2024-03-14

## 2024-02-13 NOTE — PROGRESS NOTES
Greyson Shelby  1980  [id number]    HISTORY OF PRESENT ILLNESS: Ms. Shelby is a 43 y.o. female returns for a follow up visit for pain management  She has a diagnosis of   1. Chronic pain syndrome    2. Fibromyalgia    3. Chronic midline low back pain with sciatica, sciatica laterality unspecified    4. Back spasm    5. Lupus arthritis (HCC)    6. Migraine with aura and with status migrainosus, not intractable    7. Depression, unspecified depression type      As per Information Obtained from the PADT (Patient Assessment and Documentation Tool)    She complains of pain in the  neck, down the spine, lower back radiating to both legs and feet  She rates the pain 8/10 and describes it as aching. Current treatment regimen has helped relieve about 20% of the pain.  She denies any side effects from the current pain regimen. Patient reports that since the last follow up visit the physical functioning is unchanged, family/social relationships are unchanged, mood is unchanged sleep patterns are unchanged, and that the overall functioning is unchanged.  Patient denies misusing/abusing her narcotic pain medications or using any illegal drugs.      Upon obtaining medical history from Ms. Shelby states that pain is manageable on current pain therapy. Takes the pain medications as prescribed. Mood/anxiety is stable. Sleep is fair with an average of 5-6 hours. Denies to having issues of constipation. Tolerating activities/house chores with moderate tenderness to the lower back.     ALLERGIES: Patients list of allergies were reviewed     MEDICATIONS: Ms. Shelby list of medications were reviewed.Her current medications are   Outpatient Medications Prior to Visit   Medication Sig Dispense Refill    traZODone (DESYREL) 50 MG tablet Take 1 tablet by mouth nightly 90 tablet 1    hydrOXYzine HCl (ATARAX) 25 MG tablet TAKE ONE TABLET BY MOUTH TWICE A DAY 60 tablet 2    amLODIPine (NORVASC) 10 MG tablet Take 1 tablet by

## 2024-02-26 DIAGNOSIS — B37.9 YEAST INFECTION: ICD-10-CM

## 2024-02-26 DIAGNOSIS — M79.7 FIBROMYALGIA: ICD-10-CM

## 2024-02-26 DIAGNOSIS — J45.20 MILD INTERMITTENT ASTHMA WITHOUT COMPLICATION: ICD-10-CM

## 2024-02-26 NOTE — TELEPHONE ENCOUNTER
Refill request for medication.   DIFLUCAN  TESSALON  VENTOLIN  LYRICA    Name of Pharmacy- [facility]      Last visit - 10-30-23     Pending visit - 4-29-24    Last refill -12-14-23 (LYRICA)      Medication Contract signed -   Last Oarrs ran-         Additional Comments

## 2024-02-27 RX ORDER — FLUCONAZOLE 150 MG/1
TABLET ORAL
Qty: 2 TABLET | Refills: 0 | OUTPATIENT
Start: 2024-02-27

## 2024-02-27 RX ORDER — ALBUTEROL SULFATE 90 UG/1
2 AEROSOL, METERED RESPIRATORY (INHALATION) 4 TIMES DAILY PRN
Qty: 1 EACH | Refills: 2 | Status: SHIPPED | OUTPATIENT
Start: 2024-02-27

## 2024-02-27 RX ORDER — BENZONATATE 100 MG/1
100-200 CAPSULE ORAL 3 TIMES DAILY PRN
Qty: 60 CAPSULE | Refills: 0 | Status: SHIPPED | OUTPATIENT
Start: 2024-02-27 | End: 2024-03-08

## 2024-02-27 RX ORDER — PREGABALIN 300 MG/1
CAPSULE ORAL
Qty: 60 CAPSULE | Refills: 2 | Status: SHIPPED | OUTPATIENT
Start: 2024-02-27 | End: 2024-03-26

## 2024-03-01 DIAGNOSIS — B37.9 YEAST INFECTION: Primary | ICD-10-CM

## 2024-03-01 RX ORDER — FLUCONAZOLE 150 MG/1
150 TABLET ORAL ONCE
Qty: 1 TABLET | Refills: 0 | Status: SHIPPED | OUTPATIENT
Start: 2024-03-01 | End: 2024-03-01

## 2024-03-01 RX ORDER — HYDROXYZINE HYDROCHLORIDE 25 MG/1
25 TABLET, FILM COATED ORAL 2 TIMES DAILY
Qty: 60 TABLET | Refills: 2 | Status: SHIPPED | OUTPATIENT
Start: 2024-03-01

## 2024-03-01 NOTE — TELEPHONE ENCOUNTER
Refill request for diflucan, hydroxyzine medication.     Name of Pharmacy- [facility]      Last visit - 10/30/2023     Pending visit - 4/29/2024    Last refill -11/16/2023      Medication Contract signed -   Last Oarrs ran-         Additional Comments

## 2024-03-11 ENCOUNTER — TELEPHONE (OUTPATIENT)
Dept: PAIN MANAGEMENT | Age: 44
End: 2024-03-11

## 2024-03-11 DIAGNOSIS — M54.40 CHRONIC MIDLINE LOW BACK PAIN WITH SCIATICA, SCIATICA LATERALITY UNSPECIFIED: ICD-10-CM

## 2024-03-11 DIAGNOSIS — M62.830 BACK SPASM: ICD-10-CM

## 2024-03-11 DIAGNOSIS — G89.29 CHRONIC MIDLINE LOW BACK PAIN WITH SCIATICA, SCIATICA LATERALITY UNSPECIFIED: ICD-10-CM

## 2024-03-11 DIAGNOSIS — G89.4 CHRONIC PAIN SYNDROME: ICD-10-CM

## 2024-03-11 DIAGNOSIS — M79.7 FIBROMYALGIA: ICD-10-CM

## 2024-03-11 RX ORDER — HYDROCODONE BITARTRATE AND ACETAMINOPHEN 5; 325 MG/1; MG/1
1 TABLET ORAL EVERY 8 HOURS PRN
Qty: 15 TABLET | Refills: 0 | Status: SHIPPED | OUTPATIENT
Start: 2024-03-11 | End: 2024-03-16

## 2024-03-11 RX ORDER — TIZANIDINE 4 MG/1
TABLET ORAL
Qty: 135 TABLET | Refills: 2 | Status: SHIPPED | OUTPATIENT
Start: 2024-03-11

## 2024-03-11 NOTE — TELEPHONE ENCOUNTER
Spoke with pt letting her know that [facility] sent the extension to the pharmacy, to call the pharmacy to see when the earliest fill date

## 2024-03-11 NOTE — TELEPHONE ENCOUNTER
Refill request for tiZANidine HCL 4MG TABLET medication.     Name of Pharmacy- [facility]      Last visit - 10-     Pending visit - 4-    Last refill - 1-      Medication Contract signed -   Last Oarrs ran-         Additional Comments

## 2024-03-11 NOTE — TELEPHONE ENCOUNTER
Reason for refill request: Will be out of medications 3/14/24      Name of medication: Norco 5-325, Baclofen      Pharmacy name: [facility] [address]      Phone number: [phone]      Last refill: 2/13/24      Next appointment: 3/19/24    Patient confirmed the above pharmacy has their medication in stock .    Patient requesting a call back once medications has been sent

## 2024-03-13 ENCOUNTER — TELEPHONE (OUTPATIENT)
Dept: INTERNAL MEDICINE CLINIC | Age: 44
End: 2024-03-13

## 2024-03-13 NOTE — TELEPHONE ENCOUNTER
Rescheduled on 3-     Lana from [employer] is calling and states she is faxing over a form for the PCP to fill out for the Walking Test for Oxygen.         [facility]      Fax Number    [phone]    Phone Number    [phone]

## 2024-03-13 NOTE — TELEPHONE ENCOUNTER
She may need to have this performed in the office to have O2 prescribed. It has been quite awhile since this was last performed

## 2024-03-13 NOTE — TELEPHONE ENCOUNTER
[employer] is calling and states that they need recent OV Notes anything with Oxygen encounters. Wants to have a Oxygen Walk Test done without O2 walking up the hallway with a Pulse/Ox on her finger.       "[employer]Lana    [phone]

## 2024-03-15 ENCOUNTER — OFFICE VISIT (OUTPATIENT)
Dept: INTERNAL MEDICINE CLINIC | Age: 44
End: 2024-03-15
Payer: MEDICAID

## 2024-03-15 VITALS
HEIGHT: 67 IN | BODY MASS INDEX: 29.26 KG/M2 | DIASTOLIC BLOOD PRESSURE: 78 MMHG | HEART RATE: 93 BPM | SYSTOLIC BLOOD PRESSURE: 148 MMHG | WEIGHT: 186.4 LBS | TEMPERATURE: 96.8 F | OXYGEN SATURATION: 98 %

## 2024-03-15 DIAGNOSIS — G47.34 NOCTURNAL OXYGEN DESATURATION: Primary | ICD-10-CM

## 2024-03-15 DIAGNOSIS — J45.40 MODERATE PERSISTENT ASTHMA WITHOUT COMPLICATION: ICD-10-CM

## 2024-03-15 DIAGNOSIS — M25.50 ARTHRALGIA, UNSPECIFIED JOINT: ICD-10-CM

## 2024-03-15 DIAGNOSIS — K29.70 GASTRITIS WITHOUT BLEEDING, UNSPECIFIED CHRONICITY, UNSPECIFIED GASTRITIS TYPE: ICD-10-CM

## 2024-03-15 DIAGNOSIS — M32.9 LUPUS ARTHRITIS (HCC): ICD-10-CM

## 2024-03-15 DIAGNOSIS — M79.7 FIBROMYALGIA: ICD-10-CM

## 2024-03-15 PROCEDURE — 1036F TOBACCO NON-USER: CPT | Performed by: NURSE PRACTITIONER

## 2024-03-15 PROCEDURE — G8417 CALC BMI ABV UP PARAM F/U: HCPCS | Performed by: NURSE PRACTITIONER

## 2024-03-15 PROCEDURE — G8484 FLU IMMUNIZE NO ADMIN: HCPCS | Performed by: NURSE PRACTITIONER

## 2024-03-15 PROCEDURE — G8427 DOCREV CUR MEDS BY ELIG CLIN: HCPCS | Performed by: NURSE PRACTITIONER

## 2024-03-15 PROCEDURE — 99214 OFFICE O/P EST MOD 30 MIN: CPT | Performed by: NURSE PRACTITIONER

## 2024-03-15 PROCEDURE — 3078F DIAST BP <80 MM HG: CPT | Performed by: NURSE PRACTITIONER

## 2024-03-15 PROCEDURE — 3077F SYST BP >= 140 MM HG: CPT | Performed by: NURSE PRACTITIONER

## 2024-03-15 RX ORDER — HYDROCORTISONE 25 MG/G
CREAM TOPICAL
Qty: 1 EACH | Refills: 0 | Status: SHIPPED | OUTPATIENT
Start: 2024-03-15

## 2024-03-15 RX ORDER — BUPROPION HYDROCHLORIDE 150 MG/1
150 TABLET ORAL EVERY MORNING
Qty: 30 TABLET | Refills: 3 | Status: SHIPPED | OUTPATIENT
Start: 2024-03-15

## 2024-03-15 RX ORDER — IPRATROPIUM BROMIDE AND ALBUTEROL SULFATE 2.5; .5 MG/3ML; MG/3ML
1 SOLUTION RESPIRATORY (INHALATION) 4 TIMES DAILY
Qty: 360 ML | Refills: 2 | Status: SHIPPED | OUTPATIENT
Start: 2024-03-15

## 2024-03-15 RX ORDER — ONDANSETRON 4 MG/1
4 TABLET, FILM COATED ORAL DAILY PRN
Qty: 30 TABLET | Refills: 0 | Status: SHIPPED | OUTPATIENT
Start: 2024-03-15

## 2024-03-15 RX ORDER — PREDNISONE 10 MG/1
TABLET ORAL
Qty: 30 TABLET | Refills: 0 | Status: SHIPPED | OUTPATIENT
Start: 2024-03-15

## 2024-03-15 ASSESSMENT — ENCOUNTER SYMPTOMS
CONSTIPATION: 0
DIARRHEA: 0
CHEST TIGHTNESS: 1
VOMITING: 0
SHORTNESS OF BREATH: 1
ABDOMINAL DISTENTION: 0
NAUSEA: 0

## 2024-03-15 ASSESSMENT — PATIENT HEALTH QUESTIONNAIRE - PHQ9
3. TROUBLE FALLING OR STAYING ASLEEP: 3
7. TROUBLE CONCENTRATING ON THINGS, SUCH AS READING THE NEWSPAPER OR WATCHING TELEVISION: 2
SUM OF ALL RESPONSES TO PHQ QUESTIONS 1-9: 15
6. FEELING BAD ABOUT YOURSELF - OR THAT YOU ARE A FAILURE OR HAVE LET YOURSELF OR YOUR FAMILY DOWN: 1
SUM OF ALL RESPONSES TO PHQ QUESTIONS 1-9: 15
SUM OF ALL RESPONSES TO PHQ9 QUESTIONS 1 & 2: 4
5. POOR APPETITE OR OVEREATING: 2
9. THOUGHTS THAT YOU WOULD BE BETTER OFF DEAD, OR OF HURTING YOURSELF: 0
2. FEELING DOWN, DEPRESSED OR HOPELESS: 3
1. LITTLE INTEREST OR PLEASURE IN DOING THINGS: 1
10. IF YOU CHECKED OFF ANY PROBLEMS, HOW DIFFICULT HAVE THESE PROBLEMS MADE IT FOR YOU TO DO YOUR WORK, TAKE CARE OF THINGS AT HOME, OR GET ALONG WITH OTHER PEOPLE: 2
8. MOVING OR SPEAKING SO SLOWLY THAT OTHER PEOPLE COULD HAVE NOTICED. OR THE OPPOSITE, BEING SO FIGETY OR RESTLESS THAT YOU HAVE BEEN MOVING AROUND A LOT MORE THAN USUAL: 2
4. FEELING TIRED OR HAVING LITTLE ENERGY: 1
SUM OF ALL RESPONSES TO PHQ QUESTIONS 1-9: 15
SUM OF ALL RESPONSES TO PHQ QUESTIONS 1-9: 15

## 2024-03-15 NOTE — PROGRESS NOTES
buPROPion (WELLBUTRIN XL) 150 MG extended release tablet Take 1 tablet by mouth every morning 30 tablet 3    HYDROcodone-acetaminophen (NORCO) 5-325 MG per tablet Take 1 tablet by mouth every 8 hours as needed for Pain (take for severe pain) for up to 5 days. Max Daily Amount: 3 tablets 15 tablet 0    tiZANidine (ZANAFLEX) 4 MG tablet TAKE ONE AND ONE-HALF TABLET BY MOUTH THREE TIMES A DAY AT LEAST 8 HOURS APART 135 tablet 2    hydrOXYzine HCl (ATARAX) 25 MG tablet Take 1 tablet by mouth 2 times daily 60 tablet 2    pregabalin (LYRICA) 300 MG capsule TAKE ONE CAPSULE BY MOUTH TWICE A DAY IN THE MORNING AND AT DINNER TIME 60 capsule 2    albuterol sulfate HFA (PROVENTIL;VENTOLIN;PROAIR) 108 (90 Base) MCG/ACT inhaler Inhale 2 puffs into the lungs 4 times daily as needed for Wheezing 1 each 2    traZODone (DESYREL) 50 MG tablet Take 1 tablet by mouth nightly 90 tablet 1    amLODIPine (NORVASC) 10 MG tablet Take 1 tablet by mouth daily Take 1 tablet by mouth daily 90 tablet 1    hydroCHLOROthiazide (HYDRODIURIL) 25 MG tablet Take 1 tablet by mouth daily 90 tablet 1    JOLESSA 0.15-0.03 MG per tablet TAKE ONE TABLET BY MOUTH DAILY 91 tablet 2    naloxone (NARCAN) 4 MG/0.1ML LIQD nasal spray Use as directed 1 each 0    Nebulizers (COMPRESSOR/NEBULIZER) MISC 1 each by Does not apply route daily 1 each 3    Handicap Placard MISC by Does not apply route Expiration 5 years 1 each 0    Blood Pressure KIT 1 kit by Does not apply route daily 1 kit 0    EPINEPHrine (EPIPEN 2-LUANNE) 0.3 MG/0.3ML SOAJ injection Inject 0.3 mLs into the muscle once for 1 dose Use as directed for allergic reaction 1 each 0    ibuprofen (ADVIL;MOTRIN) 200 MG tablet Take 1 tablet by mouth every 8 hours as needed for Pain 15 tablet 0    pantoprazole (PROTONIX) 20 MG tablet Take 1 tablet by mouth 2 times daily 180 tablet 2     No current facility-administered medications for this visit.       Allergies   Allergen Reactions    Ambien [Zolpidem Tartrate]

## 2024-03-19 ENCOUNTER — TELEPHONE (OUTPATIENT)
Dept: INTERNAL MEDICINE CLINIC | Age: 44
End: 2024-03-19

## 2024-03-19 ENCOUNTER — OFFICE VISIT (OUTPATIENT)
Dept: PAIN MANAGEMENT | Age: 44
End: 2024-03-19

## 2024-03-19 VITALS
OXYGEN SATURATION: 98 % | DIASTOLIC BLOOD PRESSURE: 94 MMHG | HEART RATE: 106 BPM | TEMPERATURE: 97.6 F | SYSTOLIC BLOOD PRESSURE: 140 MMHG

## 2024-03-19 DIAGNOSIS — G89.4 CHRONIC PAIN SYNDROME: ICD-10-CM

## 2024-03-19 DIAGNOSIS — E66.09 CLASS 1 OBESITY DUE TO EXCESS CALORIES WITH SERIOUS COMORBIDITY IN ADULT, UNSPECIFIED BMI: ICD-10-CM

## 2024-03-19 DIAGNOSIS — M32.9 LUPUS ARTHRITIS (HCC): ICD-10-CM

## 2024-03-19 DIAGNOSIS — M54.40 CHRONIC MIDLINE LOW BACK PAIN WITH SCIATICA, SCIATICA LATERALITY UNSPECIFIED: ICD-10-CM

## 2024-03-19 DIAGNOSIS — G43.101 MIGRAINE WITH AURA AND WITH STATUS MIGRAINOSUS, NOT INTRACTABLE: ICD-10-CM

## 2024-03-19 DIAGNOSIS — M62.830 BACK SPASM: ICD-10-CM

## 2024-03-19 DIAGNOSIS — F51.01 PRIMARY INSOMNIA: ICD-10-CM

## 2024-03-19 DIAGNOSIS — F32.A DEPRESSION, UNSPECIFIED DEPRESSION TYPE: ICD-10-CM

## 2024-03-19 DIAGNOSIS — M79.7 FIBROMYALGIA: ICD-10-CM

## 2024-03-19 DIAGNOSIS — G89.29 CHRONIC MIDLINE LOW BACK PAIN WITH SCIATICA, SCIATICA LATERALITY UNSPECIFIED: ICD-10-CM

## 2024-03-19 DIAGNOSIS — F41.1 GENERALIZED ANXIETY DISORDER: ICD-10-CM

## 2024-03-19 RX ORDER — TRAZODONE HYDROCHLORIDE 50 MG/1
50 TABLET ORAL NIGHTLY
Qty: 90 TABLET | Refills: 1 | Status: SHIPPED | OUTPATIENT
Start: 2024-03-19

## 2024-03-19 RX ORDER — HYDROCODONE BITARTRATE AND ACETAMINOPHEN 5; 325 MG/1; MG/1
1 TABLET ORAL EVERY 8 HOURS PRN
Qty: 90 TABLET | Refills: 0 | Status: SHIPPED | OUTPATIENT
Start: 2024-03-19 | End: 2024-04-18

## 2024-03-19 NOTE — TELEPHONE ENCOUNTER
Pt stated she has been having really bad swelling all weekend after her appointment. in her legs and feet and she feels like her stomach is in knots. She has been drinking water throughout the day and has been taking med. Per prescribed.

## 2024-03-20 DIAGNOSIS — M79.89 LEG SWELLING: ICD-10-CM

## 2024-03-20 DIAGNOSIS — M25.474 BILATERAL SWELLING OF FEET AND ANKLES: ICD-10-CM

## 2024-03-20 DIAGNOSIS — M25.475 BILATERAL SWELLING OF FEET AND ANKLES: ICD-10-CM

## 2024-03-20 DIAGNOSIS — R60.9 EDEMA, UNSPECIFIED TYPE: Primary | ICD-10-CM

## 2024-03-20 DIAGNOSIS — M25.472 BILATERAL SWELLING OF FEET AND ANKLES: ICD-10-CM

## 2024-03-20 DIAGNOSIS — M25.471 BILATERAL SWELLING OF FEET AND ANKLES: ICD-10-CM

## 2024-03-20 NOTE — TELEPHONE ENCOUNTER
Called and spoke with patient. Slightly increase in weight with the swelling in her legs and ankles. Patient is retaining water and is wanting a referral to Cardiology. Referral pended.

## 2024-03-20 NOTE — TELEPHONE ENCOUNTER
Is she noticing increasing weight as well? If shortness of breath worsening as well she might need repeat cardiac testing to ensure heart function is stable

## 2024-03-25 ENCOUNTER — HOSPITAL ENCOUNTER (INPATIENT)
Age: 44
LOS: 2 days | Discharge: HOME | DRG: 392 | End: 2024-03-28
Attending: STUDENT IN AN ORGANIZED HEALTH CARE EDUCATION/TRAINING PROGRAM | Admitting: INTERNAL MEDICINE
Payer: COMMERCIAL

## 2024-03-25 ENCOUNTER — APPOINTMENT (OUTPATIENT)
Dept: GENERAL RADIOLOGY | Age: 44
DRG: 392 | End: 2024-03-25
Payer: COMMERCIAL

## 2024-03-25 ENCOUNTER — APPOINTMENT (OUTPATIENT)
Dept: CT IMAGING | Age: 44
DRG: 392 | End: 2024-03-25
Payer: COMMERCIAL

## 2024-03-25 DIAGNOSIS — Z71.89 GOALS OF CARE, COUNSELING/DISCUSSION: ICD-10-CM

## 2024-03-25 DIAGNOSIS — K56.7 ILEUS (HCC): Primary | ICD-10-CM

## 2024-03-25 DIAGNOSIS — K52.9 ENTERITIS: ICD-10-CM

## 2024-03-25 DIAGNOSIS — R11.2 NAUSEA AND VOMITING, UNSPECIFIED VOMITING TYPE: ICD-10-CM

## 2024-03-25 LAB
ALBUMIN SERPL-MCNC: 4.5 G/DL (ref 3.4–5)
ALBUMIN/GLOB SERPL: 1.4 {RATIO} (ref 1.1–2.2)
ALP SERPL-CCNC: 72 U/L (ref 40–129)
ALT SERPL-CCNC: 15 U/L (ref 10–40)
ANION GAP SERPL CALCULATED.3IONS-SCNC: 14 MMOL/L (ref 3–16)
AST SERPL-CCNC: 20 U/L (ref 15–37)
BACTERIA URNS QL MICRO: ABNORMAL /HPF
BILIRUB SERPL-MCNC: 0.3 MG/DL (ref 0–1)
BILIRUB UR QL STRIP.AUTO: NEGATIVE
BUN SERPL-MCNC: 16 MG/DL (ref 7–20)
CALCIUM SERPL-MCNC: 10.5 MG/DL (ref 8.3–10.6)
CHARACTER UR: ABNORMAL
CHLORIDE SERPL-SCNC: 101 MMOL/L (ref 99–110)
CLARITY UR: CLEAR
CO2 SERPL-SCNC: 22 MMOL/L (ref 21–32)
COLOR UR: ABNORMAL
CREAT SERPL-MCNC: 0.7 MG/DL (ref 0.6–1.1)
CRYSTALS URNS MICRO: ABNORMAL /HPF
DEPRECATED RDW RBC AUTO: 14.7 % (ref 12.4–15.4)
EPI CELLS #/AREA URNS AUTO: 7 /HPF (ref 0–5)
GFR SERPLBLD CREATININE-BSD FMLA CKD-EPI: >90 ML/MIN/{1.73_M2}
GLUCOSE SERPL-MCNC: 69 MG/DL (ref 70–99)
GLUCOSE UR STRIP.AUTO-MCNC: NEGATIVE MG/DL
HCG UR QL: NEGATIVE
HCT VFR BLD AUTO: 39.6 % (ref 36–48)
HGB BLD-MCNC: 13 G/DL (ref 12–16)
HGB UR QL STRIP.AUTO: NEGATIVE
HYALINE CASTS #/AREA URNS AUTO: 2 /LPF (ref 0–8)
KETONES UR STRIP.AUTO-MCNC: ABNORMAL MG/DL
LACTATE BLDV-SCNC: 0.7 MMOL/L (ref 0.4–1.9)
LACTATE BLDV-SCNC: 0.9 MMOL/L (ref 0.4–1.9)
LEUKOCYTE ESTERASE UR QL STRIP.AUTO: NEGATIVE
LIPASE SERPL-CCNC: 34 U/L (ref 13–60)
MCH RBC QN AUTO: 27.8 PG (ref 26–34)
MCHC RBC AUTO-ENTMCNC: 32.8 G/DL (ref 31–36)
MCV RBC AUTO: 84.7 FL (ref 80–100)
NITRITE UR QL STRIP.AUTO: NEGATIVE
PH UR STRIP.AUTO: 5.5 [PH] (ref 5–8)
PLATELET # BLD AUTO: 292 K/UL (ref 135–450)
PMV BLD AUTO: 8.4 FL (ref 5–10.5)
POTASSIUM SERPL-SCNC: 4.1 MMOL/L (ref 3.5–5.1)
PROT SERPL-MCNC: 7.8 G/DL (ref 6.4–8.2)
PROT UR STRIP.AUTO-MCNC: 30 MG/DL
RBC # BLD AUTO: 4.67 M/UL (ref 4–5.2)
RBC #/AREA URNS HPF: ABNORMAL /HPF (ref 0–4)
SODIUM SERPL-SCNC: 137 MMOL/L (ref 136–145)
SP GR UR STRIP.AUTO: 1.04 (ref 1–1.03)
TROPONIN, HIGH SENSITIVITY: <6 NG/L (ref 0–14)
TROPONIN, HIGH SENSITIVITY: <6 NG/L (ref 0–14)
UA DIPSTICK W REFLEX MICRO PNL UR: YES
URN SPEC COLLECT METH UR: ABNORMAL
UROBILINOGEN UR STRIP-ACNC: 1 E.U./DL
WBC # BLD AUTO: 12.3 K/UL (ref 4–11)
WBC #/AREA URNS AUTO: 1 /HPF (ref 0–5)

## 2024-03-25 PROCEDURE — 6370000000 HC RX 637 (ALT 250 FOR IP)

## 2024-03-25 PROCEDURE — 84703 CHORIONIC GONADOTROPIN ASSAY: CPT

## 2024-03-25 PROCEDURE — 6360000002 HC RX W HCPCS: Performed by: STUDENT IN AN ORGANIZED HEALTH CARE EDUCATION/TRAINING PROGRAM

## 2024-03-25 PROCEDURE — 6360000002 HC RX W HCPCS: Performed by: FAMILY MEDICINE

## 2024-03-25 PROCEDURE — 96365 THER/PROPH/DIAG IV INF INIT: CPT

## 2024-03-25 PROCEDURE — 96375 TX/PRO/DX INJ NEW DRUG ADDON: CPT

## 2024-03-25 PROCEDURE — 6360000004 HC RX CONTRAST MEDICATION

## 2024-03-25 PROCEDURE — 85027 COMPLETE CBC AUTOMATED: CPT

## 2024-03-25 PROCEDURE — 81001 URINALYSIS AUTO W/SCOPE: CPT

## 2024-03-25 PROCEDURE — 83605 ASSAY OF LACTIC ACID: CPT

## 2024-03-25 PROCEDURE — 84484 ASSAY OF TROPONIN QUANT: CPT

## 2024-03-25 PROCEDURE — 2580000003 HC RX 258: Performed by: FAMILY MEDICINE

## 2024-03-25 PROCEDURE — 2580000003 HC RX 258: Performed by: STUDENT IN AN ORGANIZED HEALTH CARE EDUCATION/TRAINING PROGRAM

## 2024-03-25 PROCEDURE — 80053 COMPREHEN METABOLIC PANEL: CPT

## 2024-03-25 PROCEDURE — 71046 X-RAY EXAM CHEST 2 VIEWS: CPT

## 2024-03-25 PROCEDURE — 93005 ELECTROCARDIOGRAM TRACING: CPT

## 2024-03-25 PROCEDURE — 6370000000 HC RX 637 (ALT 250 FOR IP): Performed by: FAMILY MEDICINE

## 2024-03-25 PROCEDURE — G0378 HOSPITAL OBSERVATION PER HR: HCPCS

## 2024-03-25 PROCEDURE — 83690 ASSAY OF LIPASE: CPT

## 2024-03-25 PROCEDURE — 96376 TX/PRO/DX INJ SAME DRUG ADON: CPT

## 2024-03-25 PROCEDURE — 6360000002 HC RX W HCPCS

## 2024-03-25 PROCEDURE — 99285 EMERGENCY DEPT VISIT HI MDM: CPT

## 2024-03-25 PROCEDURE — 87040 BLOOD CULTURE FOR BACTERIA: CPT

## 2024-03-25 PROCEDURE — 36415 COLL VENOUS BLD VENIPUNCTURE: CPT

## 2024-03-25 PROCEDURE — 96361 HYDRATE IV INFUSION ADD-ON: CPT

## 2024-03-25 PROCEDURE — 74177 CT ABD & PELVIS W/CONTRAST: CPT

## 2024-03-25 RX ORDER — BUPROPION HYDROCHLORIDE 150 MG/1
150 TABLET ORAL EVERY MORNING
Status: DISCONTINUED | OUTPATIENT
Start: 2024-03-26 | End: 2024-03-28 | Stop reason: HOSPADM

## 2024-03-25 RX ORDER — LANOLIN ALCOHOL/MO/W.PET/CERES
3 CREAM (GRAM) TOPICAL NIGHTLY PRN
Status: DISCONTINUED | OUTPATIENT
Start: 2024-03-25 | End: 2024-03-28 | Stop reason: HOSPADM

## 2024-03-25 RX ORDER — ONDANSETRON 2 MG/ML
4 INJECTION INTRAMUSCULAR; INTRAVENOUS ONCE
Status: COMPLETED | OUTPATIENT
Start: 2024-03-25 | End: 2024-03-25

## 2024-03-25 RX ORDER — POLYETHYLENE GLYCOL 3350 17 G/17G
17 POWDER, FOR SOLUTION ORAL DAILY PRN
Status: DISCONTINUED | OUTPATIENT
Start: 2024-03-25 | End: 2024-03-28 | Stop reason: HOSPADM

## 2024-03-25 RX ORDER — LEVONORGESTREL AND ETHINYL ESTRADIOL 0.15-0.03
1 KIT ORAL DAILY
Status: DISCONTINUED | OUTPATIENT
Start: 2024-03-25 | End: 2024-03-28 | Stop reason: HOSPADM

## 2024-03-25 RX ORDER — LABETALOL HYDROCHLORIDE 5 MG/ML
10 INJECTION, SOLUTION INTRAVENOUS EVERY 6 HOURS PRN
Status: DISCONTINUED | OUTPATIENT
Start: 2024-03-25 | End: 2024-03-28 | Stop reason: HOSPADM

## 2024-03-25 RX ORDER — ACETAMINOPHEN 325 MG/1
650 TABLET ORAL EVERY 6 HOURS PRN
Status: DISCONTINUED | OUTPATIENT
Start: 2024-03-25 | End: 2024-03-25

## 2024-03-25 RX ORDER — ONDANSETRON 2 MG/ML
4 INJECTION INTRAMUSCULAR; INTRAVENOUS EVERY 6 HOURS PRN
Status: DISCONTINUED | OUTPATIENT
Start: 2024-03-25 | End: 2024-03-28 | Stop reason: HOSPADM

## 2024-03-25 RX ORDER — MORPHINE SULFATE 2 MG/ML
2 INJECTION, SOLUTION INTRAMUSCULAR; INTRAVENOUS
Status: DISCONTINUED | OUTPATIENT
Start: 2024-03-25 | End: 2024-03-28 | Stop reason: HOSPADM

## 2024-03-25 RX ORDER — 0.9 % SODIUM CHLORIDE 0.9 %
1000 INTRAVENOUS SOLUTION INTRAVENOUS ONCE
Status: DISCONTINUED | OUTPATIENT
Start: 2024-03-25 | End: 2024-03-25

## 2024-03-25 RX ORDER — OXYCODONE HYDROCHLORIDE 5 MG/1
5 TABLET ORAL EVERY 4 HOURS PRN
Status: DISCONTINUED | OUTPATIENT
Start: 2024-03-25 | End: 2024-03-26

## 2024-03-25 RX ORDER — SODIUM CHLORIDE, SODIUM LACTATE, POTASSIUM CHLORIDE, CALCIUM CHLORIDE 600; 310; 30; 20 MG/100ML; MG/100ML; MG/100ML; MG/100ML
INJECTION, SOLUTION INTRAVENOUS CONTINUOUS
Status: ACTIVE | OUTPATIENT
Start: 2024-03-25 | End: 2024-03-27

## 2024-03-25 RX ORDER — ACETAMINOPHEN 650 MG/1
650 SUPPOSITORY RECTAL EVERY 6 HOURS PRN
Status: DISCONTINUED | OUTPATIENT
Start: 2024-03-25 | End: 2024-03-28 | Stop reason: HOSPADM

## 2024-03-25 RX ORDER — HYDROXYZINE HYDROCHLORIDE 25 MG/1
25 TABLET, FILM COATED ORAL 2 TIMES DAILY
Status: DISCONTINUED | OUTPATIENT
Start: 2024-03-25 | End: 2024-03-28 | Stop reason: HOSPADM

## 2024-03-25 RX ORDER — MAGNESIUM SULFATE IN WATER 40 MG/ML
2000 INJECTION, SOLUTION INTRAVENOUS PRN
Status: DISCONTINUED | OUTPATIENT
Start: 2024-03-25 | End: 2024-03-28 | Stop reason: HOSPADM

## 2024-03-25 RX ORDER — POTASSIUM CHLORIDE 20 MEQ/1
40 TABLET, EXTENDED RELEASE ORAL PRN
Status: DISCONTINUED | OUTPATIENT
Start: 2024-03-25 | End: 2024-03-28 | Stop reason: HOSPADM

## 2024-03-25 RX ORDER — MORPHINE SULFATE 4 MG/ML
4 INJECTION, SOLUTION INTRAMUSCULAR; INTRAVENOUS
Status: DISCONTINUED | OUTPATIENT
Start: 2024-03-25 | End: 2024-03-27

## 2024-03-25 RX ORDER — PREGABALIN 100 MG/1
300 CAPSULE ORAL 2 TIMES DAILY
Status: DISCONTINUED | OUTPATIENT
Start: 2024-03-25 | End: 2024-03-28 | Stop reason: HOSPADM

## 2024-03-25 RX ORDER — SODIUM CHLORIDE 0.9 % (FLUSH) 0.9 %
5-40 SYRINGE (ML) INJECTION EVERY 12 HOURS SCHEDULED
Status: DISCONTINUED | OUTPATIENT
Start: 2024-03-25 | End: 2024-03-28 | Stop reason: HOSPADM

## 2024-03-25 RX ORDER — AMLODIPINE BESYLATE 10 MG/1
10 TABLET ORAL DAILY
Status: DISCONTINUED | OUTPATIENT
Start: 2024-03-25 | End: 2024-03-28 | Stop reason: HOSPADM

## 2024-03-25 RX ORDER — SODIUM CHLORIDE 9 MG/ML
INJECTION, SOLUTION INTRAVENOUS PRN
Status: DISCONTINUED | OUTPATIENT
Start: 2024-03-25 | End: 2024-03-28 | Stop reason: HOSPADM

## 2024-03-25 RX ORDER — SODIUM CHLORIDE 0.9 % (FLUSH) 0.9 %
5-40 SYRINGE (ML) INJECTION PRN
Status: DISCONTINUED | OUTPATIENT
Start: 2024-03-25 | End: 2024-03-28 | Stop reason: HOSPADM

## 2024-03-25 RX ORDER — ACETAMINOPHEN 325 MG/1
650 TABLET ORAL EVERY 4 HOURS PRN
Status: DISCONTINUED | OUTPATIENT
Start: 2024-03-25 | End: 2024-03-28 | Stop reason: HOSPADM

## 2024-03-25 RX ORDER — PANTOPRAZOLE SODIUM 20 MG/1
20 TABLET, DELAYED RELEASE ORAL 2 TIMES DAILY
Status: DISCONTINUED | OUTPATIENT
Start: 2024-03-25 | End: 2024-03-28 | Stop reason: HOSPADM

## 2024-03-25 RX ORDER — ONDANSETRON 2 MG/ML
4 INJECTION INTRAMUSCULAR; INTRAVENOUS EVERY 30 MIN PRN
Status: COMPLETED | OUTPATIENT
Start: 2024-03-25 | End: 2024-03-25

## 2024-03-25 RX ORDER — HYDROCODONE BITARTRATE AND ACETAMINOPHEN 5; 325 MG/1; MG/1
1 TABLET ORAL ONCE
Status: COMPLETED | OUTPATIENT
Start: 2024-03-25 | End: 2024-03-25

## 2024-03-25 RX ORDER — 0.9 % SODIUM CHLORIDE 0.9 %
1000 INTRAVENOUS SOLUTION INTRAVENOUS ONCE
Status: COMPLETED | OUTPATIENT
Start: 2024-03-25 | End: 2024-03-25

## 2024-03-25 RX ORDER — ONDANSETRON 4 MG/1
4 TABLET, ORALLY DISINTEGRATING ORAL EVERY 8 HOURS PRN
Status: DISCONTINUED | OUTPATIENT
Start: 2024-03-25 | End: 2024-03-28 | Stop reason: HOSPADM

## 2024-03-25 RX ORDER — OXYCODONE HYDROCHLORIDE 10 MG/1
10 TABLET ORAL EVERY 4 HOURS PRN
Status: DISCONTINUED | OUTPATIENT
Start: 2024-03-25 | End: 2024-03-26

## 2024-03-25 RX ORDER — ALBUTEROL SULFATE 90 UG/1
2 AEROSOL, METERED RESPIRATORY (INHALATION) 4 TIMES DAILY PRN
Status: DISCONTINUED | OUTPATIENT
Start: 2024-03-25 | End: 2024-03-28 | Stop reason: HOSPADM

## 2024-03-25 RX ORDER — DROPERIDOL 2.5 MG/ML
2.5 INJECTION, SOLUTION INTRAMUSCULAR; INTRAVENOUS ONCE
Status: COMPLETED | OUTPATIENT
Start: 2024-03-25 | End: 2024-03-25

## 2024-03-25 RX ORDER — POTASSIUM CHLORIDE 7.45 MG/ML
10 INJECTION INTRAVENOUS PRN
Status: DISCONTINUED | OUTPATIENT
Start: 2024-03-25 | End: 2024-03-28 | Stop reason: HOSPADM

## 2024-03-25 RX ORDER — ENOXAPARIN SODIUM 100 MG/ML
40 INJECTION SUBCUTANEOUS DAILY
Status: DISCONTINUED | OUTPATIENT
Start: 2024-03-25 | End: 2024-03-28 | Stop reason: HOSPADM

## 2024-03-25 RX ORDER — HYDRALAZINE HYDROCHLORIDE 20 MG/ML
10 INJECTION INTRAMUSCULAR; INTRAVENOUS ONCE
Status: COMPLETED | OUTPATIENT
Start: 2024-03-25 | End: 2024-03-25

## 2024-03-25 RX ADMIN — MORPHINE SULFATE 4 MG: 4 INJECTION, SOLUTION INTRAMUSCULAR; INTRAVENOUS at 21:03

## 2024-03-25 RX ADMIN — ENOXAPARIN SODIUM 40 MG: 100 INJECTION SUBCUTANEOUS at 18:46

## 2024-03-25 RX ADMIN — IOPAMIDOL 75 ML: 755 INJECTION, SOLUTION INTRAVENOUS at 11:16

## 2024-03-25 RX ADMIN — PIPERACILLIN AND TAZOBACTAM 4500 MG: 4; .5 INJECTION, POWDER, FOR SOLUTION INTRAVENOUS at 14:44

## 2024-03-25 RX ADMIN — PANTOPRAZOLE SODIUM 20 MG: 20 TABLET, DELAYED RELEASE ORAL at 20:51

## 2024-03-25 RX ADMIN — Medication 10 ML: at 20:56

## 2024-03-25 RX ADMIN — HYDROXYZINE HYDROCHLORIDE 25 MG: 25 TABLET, FILM COATED ORAL at 20:51

## 2024-03-25 RX ADMIN — ONDANSETRON 4 MG: 2 INJECTION INTRAMUSCULAR; INTRAVENOUS at 14:35

## 2024-03-25 RX ADMIN — MORPHINE SULFATE 4 MG: 4 INJECTION, SOLUTION INTRAMUSCULAR; INTRAVENOUS at 17:13

## 2024-03-25 RX ADMIN — PREGABALIN 300 MG: 100 CAPSULE ORAL at 20:51

## 2024-03-25 RX ADMIN — ONDANSETRON 4 MG: 2 INJECTION INTRAMUSCULAR; INTRAVENOUS at 13:10

## 2024-03-25 RX ADMIN — HYDRALAZINE HYDROCHLORIDE 10 MG: 20 INJECTION, SOLUTION INTRAMUSCULAR; INTRAVENOUS at 18:46

## 2024-03-25 RX ADMIN — HYDROCODONE BITARTRATE AND ACETAMINOPHEN 1 TABLET: 5; 325 TABLET ORAL at 10:50

## 2024-03-25 RX ADMIN — PIPERACILLIN AND TAZOBACTAM 3375 MG: 3; .375 INJECTION, POWDER, LYOPHILIZED, FOR SOLUTION INTRAVENOUS at 21:11

## 2024-03-25 RX ADMIN — OXYCODONE HYDROCHLORIDE 10 MG: 10 TABLET ORAL at 20:03

## 2024-03-25 RX ADMIN — AMLODIPINE BESYLATE 10 MG: 10 TABLET ORAL at 18:03

## 2024-03-25 RX ADMIN — ONDANSETRON 4 MG: 2 INJECTION INTRAMUSCULAR; INTRAVENOUS at 10:50

## 2024-03-25 RX ADMIN — SODIUM CHLORIDE 1000 ML: 9 INJECTION, SOLUTION INTRAVENOUS at 13:09

## 2024-03-25 RX ADMIN — DROPERIDOL 2.5 MG: 2.5 INJECTION, SOLUTION INTRAMUSCULAR; INTRAVENOUS at 13:10

## 2024-03-25 ASSESSMENT — PAIN DESCRIPTION - LOCATION
LOCATION: ABDOMEN

## 2024-03-25 ASSESSMENT — PAIN SCALES - GENERAL
PAINLEVEL_OUTOF10: 10
PAINLEVEL_OUTOF10: 10
PAINLEVEL_OUTOF10: 8
PAINLEVEL_OUTOF10: 9
PAINLEVEL_OUTOF10: 10

## 2024-03-25 ASSESSMENT — PAIN DESCRIPTION - ORIENTATION
ORIENTATION: LEFT;MID;LOWER
ORIENTATION: LEFT;LOWER;MID

## 2024-03-25 ASSESSMENT — PAIN DESCRIPTION - DESCRIPTORS
DESCRIPTORS: SHARP;STABBING
DESCRIPTORS: ACHING;DISCOMFORT;SHARP;STABBING
DESCRIPTORS: ACHING;DISCOMFORT;STABBING;SHARP

## 2024-03-25 ASSESSMENT — PAIN SCALES - WONG BAKER: WONGBAKER_NUMERICALRESPONSE: HURTS WORST

## 2024-03-25 ASSESSMENT — PAIN - FUNCTIONAL ASSESSMENT
PAIN_FUNCTIONAL_ASSESSMENT: ACTIVITIES ARE NOT PREVENTED
PAIN_FUNCTIONAL_ASSESSMENT: 0-10

## 2024-03-25 ASSESSMENT — PAIN DESCRIPTION - PAIN TYPE: TYPE: CHRONIC PAIN

## 2024-03-25 ASSESSMENT — PAIN DESCRIPTION - FREQUENCY: FREQUENCY: CONTINUOUS

## 2024-03-25 NOTE — H&P
Heparin  []IPC/SCDs  []Eliquis  []Xarelto  []Coumadin     Code status: [x]Full  []DNR/CCA  []Limited (DNR/CCA with Do Not Intubate)  []DNRCC  PT/OT Eval Status:   []NOT yet ordered  []Ordered and Pending   []Seen with Recommendations for:  []Home independently  []Home w/ assist  []HHC  []SNF  []Acute Rehab    Anticipated Discharge Day/Date: 3/26/2024    Anticipated Discharge Location: [x]Home  []HHC  []SNF  []Acute Rehab  []ECF  []LTAC  []Hospice    Consults:      IP CONSULT TO HOSPITALIST      This patient has a high likelihood of being discharged tomorrow and is appropriate for A1 Discharge Unit in AM pending clinical course overnight: []Yes  []No    --------------------------------------------------------------------------------------------------------------------------------------------------------------------    Imaging:     CT ABDOMEN PELVIS W IV CONTRAST Additional Contrast? None    Result Date: 3/25/2024  EXAMINATION: CT OF THE ABDOMEN AND PELVIS WITH CONTRAST 3/25/2024 11:11 am TECHNIQUE: CT of the abdomen and pelvis was performed with the administration of intravenous contrast. Multiplanar reformatted images are provided for review. Automated exposure control, iterative reconstruction, and/or weight based adjustment of the mA/kV was utilized to reduce the radiation dose to as low as reasonably achievable. COMPARISON: 08/02/2019 HISTORY: ORDERING SYSTEM PROVIDED HISTORY: diffuse abdominal pain TECHNOLOGIST PROVIDED HISTORY: Reason for exam:->diffuse abdominal pain Additional Contrast?->None Decision Support Exception - unselect if not a suspected or confirmed emergency medical condition->Emergency Medical Condition (MA) Reason for Exam: diffuse abdominal pain FINDINGS: Lower Chest: There is no consolidation or effusion. Organs: The liver, pancreas, spleen, kidneys and adrenals are unremarkable aside from a few tiny nonobstructing bilateral intrarenal calculi. GI/Bowel: A large amount of stool is noted

## 2024-03-25 NOTE — ED TRIAGE NOTES
Pt states having lupus flare up with emesis that began this morning.  States having all over body pain.  Pt states has taken oxycodone and muscle relaxer's around 0200 today.

## 2024-03-26 PROBLEM — K52.9 ENTERITIS: Status: ACTIVE | Noted: 2024-03-26

## 2024-03-26 PROBLEM — R11.2 INTRACTABLE NAUSEA AND VOMITING: Status: ACTIVE | Noted: 2024-03-26

## 2024-03-26 LAB
ALBUMIN SERPL-MCNC: 4.1 G/DL (ref 3.4–5)
ALBUMIN/GLOB SERPL: 1.5 {RATIO} (ref 1.1–2.2)
ALP SERPL-CCNC: 65 U/L (ref 40–129)
ALT SERPL-CCNC: 17 U/L (ref 10–40)
ANA SER QL IA: NEGATIVE
ANION GAP SERPL CALCULATED.3IONS-SCNC: 11 MMOL/L (ref 3–16)
ANION GAP SERPL CALCULATED.3IONS-SCNC: 12 MMOL/L (ref 3–16)
AST SERPL-CCNC: 20 U/L (ref 15–37)
BASOPHILS # BLD: 0 K/UL (ref 0–0.2)
BASOPHILS NFR BLD: 0.4 %
BILIRUB SERPL-MCNC: 0.4 MG/DL (ref 0–1)
BUN SERPL-MCNC: 7 MG/DL (ref 7–20)
BUN SERPL-MCNC: 8 MG/DL (ref 7–20)
CALCIUM SERPL-MCNC: 9.1 MG/DL (ref 8.3–10.6)
CALCIUM SERPL-MCNC: 9.1 MG/DL (ref 8.3–10.6)
CHLORIDE SERPL-SCNC: 103 MMOL/L (ref 99–110)
CHLORIDE SERPL-SCNC: 105 MMOL/L (ref 99–110)
CK SERPL-CCNC: 33 U/L (ref 26–192)
CO2 SERPL-SCNC: 23 MMOL/L (ref 21–32)
CO2 SERPL-SCNC: 24 MMOL/L (ref 21–32)
CREAT SERPL-MCNC: 0.7 MG/DL (ref 0.6–1.1)
CREAT SERPL-MCNC: 0.8 MG/DL (ref 0.6–1.1)
DEPRECATED RDW RBC AUTO: 14.8 % (ref 12.4–15.4)
EKG ATRIAL RATE: 102 BPM
EKG DIAGNOSIS: NORMAL
EKG P AXIS: 64 DEGREES
EKG P-R INTERVAL: 140 MS
EKG Q-T INTERVAL: 322 MS
EKG QRS DURATION: 78 MS
EKG QTC CALCULATION (BAZETT): 419 MS
EKG R AXIS: 42 DEGREES
EKG T AXIS: 31 DEGREES
EKG VENTRICULAR RATE: 102 BPM
EOSINOPHIL # BLD: 0.3 K/UL (ref 0–0.6)
EOSINOPHIL NFR BLD: 3.2 %
ERYTHROCYTE [SEDIMENTATION RATE] IN BLOOD BY WESTERGREN METHOD: 16 MM/HR (ref 0–20)
GFR SERPLBLD CREATININE-BSD FMLA CKD-EPI: >90 ML/MIN/{1.73_M2}
GFR SERPLBLD CREATININE-BSD FMLA CKD-EPI: >90 ML/MIN/{1.73_M2}
GLUCOSE SERPL-MCNC: 103 MG/DL (ref 70–99)
GLUCOSE SERPL-MCNC: 119 MG/DL (ref 70–99)
HCT VFR BLD AUTO: 38.3 % (ref 36–48)
HGB BLD-MCNC: 12.5 G/DL (ref 12–16)
LYMPHOCYTES # BLD: 3.5 K/UL (ref 1–5.1)
LYMPHOCYTES NFR BLD: 43.5 %
MAGNESIUM SERPL-MCNC: 1.7 MG/DL (ref 1.8–2.4)
MCH RBC QN AUTO: 27.3 PG (ref 26–34)
MCHC RBC AUTO-ENTMCNC: 32.6 G/DL (ref 31–36)
MCV RBC AUTO: 83.9 FL (ref 80–100)
MONOCYTES # BLD: 0.7 K/UL (ref 0–1.3)
MONOCYTES NFR BLD: 8.3 %
NEUTROPHILS # BLD: 3.5 K/UL (ref 1.7–7.7)
NEUTROPHILS NFR BLD: 44.6 %
PLATELET # BLD AUTO: 314 K/UL (ref 135–450)
PMV BLD AUTO: 8.2 FL (ref 5–10.5)
POTASSIUM SERPL-SCNC: 3.5 MMOL/L (ref 3.5–5.1)
POTASSIUM SERPL-SCNC: 4.3 MMOL/L (ref 3.5–5.1)
PROT SERPL-MCNC: 6.9 G/DL (ref 6.4–8.2)
RBC # BLD AUTO: 4.57 M/UL (ref 4–5.2)
SODIUM SERPL-SCNC: 138 MMOL/L (ref 136–145)
SODIUM SERPL-SCNC: 140 MMOL/L (ref 136–145)
WBC # BLD AUTO: 7.9 K/UL (ref 4–11)

## 2024-03-26 PROCEDURE — 6360000002 HC RX W HCPCS: Performed by: NURSE PRACTITIONER

## 2024-03-26 PROCEDURE — 86038 ANTINUCLEAR ANTIBODIES: CPT

## 2024-03-26 PROCEDURE — 85025 COMPLETE CBC W/AUTO DIFF WBC: CPT

## 2024-03-26 PROCEDURE — 6360000002 HC RX W HCPCS: Performed by: FAMILY MEDICINE

## 2024-03-26 PROCEDURE — 82550 ASSAY OF CK (CPK): CPT

## 2024-03-26 PROCEDURE — 36415 COLL VENOUS BLD VENIPUNCTURE: CPT

## 2024-03-26 PROCEDURE — 93010 ELECTROCARDIOGRAM REPORT: CPT | Performed by: INTERNAL MEDICINE

## 2024-03-26 PROCEDURE — 2580000003 HC RX 258: Performed by: FAMILY MEDICINE

## 2024-03-26 PROCEDURE — 6370000000 HC RX 637 (ALT 250 FOR IP): Performed by: INTERNAL MEDICINE

## 2024-03-26 PROCEDURE — 83735 ASSAY OF MAGNESIUM: CPT

## 2024-03-26 PROCEDURE — 6360000002 HC RX W HCPCS: Performed by: INTERNAL MEDICINE

## 2024-03-26 PROCEDURE — 6370000000 HC RX 637 (ALT 250 FOR IP): Performed by: FAMILY MEDICINE

## 2024-03-26 PROCEDURE — 85652 RBC SED RATE AUTOMATED: CPT

## 2024-03-26 PROCEDURE — 80053 COMPREHEN METABOLIC PANEL: CPT

## 2024-03-26 PROCEDURE — 1200000000 HC SEMI PRIVATE

## 2024-03-26 RX ORDER — IBUPROFEN 200 MG
200 TABLET ORAL EVERY 8 HOURS PRN
Status: DISCONTINUED | OUTPATIENT
Start: 2024-03-26 | End: 2024-03-28 | Stop reason: HOSPADM

## 2024-03-26 RX ORDER — TRAZODONE HYDROCHLORIDE 50 MG/1
50 TABLET ORAL NIGHTLY
Status: DISCONTINUED | OUTPATIENT
Start: 2024-03-26 | End: 2024-03-28 | Stop reason: HOSPADM

## 2024-03-26 RX ORDER — TIZANIDINE 4 MG/1
6 TABLET ORAL EVERY 8 HOURS PRN
Status: DISCONTINUED | OUTPATIENT
Start: 2024-03-26 | End: 2024-03-28 | Stop reason: HOSPADM

## 2024-03-26 RX ORDER — POTASSIUM CHLORIDE 20 MEQ/1
20 TABLET, EXTENDED RELEASE ORAL ONCE
Status: DISCONTINUED | OUTPATIENT
Start: 2024-03-26 | End: 2024-03-26

## 2024-03-26 RX ORDER — MAGNESIUM SULFATE 1 G/100ML
1000 INJECTION INTRAVENOUS ONCE
Status: COMPLETED | OUTPATIENT
Start: 2024-03-26 | End: 2024-03-26

## 2024-03-26 RX ORDER — HYDROCODONE BITARTRATE AND ACETAMINOPHEN 5; 325 MG/1; MG/1
1 TABLET ORAL EVERY 8 HOURS PRN
Status: DISCONTINUED | OUTPATIENT
Start: 2024-03-26 | End: 2024-03-28 | Stop reason: HOSPADM

## 2024-03-26 RX ORDER — IPRATROPIUM BROMIDE AND ALBUTEROL SULFATE 2.5; .5 MG/3ML; MG/3ML
1 SOLUTION RESPIRATORY (INHALATION) EVERY 4 HOURS PRN
Status: DISCONTINUED | OUTPATIENT
Start: 2024-03-26 | End: 2024-03-28 | Stop reason: HOSPADM

## 2024-03-26 RX ORDER — POLYETHYLENE GLYCOL 3350 17 G/17G
17 POWDER, FOR SOLUTION ORAL DAILY
Status: DISCONTINUED | OUTPATIENT
Start: 2024-03-26 | End: 2024-03-28 | Stop reason: HOSPADM

## 2024-03-26 RX ORDER — IPRATROPIUM BROMIDE AND ALBUTEROL SULFATE 2.5; .5 MG/3ML; MG/3ML
1 SOLUTION RESPIRATORY (INHALATION) 4 TIMES DAILY
Status: DISCONTINUED | OUTPATIENT
Start: 2024-03-26 | End: 2024-03-26

## 2024-03-26 RX ADMIN — TIZANIDINE 6 MG: 4 TABLET ORAL at 21:23

## 2024-03-26 RX ADMIN — MORPHINE SULFATE 4 MG: 4 INJECTION, SOLUTION INTRAMUSCULAR; INTRAVENOUS at 02:35

## 2024-03-26 RX ADMIN — HYDROCODONE BITARTRATE AND ACETAMINOPHEN 1 TABLET: 5; 325 TABLET ORAL at 11:21

## 2024-03-26 RX ADMIN — OXYCODONE HYDROCHLORIDE 10 MG: 10 TABLET ORAL at 05:12

## 2024-03-26 RX ADMIN — MORPHINE SULFATE 2 MG: 2 INJECTION, SOLUTION INTRAMUSCULAR; INTRAVENOUS at 17:51

## 2024-03-26 RX ADMIN — PIPERACILLIN AND TAZOBACTAM 3375 MG: 3; .375 INJECTION, POWDER, LYOPHILIZED, FOR SOLUTION INTRAVENOUS at 21:20

## 2024-03-26 RX ADMIN — PIPERACILLIN AND TAZOBACTAM 3375 MG: 3; .375 INJECTION, POWDER, LYOPHILIZED, FOR SOLUTION INTRAVENOUS at 04:57

## 2024-03-26 RX ADMIN — PREGABALIN 300 MG: 100 CAPSULE ORAL at 08:34

## 2024-03-26 RX ADMIN — MORPHINE SULFATE 4 MG: 4 INJECTION, SOLUTION INTRAMUSCULAR; INTRAVENOUS at 21:06

## 2024-03-26 RX ADMIN — LABETALOL HYDROCHLORIDE 10 MG: 5 INJECTION INTRAVENOUS at 08:06

## 2024-03-26 RX ADMIN — Medication 10 ML: at 21:09

## 2024-03-26 RX ADMIN — MORPHINE SULFATE 2 MG: 2 INJECTION, SOLUTION INTRAMUSCULAR; INTRAVENOUS at 00:09

## 2024-03-26 RX ADMIN — HYDROXYZINE HYDROCHLORIDE 25 MG: 25 TABLET, FILM COATED ORAL at 21:06

## 2024-03-26 RX ADMIN — HYDROXYZINE HYDROCHLORIDE 25 MG: 25 TABLET, FILM COATED ORAL at 08:05

## 2024-03-26 RX ADMIN — MORPHINE SULFATE 4 MG: 4 INJECTION, SOLUTION INTRAMUSCULAR; INTRAVENOUS at 08:05

## 2024-03-26 RX ADMIN — PREGABALIN 300 MG: 100 CAPSULE ORAL at 21:06

## 2024-03-26 RX ADMIN — TRAZODONE HYDROCHLORIDE 50 MG: 50 TABLET ORAL at 21:06

## 2024-03-26 RX ADMIN — BUPROPION HYDROCHLORIDE 150 MG: 150 TABLET, EXTENDED RELEASE ORAL at 08:05

## 2024-03-26 RX ADMIN — SODIUM CHLORIDE: 9 INJECTION, SOLUTION INTRAVENOUS at 04:55

## 2024-03-26 RX ADMIN — PIPERACILLIN AND TAZOBACTAM 3375 MG: 3; .375 INJECTION, POWDER, LYOPHILIZED, FOR SOLUTION INTRAVENOUS at 12:36

## 2024-03-26 RX ADMIN — PANTOPRAZOLE SODIUM 20 MG: 20 TABLET, DELAYED RELEASE ORAL at 21:06

## 2024-03-26 RX ADMIN — MAGNESIUM SULFATE HEPTAHYDRATE 1000 MG: 1 INJECTION, SOLUTION INTRAVENOUS at 11:21

## 2024-03-26 RX ADMIN — PANTOPRAZOLE SODIUM 20 MG: 20 TABLET, DELAYED RELEASE ORAL at 08:05

## 2024-03-26 RX ADMIN — AMLODIPINE BESYLATE 10 MG: 10 TABLET ORAL at 08:05

## 2024-03-26 RX ADMIN — Medication 3 MG: at 00:09

## 2024-03-26 RX ADMIN — POLYETHYLENE GLYCOL 3350 17 G: 17 POWDER, FOR SOLUTION ORAL at 05:09

## 2024-03-26 RX ADMIN — Medication 10 ML: at 08:12

## 2024-03-26 RX ADMIN — ENOXAPARIN SODIUM 40 MG: 100 INJECTION SUBCUTANEOUS at 08:05

## 2024-03-26 RX ADMIN — POTASSIUM CHLORIDE 40 MEQ: 1500 TABLET, EXTENDED RELEASE ORAL at 08:05

## 2024-03-26 RX ADMIN — TIZANIDINE 6 MG: 4 TABLET ORAL at 11:21

## 2024-03-26 ASSESSMENT — PAIN DESCRIPTION - LOCATION
LOCATION: ABDOMEN
LOCATION: GENERALIZED
LOCATION: ABDOMEN
LOCATION: ABDOMEN

## 2024-03-26 ASSESSMENT — PAIN DESCRIPTION - ORIENTATION
ORIENTATION: LEFT
ORIENTATION: LEFT;MID;LOWER
ORIENTATION: LEFT;LOWER;MID

## 2024-03-26 ASSESSMENT — PAIN SCALES - GENERAL
PAINLEVEL_OUTOF10: 10
PAINLEVEL_OUTOF10: 9
PAINLEVEL_OUTOF10: 10
PAINLEVEL_OUTOF10: 2

## 2024-03-26 ASSESSMENT — PAIN - FUNCTIONAL ASSESSMENT: PAIN_FUNCTIONAL_ASSESSMENT: ACTIVITIES ARE NOT PREVENTED

## 2024-03-26 ASSESSMENT — PAIN DESCRIPTION - DESCRIPTORS
DESCRIPTORS: ACHING;DISCOMFORT;SHARP;STABBING
DESCRIPTORS: ACHING;DISCOMFORT;SHARP
DESCRIPTORS: ACHING;DISCOMFORT;SHARP
DESCRIPTORS: ACHING;BURNING;SHARP
DESCRIPTORS: ACHING
DESCRIPTORS: ACHING;DISCOMFORT;SHARP;STABBING
DESCRIPTORS: ACHING;DISCOMFORT;SHARP

## 2024-03-26 ASSESSMENT — PAIN SCALES - WONG BAKER
WONGBAKER_NUMERICALRESPONSE: HURTS A LITTLE BIT
WONGBAKER_NUMERICALRESPONSE: HURTS WORST

## 2024-03-26 NOTE — CARE COORDINATION
Quick chart review, revealed that pt is from home, has insurance and PCP. At this time, an assessment is not required, but will follow for needs.     Derek Joyce LMSW, [facility] Social Work Case Management   Phone: [phone]  Fax: [phone]

## 2024-03-26 NOTE — CONSULTS
diclofenac sodium, 4 g, BID  traZODone, 50 mg, Nightly  sodium chloride flush, 5-40 mL, 2 times per day  enoxaparin, 40 mg, Daily  piperacillin-tazobactam, 3,375 mg, Q8H  amLODIPine, 10 mg, Daily  buPROPion, 150 mg, QAM  hydrOXYzine HCl, 25 mg, BID  levonorgestrel-ethinyl estradiol, 1 tablet, Daily  pantoprazole, 20 mg, BID  pregabalin, 300 mg, BID      FLUIDS/DRIPS:     sodium chloride 5 mL/hr at 03/26/24 0455    lactated ringers IV soln Stopped (03/25/24 1836)     PRNs: HYDROcodone-acetaminophen, 1 tablet, Q8H PRN  ibuprofen, 200 mg, Q8H PRN  tiZANidine, 6 mg, Q8H PRN  ipratropium 0.5 mg-albuterol 2.5 mg, 1 Dose, Q4H PRN  sodium chloride flush, 5-40 mL, PRN  sodium chloride, , PRN  potassium chloride, 40 mEq, PRN   Or  potassium alternative oral replacement, 40 mEq, PRN   Or  potassium chloride, 10 mEq, PRN  magnesium sulfate, 2,000 mg, PRN  ondansetron, 4 mg, Q8H PRN   Or  ondansetron, 4 mg, Q6H PRN  polyethylene glycol, 17 g, Daily PRN  acetaminophen, 650 mg, Q6H PRN  melatonin, 3 mg, Nightly PRN  acetaminophen, 650 mg, Q4H PRN  morphine, 2 mg, Q2H PRN   Or  morphine, 4 mg, Q2H PRN  albuterol sulfate HFA, 2 puff, 4x Daily PRN  labetalol, 10 mg, Q6H PRN      ALLERGIES:  She   Allergies   Allergen Reactions    Ambien [Zolpidem Tartrate] Shortness Of Breath     Tongue swelling    Sudafed [Pseudoephedrine Hcl] Other (See Comments)     Heart rate speeds up  Side effect, not an allergy      Amitriptyline Hcl Other (See Comments)     Increased sedation and no relief of headache.  Side effect, not an allergy         REVIEW OF SYSTEMS   Pertinent ROS noted in HPI    PHYSICAL EXAM     Vitals:    03/26/24 0835 03/26/24 1121 03/26/24 1123 03/26/24 1151   BP:   (!) 150/94    Pulse:   (!) 112    Resp: 16 16 15 16   Temp:   98.3 °F (36.8 °C)    TempSrc:   Oral    SpO2:   96%    Weight:       Height:           I/O last 3 completed shifts:  In: 5 [I.V.:5]  Out: -       Physical Exam:  General appearance: alert, cooperative, no

## 2024-03-26 NOTE — ED PROVIDER NOTES
inappropriate. If there are any questions or concerns please feel free to contact the dictating provider for clarification.          Irwin Salas MD  03/25/24 3523    
-- -- -- 18 --   03/25/24 2111 (!) 163/91 98.8 °F (37.1 °C) (!) 137 16 98 %      Recent Labs     03/25/24  1031   WBC 12.3*   CREATININE 0.7   BILITOT 0.3            Sepsis Time Identified: 1301    Fluid Resuscitation Rational: less than 30mL/kg because patient did not meet septic shock      Infection Source: Unknown    Repeat lactate level: not indicated due to initial lactate < 2    Reassessment Exam:   Not applicable. Patient does not have septic shock.      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), Graves disease, History of blood transfusion, blood clots, Hypertension, Hyperthyroidism (9/30/2016), Joint pain (1/13/2014), Kidney stone, Localized rash (1/13/2014), Lupus ([facility]), Lupus arthritis ([facility]) (9/14/2022), MDRO (multiple drug resistant organisms) resistance, Nephroureterolithiasis (7/31/2018), Paresthesia of right arm and leg (8/31/2020), PONV (postoperative nausea and vomiting), Right sided weakness, Shortness of breath (12/14/2021), SLE exacerbation ([facility]) (1/12/2023), and Yeast vaginitis (11/27/2014).    CONSULTS: (Who and What was discussed)  IP CONSULT TO HOSPITALIST      Records Reviewed (External and Source)     CC/HPI Summary, DDx, ED Course, and Reassessment:   Patient is a 43-year-old female with a history of lupus presenting to the ED with a concern of emesis and nausea that started this morning as well as body weakness.?  No new foods, restaurants, traveling.  Denies dysuria, hematuria, cough, shortness of breath.    Ddx: appendicitis, bowel obstruction, pancreatitis,     Labs show a WBC of 12.3, patient arrived with a heart rate of 117, she met Sirs criteria,  Zosyn and IV fluids were ordered.?  Urine does not show signs of infection, lactic acid of 0.9.?  CT

## 2024-03-26 NOTE — PLAN OF CARE
Problem: Discharge Planning  Goal: Discharge to home or other facility with appropriate resources  Outcome: Progressing  Flowsheets (Taken 3/26/2024 0039)  Discharge to home or other facility with appropriate resources: Identify barriers to discharge with patient and caregiver     Problem: Pain  Goal: Verbalizes/displays adequate comfort level or baseline comfort level  Outcome: Progressing     Problem: Safety - Adult  Goal: Free from fall injury  Outcome: Progressing  Flowsheets (Taken 3/26/2024 0043)  Free From Fall Injury: Instruct family/caregiver on patient safety     Problem: ABCDS Injury Assessment  Goal: Absence of physical injury  Outcome: Progressing

## 2024-03-26 NOTE — PLAN OF CARE
Problem: Discharge Planning  Goal: Discharge to home or other facility with appropriate resources  3/26/2024 0938 by Amber Mazariegos RN  Flowsheets (Taken 3/26/2024 0938)  Discharge to home or other facility with appropriate resources:   Identify barriers to discharge with patient and caregiver   Arrange for needed discharge resources and transportation as appropriate     Problem: Pain  Goal: Verbalizes/displays adequate comfort level or baseline comfort level  3/26/2024 0938 by Amber Mazariegos RN  Flowsheets (Taken 3/26/2024 0938)  Verbalizes/displays adequate comfort level or baseline comfort level:   Encourage patient to monitor pain and request assistance   Assess pain using appropriate pain scale   Administer analgesics based on type and severity of pain and evaluate response     Problem: ABCDS Injury Assessment  Goal: Absence of physical injury  3/26/2024 0938 by Amber Mazariegos RN  Flowsheets (Taken 3/26/2024 0938)  Absence of Physical Injury: Implement safety measures based on patient assessment

## 2024-03-27 ENCOUNTER — APPOINTMENT (OUTPATIENT)
Dept: ULTRASOUND IMAGING | Age: 44
DRG: 392 | End: 2024-03-27
Payer: COMMERCIAL

## 2024-03-27 LAB
ALBUMIN SERPL-MCNC: 3.7 G/DL (ref 3.4–5)
ALBUMIN/GLOB SERPL: 1.4 {RATIO} (ref 1.1–2.2)
ALP SERPL-CCNC: 59 U/L (ref 40–129)
ALT SERPL-CCNC: 16 U/L (ref 10–40)
ANION GAP SERPL CALCULATED.3IONS-SCNC: 9 MMOL/L (ref 3–16)
AST SERPL-CCNC: 20 U/L (ref 15–37)
BASOPHILS # BLD: 0 K/UL (ref 0–0.2)
BASOPHILS NFR BLD: 0.6 %
BILIRUB SERPL-MCNC: 0.3 MG/DL (ref 0–1)
BUN SERPL-MCNC: 15 MG/DL (ref 7–20)
CALCIUM SERPL-MCNC: 9 MG/DL (ref 8.3–10.6)
CHLORIDE SERPL-SCNC: 108 MMOL/L (ref 99–110)
CO2 SERPL-SCNC: 24 MMOL/L (ref 21–32)
CREAT SERPL-MCNC: 0.9 MG/DL (ref 0.6–1.1)
CRP SERPL-MCNC: 21.7 MG/L (ref 0–5.1)
DEPRECATED RDW RBC AUTO: 14.7 % (ref 12.4–15.4)
EOSINOPHIL # BLD: 0.4 K/UL (ref 0–0.6)
EOSINOPHIL NFR BLD: 5.6 %
GFR SERPLBLD CREATININE-BSD FMLA CKD-EPI: 82 ML/MIN/{1.73_M2}
GLUCOSE SERPL-MCNC: 102 MG/DL (ref 70–99)
HCT VFR BLD AUTO: 34.9 % (ref 36–48)
HGB BLD-MCNC: 11.5 G/DL (ref 12–16)
LYMPHOCYTES # BLD: 3.4 K/UL (ref 1–5.1)
LYMPHOCYTES NFR BLD: 50.2 %
MAGNESIUM SERPL-MCNC: 2.1 MG/DL (ref 1.8–2.4)
MCH RBC QN AUTO: 27.7 PG (ref 26–34)
MCHC RBC AUTO-ENTMCNC: 32.8 G/DL (ref 31–36)
MCV RBC AUTO: 84.5 FL (ref 80–100)
MONOCYTES # BLD: 0.6 K/UL (ref 0–1.3)
MONOCYTES NFR BLD: 8.6 %
NEUTROPHILS # BLD: 2.4 K/UL (ref 1.7–7.7)
NEUTROPHILS NFR BLD: 35 %
PLATELET # BLD AUTO: 282 K/UL (ref 135–450)
PMV BLD AUTO: 8.2 FL (ref 5–10.5)
POTASSIUM SERPL-SCNC: 4.6 MMOL/L (ref 3.5–5.1)
PROT SERPL-MCNC: 6.4 G/DL (ref 6.4–8.2)
RBC # BLD AUTO: 4.13 M/UL (ref 4–5.2)
SODIUM SERPL-SCNC: 141 MMOL/L (ref 136–145)
WBC # BLD AUTO: 6.8 K/UL (ref 4–11)

## 2024-03-27 PROCEDURE — 80053 COMPREHEN METABOLIC PANEL: CPT

## 2024-03-27 PROCEDURE — 6370000000 HC RX 637 (ALT 250 FOR IP): Performed by: INTERNAL MEDICINE

## 2024-03-27 PROCEDURE — 1200000000 HC SEMI PRIVATE

## 2024-03-27 PROCEDURE — 6370000000 HC RX 637 (ALT 250 FOR IP): Performed by: FAMILY MEDICINE

## 2024-03-27 PROCEDURE — 36415 COLL VENOUS BLD VENIPUNCTURE: CPT

## 2024-03-27 PROCEDURE — 85025 COMPLETE CBC W/AUTO DIFF WBC: CPT

## 2024-03-27 PROCEDURE — 6360000002 HC RX W HCPCS: Performed by: NURSE PRACTITIONER

## 2024-03-27 PROCEDURE — 6370000000 HC RX 637 (ALT 250 FOR IP): Performed by: PHYSICIAN ASSISTANT

## 2024-03-27 PROCEDURE — 76705 ECHO EXAM OF ABDOMEN: CPT

## 2024-03-27 PROCEDURE — 83735 ASSAY OF MAGNESIUM: CPT

## 2024-03-27 PROCEDURE — 6360000002 HC RX W HCPCS: Performed by: FAMILY MEDICINE

## 2024-03-27 PROCEDURE — 86140 C-REACTIVE PROTEIN: CPT

## 2024-03-27 PROCEDURE — 2580000003 HC RX 258: Performed by: FAMILY MEDICINE

## 2024-03-27 RX ORDER — SIMETHICONE 80 MG
80 TABLET,CHEWABLE ORAL EVERY 6 HOURS PRN
Status: DISCONTINUED | OUTPATIENT
Start: 2024-03-27 | End: 2024-03-28 | Stop reason: HOSPADM

## 2024-03-27 RX ADMIN — HYDROXYZINE HYDROCHLORIDE 25 MG: 25 TABLET, FILM COATED ORAL at 20:39

## 2024-03-27 RX ADMIN — MORPHINE SULFATE 2 MG: 2 INJECTION, SOLUTION INTRAMUSCULAR; INTRAVENOUS at 20:38

## 2024-03-27 RX ADMIN — Medication 10 ML: at 20:41

## 2024-03-27 RX ADMIN — LABETALOL HYDROCHLORIDE 10 MG: 5 INJECTION INTRAVENOUS at 15:31

## 2024-03-27 RX ADMIN — SIMETHICONE 80 MG: 80 TABLET, CHEWABLE ORAL at 13:50

## 2024-03-27 RX ADMIN — PIPERACILLIN AND TAZOBACTAM 3375 MG: 3; .375 INJECTION, POWDER, LYOPHILIZED, FOR SOLUTION INTRAVENOUS at 13:49

## 2024-03-27 RX ADMIN — BUPROPION HYDROCHLORIDE 150 MG: 150 TABLET, EXTENDED RELEASE ORAL at 09:58

## 2024-03-27 RX ADMIN — AMLODIPINE BESYLATE 10 MG: 10 TABLET ORAL at 09:59

## 2024-03-27 RX ADMIN — PREGABALIN 300 MG: 100 CAPSULE ORAL at 20:39

## 2024-03-27 RX ADMIN — PANTOPRAZOLE SODIUM 20 MG: 20 TABLET, DELAYED RELEASE ORAL at 20:38

## 2024-03-27 RX ADMIN — TRAZODONE HYDROCHLORIDE 50 MG: 50 TABLET ORAL at 20:39

## 2024-03-27 RX ADMIN — MORPHINE SULFATE 4 MG: 4 INJECTION, SOLUTION INTRAMUSCULAR; INTRAVENOUS at 05:17

## 2024-03-27 RX ADMIN — Medication 10 ML: at 15:35

## 2024-03-27 RX ADMIN — Medication 10 ML: at 18:05

## 2024-03-27 RX ADMIN — TIZANIDINE 6 MG: 4 TABLET ORAL at 20:38

## 2024-03-27 RX ADMIN — Medication 10 ML: at 09:58

## 2024-03-27 RX ADMIN — PREGABALIN 300 MG: 100 CAPSULE ORAL at 09:59

## 2024-03-27 RX ADMIN — LABETALOL HYDROCHLORIDE 10 MG: 5 INJECTION INTRAVENOUS at 21:41

## 2024-03-27 RX ADMIN — POLYETHYLENE GLYCOL 3350 17 G: 17 POWDER, FOR SOLUTION ORAL at 09:56

## 2024-03-27 RX ADMIN — SODIUM CHLORIDE, POTASSIUM CHLORIDE, SODIUM LACTATE AND CALCIUM CHLORIDE: 600; 310; 30; 20 INJECTION, SOLUTION INTRAVENOUS at 10:12

## 2024-03-27 RX ADMIN — Medication 10 ML: at 15:45

## 2024-03-27 RX ADMIN — PIPERACILLIN AND TAZOBACTAM 3375 MG: 3; .375 INJECTION, POWDER, LYOPHILIZED, FOR SOLUTION INTRAVENOUS at 20:49

## 2024-03-27 RX ADMIN — SODIUM CHLORIDE: 9 INJECTION, SOLUTION INTRAVENOUS at 13:44

## 2024-03-27 RX ADMIN — HYDROXYZINE HYDROCHLORIDE 25 MG: 25 TABLET, FILM COATED ORAL at 09:59

## 2024-03-27 RX ADMIN — PANTOPRAZOLE SODIUM 20 MG: 20 TABLET, DELAYED RELEASE ORAL at 09:59

## 2024-03-27 RX ADMIN — PIPERACILLIN AND TAZOBACTAM 3375 MG: 3; .375 INJECTION, POWDER, LYOPHILIZED, FOR SOLUTION INTRAVENOUS at 05:10

## 2024-03-27 RX ADMIN — ENOXAPARIN SODIUM 40 MG: 100 INJECTION SUBCUTANEOUS at 09:58

## 2024-03-27 RX ADMIN — MORPHINE SULFATE 2 MG: 2 INJECTION, SOLUTION INTRAMUSCULAR; INTRAVENOUS at 15:45

## 2024-03-27 ASSESSMENT — PAIN DESCRIPTION - DESCRIPTORS
DESCRIPTORS: ACHING
DESCRIPTORS: SHARP

## 2024-03-27 ASSESSMENT — PAIN SCALES - GENERAL
PAINLEVEL_OUTOF10: 8
PAINLEVEL_OUTOF10: 10

## 2024-03-27 ASSESSMENT — PAIN - FUNCTIONAL ASSESSMENT: PAIN_FUNCTIONAL_ASSESSMENT: ACTIVITIES ARE NOT PREVENTED

## 2024-03-27 ASSESSMENT — PAIN DESCRIPTION - LOCATION
LOCATION: GENERALIZED;ABDOMEN
LOCATION: ABDOMEN;BACK;NECK

## 2024-03-27 ASSESSMENT — PAIN DESCRIPTION - ORIENTATION: ORIENTATION: ANTERIOR;UPPER

## 2024-03-28 VITALS
WEIGHT: 182.98 LBS | RESPIRATION RATE: 16 BRPM | OXYGEN SATURATION: 98 % | HEART RATE: 95 BPM | DIASTOLIC BLOOD PRESSURE: 92 MMHG | BODY MASS INDEX: 28.72 KG/M2 | TEMPERATURE: 98.7 F | HEIGHT: 67 IN | SYSTOLIC BLOOD PRESSURE: 150 MMHG

## 2024-03-28 PROCEDURE — 6370000000 HC RX 637 (ALT 250 FOR IP): Performed by: PHYSICIAN ASSISTANT

## 2024-03-28 PROCEDURE — 6370000000 HC RX 637 (ALT 250 FOR IP): Performed by: FAMILY MEDICINE

## 2024-03-28 PROCEDURE — 6370000000 HC RX 637 (ALT 250 FOR IP): Performed by: INTERNAL MEDICINE

## 2024-03-28 PROCEDURE — 2580000003 HC RX 258: Performed by: FAMILY MEDICINE

## 2024-03-28 PROCEDURE — 6360000002 HC RX W HCPCS: Performed by: FAMILY MEDICINE

## 2024-03-28 PROCEDURE — 94760 N-INVAS EAR/PLS OXIMETRY 1: CPT

## 2024-03-28 RX ORDER — POLYETHYLENE GLYCOL 3350 17 G/17G
17 POWDER, FOR SOLUTION ORAL DAILY
Qty: 527 G | Refills: 0 | Status: SHIPPED | OUTPATIENT
Start: 2024-03-29 | End: 2024-04-28

## 2024-03-28 RX ADMIN — ENOXAPARIN SODIUM 40 MG: 100 INJECTION SUBCUTANEOUS at 09:34

## 2024-03-28 RX ADMIN — AMLODIPINE BESYLATE 10 MG: 10 TABLET ORAL at 09:38

## 2024-03-28 RX ADMIN — BUPROPION HYDROCHLORIDE 150 MG: 150 TABLET, EXTENDED RELEASE ORAL at 09:38

## 2024-03-28 RX ADMIN — HYDROXYZINE HYDROCHLORIDE 25 MG: 25 TABLET, FILM COATED ORAL at 09:38

## 2024-03-28 RX ADMIN — Medication 10 ML: at 09:34

## 2024-03-28 RX ADMIN — PANTOPRAZOLE SODIUM 20 MG: 20 TABLET, DELAYED RELEASE ORAL at 09:38

## 2024-03-28 RX ADMIN — POLYETHYLENE GLYCOL 3350 17 G: 17 POWDER, FOR SOLUTION ORAL at 09:34

## 2024-03-28 RX ADMIN — SIMETHICONE 80 MG: 80 TABLET, CHEWABLE ORAL at 09:52

## 2024-03-28 RX ADMIN — PIPERACILLIN AND TAZOBACTAM 3375 MG: 3; .375 INJECTION, POWDER, LYOPHILIZED, FOR SOLUTION INTRAVENOUS at 04:57

## 2024-03-28 RX ADMIN — NALOXEGOL OXALATE 12.5 MG: 25 TABLET, FILM COATED ORAL at 09:56

## 2024-03-28 RX ADMIN — PREGABALIN 300 MG: 100 CAPSULE ORAL at 09:38

## 2024-03-28 NOTE — PLAN OF CARE
Problem: Discharge Planning  Goal: Discharge to home or other facility with appropriate resources  3/27/2024 2239 by Leobardo Castillo RN  Outcome: Progressing  3/27/2024 1431 by Jenise Leo RN  Outcome: Progressing     Problem: Pain  Goal: Verbalizes/displays adequate comfort level or baseline comfort level  3/27/2024 2239 by Leobardo Castillo RN  Outcome: Progressing  3/27/2024 1431 by Jenise Leo RN  Outcome: Progressing     Problem: Safety - Adult  Goal: Free from fall injury  3/27/2024 2239 by Leobardo Castillo RN  Outcome: Progressing  3/27/2024 1431 by Jenise Leo RN  Outcome: Progressing     Problem: ABCDS Injury Assessment  Goal: Absence of physical injury  3/27/2024 2239 by Leobardo Castillo RN  Outcome: Progressing  3/27/2024 1431 by Jenise Leo RN  Outcome: Progressing

## 2024-03-28 NOTE — DISCHARGE SUMMARY
Hospital Medicine Discharge Summary    Patient ID: Greyson Shelby      Patient's PCP: Ehsan Patel APRN - CNP    Admit Date: 3/25/2024     Discharge Date:   03/28/2024    Admitting Provider: Zacarias Greenberg MD     Discharge Provider: Zacarias Greenberg MD     Discharge Diagnoses:       Active Hospital Problems    Diagnosis     Lupus arthritis (HCC) [M32.9]      Priority: Medium    Enteritis [K52.9]     Intractable nausea and vomiting [R11.2]     Gastroenteritis [K52.9]     Mild intermittent asthma without complication [J45.20]     HTN (hypertension) [I10]        The patient was seen and examined on day of discharge and this discharge summary is in conjunction with any daily progress note from day of discharge.    Hospital Course:     From HPI:\"43 y.o. female with a past medical history significant for lupus arthritis, hypertension, fibromyalgia, conversion disorder presenting with 1 day history of intractable nausea and vomiting.  Unable to keep anything down.  She states that it started with her typical \"lupus flare\" with myalgias and arthralgias that were widespread.  She has some associated symptoms of lightheadedness and dizziness.  She denies fevers, chills, chest pain, shortness of breath, dyspnea on exertion, dysuria, polyuria, hematuria, constipation, melena, hematochezia.  She reports some loose stools.     She denies a history of significant alcohol abuse or marijuana use.     ED evaluation:  Initial pulse 117.  Initial blood pressure 165/108.  CMP significant for glucose of 69  WBC 12.3  Troponins negative x 2  Initial lactic acid 0.9  Urinalysis significant for specific gravity elevated at 1.036.  Otherwise bland.  Beta-hCG negative for pregnancy     CT abdomen/pelvis significant for possible mild ileus versus enteritis.  Nonobstructing bilateral nephrolithiasis.     EKG demonstrates sinus tachycardia with a rate of 102.  My interpretation     Chest x-ray shows no acute disease on my

## 2024-03-28 NOTE — PROGRESS NOTES
V2.0    [facility] Progress [facility]      Name:  Greyson Shelby /Age/Sex: 1980  (43 y.o. female)   MRN & CSN:  [id number] & [id number] Encounter Date/Time: 3/26/2024 9:33 AM EDT   Location:  [id number] PCP: Ehsan Patel APRN - CNP     Attending:Zacarias Greenberg MD       Hospital Day: 2    Assessment and Recommendations   Greyson Shelby is a 43 y.o. female who presents with Gastroenteritis      Plan:   Intractable nausea and vomiting, IV fluid, antiemetics due to dehydration with possible enteritis started on Zosyn on admission will keep for now.  History of lupus arthritis, continue home regimen, I doubt flare at this time will check RUY, ESR CRP  History of fibromyalgia, home regimen likely with some exacerbation, restart home muscle relaxant restart home pain regimen  Essential hypertension p.o. medication  History of right-sided weakness  Hypomagnesemia, magnesium 1.7 replace with IV supplement  Leukocytosis of 12.3 on admission improved down to 7.9 today  Constipation as needed laxatives  Nonobstructing bilateral nephrolithiasis, follow-up as outpatient and symptomatic      Diet ADULT DIET; Clear Liquid   DVT Prophylaxis [] Lovenox, []  Heparin, [] SCDs, [] Ambulation,  [] Eliquis, [] Xarelto  [] Coumadin   Code Status Full Code             Personally reviewed Lab Studies and Imaging     Discussed management of the case with nursing staff          Drugs that require monitoring for toxicity include Zosyn and the method of monitoring was creatinine    Medical Decision Making:  The following items were considered in medical decision making:  Discussion of patient care with other providers  Reviewed clinical lab tests  Reviewed radiology tests  Reviewed other diagnostic tests/interventions  Independent review of radiologic images  Microbiology cultures and other micro tests reviewed      Subjective:     Chief Complaint: Abdominal pain nausea vomiting    Greyson Shelby is a 43 y.o. female who 
4 Eyes Skin Assessment     NAME:  Greyson Shelby  YOB: 1980  MEDICAL RECORD NUMBER:  [id number]    The patient is being assessed for  Admission    I agree that at least one RN has performed a thorough Head to Toe Skin Assessment on the patient. ALL assessment sites listed below have been assessed.      Areas assessed by both nurses:    Head, Face, Ears, Shoulders, Back, Chest, Arms, Elbows, Hands, Sacrum. Buttock, Coccyx, Ischium, Legs. Feet and Heels, and Under Medical Devices         Does the Patient have a Wound? No noted wound(s)       Doron Prevention initiated by RN: No  Wound Care Orders initiated by RN: No    Pressure Injury (Stage 3,4, Unstageable, DTI, NWPT, and Complex wounds) if present, place Wound referral order by RN under : No    New Ostomies, if present place, Ostomy referral order under : No     Nurse 1 eSignature: Electronically signed by Amber Mazariegos RN on 3/25/24 at 6:16 PM EDT    **SHARE this note so that the co-signing nurse can place an eSignature**    Nurse 2 eSignature: Electronically signed by Justin N. Schoenung, RN on 3/25/24 at 6:17 PM EDT    
CLINICAL PHARMACY NOTE: MEDS TO BEDS    Total # of Prescriptions Filled: 1   The following medications were delivered to the patient:  Current Discharge Medication List        START taking these medications    Details   naloxegol (MOVANTIK) 12.5 MG TABS tablet Take 1 tablet by mouth every morning (before breakfast)  Qty: 30 tablet, Refills: 0      polyethylene glycol (GLYCOLAX) 17 g packet Take 1 packet by mouth daily  Qty: 527 g, Refills: 0           Pt only got polyethylene  from retail, [employer]antik    Additional Documentation:    
INPATIENT CONSULTATION:    IDENTIFYING DATA/REASON FOR CONSULTATION   PATIENT:  Greyson Shelby  MRN:  [id number]  ADMIT DATE: 3/25/2024  TIME OF EVALUATION: 3/28/2024 1:02 PM  HOSPITAL STAY:   LOS: 2 days   CONSULTING PHYSICIAN: No att. providers found   REASON FOR CONSULTATION: Nausea and vomiting    Subjective:    Patient seen and examined in follow-up. Patient reports ongoing diffuse pain from her lupus flare but reports her nausea and vomiting is resolved.  She is tolerating a diet.    MEDICATIONS   SCHEDULED:  naloxegol, 12.5 mg, QAM AC  diclofenac sodium, 4 g, BID  traZODone, 50 mg, Nightly  polyethylene glycol, 17 g, Daily  sodium chloride flush, 5-40 mL, 2 times per day  enoxaparin, 40 mg, Daily  piperacillin-tazobactam, 3,375 mg, Q8H  amLODIPine, 10 mg, Daily  buPROPion, 150 mg, QAM  hydrOXYzine HCl, 25 mg, BID  levonorgestrel-ethinyl estradiol, 1 tablet, Daily  pantoprazole, 20 mg, BID  pregabalin, 300 mg, BID      FLUIDS/DRIPS:     sodium chloride Stopped (03/28/24 0910)     PRNs: simethicone, 80 mg, Q6H PRN  HYDROcodone-acetaminophen, 1 tablet, Q8H PRN  ibuprofen, 200 mg, Q8H PRN  tiZANidine, 6 mg, Q8H PRN  ipratropium 0.5 mg-albuterol 2.5 mg, 1 Dose, Q4H PRN  sodium chloride flush, 5-40 mL, PRN  sodium chloride, , PRN  potassium chloride, 40 mEq, PRN   Or  potassium alternative oral replacement, 40 mEq, PRN   Or  potassium chloride, 10 mEq, PRN  magnesium sulfate, 2,000 mg, PRN  ondansetron, 4 mg, Q8H PRN   Or  ondansetron, 4 mg, Q6H PRN  polyethylene glycol, 17 g, Daily PRN  acetaminophen, 650 mg, Q6H PRN  melatonin, 3 mg, Nightly PRN  acetaminophen, 650 mg, Q4H PRN  morphine, 2 mg, Q2H PRN  albuterol sulfate HFA, 2 puff, 4x Daily PRN  labetalol, 10 mg, Q6H PRN      ALLERGIES:    Allergies   Allergen Reactions    Ambien [Zolpidem Tartrate] Shortness Of Breath     Tongue swelling    Sudafed [Pseudoephedrine Hcl] Other (See Comments)     Heart rate speeds up  Side effect, not an allergy      
INPATIENT PROGRESS NOTE        IDENTIFYING DATA/REASON FOR CONSULTATION   PATIENT:  Greyson Shelby  MRN:  [id number]  ADMIT DATE: 3/25/2024  TIME OF EVALUATION: 3/27/2024 10:09 AM  HOSPITAL STAY:   LOS: 1 day   CONSULTING PHYSICIAN: Zacarias Greenberg MD   REASON FOR CONSULTATION: n/v    Subjective:    Patient seen in follow up   She reports that she does not feel good overall.  She was able to tolerate lunch yesterday without vomiting  She has had no vomiting overnight or this morning  She denies diarrhea.  She has not had a bm    MEDICATIONS   SCHEDULED:  diclofenac sodium, 4 g, BID  traZODone, 50 mg, Nightly  polyethylene glycol, 17 g, Daily  sodium chloride flush, 5-40 mL, 2 times per day  enoxaparin, 40 mg, Daily  piperacillin-tazobactam, 3,375 mg, Q8H  amLODIPine, 10 mg, Daily  buPROPion, 150 mg, QAM  hydrOXYzine HCl, 25 mg, BID  levonorgestrel-ethinyl estradiol, 1 tablet, Daily  pantoprazole, 20 mg, BID  pregabalin, 300 mg, BID      FLUIDS/DRIPS:     sodium chloride 5 mL/hr at 03/26/24 0455    lactated ringers IV soln Stopped (03/25/24 1836)     PRNs: HYDROcodone-acetaminophen, 1 tablet, Q8H PRN  ibuprofen, 200 mg, Q8H PRN  tiZANidine, 6 mg, Q8H PRN  ipratropium 0.5 mg-albuterol 2.5 mg, 1 Dose, Q4H PRN  sodium chloride flush, 5-40 mL, PRN  sodium chloride, , PRN  potassium chloride, 40 mEq, PRN   Or  potassium alternative oral replacement, 40 mEq, PRN   Or  potassium chloride, 10 mEq, PRN  magnesium sulfate, 2,000 mg, PRN  ondansetron, 4 mg, Q8H PRN   Or  ondansetron, 4 mg, Q6H PRN  polyethylene glycol, 17 g, Daily PRN  acetaminophen, 650 mg, Q6H PRN  melatonin, 3 mg, Nightly PRN  acetaminophen, 650 mg, Q4H PRN  morphine, 2 mg, Q2H PRN   Or  morphine, 4 mg, Q2H PRN  albuterol sulfate HFA, 2 puff, 4x Daily PRN  labetalol, 10 mg, Q6H PRN      ALLERGIES:    Allergies   Allergen Reactions    Ambien [Zolpidem Tartrate] Shortness Of Breath     Tongue swelling    Sudafed [Pseudoephedrine Hcl] Other (See 
Medication Reconciliation    List of medications patient is currently taking is complete.     Source of information: 1. Conversation with patient at bedside                                      2. EPIC records     Notes regarding home medications:   1. Patient received a dose of Norco early this morning - she has not taken any of her other medications yet today.    Ollie King [facility], PharmD, BCPS  3/25/2024 4:00 PM                
Pt AOX4- pt denied n/v, diarrhea, sob - pt c/o 10/10 pain  Bed in the lowest and locked position   Bedside table and call light within reach   Non skid socks on                 
Pt AOX4- pt denied n/v, diarrhea, sob, pain  Bed in the lowest and locked position   Bedside table and call light within reach   Non skid socks and bed exit alarm on               
Pt AOX4. Vitals obtained. BP elevated. PRN labetalol given per order. Pain medication given per order. All scheduled meds given this morning. No nausea or vomiting. No further needs at this time. Call light within reach.     Electronically signed by Amber Mazariegos RN on 3/26/2024 at 10:12 AM    
Pt BP checked, 182/113 mm of hg at left arm automatically and 170/90 at left arm manually. Informed to Antonietta SMITH through perfect serve. Got the response to recheck after an hour if it still systolic is greater that 170 then can give Inj Labetalol 10 mg IV. So, continue to monitor. Electronically signed by Alison Martin RN on 3/25/2024 at 8:42 PM   
Pt admitted to room 4280. Vitals obtained. BP is high last reading was 209/138. MD [place] notified. Scheduled BP med given per order. Pt AOX4. Pt alert to room and call light. No further needs at this time. Call light within reach.     Electronically signed by Amber Mazariegos RN on 3/25/2024 at 6:15 PM    
Pt bp is 103/64 this afternoon. MD notified.     Electronically signed by Amber Mazariegos RN on 3/26/2024 at 3:42 PM    
Pt hr up to 112s. MD Greenberg notified.     Electronically signed by Amber Mazariegos RN on 3/26/2024 at 4:28 PM    
Pt hr went up to 130s, MD notified.     Electronically signed by Amber Mazariegos RN on 3/26/2024 at 5:44 PM    
Pt's BP monitored frequently, 161/91 mm of hg, 151/88 mm of hg, keep informed. IV fluid still hold, notified to NP, wants to keep hold until day team provider decides in the morning. Pain assessment done, 10/10 almost all the time.Pain meds given as per order.  
Reviewed discharge and follow up instructions with pt - answered all questions - pt stable without s/sx of distress at discharge - wheel chair transport to [facility]   
3/25/2024  EXAMINATION: CT OF THE ABDOMEN AND PELVIS WITH CONTRAST 3/25/2024 11:11 am TECHNIQUE: CT of the abdomen and pelvis was performed with the administration of intravenous contrast. Multiplanar reformatted images are provided for review. Automated exposure control, iterative reconstruction, and/or weight based adjustment of the mA/kV was utilized to reduce the radiation dose to as low as reasonably achievable. COMPARISON: 08/02/2019 HISTORY: ORDERING SYSTEM PROVIDED HISTORY: diffuse abdominal pain TECHNOLOGIST PROVIDED HISTORY: Reason for exam:->diffuse abdominal pain Additional Contrast?->None Decision Support Exception - unselect if not a suspected or confirmed emergency medical condition->Emergency Medical Condition (MA) Reason for Exam: diffuse abdominal pain FINDINGS: Lower Chest: There is no consolidation or effusion. Organs: The liver, pancreas, spleen, kidneys and adrenals are unremarkable aside from a few tiny nonobstructing bilateral intrarenal calculi. GI/Bowel: A large amount of stool is noted throughout the colon.  The small bowel is fluid-filled and mildly dilated without discrete transition point. The appendix is normal. Pelvis: The bladder and pelvic organs are grossly unremarkable. Peritoneum/Retroperitoneum: There is no free air, free fluid or intraperitoneal inflammatory change.  There is no adenopathy. Bones/Soft Tissues: There is no acute fracture or aggressive osseous lesion.     1. Constipation with mild ileus versus enteritis. 2. Nonobstructing bilateral nephrolithiasis.     XR CHEST (2 VW)    Result Date: 3/25/2024  EXAMINATION: TWO XRAY VIEWS OF THE CHEST 3/25/2024 11:02 am COMPARISON: Chest x-ray dated 06/21/2023. HISTORY: ORDERING SYSTEM PROVIDED HISTORY: chest pain TECHNOLOGIST PROVIDED HISTORY: Reason for exam:->chest pain Reason for Exam: chest pain FINDINGS: HEART/MEDIASTINUM: The cardiomediastinal silhouette is within normal limits. PLEURA/LUNGS: There are no focal

## 2024-03-28 NOTE — PLAN OF CARE
Problem: Discharge Planning  Goal: Discharge to home or other facility with appropriate resources  3/28/2024 1237 by Jenise Leo RN  Outcome: Completed  3/28/2024 1114 by Jenise Leo RN  Outcome: Progressing  3/27/2024 2239 by Leobardo Castillo RN  Outcome: Progressing     Problem: Pain  Goal: Verbalizes/displays adequate comfort level or baseline comfort level  3/28/2024 1237 by Jenise Leo RN  Outcome: Completed  3/28/2024 1114 by Jenise Leo RN  Outcome: Progressing  3/27/2024 2239 by Leobardo Castillo RN  Outcome: Progressing     Problem: Safety - Adult  Goal: Free from fall injury  3/28/2024 1237 by Jenise Leo RN  Outcome: Completed  3/28/2024 1114 by Jenise Leo RN  Outcome: Progressing  3/27/2024 2239 by Leobardo Castillo RN  Outcome: Progressing     Problem: ABCDS Injury Assessment  Goal: Absence of physical injury  3/28/2024 1237 by Jenise Leo RN  Outcome: Completed  3/28/2024 1114 by Jenise Leo RN  Outcome: Progressing  3/27/2024 2239 by Leobardo Castillo RN  Outcome: Progressing

## 2024-03-28 NOTE — PLAN OF CARE
Problem: Discharge Planning  Goal: Discharge to home or other facility with appropriate resources  3/28/2024 1114 by Jenise Leo RN  Outcome: Progressing  3/27/2024 2239 by Leobardo Castillo RN  Outcome: Progressing     Problem: Pain  Goal: Verbalizes/displays adequate comfort level or baseline comfort level  3/28/2024 1114 by Jenise Leo RN  Outcome: Progressing  3/27/2024 2239 by Leobardo Castillo RN  Outcome: Progressing     Problem: Safety - Adult  Goal: Free from fall injury  3/28/2024 1114 by Jenise Leo RN  Outcome: Progressing  3/27/2024 2239 by Leobardo Castillo RN  Outcome: Progressing     Problem: ABCDS Injury Assessment  Goal: Absence of physical injury  3/28/2024 1114 by Jenise Leo RN  Outcome: Progressing  3/27/2024 2239 by Leobardo Castillo, RN  Outcome: Progressing

## 2024-03-29 LAB
BACTERIA BLD CULT ORG #2: NORMAL
BACTERIA BLD CULT: NORMAL

## 2024-04-03 ENCOUNTER — OFFICE VISIT (OUTPATIENT)
Dept: INTERNAL MEDICINE CLINIC | Age: 44
End: 2024-04-03

## 2024-04-03 VITALS
HEART RATE: 90 BPM | SYSTOLIC BLOOD PRESSURE: 128 MMHG | RESPIRATION RATE: 12 BRPM | OXYGEN SATURATION: 100 % | DIASTOLIC BLOOD PRESSURE: 80 MMHG | WEIGHT: 182 LBS | HEIGHT: 67 IN | BODY MASS INDEX: 28.56 KG/M2 | TEMPERATURE: 97.5 F

## 2024-04-03 DIAGNOSIS — M32.9 LUPUS ARTHRITIS (HCC): ICD-10-CM

## 2024-04-03 DIAGNOSIS — Z09 HOSPITAL DISCHARGE FOLLOW-UP: Primary | ICD-10-CM

## 2024-04-03 DIAGNOSIS — M79.7 FIBROMYALGIA: ICD-10-CM

## 2024-04-03 DIAGNOSIS — K52.9 GASTROENTERITIS: ICD-10-CM

## 2024-04-03 RX ORDER — BACLOFEN 10 MG/1
TABLET ORAL
COMMUNITY
Start: 2024-03-14

## 2024-04-03 NOTE — PROGRESS NOTES
reconciled against medications ordered at time of hospital discharge: Yes    A comprehensive review of systems was negative except for what was noted in the HPI.    Objective:    /80 (Site: Right Upper Arm, Position: Sitting, Cuff Size: Medium Adult)   Pulse 90   Temp 97.5 °F (36.4 °C) (Temporal)   Resp 12   Ht 1.708 m (5' 7.25\")   Wt 82.6 kg (182 lb)   SpO2 100%   BMI 28.29 kg/m²         An electronic signature was used to authenticate this note.  --Ehsan Patel, APRMANNY - CNP

## 2024-04-09 ENCOUNTER — OFFICE VISIT (OUTPATIENT)
Dept: PULMONOLOGY | Age: 44
End: 2024-04-09

## 2024-04-09 VITALS
RESPIRATION RATE: 18 BRPM | DIASTOLIC BLOOD PRESSURE: 74 MMHG | SYSTOLIC BLOOD PRESSURE: 108 MMHG | WEIGHT: 181 LBS | OXYGEN SATURATION: 99 % | TEMPERATURE: 97.6 F | HEIGHT: 67 IN | BODY MASS INDEX: 28.41 KG/M2 | HEART RATE: 82 BPM

## 2024-04-09 DIAGNOSIS — J45.20 MILD INTERMITTENT ASTHMA WITHOUT COMPLICATION: ICD-10-CM

## 2024-04-09 DIAGNOSIS — G47.10 HYPERSOMNIA: ICD-10-CM

## 2024-04-09 DIAGNOSIS — J45.40 MODERATE PERSISTENT ASTHMA WITHOUT COMPLICATION: Primary | ICD-10-CM

## 2024-04-09 RX ORDER — FLUTICASONE FUROATE 100 UG/1
1 POWDER RESPIRATORY (INHALATION) DAILY
Qty: 1 EACH | Refills: 11 | Status: SHIPPED | OUTPATIENT
Start: 2024-04-09

## 2024-04-09 ASSESSMENT — ASTHMA QUESTIONNAIRES
QUESTION_3 LAST FOUR WEEKS HOW OFTEN DID YOUR ASTHMA SYMPTOMS (WHEEZING, COUGHING, SHORTNESS OF BREATH, CHEST TIGHTNESS OR PAIN) WAKE YOU UP AT NIGHT OR EARLIER THAN USUAL IN THE MORNING: 2 OR 3 NIGHTS A WEEK
QUESTION_2 LAST FOUR WEEKS HOW OFTEN HAVE YOU HAD SHORTNESS OF BREATH: 3 TO 6 TIMES A WEEK
ACT_TOTALSCORE: 13
QUESTION_5 LAST FOUR WEEKS HOW WOULD YOU RATE YOUR ASTHMA CONTROL: SOMEWHAT CONTROLED
QUESTION_1 LAST FOUR WEEKS HOW MUCH OF THE TIME DID YOUR ASTHMA KEEP YOU FROM GETTING AS MUCH DONE AT WORK, SCHOOL OR AT HOME: SOME OF THE TIME
QUESTION_4 LAST FOUR WEEKS HOW OFTEN HAVE YOU USED YOUR RESCUE INHALER OR NEBULIZER MEDICATION (SUCH AS ALBUTEROL): 1 OR 2 TIMES PER DAY

## 2024-04-09 NOTE — PROGRESS NOTES
REASON FOR CONSULTATION/CC:    Chief Complaint   Patient presents with    New Patient    Asthma     Davey         Consult at request of   Ehsan Patel APRN - CNP for      PCP: Ehsan Patel APRN - CNP    HISTORY OF PRESENT ILLNESS: Greyson Shelby is a 43 y.o. year old female with a history of    who presents             Asthma   She was originally dx with asthma ~ 2018 secondary to symptoms of wheezing / coughing given treatment of nebulizer and prednisone.      Current tx  albuterol HFA  Duoneb's  as needed  She was on QVAR but stopped secondary to insurance.  This wa s> 2 years ago.     Family hx   2 children with asthma.   Aunt with asthma.       Asthma became worse after getting covid and states she was put on oxygen for worsening symptoms.   Covid 2/14/21.      She is using nebulizer at night with worsening symptoms with oxygen.  Currently using 2 L NC       Sleep.   Father with sleep apnea.   Wake shortness of breath / gasping for air  nocturnal hypoxemia / needing o2  + snoring   Tonsillectomy at age of 12.       REVIEW OF SYSTEMS:  Constitutional: Negative for fever    HENT: Negative for sore throat  Eyes: Negative for redness   Respiratory: +  for dyspnea, cough  Cardiovascular: Negative for chest pain  Gastrointestinal: Negative for vomiting, diarrhea   Genitourinary: Negative for hematuria   Musculoskeletal: Negative for arthralgias   Skin: Negative for rash  Neurological: Negative for syncope  Hematological: Negative for adenopathy  Psychiatric/Behavorial: Negative for anxiety      SOCIAL HISTORY:   reports that she has never smoked. She has never used smokeless tobacco.    PAST MEDICAL HISTORY:  Past Medical History:   Diagnosis Date    Acute asthma exacerbation 12/15/2021    JUAN (acute kidney injury) ([facility]) 2/14/2021    Anxiety     Asthma     Constipation 1/13/2014    Conversion disorder     Depression     Fibromyalgia     Focal motor deficit 8/31/2020    Graves disease     History of blood

## 2024-04-10 PROBLEM — G47.10 HYPERSOMNIA: Status: ACTIVE | Noted: 2024-04-10

## 2024-04-10 NOTE — ASSESSMENT & PLAN NOTE
Patient has significant symptoms of of sleep apnea.  Strong family history.  Home sleep study not able to be completed secondary to nocturnal oxygen.  PSG ordered.    Education given on diagnostic process of sleep apnea and treatment.  It is likely that she has moderate to severe sleep apnea therefore will need a CPAP like multiple family members

## 2024-04-10 NOTE — ASSESSMENT & PLAN NOTE
>>ASSESSMENT AND PLAN FOR ASTHMA WRITTEN ON 4/10/2024  7:54 AM BY ALAN GUTIERREZ MD     Strong family history of asthma, it is most likely that she has asthma.  Pulmonary function test have been ordered to assess further.  She has a history of severe COVID infection requiring hospitalization.  Last CT abdomen demonstrated no acute abnormality of the lower parts of the lungs with resolution of prior pneumonia.  This was demonstrated to the patient.    Patient was on Qvar in the past.  This was stopped secondary to insurance.  After this was stopped, worsening control.  Insurance formulary not known at this time.  Arnuity sent to pharmacy.  Continue albuterol.

## 2024-04-10 NOTE — ASSESSMENT & PLAN NOTE
Strong family history of asthma, it is most likely that she has asthma.  Pulmonary function test have been ordered to assess further.  She has a history of severe COVID infection requiring hospitalization.  Last CT abdomen demonstrated no acute abnormality of the lower parts of the lungs with resolution of prior pneumonia.  This was demonstrated to the patient.    Patient was on Qvar in the past.  This was stopped secondary to insurance.  After this was stopped, worsening control.  Insurance formulary not known at this time.  Arnuity sent to pharmacy.  Continue albuterol.

## 2024-04-16 ENCOUNTER — OFFICE VISIT (OUTPATIENT)
Dept: PAIN MANAGEMENT | Age: 44
End: 2024-04-16

## 2024-04-16 VITALS
OXYGEN SATURATION: 99 % | SYSTOLIC BLOOD PRESSURE: 110 MMHG | WEIGHT: 178 LBS | DIASTOLIC BLOOD PRESSURE: 75 MMHG | BODY MASS INDEX: 27.88 KG/M2 | TEMPERATURE: 97.1 F | HEART RATE: 81 BPM

## 2024-04-16 DIAGNOSIS — F32.A DEPRESSION, UNSPECIFIED DEPRESSION TYPE: ICD-10-CM

## 2024-04-16 DIAGNOSIS — E66.09 CLASS 1 OBESITY DUE TO EXCESS CALORIES WITH SERIOUS COMORBIDITY IN ADULT, UNSPECIFIED BMI: ICD-10-CM

## 2024-04-16 DIAGNOSIS — G89.29 CHRONIC MIDLINE LOW BACK PAIN WITH SCIATICA, SCIATICA LATERALITY UNSPECIFIED: ICD-10-CM

## 2024-04-16 DIAGNOSIS — G89.4 CHRONIC PAIN SYNDROME: ICD-10-CM

## 2024-04-16 DIAGNOSIS — F41.1 GENERALIZED ANXIETY DISORDER: ICD-10-CM

## 2024-04-16 DIAGNOSIS — M62.830 BACK SPASM: ICD-10-CM

## 2024-04-16 DIAGNOSIS — M79.7 FIBROMYALGIA: ICD-10-CM

## 2024-04-16 DIAGNOSIS — G43.101 MIGRAINE WITH AURA AND WITH STATUS MIGRAINOSUS, NOT INTRACTABLE: ICD-10-CM

## 2024-04-16 DIAGNOSIS — M32.9 LUPUS ARTHRITIS (HCC): ICD-10-CM

## 2024-04-16 DIAGNOSIS — F51.01 PRIMARY INSOMNIA: ICD-10-CM

## 2024-04-16 DIAGNOSIS — M54.40 CHRONIC MIDLINE LOW BACK PAIN WITH SCIATICA, SCIATICA LATERALITY UNSPECIFIED: ICD-10-CM

## 2024-04-16 RX ORDER — TRAZODONE HYDROCHLORIDE 50 MG/1
50 TABLET ORAL NIGHTLY
Qty: 90 TABLET | Refills: 1 | Status: SHIPPED | OUTPATIENT
Start: 2024-04-16

## 2024-04-16 RX ORDER — HYDROCODONE BITARTRATE AND ACETAMINOPHEN 5; 325 MG/1; MG/1
1 TABLET ORAL EVERY 8 HOURS PRN
Qty: 90 TABLET | Refills: 0 | Status: SHIPPED | OUTPATIENT
Start: 2024-04-16 | End: 2024-05-16

## 2024-04-16 RX ORDER — BACLOFEN 10 MG/1
10 TABLET ORAL DAILY
Qty: 30 TABLET | Refills: 0 | Status: SHIPPED | OUTPATIENT
Start: 2024-04-16 | End: 2024-05-16

## 2024-05-14 ENCOUNTER — OFFICE VISIT (OUTPATIENT)
Dept: PAIN MANAGEMENT | Age: 44
End: 2024-05-14

## 2024-05-14 ENCOUNTER — TELEPHONE (OUTPATIENT)
Dept: PAIN MANAGEMENT | Age: 44
End: 2024-05-14

## 2024-05-14 VITALS
SYSTOLIC BLOOD PRESSURE: 180 MMHG | OXYGEN SATURATION: 97 % | TEMPERATURE: 97.6 F | WEIGHT: 173 LBS | DIASTOLIC BLOOD PRESSURE: 120 MMHG | BODY MASS INDEX: 27.1 KG/M2 | HEART RATE: 116 BPM

## 2024-05-14 DIAGNOSIS — K59.03 DRUG-INDUCED CONSTIPATION: ICD-10-CM

## 2024-05-14 DIAGNOSIS — F32.A DEPRESSION, UNSPECIFIED DEPRESSION TYPE: ICD-10-CM

## 2024-05-14 DIAGNOSIS — F41.1 GENERALIZED ANXIETY DISORDER: ICD-10-CM

## 2024-05-14 DIAGNOSIS — F51.01 PRIMARY INSOMNIA: ICD-10-CM

## 2024-05-14 DIAGNOSIS — M79.7 FIBROMYALGIA: ICD-10-CM

## 2024-05-14 DIAGNOSIS — G89.29 CHRONIC MIDLINE LOW BACK PAIN WITH SCIATICA, SCIATICA LATERALITY UNSPECIFIED: ICD-10-CM

## 2024-05-14 DIAGNOSIS — E66.09 CLASS 1 OBESITY DUE TO EXCESS CALORIES WITH SERIOUS COMORBIDITY IN ADULT, UNSPECIFIED BMI: ICD-10-CM

## 2024-05-14 DIAGNOSIS — G43.101 MIGRAINE WITH AURA AND WITH STATUS MIGRAINOSUS, NOT INTRACTABLE: ICD-10-CM

## 2024-05-14 DIAGNOSIS — M62.830 BACK SPASM: ICD-10-CM

## 2024-05-14 DIAGNOSIS — M54.40 CHRONIC MIDLINE LOW BACK PAIN WITH SCIATICA, SCIATICA LATERALITY UNSPECIFIED: ICD-10-CM

## 2024-05-14 DIAGNOSIS — G89.4 CHRONIC PAIN SYNDROME: ICD-10-CM

## 2024-05-14 DIAGNOSIS — M32.9 LUPUS ARTHRITIS (HCC): ICD-10-CM

## 2024-05-14 RX ORDER — TRAZODONE HYDROCHLORIDE 50 MG/1
50 TABLET ORAL NIGHTLY
Qty: 90 TABLET | Refills: 1 | Status: SHIPPED | OUTPATIENT
Start: 2024-05-14

## 2024-05-14 RX ORDER — HYDROCODONE BITARTRATE AND ACETAMINOPHEN 5; 325 MG/1; MG/1
1 TABLET ORAL EVERY 8 HOURS PRN
Qty: 90 TABLET | Refills: 0 | Status: SHIPPED | OUTPATIENT
Start: 2024-05-14 | End: 2024-06-13

## 2024-05-14 RX ORDER — METHYLNALTREXONE BROMIDE 150 MG/1
TABLET ORAL
Qty: 90 TABLET | Refills: 0 | Status: SHIPPED | OUTPATIENT
Start: 2024-05-14

## 2024-05-14 RX ORDER — BACLOFEN 10 MG/1
10 TABLET ORAL DAILY
Qty: 30 TABLET | Refills: 0 | Status: SHIPPED | OUTPATIENT
Start: 2024-05-14 | End: 2024-06-13

## 2024-05-14 NOTE — PROGRESS NOTES
Greyson Shelby  1980  [id number]    HISTORY OF PRESENT ILLNESS: Ms. Shelby is a 43 y.o. female returns for a follow up visit for pain management  She has a diagnosis of   1. Chronic pain syndrome    2. Fibromyalgia    3. Chronic midline low back pain with sciatica, sciatica laterality unspecified    4. Back spasm    5. Lupus arthritis (HCC)    6. Migraine with aura and with status migrainosus, not intractable    7. Depression, unspecified depression type    8. Generalized anxiety disorder    9. Primary insomnia    10. Drug-induced constipation    11. Class 1 obesity due to excess calories with serious comorbidity in adult, unspecified BMI      As per Information Obtained from the PADT (Patient Assessment and Documentation Tool)    She complains of pain in the  neck, both shoulders, down the spine, both arms, both hips,, both legs,   She rates the pain 9/10 and describes it as aching. Current treatment regimen has helped relieve about 10% of the pain.  She denies any side effects from the current pain regimen. Patient reports that since the last follow up visit the physical functioning is unchanged, family/social relationships are unchanged, mood is unchanged sleep patterns are unchanged, and that the overall functioning is unchanged.  Patient denies misusing/abusing her narcotic pain medications or using any illegal drugs.      Upon obtaining medical history from Ms. Shelby states that pain is manageable on current pain therapy. Takes the pain medications as prescribed. Mood/anxiety is stable. Sleep is fair with an average of 5-6 hours. Denies to having issues of constipation. Tolerating activities/house chores with moderate tenderness to the lower back.     ALLERGIES: Patients list of allergies were reviewed     MEDICATIONS: Ms. Shelby list of medications were reviewed.Her current medications are   Outpatient Medications Prior to Visit   Medication Sig Dispense Refill    fluticasone (ARNUITY

## 2024-05-14 NOTE — TELEPHONE ENCOUNTER
Molly with [facility] rec'vd rx baclofen rx however, pt is receiving tizanidine from another prescriber and both are muscle relaxers.

## 2024-06-11 ENCOUNTER — OFFICE VISIT (OUTPATIENT)
Dept: PAIN MANAGEMENT | Age: 44
End: 2024-06-11

## 2024-06-11 VITALS
SYSTOLIC BLOOD PRESSURE: 215 MMHG | BODY MASS INDEX: 27.25 KG/M2 | HEART RATE: 80 BPM | WEIGHT: 174 LBS | OXYGEN SATURATION: 99 % | DIASTOLIC BLOOD PRESSURE: 101 MMHG

## 2024-06-11 DIAGNOSIS — G89.29 CHRONIC MIDLINE LOW BACK PAIN WITH SCIATICA, SCIATICA LATERALITY UNSPECIFIED: ICD-10-CM

## 2024-06-11 DIAGNOSIS — G89.4 CHRONIC PAIN SYNDROME: ICD-10-CM

## 2024-06-11 DIAGNOSIS — F41.1 GENERALIZED ANXIETY DISORDER: ICD-10-CM

## 2024-06-11 DIAGNOSIS — M79.7 FIBROMYALGIA: ICD-10-CM

## 2024-06-11 DIAGNOSIS — K59.03 DRUG-INDUCED CONSTIPATION: ICD-10-CM

## 2024-06-11 DIAGNOSIS — G43.101 MIGRAINE WITH AURA AND WITH STATUS MIGRAINOSUS, NOT INTRACTABLE: ICD-10-CM

## 2024-06-11 DIAGNOSIS — M54.40 CHRONIC MIDLINE LOW BACK PAIN WITH SCIATICA, SCIATICA LATERALITY UNSPECIFIED: ICD-10-CM

## 2024-06-11 DIAGNOSIS — F32.A DEPRESSION, UNSPECIFIED DEPRESSION TYPE: ICD-10-CM

## 2024-06-11 DIAGNOSIS — F51.01 PRIMARY INSOMNIA: ICD-10-CM

## 2024-06-11 DIAGNOSIS — E66.09 CLASS 1 OBESITY DUE TO EXCESS CALORIES WITH SERIOUS COMORBIDITY IN ADULT, UNSPECIFIED BMI: ICD-10-CM

## 2024-06-11 DIAGNOSIS — M62.830 BACK SPASM: ICD-10-CM

## 2024-06-11 DIAGNOSIS — M32.9 LUPUS ARTHRITIS (HCC): ICD-10-CM

## 2024-06-11 RX ORDER — BACLOFEN 10 MG/1
10 TABLET ORAL DAILY
Qty: 30 TABLET | Refills: 0 | Status: SHIPPED | OUTPATIENT
Start: 2024-06-11 | End: 2024-07-11

## 2024-06-11 RX ORDER — METHYLNALTREXONE BROMIDE 150 MG/1
TABLET ORAL
Qty: 90 TABLET | Refills: 0 | Status: SHIPPED | OUTPATIENT
Start: 2024-06-11

## 2024-06-11 RX ORDER — HYDROCODONE BITARTRATE AND ACETAMINOPHEN 5; 325 MG/1; MG/1
1 TABLET ORAL EVERY 8 HOURS PRN
Qty: 90 TABLET | Refills: 0 | Status: SHIPPED | OUTPATIENT
Start: 2024-06-11 | End: 2024-07-11

## 2024-06-11 RX ORDER — TRAZODONE HYDROCHLORIDE 50 MG/1
50 TABLET ORAL NIGHTLY
Qty: 90 TABLET | Refills: 1 | Status: SHIPPED | OUTPATIENT
Start: 2024-06-11

## 2024-06-11 NOTE — PROGRESS NOTES
Prescription pain medication monitoring:                  MEDD current = 15              ORT Score = 2 low risk              Other Risk factors - (mood) Anxiety/Depression              Date of Last Medication Agreement: 9/7/23              Date Naloxone prescribed: 0              UDT:                          Date of last UDT: 9/5/23                          Adverse report: No              OARRS:                          Checked today: Yes                          Adverse report: No    IMPRESSION:   1. Chronic pain syndrome    2. Fibromyalgia    3. Chronic midline low back pain with sciatica, sciatica laterality unspecified    4. Back spasm    5. Lupus arthritis (HCC)    6. Migraine with aura and with status migrainosus, not intractable    7. Depression, unspecified depression type    8. Generalized anxiety disorder    9. Primary insomnia    10. Class 1 obesity due to excess calories with serious comorbidity in adult, unspecified BMI      PLAN:  Informed verbal consent was obtained:  -Patient's opioid therapy will be maintained at current dose  -Home exercises/Shadia exercises recommended  -CBT techniques- relaxation therapies such as biofeedback, mindfulness based stress reduction, imagery, cognitive restructuring, problem solving discussed with patient   -She was advised weight reduction, diet changes- 800-1200 tracy diet, diet diary, exercising, nutritional  consult increased physical activity as tolerated   -Last UDS 9/5/23 consistent  -No follow-ups on file.   -OARRS record was obtained and reviewed  for the last one year and no indicators of drug misuse  were found. Any other controlled substance prescriptions  seen on the record have been accounted for, I am aware of the patient receiving these medications. . OARRS record will be rechecked as part of office protocol.       Analgesic Plan:              Continue present regimen: Yes: Norco 5-325 mg tabs orally q8h prn              Adjust dose of present

## 2024-06-13 ENCOUNTER — TELEPHONE (OUTPATIENT)
Dept: PAIN MANAGEMENT | Age: 44
End: 2024-06-13

## 2024-06-13 NOTE — TELEPHONE ENCOUNTER
Submitted PA for [facility] Key: [id number] STATUS: PENDING.    Follow up done daily; if no decision with in three days we will refax.  If another three days goes by with no decision will call the insurance for status.

## 2024-06-13 NOTE — TELEPHONE ENCOUNTER
This medication is APPROVED.    \"Your PA request for [id number] was approved for 365 days. The PA# assigned is [id number].\"    [facility] has been notified.   Thank you!

## 2024-06-24 RX ORDER — TIZANIDINE 4 MG/1
TABLET ORAL
Qty: 135 TABLET | Refills: 2 | Status: SHIPPED | OUTPATIENT
Start: 2024-06-24

## 2024-06-24 NOTE — TELEPHONE ENCOUNTER
Refill request for tiZANidine HCL 4MG TABLET medication.     Name of Pharmacy- [facility]      Last visit - 4-3-2024     Pending visit - N/A    Last refill - 5-      Medication Contract signed -   Last Oarrs ran-         Additional Comments

## 2024-07-09 ENCOUNTER — OFFICE VISIT (OUTPATIENT)
Dept: PAIN MANAGEMENT | Age: 44
End: 2024-07-09

## 2024-07-09 VITALS
OXYGEN SATURATION: 98 % | WEIGHT: 166 LBS | DIASTOLIC BLOOD PRESSURE: 114 MMHG | SYSTOLIC BLOOD PRESSURE: 206 MMHG | HEART RATE: 82 BPM | TEMPERATURE: 97 F | BODY MASS INDEX: 26 KG/M2

## 2024-07-09 DIAGNOSIS — F41.1 GENERALIZED ANXIETY DISORDER: ICD-10-CM

## 2024-07-09 DIAGNOSIS — G89.29 CHRONIC MIDLINE LOW BACK PAIN WITH SCIATICA, SCIATICA LATERALITY UNSPECIFIED: ICD-10-CM

## 2024-07-09 DIAGNOSIS — F51.01 PRIMARY INSOMNIA: ICD-10-CM

## 2024-07-09 DIAGNOSIS — K59.03 DRUG-INDUCED CONSTIPATION: ICD-10-CM

## 2024-07-09 DIAGNOSIS — E66.09 CLASS 1 OBESITY DUE TO EXCESS CALORIES WITH SERIOUS COMORBIDITY IN ADULT, UNSPECIFIED BMI: ICD-10-CM

## 2024-07-09 DIAGNOSIS — F32.A DEPRESSION, UNSPECIFIED DEPRESSION TYPE: ICD-10-CM

## 2024-07-09 DIAGNOSIS — G89.4 CHRONIC PAIN SYNDROME: ICD-10-CM

## 2024-07-09 DIAGNOSIS — M32.9 LUPUS ARTHRITIS (HCC): ICD-10-CM

## 2024-07-09 DIAGNOSIS — M48.02 STENOSIS, CERVICAL SPINE: ICD-10-CM

## 2024-07-09 DIAGNOSIS — M47.812 SPONDYLOSIS, CERVICAL: ICD-10-CM

## 2024-07-09 DIAGNOSIS — M54.40 CHRONIC MIDLINE LOW BACK PAIN WITH SCIATICA, SCIATICA LATERALITY UNSPECIFIED: ICD-10-CM

## 2024-07-09 DIAGNOSIS — M79.7 FIBROMYALGIA: ICD-10-CM

## 2024-07-09 DIAGNOSIS — G43.101 MIGRAINE WITH AURA AND WITH STATUS MIGRAINOSUS, NOT INTRACTABLE: ICD-10-CM

## 2024-07-09 DIAGNOSIS — M62.830 BACK SPASM: ICD-10-CM

## 2024-07-09 NOTE — PROGRESS NOTES
have wheezes in the lung fields. She has no rales.     Neurological/Psychiatric:She is alert and oriented to person, place, and time. Coordination is  normal. +Lumbar spine pain with palpation, 30 degree flexion. Tender spots noted to the shoulders Her mood isAppropriate and affect is Neutral/Euthymic(normal) .     CT scan of the Lumbar spine 8/31/20:  1. Negative CT of the lumbar spine.  2. Nonobstructing bilateral nephrolithiasis.    CT scan of the Lumbar spine 10/3/23:  No acute findings. Degenerative disc changes as described above     CT scan of the Cervical spine 10/3/24:  No acute abnormality of the cervical spine.  C5/C6 borderline central canal compromise secondary to encroachment by  central posterior disc extrusion.  Otherwise, multilevel minimal cervical spondylosis without further evidence  of significant central canal compromise/stenosis.    Prescription pain medication monitoring:                  MEDD current = 15              ORT Score = 2 low risk              Other Risk factors - (mood) Anxiety/Depression              Date of Last Medication Agreement: 9/7/23              Date Naloxone prescribed: 0              UDT:                          Date of last UDT: 9/5/23                          Adverse report: No              OARRS:                          Checked today: Yes                          Adverse report: No    IMPRESSION:   1. Chronic pain syndrome    2. Fibromyalgia    3. Spondylosis, cervical    4. Stenosis, cervical spine    5. Chronic midline low back pain with sciatica, sciatica laterality unspecified    6. Back spasm    7. Lupus arthritis (HCC)    8. Migraine with aura and with status migrainosus, not intractable    9. Depression, unspecified depression type    10. Generalized anxiety disorder    11. Primary insomnia    12. Class 1 obesity due to excess calories with serious comorbidity in adult, unspecified BMI      PLAN:  Informed verbal consent was obtained:  -Patient's opioid

## 2024-07-11 DIAGNOSIS — Z00.01 ENCOUNTER FOR WELL ADULT EXAM WITH ABNORMAL FINDINGS: ICD-10-CM

## 2024-07-11 RX ORDER — TRAZODONE HYDROCHLORIDE 50 MG/1
50 TABLET ORAL NIGHTLY
Qty: 90 TABLET | Refills: 1 | Status: SHIPPED | OUTPATIENT
Start: 2024-07-11

## 2024-07-11 RX ORDER — LEVONORGESTREL / ETHINYL ESTRADIOL 0.15-0.03
1 KIT ORAL DAILY
Qty: 91 TABLET | Refills: 2 | Status: SHIPPED | OUTPATIENT
Start: 2024-07-11

## 2024-07-11 RX ORDER — TRIAMCINOLONE ACETONIDE 40 MG/ML
40 INJECTION, SUSPENSION INTRA-ARTICULAR; INTRAMUSCULAR ONCE
Status: SHIPPED | OUTPATIENT
Start: 2024-07-11

## 2024-07-11 RX ORDER — METHYLPREDNISOLONE 4 MG/1
TABLET ORAL
Qty: 1 KIT | Refills: 0 | Status: SHIPPED | OUTPATIENT
Start: 2024-07-11 | End: 2024-07-15

## 2024-07-11 RX ORDER — TIZANIDINE 4 MG/1
TABLET ORAL
Qty: 135 TABLET | Refills: 2 | Status: SHIPPED | OUTPATIENT
Start: 2024-07-11

## 2024-07-11 RX ORDER — HYDROCODONE BITARTRATE AND ACETAMINOPHEN 5; 325 MG/1; MG/1
1 TABLET ORAL EVERY 8 HOURS PRN
Qty: 90 TABLET | Refills: 0 | Status: SHIPPED | OUTPATIENT
Start: 2024-07-11 | End: 2024-08-10

## 2024-07-11 RX ORDER — METHYLNALTREXONE BROMIDE 150 MG/1
TABLET ORAL
Qty: 90 TABLET | Refills: 0 | Status: SHIPPED | OUTPATIENT
Start: 2024-07-11

## 2024-07-11 NOTE — TELEPHONE ENCOUNTER
Refill request for Jolessa 0.15-0.03 mg   medication.     Name [facility]- [facility]      Last visit - 4/3/24     Pending visit - 8/6/24    Last refill -9/20/23      Medication Contract signed -   Last Oarrs ran-        Additional Comments

## 2024-07-12 RX ORDER — HYDROXYZINE HYDROCHLORIDE 25 MG/1
25 TABLET, FILM COATED ORAL 2 TIMES DAILY
Qty: 60 TABLET | Refills: 2 | Status: SHIPPED | OUTPATIENT
Start: 2024-07-12

## 2024-07-12 NOTE — TELEPHONE ENCOUNTER
Refill request for hydroxyzine medication.     Name of [facility]acy- [facility]      Last visit - 4/3/2024     Pending visit - None    Last refill -3/1/2024      Medication Contract signed -   Last Oarrs ran-         Additional Comments

## 2024-08-06 ENCOUNTER — OFFICE VISIT (OUTPATIENT)
Dept: PAIN MANAGEMENT | Age: 44
End: 2024-08-06
Payer: COMMERCIAL

## 2024-08-06 VITALS
BODY MASS INDEX: 26.16 KG/M2 | OXYGEN SATURATION: 99 % | DIASTOLIC BLOOD PRESSURE: 123 MMHG | SYSTOLIC BLOOD PRESSURE: 190 MMHG | WEIGHT: 167 LBS | HEART RATE: 79 BPM | TEMPERATURE: 98.5 F

## 2024-08-06 DIAGNOSIS — M32.9 LUPUS ARTHRITIS (HCC): ICD-10-CM

## 2024-08-06 DIAGNOSIS — M48.02 STENOSIS, CERVICAL SPINE: ICD-10-CM

## 2024-08-06 DIAGNOSIS — G43.101 MIGRAINE WITH AURA AND WITH STATUS MIGRAINOSUS, NOT INTRACTABLE: ICD-10-CM

## 2024-08-06 DIAGNOSIS — M62.830 BACK SPASM: ICD-10-CM

## 2024-08-06 DIAGNOSIS — F51.01 PRIMARY INSOMNIA: ICD-10-CM

## 2024-08-06 DIAGNOSIS — G89.4 CHRONIC PAIN SYNDROME: ICD-10-CM

## 2024-08-06 DIAGNOSIS — K59.03 DRUG-INDUCED CONSTIPATION: ICD-10-CM

## 2024-08-06 DIAGNOSIS — M79.7 FIBROMYALGIA: ICD-10-CM

## 2024-08-06 DIAGNOSIS — M54.40 CHRONIC MIDLINE LOW BACK PAIN WITH SCIATICA, SCIATICA LATERALITY UNSPECIFIED: ICD-10-CM

## 2024-08-06 DIAGNOSIS — F32.A DEPRESSION, UNSPECIFIED DEPRESSION TYPE: ICD-10-CM

## 2024-08-06 DIAGNOSIS — M47.812 SPONDYLOSIS, CERVICAL: ICD-10-CM

## 2024-08-06 DIAGNOSIS — G89.29 CHRONIC MIDLINE LOW BACK PAIN WITH SCIATICA, SCIATICA LATERALITY UNSPECIFIED: ICD-10-CM

## 2024-08-06 PROCEDURE — G8417 CALC BMI ABV UP PARAM F/U: HCPCS | Performed by: NURSE PRACTITIONER

## 2024-08-06 PROCEDURE — 3077F SYST BP >= 140 MM HG: CPT | Performed by: NURSE PRACTITIONER

## 2024-08-06 PROCEDURE — 3080F DIAST BP >= 90 MM HG: CPT | Performed by: NURSE PRACTITIONER

## 2024-08-06 PROCEDURE — G8427 DOCREV CUR MEDS BY ELIG CLIN: HCPCS | Performed by: NURSE PRACTITIONER

## 2024-08-06 PROCEDURE — 1036F TOBACCO NON-USER: CPT | Performed by: NURSE PRACTITIONER

## 2024-08-06 PROCEDURE — 99214 OFFICE O/P EST MOD 30 MIN: CPT | Performed by: NURSE PRACTITIONER

## 2024-08-06 RX ORDER — HYDROCODONE BITARTRATE AND ACETAMINOPHEN 5; 325 MG/1; MG/1
1 TABLET ORAL EVERY 8 HOURS PRN
Qty: 90 TABLET | Refills: 0 | Status: SHIPPED | OUTPATIENT
Start: 2024-08-06 | End: 2024-09-05

## 2024-08-06 RX ORDER — BACLOFEN 10 MG/1
10 TABLET ORAL DAILY
Qty: 30 TABLET | Refills: 0 | Status: SHIPPED | OUTPATIENT
Start: 2024-08-06 | End: 2024-09-05

## 2024-08-06 RX ORDER — TRAZODONE HYDROCHLORIDE 50 MG/1
50 TABLET ORAL NIGHTLY
Qty: 90 TABLET | Refills: 1 | Status: SHIPPED | OUTPATIENT
Start: 2024-08-06

## 2024-08-06 RX ORDER — METHYLNALTREXONE BROMIDE 150 MG/1
TABLET ORAL
Qty: 90 TABLET | Refills: 0 | Status: SHIPPED | OUTPATIENT
Start: 2024-08-06

## 2024-08-06 RX ORDER — TIZANIDINE 4 MG/1
TABLET ORAL
Qty: 135 TABLET | Refills: 2 | Status: SHIPPED | OUTPATIENT
Start: 2024-08-06 | End: 2024-08-06

## 2024-08-06 NOTE — PROGRESS NOTES
current medications.Risk of overdose and death, if medicines not taken as prescribed, were also discussed. If the patient develops new symptoms or if the symptoms worsen, the patient should call the office.    While transcribing every attempt was made to maintain the accuracy of the note in terms of it's contents,there may have been some errors made inadvertently.  Thank you for allowing me to participate in the care of this patient.    Kimmy Manzano CNP.    Cc: Ehsan Nice, LEESA - CNP

## 2024-08-27 ENCOUNTER — TELEPHONE (OUTPATIENT)
Dept: PAIN MANAGEMENT | Age: 44
End: 2024-08-27

## 2024-08-27 DIAGNOSIS — M62.830 BACK SPASM: ICD-10-CM

## 2024-08-27 DIAGNOSIS — M79.7 FIBROMYALGIA: ICD-10-CM

## 2024-08-27 DIAGNOSIS — F32.A DEPRESSION, UNSPECIFIED DEPRESSION TYPE: ICD-10-CM

## 2024-08-27 DIAGNOSIS — G89.4 CHRONIC PAIN SYNDROME: ICD-10-CM

## 2024-08-27 DIAGNOSIS — M54.40 CHRONIC MIDLINE LOW BACK PAIN WITH SCIATICA, SCIATICA LATERALITY UNSPECIFIED: ICD-10-CM

## 2024-08-27 DIAGNOSIS — M48.02 STENOSIS, CERVICAL SPINE: ICD-10-CM

## 2024-08-27 DIAGNOSIS — G43.101 MIGRAINE WITH AURA AND WITH STATUS MIGRAINOSUS, NOT INTRACTABLE: ICD-10-CM

## 2024-08-27 DIAGNOSIS — M32.9 LUPUS ARTHRITIS (HCC): ICD-10-CM

## 2024-08-27 DIAGNOSIS — G89.29 CHRONIC MIDLINE LOW BACK PAIN WITH SCIATICA, SCIATICA LATERALITY UNSPECIFIED: ICD-10-CM

## 2024-08-27 DIAGNOSIS — M47.812 SPONDYLOSIS, CERVICAL: ICD-10-CM

## 2024-08-27 RX ORDER — HYDROCODONE BITARTRATE AND ACETAMINOPHEN 5; 325 MG/1; MG/1
1 TABLET ORAL EVERY 8 HOURS PRN
Qty: 21 TABLET | Refills: 0 | Status: SHIPPED | OUTPATIENT
Start: 2024-08-27 | End: 2024-09-03 | Stop reason: SDUPTHER

## 2024-08-27 NOTE — TELEPHONE ENCOUNTER
Called patient LVM. Please be advised when patient calls please inform her that [facility] has sent her Murray to her preferred pharmacy requested.    Please be advised.

## 2024-08-27 NOTE — TELEPHONE ENCOUNTER
Who's asking for the refill request: pt       Reason for refill request: script       Why is patient out of medication?: 1-2 tabs left      Name of medication:     HYDROcodone-acetaminophen (NORCO) 5-325 MG per tablet         Pharmacy name:     [facility] [id number] [address] - P [phone] - F [phone]         Phone number:       Last refill: 24 pt was not due till about the       Next appointment: 9/3/24      Patient confirmed the above pharmacy has their medication in stock

## 2024-08-28 ENCOUNTER — TELEPHONE (OUTPATIENT)
Dept: INTERNAL MEDICINE CLINIC | Age: 44
End: 2024-08-28

## 2024-08-28 NOTE — TELEPHONE ENCOUNTER
Care Transitions Initial Follow Up Call    Outreach made within 2 business days of discharge: Yes    Patient: Greyson Shelby Patient : 1980   MRN: [id number]  Reason for Admission: Kidney issues  Discharge Date: 2024       Spoke with: Greyson Shelby    Discharge department/facility: [facility] Interactive Patient Contact:  Was patient able to fill all prescriptions: Yes  Was patient instructed to bring all medications to the follow-up visit: Yes  Is patient taking all medications as directed in the discharge summary? Yes  Does patient understand their discharge instructions: Yes  Does patient have questions or concerns that need addressed prior to 7-14 day follow up office visit: yes - Yes, 2024    Additional needs identified to be addressed with provider    Patient was released from the hospital on 2024           Scheduled appointment with PCP within 7-14 days    Follow Up  Future Appointments   Date Time Provider Department Center   2024  3:00 PM Davion Isabel MD [facility]   9/3/2024  4:20 PM Kimmy Manzano, APRN - CNP [facility]

## 2024-08-30 ENCOUNTER — OFFICE VISIT (OUTPATIENT)
Dept: INTERNAL MEDICINE CLINIC | Age: 44
End: 2024-08-30

## 2024-08-30 VITALS
SYSTOLIC BLOOD PRESSURE: 105 MMHG | WEIGHT: 164.6 LBS | DIASTOLIC BLOOD PRESSURE: 65 MMHG | BODY MASS INDEX: 25.78 KG/M2 | HEART RATE: 98 BPM | OXYGEN SATURATION: 98 %

## 2024-08-30 DIAGNOSIS — M32.9 LUPUS ARTHRITIS (HCC): ICD-10-CM

## 2024-08-30 DIAGNOSIS — Z09 HOSPITAL DISCHARGE FOLLOW-UP: Primary | ICD-10-CM

## 2024-08-30 DIAGNOSIS — G89.4 CHRONIC PAIN SYNDROME: ICD-10-CM

## 2024-08-30 DIAGNOSIS — I10 ESSENTIAL HYPERTENSION: ICD-10-CM

## 2024-08-30 RX ORDER — CARVEDILOL 25 MG/1
25 TABLET ORAL EVERY 12 HOURS
COMMUNITY
Start: 2024-08-23

## 2024-08-30 RX ORDER — NIFEDIPINE 30 MG/1
30 TABLET, EXTENDED RELEASE ORAL 2 TIMES DAILY
COMMUNITY
Start: 2024-08-23

## 2024-08-30 SDOH — ECONOMIC STABILITY: INCOME INSECURITY: HOW HARD IS IT FOR YOU TO PAY FOR THE VERY BASICS LIKE FOOD, HOUSING, MEDICAL CARE, AND HEATING?: SOMEWHAT HARD

## 2024-08-30 SDOH — ECONOMIC STABILITY: FOOD INSECURITY: WITHIN THE PAST 12 MONTHS, YOU WORRIED THAT YOUR FOOD WOULD RUN OUT BEFORE YOU GOT MONEY TO BUY MORE.: SOMETIMES TRUE

## 2024-08-30 SDOH — ECONOMIC STABILITY: FOOD INSECURITY: WITHIN THE PAST 12 MONTHS, THE FOOD YOU BOUGHT JUST DIDN'T LAST AND YOU DIDN'T HAVE MONEY TO GET MORE.: SOMETIMES TRUE

## 2024-08-30 NOTE — PROGRESS NOTES
Abiel   2024    Greyson Shelby (:  1980) is a 43 y.o. female, here for evaluation of the following medical concerns:    Chief Complaint   Patient presents with    Follow-Up from Hospital        ASSESSMENT/ PLAN  1. Hospital discharge follow-up  2. Lupus arthritis (HCC)  3. Essential hypertension  4.  Chronic pain syndrome  -Medications reconciled on patient's chart.  Amlodipine and hydrochlorothiazide discontinued.  She is not taking these.  She is currently on carvedilol and nifedipine which are reconciled to her chart.  Blood pressure today is at goal.  Noted she has upcoming appointments with nephrology and rheumatology.  She will follow-up with them.  [facility] paperwork completed and provided to patient.  Imaging and labs reviewed as described below.  She continues to follow-up with pain management.    HPI  Patient is a 43-year-old female presenting posthospital discharge.  She was admitted to the hospital on 2023 for abdominal pain, gross hematuria, hypertensive urgency.  Noted hypertensive regimen on discharge carvedilol 25 twice a day, nifedipine 30 twice daily reviewed imaging findings including CT angio abdomen pelvis with and without contrast with no significant vascular abnormality, CT head without contrast with no significant abnormality, CT angio chest for PE without any acute abnormality, x-ray chest portable without any significant abnormality, ultrasound pelvis with transvaginal without any abnormality, no evidence of ovarian torsion, ultrasound kidney with multiple bilateral nonobstructing stones, no hydronephrosis I have reviewed her lab results dated 2024.  CBC unremarkable, no anemia, no leukocytosis, creatinine at baseline of 0.86, sodium potassium normal  Today she notes she has been doing fairly well.   She denies any chest pain, shortness of breath.  Medications reconciled.  She has been taking carvedilol and nifedipine as prescribed.  Her blood pressure today is

## 2024-09-03 ENCOUNTER — OFFICE VISIT (OUTPATIENT)
Dept: PAIN MANAGEMENT | Age: 44
End: 2024-09-03

## 2024-09-03 VITALS
SYSTOLIC BLOOD PRESSURE: 98 MMHG | WEIGHT: 163 LBS | DIASTOLIC BLOOD PRESSURE: 57 MMHG | BODY MASS INDEX: 25.53 KG/M2 | HEART RATE: 88 BPM | TEMPERATURE: 98.6 F | OXYGEN SATURATION: 99 %

## 2024-09-03 DIAGNOSIS — M32.9 LUPUS ARTHRITIS (HCC): ICD-10-CM

## 2024-09-03 DIAGNOSIS — M48.02 STENOSIS, CERVICAL SPINE: ICD-10-CM

## 2024-09-03 DIAGNOSIS — M62.830 BACK SPASM: ICD-10-CM

## 2024-09-03 DIAGNOSIS — M54.40 CHRONIC MIDLINE LOW BACK PAIN WITH SCIATICA, SCIATICA LATERALITY UNSPECIFIED: ICD-10-CM

## 2024-09-03 DIAGNOSIS — Z51.81 ENCOUNTER FOR THERAPEUTIC DRUG MONITORING: ICD-10-CM

## 2024-09-03 DIAGNOSIS — M47.812 SPONDYLOSIS, CERVICAL: ICD-10-CM

## 2024-09-03 DIAGNOSIS — G89.4 CHRONIC PAIN SYNDROME: ICD-10-CM

## 2024-09-03 DIAGNOSIS — G89.29 CHRONIC MIDLINE LOW BACK PAIN WITH SCIATICA, SCIATICA LATERALITY UNSPECIFIED: ICD-10-CM

## 2024-09-03 DIAGNOSIS — F32.A DEPRESSION, UNSPECIFIED DEPRESSION TYPE: ICD-10-CM

## 2024-09-03 DIAGNOSIS — M79.7 FIBROMYALGIA: ICD-10-CM

## 2024-09-03 DIAGNOSIS — G43.101 MIGRAINE WITH AURA AND WITH STATUS MIGRAINOSUS, NOT INTRACTABLE: ICD-10-CM

## 2024-09-03 RX ORDER — LIDOCAINE 50 MG/G
1 PATCH TOPICAL DAILY
Qty: 30 PATCH | Refills: 0 | Status: SHIPPED | OUTPATIENT
Start: 2024-09-03

## 2024-09-03 RX ORDER — BACLOFEN 10 MG/1
10 TABLET ORAL DAILY
Qty: 30 TABLET | Refills: 0 | Status: SHIPPED | OUTPATIENT
Start: 2024-09-03 | End: 2024-10-03

## 2024-09-03 RX ORDER — HYDROCODONE BITARTRATE AND ACETAMINOPHEN 5; 325 MG/1; MG/1
1 TABLET ORAL EVERY 8 HOURS PRN
Qty: 21 TABLET | Refills: 0 | Status: SHIPPED | OUTPATIENT
Start: 2024-09-03 | End: 2024-09-10

## 2024-09-03 NOTE — PROGRESS NOTES
albuterol sulfate HFA (PROVENTIL;VENTOLIN;PROAIR) 108 (90 Base) MCG/ACT inhaler Inhale 2 puffs into the lungs 4 times daily as needed for Wheezing 1 each 2    EPINEPHrine (EPIPEN 2-LUANNE) 0.3 MG/0.3ML SOAJ injection Inject 0.3 mLs into the muscle once for 1 dose Use as directed for allergic reaction 1 each 0    naloxone (NARCAN) 4 MG/0.1ML LIQD nasal spray Use as directed 1 each 0    ibuprofen (ADVIL;MOTRIN) 200 MG tablet Take 1 tablet by mouth every 8 hours as needed for Pain 15 tablet 0    Nebulizers (COMPRESSOR/NEBULIZER) MISC 1 each by Does not apply route daily 1 each 3    pantoprazole (PROTONIX) 20 MG tablet Take 1 tablet by mouth 2 times daily 180 tablet 2    Handicap Placard MISC by Does not apply route Expiration 5 years 1 each 0    Blood Pressure KIT 1 kit by Does not apply route daily 1 kit 0     Facility-Administered Medications Prior to Visit   Medication Dose Route Frequency Provider Last Rate Last Admin    triamcinolone acetonide (KENALOG-40) injection 40 mg  40 mg IntraMUSCular Once Kimmy Manzano, APRN - CNP         SOCIAL/FAMILY/PAST MEDICAL HISTORY: Ms. Shelby social, family and past medical history was reviewed.     REVIEW OF SYSTEMS:    Respiratory: Negative for apnea, chest tightness and shortness of breath or change in baseline breathing.    Gastrointestinal: Negative for nausea, vomiting, abdominal pain, diarrhea, constipation, blood in stool and abdominal distention.     PHYSICAL EXAM:   Nursing note and vitals reviewed. BP (!) 98/57   Pulse 88   Temp 98.6 °F (37 °C)   Wt 73.9 kg (163 lb)   SpO2 99%   BMI 25.53 kg/m²   Constitutional: She appears well-developed and well-nourished. No acute distress.   Skin: Skin is warm and dry, good turgor. No rash noted. She is not diaphoretic.  Cardiovascular: Normal rate, regular rhythm, normal heart sounds, and does not have murmur.     Pulmonary/Chest: Effort normal. No respiratory distress. She does not have wheezes in the lung fields. She has

## 2024-09-04 ENCOUNTER — TELEPHONE (OUTPATIENT)
Dept: INTERNAL MEDICINE CLINIC | Age: 44
End: 2024-09-04

## 2024-09-04 NOTE — TELEPHONE ENCOUNTER
Called and left voice mail letting patient know that her [employer]ability Paperwork has been completed, but need her signature and name of employer.  Asked patient to call and let us know if she wants forms mailed to her, sent to her my chart, or if she wants to  at office.  Placed paperwork at .

## 2024-09-09 NOTE — TELEPHONE ENCOUNTER
Patient calling today to check on paperwork.  She was given the message that she needed to come in and sign the form and also needs to put her employer's name on the form.     She states she will come in today or tomorrow.

## 2024-09-11 DIAGNOSIS — M79.7 FIBROMYALGIA: ICD-10-CM

## 2024-09-19 RX ORDER — HYDROXYZINE HYDROCHLORIDE 50 MG/1
50 TABLET, FILM COATED ORAL EVERY 8 HOURS PRN
Qty: 30 TABLET | Refills: 0 | Status: SHIPPED | OUTPATIENT
Start: 2024-09-19 | End: 2024-09-29

## 2024-09-24 DIAGNOSIS — J45.20 MILD INTERMITTENT ASTHMA WITHOUT COMPLICATION: ICD-10-CM

## 2024-09-26 RX ORDER — CARVEDILOL 25 MG/1
25 TABLET ORAL EVERY 12 HOURS
Qty: 60 TABLET | Refills: 0 | Status: SHIPPED | OUTPATIENT
Start: 2024-09-26

## 2024-09-26 RX ORDER — ALBUTEROL SULFATE 90 UG/1
2 INHALANT RESPIRATORY (INHALATION) 4 TIMES DAILY PRN
Qty: 1 EACH | Refills: 2 | Status: SHIPPED | OUTPATIENT
Start: 2024-09-26

## 2024-09-26 RX ORDER — NIFEDIPINE 30 MG/1
30 TABLET, EXTENDED RELEASE ORAL 2 TIMES DAILY
Qty: 30 TABLET | Refills: 0 | Status: SHIPPED | OUTPATIENT
Start: 2024-09-26

## 2024-09-26 RX ORDER — BUPROPION HYDROCHLORIDE 150 MG/1
150 TABLET ORAL EVERY MORNING
Qty: 30 TABLET | Refills: 3 | Status: SHIPPED | OUTPATIENT
Start: 2024-09-26

## 2024-10-01 ENCOUNTER — OFFICE VISIT (OUTPATIENT)
Dept: PAIN MANAGEMENT | Age: 44
End: 2024-10-01
Payer: COMMERCIAL

## 2024-10-01 VITALS
HEART RATE: 65 BPM | DIASTOLIC BLOOD PRESSURE: 75 MMHG | OXYGEN SATURATION: 100 % | TEMPERATURE: 96.9 F | SYSTOLIC BLOOD PRESSURE: 121 MMHG

## 2024-10-01 DIAGNOSIS — F51.01 PRIMARY INSOMNIA: ICD-10-CM

## 2024-10-01 DIAGNOSIS — G89.4 CHRONIC PAIN SYNDROME: ICD-10-CM

## 2024-10-01 DIAGNOSIS — M48.02 STENOSIS, CERVICAL SPINE: ICD-10-CM

## 2024-10-01 DIAGNOSIS — F32.A DEPRESSION, UNSPECIFIED DEPRESSION TYPE: ICD-10-CM

## 2024-10-01 DIAGNOSIS — G89.29 CHRONIC MIDLINE LOW BACK PAIN WITH SCIATICA, SCIATICA LATERALITY UNSPECIFIED: ICD-10-CM

## 2024-10-01 DIAGNOSIS — M62.830 BACK SPASM: ICD-10-CM

## 2024-10-01 DIAGNOSIS — M47.812 SPONDYLOSIS, CERVICAL: ICD-10-CM

## 2024-10-01 DIAGNOSIS — K59.03 DRUG-INDUCED CONSTIPATION: ICD-10-CM

## 2024-10-01 DIAGNOSIS — G43.101 MIGRAINE WITH AURA AND WITH STATUS MIGRAINOSUS, NOT INTRACTABLE: ICD-10-CM

## 2024-10-01 DIAGNOSIS — M79.7 FIBROMYALGIA: ICD-10-CM

## 2024-10-01 DIAGNOSIS — M54.40 CHRONIC MIDLINE LOW BACK PAIN WITH SCIATICA, SCIATICA LATERALITY UNSPECIFIED: ICD-10-CM

## 2024-10-01 DIAGNOSIS — Z51.81 ENCOUNTER FOR THERAPEUTIC DRUG MONITORING: ICD-10-CM

## 2024-10-01 DIAGNOSIS — M32.9 LUPUS ARTHRITIS (HCC): ICD-10-CM

## 2024-10-01 PROCEDURE — 99214 OFFICE O/P EST MOD 30 MIN: CPT | Performed by: NURSE PRACTITIONER

## 2024-10-01 PROCEDURE — G8427 DOCREV CUR MEDS BY ELIG CLIN: HCPCS | Performed by: NURSE PRACTITIONER

## 2024-10-01 PROCEDURE — G8484 FLU IMMUNIZE NO ADMIN: HCPCS | Performed by: NURSE PRACTITIONER

## 2024-10-01 PROCEDURE — 3078F DIAST BP <80 MM HG: CPT | Performed by: NURSE PRACTITIONER

## 2024-10-01 PROCEDURE — G8417 CALC BMI ABV UP PARAM F/U: HCPCS | Performed by: NURSE PRACTITIONER

## 2024-10-01 PROCEDURE — 1036F TOBACCO NON-USER: CPT | Performed by: NURSE PRACTITIONER

## 2024-10-01 PROCEDURE — 3074F SYST BP LT 130 MM HG: CPT | Performed by: NURSE PRACTITIONER

## 2024-10-01 RX ORDER — HYDROCODONE BITARTRATE AND ACETAMINOPHEN 5; 325 MG/1; MG/1
1 TABLET ORAL EVERY 8 HOURS PRN
Qty: 30 TABLET | Refills: 0 | Status: SHIPPED | OUTPATIENT
Start: 2024-10-01 | End: 2024-10-11

## 2024-10-01 RX ORDER — METHYLNALTREXONE BROMIDE 150 MG/1
TABLET ORAL
Qty: 90 TABLET | Refills: 0 | Status: SHIPPED | OUTPATIENT
Start: 2024-10-01

## 2024-10-01 RX ORDER — TRAZODONE HYDROCHLORIDE 50 MG/1
50 TABLET, FILM COATED ORAL NIGHTLY
Qty: 90 TABLET | Refills: 1 | Status: SHIPPED | OUTPATIENT
Start: 2024-10-01

## 2024-10-01 RX ORDER — LIDOCAINE 50 MG/G
1 PATCH TOPICAL DAILY
Qty: 30 PATCH | Refills: 0 | Status: SHIPPED | OUTPATIENT
Start: 2024-10-01

## 2024-10-01 RX ORDER — BACLOFEN 10 MG/1
10 TABLET ORAL DAILY
Qty: 30 TABLET | Refills: 0 | Status: SHIPPED | OUTPATIENT
Start: 2024-10-01 | End: 2024-10-31

## 2024-10-01 NOTE — PROGRESS NOTES
days pending UDS               Adjust dose of present analgesic: No              Switch analgesics: No              Add/Adjust concomitant therapy: Yes: started on Trazodone, continue with Baclofen, Lidocaine, Narcan, Lyrica through her PCP    I will continue her current medication regimen  which is part of the above treatment schedule. It has been helping with Ms. Shelby's chronic  medical problems which for this visit include:   Diagnoses of Chronic pain syndrome, Fibromyalgia, Spondylosis, cervical, Stenosis, cervical spine, Chronic midline low back pain with sciatica, sciatica laterality unspecified, Back spasm, Lupus arthritis (HCC), Migraine with aura and with status migrainosus, not intractable, Depression, unspecified depression type, Primary insomnia, Drug-induced constipation, and Encounter for therapeutic drug monitoring were pertinent to this visit.   Risks and benefits of the medications and other alternative treatments  including no treatment were discussed with the patient.The common side effects of these medications were also explained to the patient.  Informed verbal consent was obtained.   Goals of current treatment regimen include improvement in pain, restoration of functioning- with focus on improvement in physical performance, general activity, work or disability,emotional distress, health care utilization and  decreased medication consumption. Will continue to monitor progress towards achieving/maintaining therapeutic goals with special emphasis on  1. Improvement in perceived interfernce  of pain with ADL's. Ability to do home exercises independently. Ability to do household chores indoor and/or outdoor work and social and leisure activities.Improve psychosocial and physical functioning. - she is showing progression towards this treatment goal with the current regimen.     She was advised against drinking alcohol with the narcotic pain medicines, advised against driving or handling machinery

## 2024-10-14 ENCOUNTER — TELEPHONE (OUTPATIENT)
Dept: PAIN MANAGEMENT | Age: 44
End: 2024-10-14

## 2024-10-14 DIAGNOSIS — M54.40 CHRONIC MIDLINE LOW BACK PAIN WITH SCIATICA, SCIATICA LATERALITY UNSPECIFIED: ICD-10-CM

## 2024-10-14 DIAGNOSIS — G43.101 MIGRAINE WITH AURA AND WITH STATUS MIGRAINOSUS, NOT INTRACTABLE: ICD-10-CM

## 2024-10-14 DIAGNOSIS — G89.4 CHRONIC PAIN SYNDROME: ICD-10-CM

## 2024-10-14 DIAGNOSIS — G89.29 CHRONIC MIDLINE LOW BACK PAIN WITH SCIATICA, SCIATICA LATERALITY UNSPECIFIED: ICD-10-CM

## 2024-10-14 DIAGNOSIS — M62.830 BACK SPASM: ICD-10-CM

## 2024-10-14 DIAGNOSIS — M79.7 FIBROMYALGIA: ICD-10-CM

## 2024-10-14 DIAGNOSIS — M32.9 LUPUS ARTHRITIS (HCC): ICD-10-CM

## 2024-10-14 DIAGNOSIS — F32.A DEPRESSION, UNSPECIFIED DEPRESSION TYPE: ICD-10-CM

## 2024-10-14 RX ORDER — HYDROCODONE BITARTRATE AND ACETAMINOPHEN 5; 325 MG/1; MG/1
1 TABLET ORAL EVERY 8 HOURS PRN
Qty: 45 TABLET | Refills: 0 | Status: SHIPPED | OUTPATIENT
Start: 2024-10-14 | End: 2024-10-29

## 2024-10-14 NOTE — TELEPHONE ENCOUNTER
LVM for pt to return my call    Just wanted to let the pt know that [facility] sent the remainder of pain meds to the pharmacy, enough till FU

## 2024-10-14 NOTE — TELEPHONE ENCOUNTER
Who's asking for the refill request:    Pt    Reason for refill request:    Out of medication     Why is patient out of medication?:    [facility] only sent in 21 days and was told to call back when needing refill     Name of medication:     Norco 5/325    [facility]     Phone number:     [phone]    Last refill:       Next appointment:     10/29      Patient confirmed the above pharmacy has their medication in stock

## 2024-10-29 ENCOUNTER — OFFICE VISIT (OUTPATIENT)
Dept: PAIN MANAGEMENT | Age: 44
End: 2024-10-29

## 2024-10-29 VITALS
HEART RATE: 89 BPM | DIASTOLIC BLOOD PRESSURE: 89 MMHG | SYSTOLIC BLOOD PRESSURE: 143 MMHG | OXYGEN SATURATION: 98 % | TEMPERATURE: 97 F

## 2024-10-29 DIAGNOSIS — M32.9 LUPUS ARTHRITIS (HCC): ICD-10-CM

## 2024-10-29 DIAGNOSIS — G89.29 CHRONIC MIDLINE LOW BACK PAIN WITH SCIATICA, SCIATICA LATERALITY UNSPECIFIED: ICD-10-CM

## 2024-10-29 DIAGNOSIS — M62.830 BACK SPASM: ICD-10-CM

## 2024-10-29 DIAGNOSIS — M48.02 STENOSIS, CERVICAL SPINE: ICD-10-CM

## 2024-10-29 DIAGNOSIS — F32.A DEPRESSION, UNSPECIFIED DEPRESSION TYPE: ICD-10-CM

## 2024-10-29 DIAGNOSIS — G89.4 CHRONIC PAIN SYNDROME: ICD-10-CM

## 2024-10-29 DIAGNOSIS — G43.101 MIGRAINE WITH AURA AND WITH STATUS MIGRAINOSUS, NOT INTRACTABLE: ICD-10-CM

## 2024-10-29 DIAGNOSIS — F51.01 PRIMARY INSOMNIA: ICD-10-CM

## 2024-10-29 DIAGNOSIS — M47.812 SPONDYLOSIS, CERVICAL: ICD-10-CM

## 2024-10-29 DIAGNOSIS — M54.40 CHRONIC MIDLINE LOW BACK PAIN WITH SCIATICA, SCIATICA LATERALITY UNSPECIFIED: ICD-10-CM

## 2024-10-29 DIAGNOSIS — M79.7 FIBROMYALGIA: ICD-10-CM

## 2024-10-29 DIAGNOSIS — K59.03 DRUG-INDUCED CONSTIPATION: ICD-10-CM

## 2024-10-29 RX ORDER — BACLOFEN 10 MG/1
10 TABLET ORAL DAILY
Qty: 30 TABLET | Refills: 0 | Status: SHIPPED | OUTPATIENT
Start: 2024-10-29 | End: 2024-11-28

## 2024-10-29 RX ORDER — HYDROCODONE BITARTRATE AND ACETAMINOPHEN 5; 325 MG/1; MG/1
1 TABLET ORAL EVERY 8 HOURS PRN
Qty: 45 TABLET | Refills: 0 | Status: SHIPPED | OUTPATIENT
Start: 2024-10-29 | End: 2024-11-13

## 2024-10-29 RX ORDER — METHYLNALTREXONE BROMIDE 150 MG/1
TABLET ORAL
Qty: 90 TABLET | Refills: 0 | Status: SHIPPED | OUTPATIENT
Start: 2024-10-29

## 2024-10-29 NOTE — PROGRESS NOTES
her PCP    I will continue her current medication regimen  which is part of the above treatment schedule. It has been helping with Ms. Shelby's chronic  medical problems which for this visit include:   Diagnoses of Chronic pain syndrome, Fibromyalgia, Spondylosis, cervical, Stenosis, cervical spine, Chronic midline low back pain with sciatica, sciatica laterality unspecified, Back spasm, Lupus arthritis (HCC), Migraine with aura and with status migrainosus, not intractable, Depression, unspecified depression type, Drug-induced constipation, and Primary insomnia were pertinent to this visit.   Risks and benefits of the medications and other alternative treatments  including no treatment were discussed with the patient.The common side effects of these medications were also explained to the patient.  Informed verbal consent was obtained.   Goals of current treatment regimen include improvement in pain, restoration of functioning- with focus on improvement in physical performance, general activity, work or disability,emotional distress, health care utilization and  decreased medication consumption. Will continue to monitor progress towards achieving/maintaining therapeutic goals with special emphasis on  1. Improvement in perceived interfernce  of pain with ADL's. Ability to do home exercises independently. Ability to do household chores indoor and/or outdoor work and social and leisure activities.Improve psychosocial and physical functioning. - she is showing progression towards this treatment goal with the current regimen.     She was advised against drinking alcohol with the narcotic pain medicines, advised against driving or handling machinery while adjusting the dose of medicines or if having cognitive  issues related to the current medications.Risk of overdose and death, if medicines not taken as prescribed, were also discussed. If the patient develops new symptoms or if the symptoms worsen, the patient should

## 2024-11-04 RX ORDER — BUPROPION HYDROCHLORIDE 150 MG/1
150 TABLET ORAL EVERY MORNING
Qty: 30 TABLET | Refills: 3 | Status: SHIPPED | OUTPATIENT
Start: 2024-11-04

## 2024-11-04 RX ORDER — CARVEDILOL 25 MG/1
25 TABLET ORAL EVERY 12 HOURS
Qty: 60 TABLET | Refills: 0 | Status: SHIPPED | OUTPATIENT
Start: 2024-11-04

## 2024-11-04 NOTE — TELEPHONE ENCOUNTER
Additional Comments Patient stated that she was taking the carvedilol 2x day as the hospital prescribed it, but then she saw that the bottle said 1x day. This is why she is out of this medication early per patient.      Refill request for bupropion, carvedilol medication.     Name of [facility]- [facility]      Last visit - 8/30/2024     Pending visit - None    Last refill -9/26/2024      Medication Contract signed -   Last Oarrs ran-

## 2024-11-18 ENCOUNTER — TELEPHONE (OUTPATIENT)
Dept: PAIN MANAGEMENT | Age: 44
End: 2024-11-18

## 2024-11-18 NOTE — TELEPHONE ENCOUNTER
Pharm said baclofen 10 was written by us, however, anothr doctor wrote tizanidine.  Wants to know if we should fill baclofen.      Call dorie [phone]

## 2024-11-19 NOTE — TELEPHONE ENCOUNTER
Pharmacy calling again to follow up message left yesterday. Need to see if bacofen should be filled while on tizanidine.

## 2024-11-26 ENCOUNTER — OFFICE VISIT (OUTPATIENT)
Dept: PAIN MANAGEMENT | Age: 44
End: 2024-11-26
Payer: COMMERCIAL

## 2024-11-26 VITALS
OXYGEN SATURATION: 100 % | HEART RATE: 99 BPM | TEMPERATURE: 96.9 F | SYSTOLIC BLOOD PRESSURE: 106 MMHG | DIASTOLIC BLOOD PRESSURE: 65 MMHG

## 2024-11-26 DIAGNOSIS — M32.9 LUPUS ARTHRITIS (HCC): ICD-10-CM

## 2024-11-26 DIAGNOSIS — M47.812 SPONDYLOSIS, CERVICAL: ICD-10-CM

## 2024-11-26 DIAGNOSIS — F32.A DEPRESSION, UNSPECIFIED DEPRESSION TYPE: ICD-10-CM

## 2024-11-26 DIAGNOSIS — K59.03 DRUG-INDUCED CONSTIPATION: ICD-10-CM

## 2024-11-26 DIAGNOSIS — M62.830 BACK SPASM: ICD-10-CM

## 2024-11-26 DIAGNOSIS — M54.40 CHRONIC MIDLINE LOW BACK PAIN WITH SCIATICA, SCIATICA LATERALITY UNSPECIFIED: ICD-10-CM

## 2024-11-26 DIAGNOSIS — M79.7 FIBROMYALGIA: ICD-10-CM

## 2024-11-26 DIAGNOSIS — M48.02 STENOSIS, CERVICAL SPINE: ICD-10-CM

## 2024-11-26 DIAGNOSIS — F51.01 PRIMARY INSOMNIA: ICD-10-CM

## 2024-11-26 DIAGNOSIS — G89.29 CHRONIC MIDLINE LOW BACK PAIN WITH SCIATICA, SCIATICA LATERALITY UNSPECIFIED: ICD-10-CM

## 2024-11-26 DIAGNOSIS — G89.4 CHRONIC PAIN SYNDROME: ICD-10-CM

## 2024-11-26 DIAGNOSIS — G43.101 MIGRAINE WITH AURA AND WITH STATUS MIGRAINOSUS, NOT INTRACTABLE: ICD-10-CM

## 2024-11-26 PROCEDURE — 1036F TOBACCO NON-USER: CPT | Performed by: NURSE PRACTITIONER

## 2024-11-26 PROCEDURE — G8427 DOCREV CUR MEDS BY ELIG CLIN: HCPCS | Performed by: NURSE PRACTITIONER

## 2024-11-26 PROCEDURE — 3074F SYST BP LT 130 MM HG: CPT | Performed by: NURSE PRACTITIONER

## 2024-11-26 PROCEDURE — 99214 OFFICE O/P EST MOD 30 MIN: CPT | Performed by: NURSE PRACTITIONER

## 2024-11-26 PROCEDURE — 3078F DIAST BP <80 MM HG: CPT | Performed by: NURSE PRACTITIONER

## 2024-11-26 PROCEDURE — G8484 FLU IMMUNIZE NO ADMIN: HCPCS | Performed by: NURSE PRACTITIONER

## 2024-11-26 PROCEDURE — G8417 CALC BMI ABV UP PARAM F/U: HCPCS | Performed by: NURSE PRACTITIONER

## 2024-11-26 RX ORDER — LIDOCAINE 50 MG/G
1 PATCH TOPICAL DAILY
Qty: 30 PATCH | Refills: 0 | Status: SHIPPED | OUTPATIENT
Start: 2024-11-26

## 2024-11-26 RX ORDER — TRAZODONE HYDROCHLORIDE 50 MG/1
50 TABLET, FILM COATED ORAL NIGHTLY
Qty: 90 TABLET | Refills: 1 | Status: SHIPPED | OUTPATIENT
Start: 2024-11-26

## 2024-11-26 RX ORDER — HYDROCODONE BITARTRATE AND ACETAMINOPHEN 5; 325 MG/1; MG/1
1 TABLET ORAL EVERY 8 HOURS PRN
Qty: 84 TABLET | Refills: 0 | Status: SHIPPED | OUTPATIENT
Start: 2024-11-26 | End: 2024-12-24

## 2024-11-26 RX ORDER — BACLOFEN 10 MG/1
10 TABLET ORAL DAILY
Qty: 30 TABLET | Refills: 0 | Status: SHIPPED | OUTPATIENT
Start: 2024-11-26 | End: 2024-12-26

## 2024-11-26 NOTE — PROGRESS NOTES
Greyson Shelby  1980  [id number]    HISTORY OF PRESENT ILLNESS: Ms. Shelby is a 44 y.o. female returns for a follow up visit for pain management  She has a diagnosis of   1. Chronic pain syndrome    2. Fibromyalgia    3. Spondylosis, cervical    4. Stenosis, cervical spine    5. Chronic midline low back pain with sciatica, sciatica laterality unspecified    6. Back spasm    7. Lupus arthritis (HCC)    8. Migraine with aura and with status migrainosus, not intractable    9. Depression, unspecified depression type    10. Drug-induced constipation    11. Primary insomnia      As per Information Obtained from the PADT (Patient Assessment and Documentation Tool)    She complains of pain in the whole body She rates the pain 10/10 and describes it as aching. Current treatment regimen has helped relieve about 0% of the pain.  She denies any side effects from the current pain regimen. Patient reports that since the last follow up visit the physical functioning is unchanged, family/social relationships are unchanged, mood is unchanged sleep patterns are unchanged, and that the overall functioning is unchanged.  Patient denies misusing/abusing her narcotic pain medications or using any illegal drugs.      Upon obtaining medical history from Ms. Shelby states that pain is manageable on current pain therapy. Takes the pain medications as prescribed. Mood/anxiety is stable. Sleep is fair with an average of 5-6 hours. Denies to having issues of constipation. Tolerating activities/house chores with moderate tenderness to the lower back.     ALLERGIES: Patients list of allergies were reviewed     MEDICATIONS: Ms. Shelby list of medications were reviewed.Her current medications are   Outpatient Medications Prior to Visit   Medication Sig Dispense Refill    carvedilol (COREG) 25 MG tablet Take 1 tablet by mouth in the morning and 1 tablet in the evening. 60 tablet 0    buPROPion (WELLBUTRIN XL) 150 MG extended release

## 2024-12-24 ENCOUNTER — OFFICE VISIT (OUTPATIENT)
Dept: PAIN MANAGEMENT | Age: 44
End: 2024-12-24

## 2024-12-24 VITALS
DIASTOLIC BLOOD PRESSURE: 90 MMHG | TEMPERATURE: 97 F | OXYGEN SATURATION: 100 % | SYSTOLIC BLOOD PRESSURE: 135 MMHG | HEART RATE: 76 BPM

## 2024-12-24 DIAGNOSIS — E66.811 CLASS 1 OBESITY DUE TO EXCESS CALORIES WITH SERIOUS COMORBIDITY IN ADULT, UNSPECIFIED BMI: ICD-10-CM

## 2024-12-24 DIAGNOSIS — K59.03 DRUG-INDUCED CONSTIPATION: ICD-10-CM

## 2024-12-24 DIAGNOSIS — M47.812 SPONDYLOSIS, CERVICAL: ICD-10-CM

## 2024-12-24 DIAGNOSIS — F32.A DEPRESSION, UNSPECIFIED DEPRESSION TYPE: ICD-10-CM

## 2024-12-24 DIAGNOSIS — M48.02 STENOSIS, CERVICAL SPINE: ICD-10-CM

## 2024-12-24 DIAGNOSIS — F51.01 PRIMARY INSOMNIA: ICD-10-CM

## 2024-12-24 DIAGNOSIS — M54.40 CHRONIC MIDLINE LOW BACK PAIN WITH SCIATICA, SCIATICA LATERALITY UNSPECIFIED: ICD-10-CM

## 2024-12-24 DIAGNOSIS — M32.9 LUPUS ARTHRITIS (HCC): ICD-10-CM

## 2024-12-24 DIAGNOSIS — E66.09 CLASS 1 OBESITY DUE TO EXCESS CALORIES WITH SERIOUS COMORBIDITY IN ADULT, UNSPECIFIED BMI: ICD-10-CM

## 2024-12-24 DIAGNOSIS — M62.830 BACK SPASM: ICD-10-CM

## 2024-12-24 DIAGNOSIS — M79.7 FIBROMYALGIA: ICD-10-CM

## 2024-12-24 DIAGNOSIS — G43.101 MIGRAINE WITH AURA AND WITH STATUS MIGRAINOSUS, NOT INTRACTABLE: ICD-10-CM

## 2024-12-24 DIAGNOSIS — G89.4 CHRONIC PAIN SYNDROME: ICD-10-CM

## 2024-12-24 DIAGNOSIS — G89.29 CHRONIC MIDLINE LOW BACK PAIN WITH SCIATICA, SCIATICA LATERALITY UNSPECIFIED: ICD-10-CM

## 2024-12-24 RX ORDER — BACLOFEN 10 MG/1
10 TABLET ORAL DAILY
Qty: 30 TABLET | Refills: 0 | Status: SHIPPED | OUTPATIENT
Start: 2024-12-24 | End: 2025-01-23

## 2024-12-24 RX ORDER — LIDOCAINE 50 MG/G
1 PATCH TOPICAL DAILY
Qty: 30 PATCH | Refills: 0 | Status: SHIPPED | OUTPATIENT
Start: 2024-12-24

## 2024-12-24 RX ORDER — HYDROCODONE BITARTRATE AND ACETAMINOPHEN 5; 325 MG/1; MG/1
1 TABLET ORAL EVERY 8 HOURS PRN
Qty: 84 TABLET | Refills: 0 | Status: SHIPPED | OUTPATIENT
Start: 2024-12-24 | End: 2025-01-21

## 2024-12-24 RX ORDER — TRAZODONE HYDROCHLORIDE 50 MG/1
50 TABLET, FILM COATED ORAL NIGHTLY
Qty: 90 TABLET | Refills: 1 | Status: SHIPPED | OUTPATIENT
Start: 2024-12-24

## 2024-12-24 RX ORDER — METHYLNALTREXONE BROMIDE 150 MG/1
TABLET ORAL
Qty: 90 TABLET | Refills: 0 | Status: SHIPPED | OUTPATIENT
Start: 2024-12-24

## 2024-12-24 NOTE — PROGRESS NOTES
to person, place, and time. Coordination is  normal.  Her mood isAppropriate and affect is Neutral/Euthymic(normal) .     Prescription pain medication monitoring:                  MEDD current = 15              ORT Score = 2 low risk              Other Risk factors - (mood) Anxiety/Depression              Date of Last Medication Agreement: 9/7/23              Date Naloxone prescribed: 0              UDT:                          Date of last UDT: 10/1/24                          Adverse report: No              OARRS:                          Checked today: Yes                          Adverse report: No    IMPRESSION:   1. Chronic pain syndrome    2. Fibromyalgia    3. Spondylosis, cervical    4. Stenosis, cervical spine    5. Chronic midline low back pain with sciatica, sciatica laterality unspecified    6. Back spasm    7. Lupus arthritis (HCC)    8. Migraine with aura and with status migrainosus, not intractable    9. Depression, unspecified depression type    10. Drug-induced constipation    11. Primary insomnia    12. Class 1 obesity due to excess calories with serious comorbidity in adult, unspecified BMI      PLAN:  Informed verbal consent was obtained:  -Patient's opioid therapy will be maintained at current dose  -Home exercises/Shadia exercises recommended  -CBT techniques- relaxation therapies such as biofeedback, mindfulness based stress reduction, imagery, cognitive restructuring, problem solving discussed with patient   -She was advised weight reduction, diet changes- 800-1200 tracy diet, diet diary, exercising, nutritional  consult increased physical activity as tolerated   -Last UDS 10/1/24 consistent  -Return in about 4 weeks (around 1/21/2025).   -OARRS record was obtained and reviewed  for the last one year and no indicators of drug misuse  were found. Any other controlled substance prescriptions  seen on the record have been accounted for, I am aware of the patient receiving these medications. .

## 2024-12-26 ENCOUNTER — TELEPHONE (OUTPATIENT)
Dept: PAIN MANAGEMENT | Age: 44
End: 2024-12-26

## 2024-12-26 RX ORDER — HYDROXYZINE HYDROCHLORIDE 50 MG/1
TABLET, FILM COATED ORAL
Qty: 30 TABLET | Refills: 0 | Status: SHIPPED | OUTPATIENT
Start: 2024-12-26

## 2024-12-26 NOTE — TELEPHONE ENCOUNTER
Refill request for ATARAX medication.     Name of Pharmacy- [facility]      Last visit - 4/3/24     Pending visit - NONE    Last refill -9/19/24      Medication Contract signed -   Last Oarrs ran-         Additional Comments

## 2024-12-26 NOTE — TELEPHONE ENCOUNTER
Pharmacy calling because baclofen is to soon to fill and pt wanted to know if she could get the tizanidine filled that another prescriber had given her that has refills on it.

## 2024-12-30 NOTE — TELEPHONE ENCOUNTER
Pharm wants to know if pt is taking baclofen and tizanidine both.    Request call back [phone]

## 2025-01-28 ENCOUNTER — TRANSCRIBE ORDERS (OUTPATIENT)
Dept: ADMINISTRATIVE | Age: 45
End: 2025-01-28

## 2025-01-28 ENCOUNTER — HOSPITAL ENCOUNTER (OUTPATIENT)
Age: 45
Setting detail: OBSERVATION
Discharge: HOME | End: 2025-01-29
Attending: EMERGENCY MEDICINE | Admitting: INTERNAL MEDICINE
Payer: COMMERCIAL

## 2025-01-28 ENCOUNTER — TELEPHONE (OUTPATIENT)
Dept: PAIN MANAGEMENT | Age: 45
End: 2025-01-28

## 2025-01-28 ENCOUNTER — APPOINTMENT (OUTPATIENT)
Dept: ULTRASOUND IMAGING | Age: 45
End: 2025-01-28
Payer: COMMERCIAL

## 2025-01-28 ENCOUNTER — APPOINTMENT (OUTPATIENT)
Dept: CT IMAGING | Age: 45
End: 2025-01-28
Payer: COMMERCIAL

## 2025-01-28 DIAGNOSIS — R10.9 FLANK PAIN: ICD-10-CM

## 2025-01-28 DIAGNOSIS — N10 ACUTE PYELONEPHRITIS: ICD-10-CM

## 2025-01-28 DIAGNOSIS — R11.2 NAUSEA VOMITING AND DIARRHEA: ICD-10-CM

## 2025-01-28 DIAGNOSIS — R19.7 NAUSEA VOMITING AND DIARRHEA: ICD-10-CM

## 2025-01-28 DIAGNOSIS — R10.9 ACUTE ABDOMINAL PAIN: Primary | ICD-10-CM

## 2025-01-28 DIAGNOSIS — N20.0 NEPHROLITHIASIS: ICD-10-CM

## 2025-01-28 DIAGNOSIS — N18.2 CHRONIC KIDNEY DISEASE, STAGE II (MILD): Primary | ICD-10-CM

## 2025-01-28 PROBLEM — N39.0 UTI (URINARY TRACT INFECTION): Status: ACTIVE | Noted: 2025-01-28

## 2025-01-28 LAB
ALBUMIN SERPL-MCNC: 4.7 G/DL (ref 3.4–5)
ALBUMIN/GLOB SERPL: 1.5 {RATIO} (ref 1.1–2.2)
ALP SERPL-CCNC: 90 U/L (ref 40–129)
ALT SERPL-CCNC: 34 U/L (ref 10–40)
ANION GAP SERPL CALCULATED.3IONS-SCNC: 13 MMOL/L (ref 3–16)
AST SERPL-CCNC: 24 U/L (ref 15–37)
BACTERIA URNS QL MICRO: ABNORMAL /HPF
BASOPHILS # BLD: 0.1 K/UL (ref 0–0.2)
BASOPHILS NFR BLD: 0.9 %
BILIRUB SERPL-MCNC: 0.4 MG/DL (ref 0–1)
BILIRUB UR QL STRIP.AUTO: ABNORMAL
BILIRUB UR QL STRIP.AUTO: NEGATIVE
BUN SERPL-MCNC: 12 MG/DL (ref 7–20)
CALCIUM OXALATE CRYSTALS: PRESENT
CALCIUM SERPL-MCNC: 8.7 MG/DL (ref 8.3–10.6)
CHLORIDE SERPL-SCNC: 104 MMOL/L (ref 99–110)
CLARITY UR: ABNORMAL
CLARITY UR: CLEAR
CO2 SERPL-SCNC: 22 MMOL/L (ref 21–32)
COLOR UR: ABNORMAL
COLOR UR: YELLOW
CREAT SERPL-MCNC: 0.9 MG/DL (ref 0.6–1.1)
DEPRECATED RDW RBC AUTO: 13.7 % (ref 12.4–15.4)
EOSINOPHIL # BLD: 0.3 K/UL (ref 0–0.6)
EOSINOPHIL NFR BLD: 2.6 %
EPI CELLS #/AREA URNS AUTO: 63 /HPF (ref 0–5)
GFR SERPLBLD CREATININE-BSD FMLA CKD-EPI: 82 ML/MIN/{1.73_M2}
GLUCOSE SERPL-MCNC: 94 MG/DL (ref 70–99)
GLUCOSE UR STRIP.AUTO-MCNC: NEGATIVE MG/DL
GLUCOSE UR STRIP.AUTO-MCNC: NEGATIVE MG/DL
HCG UR QL: NEGATIVE
HCT VFR BLD AUTO: 37 % (ref 36–48)
HGB BLD-MCNC: 12.3 G/DL (ref 12–16)
HGB UR QL STRIP.AUTO: NEGATIVE
HGB UR QL STRIP.AUTO: NEGATIVE
HYALINE CASTS #/AREA URNS AUTO: 4 /LPF (ref 0–8)
KETONES UR STRIP.AUTO-MCNC: ABNORMAL MG/DL
KETONES UR STRIP.AUTO-MCNC: NEGATIVE MG/DL
LEUKOCYTE ESTERASE UR QL STRIP.AUTO: ABNORMAL
LEUKOCYTE ESTERASE UR QL STRIP.AUTO: NEGATIVE
LIPASE SERPL-CCNC: 33 U/L (ref 13–60)
LYMPHOCYTES # BLD: 3.1 K/UL (ref 1–5.1)
LYMPHOCYTES NFR BLD: 29.5 %
MCH RBC QN AUTO: 27.6 PG (ref 26–34)
MCHC RBC AUTO-ENTMCNC: 33.2 G/DL (ref 31–36)
MCV RBC AUTO: 83 FL (ref 80–100)
MONOCYTES # BLD: 0.7 K/UL (ref 0–1.3)
MONOCYTES NFR BLD: 6.7 %
NEUTROPHILS # BLD: 6.3 K/UL (ref 1.7–7.7)
NEUTROPHILS NFR BLD: 60.3 %
NITRITE UR QL STRIP.AUTO: NEGATIVE
NITRITE UR QL STRIP.AUTO: NEGATIVE
PH UR STRIP.AUTO: 5.5 [PH] (ref 5–8)
PH UR STRIP.AUTO: 6 [PH] (ref 5–8)
PLATELET # BLD AUTO: 338 K/UL (ref 135–450)
PMV BLD AUTO: 7.7 FL (ref 5–10.5)
POTASSIUM SERPL-SCNC: 4 MMOL/L (ref 3.5–5.1)
PROT SERPL-MCNC: 7.9 G/DL (ref 6.4–8.2)
PROT UR STRIP.AUTO-MCNC: 30 MG/DL
PROT UR STRIP.AUTO-MCNC: NEGATIVE MG/DL
RBC # BLD AUTO: 4.46 M/UL (ref 4–5.2)
RBC CLUMPS #/AREA URNS AUTO: 3 /HPF (ref 0–4)
SODIUM SERPL-SCNC: 139 MMOL/L (ref 136–145)
SP GR UR STRIP.AUTO: 1.02 (ref 1–1.03)
SP GR UR STRIP.AUTO: 1.05 (ref 1–1.03)
UA COMPLETE W REFLEX CULTURE PNL UR: NORMAL
UA COMPLETE W REFLEX CULTURE PNL UR: YES
UA DIPSTICK W REFLEX MICRO PNL UR: NORMAL
UA DIPSTICK W REFLEX MICRO PNL UR: YES
URN SPEC COLLECT METH UR: ABNORMAL
URN SPEC COLLECT METH UR: NORMAL
UROBILINOGEN UR STRIP-ACNC: 1 E.U./DL
UROBILINOGEN UR STRIP-ACNC: 1 E.U./DL
WBC # BLD AUTO: 10.5 K/UL (ref 4–11)
WBC #/AREA URNS AUTO: 62 /HPF (ref 0–5)

## 2025-01-28 PROCEDURE — 6370000000 HC RX 637 (ALT 250 FOR IP): Performed by: EMERGENCY MEDICINE

## 2025-01-28 PROCEDURE — 85025 COMPLETE CBC W/AUTO DIFF WBC: CPT

## 2025-01-28 PROCEDURE — G0378 HOSPITAL OBSERVATION PER HR: HCPCS

## 2025-01-28 PROCEDURE — 94640 AIRWAY INHALATION TREATMENT: CPT

## 2025-01-28 PROCEDURE — 76856 US EXAM PELVIC COMPLETE: CPT

## 2025-01-28 PROCEDURE — 96375 TX/PRO/DX INJ NEW DRUG ADDON: CPT

## 2025-01-28 PROCEDURE — 81003 URINALYSIS AUTO W/O SCOPE: CPT

## 2025-01-28 PROCEDURE — 96361 HYDRATE IV INFUSION ADD-ON: CPT

## 2025-01-28 PROCEDURE — 99285 EMERGENCY DEPT VISIT HI MDM: CPT

## 2025-01-28 PROCEDURE — 6360000002 HC RX W HCPCS: Performed by: NURSE PRACTITIONER

## 2025-01-28 PROCEDURE — 6370000000 HC RX 637 (ALT 250 FOR IP): Performed by: INTERNAL MEDICINE

## 2025-01-28 PROCEDURE — 81001 URINALYSIS AUTO W/SCOPE: CPT

## 2025-01-28 PROCEDURE — 96365 THER/PROPH/DIAG IV INF INIT: CPT

## 2025-01-28 PROCEDURE — 2580000003 HC RX 258: Performed by: INTERNAL MEDICINE

## 2025-01-28 PROCEDURE — 96376 TX/PRO/DX INJ SAME DRUG ADON: CPT

## 2025-01-28 PROCEDURE — 2580000003 HC RX 258: Performed by: EMERGENCY MEDICINE

## 2025-01-28 PROCEDURE — 87086 URINE CULTURE/COLONY COUNT: CPT

## 2025-01-28 PROCEDURE — 76830 TRANSVAGINAL US NON-OB: CPT

## 2025-01-28 PROCEDURE — 94760 N-INVAS EAR/PLS OXIMETRY 1: CPT

## 2025-01-28 PROCEDURE — 83690 ASSAY OF LIPASE: CPT

## 2025-01-28 PROCEDURE — 36415 COLL VENOUS BLD VENIPUNCTURE: CPT

## 2025-01-28 PROCEDURE — 80053 COMPREHEN METABOLIC PANEL: CPT

## 2025-01-28 PROCEDURE — 74177 CT ABD & PELVIS W/CONTRAST: CPT

## 2025-01-28 PROCEDURE — 6360000004 HC RX CONTRAST MEDICATION: Performed by: EMERGENCY MEDICINE

## 2025-01-28 PROCEDURE — 6360000002 HC RX W HCPCS: Performed by: EMERGENCY MEDICINE

## 2025-01-28 PROCEDURE — 84703 CHORIONIC GONADOTROPIN ASSAY: CPT

## 2025-01-28 RX ORDER — TRAZODONE HYDROCHLORIDE 50 MG/1
50 TABLET, FILM COATED ORAL NIGHTLY
Status: DISCONTINUED | OUTPATIENT
Start: 2025-01-28 | End: 2025-01-29 | Stop reason: HOSPADM

## 2025-01-28 RX ORDER — FLUTICASONE PROPIONATE 110 UG/1
1 AEROSOL, METERED RESPIRATORY (INHALATION)
Status: DISCONTINUED | OUTPATIENT
Start: 2025-01-28 | End: 2025-01-29 | Stop reason: HOSPADM

## 2025-01-28 RX ORDER — CARVEDILOL 12.5 MG/1
25 TABLET ORAL EVERY 12 HOURS
Status: DISCONTINUED | OUTPATIENT
Start: 2025-01-28 | End: 2025-01-28

## 2025-01-28 RX ORDER — IPRATROPIUM BROMIDE AND ALBUTEROL SULFATE 2.5; .5 MG/3ML; MG/3ML
1 SOLUTION RESPIRATORY (INHALATION) 4 TIMES DAILY
Status: DISCONTINUED | OUTPATIENT
Start: 2025-01-28 | End: 2025-01-29 | Stop reason: HOSPADM

## 2025-01-28 RX ORDER — CARVEDILOL 12.5 MG/1
12.5 TABLET ORAL EVERY 12 HOURS
Status: DISCONTINUED | OUTPATIENT
Start: 2025-01-29 | End: 2025-01-29

## 2025-01-28 RX ORDER — PANTOPRAZOLE SODIUM 20 MG/1
20 TABLET, DELAYED RELEASE ORAL 2 TIMES DAILY
Status: DISCONTINUED | OUTPATIENT
Start: 2025-01-28 | End: 2025-01-29 | Stop reason: HOSPADM

## 2025-01-28 RX ORDER — MAGNESIUM SULFATE IN WATER 40 MG/ML
2000 INJECTION, SOLUTION INTRAVENOUS PRN
Status: DISCONTINUED | OUTPATIENT
Start: 2025-01-28 | End: 2025-01-29 | Stop reason: HOSPADM

## 2025-01-28 RX ORDER — SODIUM CHLORIDE 0.9 % (FLUSH) 0.9 %
5-40 SYRINGE (ML) INJECTION PRN
Status: DISCONTINUED | OUTPATIENT
Start: 2025-01-28 | End: 2025-01-29 | Stop reason: HOSPADM

## 2025-01-28 RX ORDER — HYDROCODONE BITARTRATE AND ACETAMINOPHEN 5; 325 MG/1; MG/1
3 TABLET ORAL
Status: ON HOLD | COMMUNITY
End: 2025-01-29

## 2025-01-28 RX ORDER — ALBUTEROL SULFATE 90 UG/1
2 INHALANT RESPIRATORY (INHALATION) 4 TIMES DAILY PRN
Status: DISCONTINUED | OUTPATIENT
Start: 2025-01-28 | End: 2025-01-29 | Stop reason: HOSPADM

## 2025-01-28 RX ORDER — MORPHINE SULFATE 4 MG/ML
4 INJECTION, SOLUTION INTRAMUSCULAR; INTRAVENOUS ONCE
Status: COMPLETED | OUTPATIENT
Start: 2025-01-28 | End: 2025-01-28

## 2025-01-28 RX ORDER — SODIUM CHLORIDE 0.9 % (FLUSH) 0.9 %
5-40 SYRINGE (ML) INJECTION EVERY 12 HOURS SCHEDULED
Status: DISCONTINUED | OUTPATIENT
Start: 2025-01-28 | End: 2025-01-29 | Stop reason: HOSPADM

## 2025-01-28 RX ORDER — BUPROPION HYDROCHLORIDE 150 MG/1
150 TABLET ORAL EVERY MORNING
Status: DISCONTINUED | OUTPATIENT
Start: 2025-01-29 | End: 2025-01-29 | Stop reason: HOSPADM

## 2025-01-28 RX ORDER — ONDANSETRON 2 MG/ML
4 INJECTION INTRAMUSCULAR; INTRAVENOUS ONCE
Status: COMPLETED | OUTPATIENT
Start: 2025-01-28 | End: 2025-01-28

## 2025-01-28 RX ORDER — IOPAMIDOL 755 MG/ML
75 INJECTION, SOLUTION INTRAVASCULAR
Status: COMPLETED | OUTPATIENT
Start: 2025-01-28 | End: 2025-01-28

## 2025-01-28 RX ORDER — ONDANSETRON 4 MG/1
4 TABLET, ORALLY DISINTEGRATING ORAL EVERY 8 HOURS PRN
Status: DISCONTINUED | OUTPATIENT
Start: 2025-01-28 | End: 2025-01-29 | Stop reason: HOSPADM

## 2025-01-28 RX ORDER — MORPHINE SULFATE 4 MG/ML
4 INJECTION, SOLUTION INTRAMUSCULAR; INTRAVENOUS EVERY 4 HOURS PRN
Status: DISCONTINUED | OUTPATIENT
Start: 2025-01-28 | End: 2025-01-29

## 2025-01-28 RX ORDER — HYDROXYZINE HYDROCHLORIDE 25 MG/1
25 TABLET, FILM COATED ORAL 2 TIMES DAILY
Status: DISCONTINUED | OUTPATIENT
Start: 2025-01-28 | End: 2025-01-29 | Stop reason: HOSPADM

## 2025-01-28 RX ORDER — HYDROCODONE BITARTRATE AND ACETAMINOPHEN 5; 325 MG/1; MG/1
1 TABLET ORAL ONCE
Status: COMPLETED | OUTPATIENT
Start: 2025-01-28 | End: 2025-01-28

## 2025-01-28 RX ORDER — POLYETHYLENE GLYCOL 3350 17 G/17G
17 POWDER, FOR SOLUTION ORAL DAILY PRN
Status: DISCONTINUED | OUTPATIENT
Start: 2025-01-28 | End: 2025-01-29 | Stop reason: HOSPADM

## 2025-01-28 RX ORDER — PREGABALIN 75 MG/1
150 CAPSULE ORAL 2 TIMES DAILY
Status: DISCONTINUED | OUTPATIENT
Start: 2025-01-28 | End: 2025-01-28

## 2025-01-28 RX ORDER — POTASSIUM CHLORIDE 7.45 MG/ML
10 INJECTION INTRAVENOUS PRN
Status: DISCONTINUED | OUTPATIENT
Start: 2025-01-28 | End: 2025-01-29 | Stop reason: HOSPADM

## 2025-01-28 RX ORDER — SODIUM CHLORIDE 9 MG/ML
INJECTION, SOLUTION INTRAVENOUS PRN
Status: DISCONTINUED | OUTPATIENT
Start: 2025-01-28 | End: 2025-01-29 | Stop reason: HOSPADM

## 2025-01-28 RX ORDER — KETOROLAC TROMETHAMINE 15 MG/ML
15 INJECTION, SOLUTION INTRAMUSCULAR; INTRAVENOUS ONCE
Status: COMPLETED | OUTPATIENT
Start: 2025-01-28 | End: 2025-01-28

## 2025-01-28 RX ORDER — PROCHLORPERAZINE EDISYLATE 5 MG/ML
10 INJECTION INTRAMUSCULAR; INTRAVENOUS EVERY 6 HOURS PRN
Status: DISCONTINUED | OUTPATIENT
Start: 2025-01-28 | End: 2025-01-29 | Stop reason: HOSPADM

## 2025-01-28 RX ORDER — NIFEDIPINE 30 MG/1
30 TABLET, EXTENDED RELEASE ORAL 2 TIMES DAILY
Status: DISCONTINUED | OUTPATIENT
Start: 2025-01-28 | End: 2025-01-28

## 2025-01-28 RX ORDER — TRAMADOL HYDROCHLORIDE 50 MG/1
50 TABLET ORAL EVERY 6 HOURS PRN
Status: DISCONTINUED | OUTPATIENT
Start: 2025-01-28 | End: 2025-01-28

## 2025-01-28 RX ORDER — POTASSIUM CHLORIDE 1500 MG/1
40 TABLET, EXTENDED RELEASE ORAL PRN
Status: DISCONTINUED | OUTPATIENT
Start: 2025-01-28 | End: 2025-01-29 | Stop reason: HOSPADM

## 2025-01-28 RX ORDER — ACETAMINOPHEN 650 MG/1
650 SUPPOSITORY RECTAL EVERY 6 HOURS PRN
Status: DISCONTINUED | OUTPATIENT
Start: 2025-01-28 | End: 2025-01-29 | Stop reason: HOSPADM

## 2025-01-28 RX ORDER — ONDANSETRON 2 MG/ML
4 INJECTION INTRAMUSCULAR; INTRAVENOUS EVERY 6 HOURS PRN
Status: DISCONTINUED | OUTPATIENT
Start: 2025-01-28 | End: 2025-01-29 | Stop reason: HOSPADM

## 2025-01-28 RX ORDER — 0.9 % SODIUM CHLORIDE 0.9 %
1000 INTRAVENOUS SOLUTION INTRAVENOUS ONCE
Status: COMPLETED | OUTPATIENT
Start: 2025-01-28 | End: 2025-01-28

## 2025-01-28 RX ORDER — NIFEDIPINE 60 MG/1
60 TABLET, EXTENDED RELEASE ORAL 2 TIMES DAILY
COMMUNITY

## 2025-01-28 RX ORDER — PREGABALIN 100 MG/1
300 CAPSULE ORAL 2 TIMES DAILY
Status: DISCONTINUED | OUTPATIENT
Start: 2025-01-29 | End: 2025-01-29 | Stop reason: HOSPADM

## 2025-01-28 RX ORDER — CARVEDILOL 12.5 MG/1
12.5 TABLET ORAL 2 TIMES DAILY WITH MEALS
COMMUNITY

## 2025-01-28 RX ORDER — KETOROLAC TROMETHAMINE 15 MG/ML
15 INJECTION, SOLUTION INTRAMUSCULAR; INTRAVENOUS EVERY 6 HOURS PRN
Status: DISCONTINUED | OUTPATIENT
Start: 2025-01-28 | End: 2025-01-29 | Stop reason: HOSPADM

## 2025-01-28 RX ORDER — NIFEDIPINE 30 MG/1
60 TABLET, EXTENDED RELEASE ORAL 2 TIMES DAILY
Status: DISCONTINUED | OUTPATIENT
Start: 2025-01-29 | End: 2025-01-29

## 2025-01-28 RX ORDER — SODIUM CHLORIDE 9 MG/ML
INJECTION, SOLUTION INTRAVENOUS CONTINUOUS
Status: DISCONTINUED | OUTPATIENT
Start: 2025-01-28 | End: 2025-01-29 | Stop reason: HOSPADM

## 2025-01-28 RX ORDER — ACETAMINOPHEN 325 MG/1
650 TABLET ORAL EVERY 6 HOURS PRN
Status: DISCONTINUED | OUTPATIENT
Start: 2025-01-28 | End: 2025-01-29 | Stop reason: HOSPADM

## 2025-01-28 RX ORDER — CINACALCET 30 MG/1
30 TABLET, FILM COATED ORAL
COMMUNITY

## 2025-01-28 RX ORDER — ENOXAPARIN SODIUM 100 MG/ML
40 INJECTION SUBCUTANEOUS DAILY
Status: DISCONTINUED | OUTPATIENT
Start: 2025-01-29 | End: 2025-01-29 | Stop reason: HOSPADM

## 2025-01-28 RX ADMIN — HYDROCODONE BITARTRATE AND ACETAMINOPHEN 1 TABLET: 5; 325 TABLET ORAL at 15:32

## 2025-01-28 RX ADMIN — ONDANSETRON 4 MG: 2 INJECTION INTRAMUSCULAR; INTRAVENOUS at 13:23

## 2025-01-28 RX ADMIN — SODIUM CHLORIDE: 9 INJECTION, SOLUTION INTRAVENOUS at 21:18

## 2025-01-28 RX ADMIN — TRAZODONE HYDROCHLORIDE 50 MG: 50 TABLET ORAL at 22:44

## 2025-01-28 RX ADMIN — CEFTRIAXONE SODIUM 2000 MG: 2 INJECTION, POWDER, FOR SOLUTION INTRAMUSCULAR; INTRAVENOUS at 14:58

## 2025-01-28 RX ADMIN — PROCHLORPERAZINE EDISYLATE 10 MG: 5 INJECTION INTRAMUSCULAR; INTRAVENOUS at 20:42

## 2025-01-28 RX ADMIN — IOPAMIDOL 75 ML: 755 INJECTION, SOLUTION INTRAVENOUS at 14:04

## 2025-01-28 RX ADMIN — ONDANSETRON 4 MG: 2 INJECTION, SOLUTION INTRAMUSCULAR; INTRAVENOUS at 17:38

## 2025-01-28 RX ADMIN — PANTOPRAZOLE SODIUM 20 MG: 20 TABLET, DELAYED RELEASE ORAL at 22:44

## 2025-01-28 RX ADMIN — HYDROXYZINE HYDROCHLORIDE 25 MG: 25 TABLET, FILM COATED ORAL at 21:30

## 2025-01-28 RX ADMIN — TRAMADOL HYDROCHLORIDE 50 MG: 50 TABLET, COATED ORAL at 19:09

## 2025-01-28 RX ADMIN — MORPHINE SULFATE 4 MG: 4 INJECTION, SOLUTION INTRAMUSCULAR; INTRAVENOUS at 20:42

## 2025-01-28 RX ADMIN — SODIUM CHLORIDE 1000 ML: 9 INJECTION, SOLUTION INTRAVENOUS at 13:24

## 2025-01-28 RX ADMIN — KETOROLAC TROMETHAMINE 15 MG: 15 INJECTION, SOLUTION INTRAMUSCULAR; INTRAVENOUS at 13:24

## 2025-01-28 RX ADMIN — KETOROLAC TROMETHAMINE 15 MG: 15 INJECTION, SOLUTION INTRAMUSCULAR; INTRAVENOUS at 22:44

## 2025-01-28 RX ADMIN — MORPHINE SULFATE 4 MG: 4 INJECTION, SOLUTION INTRAMUSCULAR; INTRAVENOUS at 17:38

## 2025-01-28 RX ADMIN — IPRATROPIUM BROMIDE AND ALBUTEROL SULFATE 1 DOSE: 2.5; .5 SOLUTION RESPIRATORY (INHALATION) at 21:18

## 2025-01-28 ASSESSMENT — PAIN SCALES - GENERAL
PAINLEVEL_OUTOF10: 10

## 2025-01-28 ASSESSMENT — PAIN DESCRIPTION - ORIENTATION
ORIENTATION: RIGHT

## 2025-01-28 ASSESSMENT — PAIN DESCRIPTION - DESCRIPTORS
DESCRIPTORS: ACHING
DESCRIPTORS: ACHING
DESCRIPTORS: SHARP

## 2025-01-28 ASSESSMENT — PAIN DESCRIPTION - LOCATION
LOCATION: FLANK

## 2025-01-28 ASSESSMENT — LIFESTYLE VARIABLES
HOW OFTEN DO YOU HAVE A DRINK CONTAINING ALCOHOL: NEVER
HOW MANY STANDARD DRINKS CONTAINING ALCOHOL DO YOU HAVE ON A TYPICAL DAY: PATIENT DOES NOT DRINK

## 2025-01-28 ASSESSMENT — PAIN - FUNCTIONAL ASSESSMENT
PAIN_FUNCTIONAL_ASSESSMENT: 0-10
PAIN_FUNCTIONAL_ASSESSMENT: PREVENTS OR INTERFERES SOME ACTIVE ACTIVITIES AND ADLS

## 2025-01-28 NOTE — TELEPHONE ENCOUNTER
Pt called requesting partial refill of   HYDROcodone-acetaminophen (NORCO) 5-325 MG per tablet  and   baclofen (LIORESAL) 10 MG tablet [[id number]]. Pt said her initial fu appt was scheduled at 5 weeks in stead of 4.    Pt was scheduled for fu today 1/28 but had to cancel due to being in the hospital. Fu was rescheduled for 2/4.    [facility]  [phone]

## 2025-01-28 NOTE — PROGRESS NOTES
Medication Reconciliation    List of medications patient is currently taking is complete.     Source of information: 1. Conversation with patient at bedside                                      2. EPIC records      Notes regarding home medications:   1. Patient states she took no medications today prior to arrival.  2. Hydrocodone-acetaminophen is prescribed as 1 tablet three times daily; patient states she takes once daily, 3 tablets every night at bedtime.  3. Tizanidine is prescribed as 1.5 tablets four times daily as needed; patient takes twice daily.  4. Updated dosing on bupropion, carvedilol, hydroxyzine 50, nifedipine, and pantoprazole.  5. Added cinacalcet, tizanidine, and hydrocodone-acetaminophen.  6. Marked hydroxyzine 25 mg as not taking but did not remove. Patient states she only takes 50 mg, but  patient  fills both regularly.  7. Removed naloxegol.      Shannon Molina   1/28/2025  5:53 PM

## 2025-01-28 NOTE — H&P
Hospital Medicine History & Physical      PCP: Ehsan Patel, APRN - CNP    Date of Admission: 1/28/2025    Date of Service: Pt seen/examined on 01/28/2025 and placed in Observation.    Chief Complaint: Flank and abdominal pain      History Of Present Illness:      44 y.o. female who presented to [facility] with flank and abdominal pain for the last 10 to 14 days getting worse in the last few days, mainly on the right side, radiating to anterior abdomen on the right side, up to 10 out of 10 sometimes, no obvious trigger, no obvious alleviating or aggravating factors denies fever or chills had nausea but no vomiting, also today having for diarrhea without blood, denies headache blurry vision double vision chest pain or shortness of breath in emergency department found to have pyuria being admitted for further management and treatment discussed with ED MD agrees plan to place in observation    Past Medical History:          Diagnosis Date    Acute asthma exacerbation 12/15/2021    JUAN (acute kidney injury) (Formerly [facility]) 2/14/2021    Anxiety     Asthma     Constipation 1/13/2014    Conversion disorder     Depression     Fibromyalgia     Focal motor deficit 8/31/2020    Graves disease     History of blood transfusion     Hx of blood clots     2015 LT LUNG W/ PNEUMONIA    Hypertension     Hyperthyroidism 9/30/2016    Joint pain 1/13/2014    Various joints (back, hands, knees, hips), recurrent flares since age 24, RF+ at one point, never fully worked up for rheumatologic dz yet    Kidney stone     Multiple    Localized rash 1/13/2014    Recurrent, on inner surface of upper arms, q 3 months, sometimes on chest, no facial involvement    Lupus     Lupus arthritis (HCC) 9/14/2022    MDRO (multiple drug resistant organisms) resistance     Nephroureterolithiasis 7/31/2018    Paresthesia of right arm and leg 8/31/2020    PONV (postoperative nausea and vomiting)     Right sided weakness     Shortness of breath 12/14/2021    SLE

## 2025-01-28 NOTE — ED PROVIDER NOTES
HYDROcodone-acetaminophen (NORCO) 5-325 MG per tablet 1 tablet (1 tablet Oral Given 1/28/25 1532)   ondansetron (ZOFRAN) injection 4 mg (4 mg IntraVENous Given 1/28/25 1738)   morphine (PF) injection 4 mg (4 mg IntraVENous Given 1/28/25 1738)        Lab results were reviewed.  White blood cell count is 10.5.  Hemoglobin 12.3.  Platelets 338.    Pregnancy test is negative.  Electrolytes normal.  Glucose 94.  Creatinine 0.9.  Liver function test and lipase are normal.    Urinalysis revealed moderate leukocytes with 62 white cells and calcium oxalate crystals and 4+ bacteria.    Imaging results were reviewed.  CT abdomen pelvis with IV contrast was reviewed.  Moderate stool burden.  Questionable enteritis.  No radiographic evidence of pyelonephritis.  No evidence of ureteral stone.    The patient was reexamined.  She still rates her pain a 10/10 intensity.  Morphine 4 mg IV was ordered as well as Norco 5 mg p.o.    Pelvic ultrasound was obtained to rule out ovarian torsion.  Suspicion is low.  Her pain is predominantly in the posterior flank area.  I suspect that she likely has acute pyelonephritis.  No suspicion for pelvic infection.  She denies any vaginal bleeding or discharge.    She does have acute CVA tenderness with evidence of urinary tract infection.  Urine culture is pending.  Rocephin has been given.    She will be admitted for further treatment and evaluation.  A call was placed to the hospitalist on-call for admission.    I am the primary attending of record.    CRITICAL CARE TIME   Total Critical Care time was 0 minutes, excluding separately reportable procedures.  There was a high probability of clinically significant/life threatening deterioration in the patient's condition which required my urgent intervention.      CONSULTS:  IP CONSULT TO UROLOGY    PROCEDURES:  Unless otherwise noted below, none     Procedures      FINAL IMPRESSION      1. Acute abdominal pain    2. Acute pyelonephritis    3.

## 2025-01-29 VITALS
WEIGHT: 166.67 LBS | BODY MASS INDEX: 26.16 KG/M2 | DIASTOLIC BLOOD PRESSURE: 92 MMHG | TEMPERATURE: 98.3 F | HEIGHT: 67 IN | SYSTOLIC BLOOD PRESSURE: 154 MMHG | HEART RATE: 87 BPM | OXYGEN SATURATION: 97 % | RESPIRATION RATE: 18 BRPM

## 2025-01-29 LAB
ALBUMIN SERPL-MCNC: 4.2 G/DL (ref 3.4–5)
ALBUMIN/GLOB SERPL: 1.5 {RATIO} (ref 1.1–2.2)
ALP SERPL-CCNC: 82 U/L (ref 40–129)
ALT SERPL-CCNC: 36 U/L (ref 10–40)
ANION GAP SERPL CALCULATED.3IONS-SCNC: 11 MMOL/L (ref 3–16)
AST SERPL-CCNC: 27 U/L (ref 15–37)
BACTERIA UR CULT: NORMAL
BASOPHILS # BLD: 0.1 K/UL (ref 0–0.2)
BASOPHILS NFR BLD: 0.7 %
BILIRUB SERPL-MCNC: 0.5 MG/DL (ref 0–1)
BUN SERPL-MCNC: 10 MG/DL (ref 7–20)
CALCIUM SERPL-MCNC: 7.6 MG/DL (ref 8.3–10.6)
CHLORIDE SERPL-SCNC: 107 MMOL/L (ref 99–110)
CO2 SERPL-SCNC: 21 MMOL/L (ref 21–32)
CREAT SERPL-MCNC: 0.8 MG/DL (ref 0.6–1.1)
DEPRECATED RDW RBC AUTO: 13.5 % (ref 12.4–15.4)
EOSINOPHIL # BLD: 0.5 K/UL (ref 0–0.6)
EOSINOPHIL NFR BLD: 4.9 %
GFR SERPLBLD CREATININE-BSD FMLA CKD-EPI: >90 ML/MIN/{1.73_M2}
GLUCOSE SERPL-MCNC: 85 MG/DL (ref 70–99)
HCT VFR BLD AUTO: 33.7 % (ref 36–48)
HGB BLD-MCNC: 11.1 G/DL (ref 12–16)
LYMPHOCYTES # BLD: 3 K/UL (ref 1–5.1)
LYMPHOCYTES NFR BLD: 29.5 %
MCH RBC QN AUTO: 27.5 PG (ref 26–34)
MCHC RBC AUTO-ENTMCNC: 33.1 G/DL (ref 31–36)
MCV RBC AUTO: 83.2 FL (ref 80–100)
MONOCYTES # BLD: 0.8 K/UL (ref 0–1.3)
MONOCYTES NFR BLD: 8 %
NEUTROPHILS # BLD: 5.9 K/UL (ref 1.7–7.7)
NEUTROPHILS NFR BLD: 56.9 %
PLATELET # BLD AUTO: 302 K/UL (ref 135–450)
PMV BLD AUTO: 7.9 FL (ref 5–10.5)
POTASSIUM SERPL-SCNC: 3.6 MMOL/L (ref 3.5–5.1)
PROT SERPL-MCNC: 7 G/DL (ref 6.4–8.2)
RBC # BLD AUTO: 4.05 M/UL (ref 4–5.2)
SODIUM SERPL-SCNC: 139 MMOL/L (ref 136–145)
WBC # BLD AUTO: 10.3 K/UL (ref 4–11)

## 2025-01-29 PROCEDURE — 2500000003 HC RX 250 WO HCPCS: Performed by: INTERNAL MEDICINE

## 2025-01-29 PROCEDURE — G0378 HOSPITAL OBSERVATION PER HR: HCPCS

## 2025-01-29 PROCEDURE — 85025 COMPLETE CBC W/AUTO DIFF WBC: CPT

## 2025-01-29 PROCEDURE — 94640 AIRWAY INHALATION TREATMENT: CPT

## 2025-01-29 PROCEDURE — 6370000000 HC RX 637 (ALT 250 FOR IP)

## 2025-01-29 PROCEDURE — 94760 N-INVAS EAR/PLS OXIMETRY 1: CPT

## 2025-01-29 PROCEDURE — 6370000000 HC RX 637 (ALT 250 FOR IP): Performed by: NURSE PRACTITIONER

## 2025-01-29 PROCEDURE — 6360000002 HC RX W HCPCS: Performed by: INTERNAL MEDICINE

## 2025-01-29 PROCEDURE — 94761 N-INVAS EAR/PLS OXIMETRY MLT: CPT

## 2025-01-29 PROCEDURE — 80053 COMPREHEN METABOLIC PANEL: CPT

## 2025-01-29 PROCEDURE — 6370000000 HC RX 637 (ALT 250 FOR IP): Performed by: INTERNAL MEDICINE

## 2025-01-29 PROCEDURE — 6360000002 HC RX W HCPCS: Performed by: NURSE PRACTITIONER

## 2025-01-29 PROCEDURE — 96361 HYDRATE IV INFUSION ADD-ON: CPT

## 2025-01-29 PROCEDURE — 96376 TX/PRO/DX INJ SAME DRUG ADON: CPT

## 2025-01-29 PROCEDURE — 36415 COLL VENOUS BLD VENIPUNCTURE: CPT

## 2025-01-29 PROCEDURE — 96372 THER/PROPH/DIAG INJ SC/IM: CPT

## 2025-01-29 RX ORDER — CARVEDILOL 12.5 MG/1
12.5 TABLET ORAL 2 TIMES DAILY WITH MEALS
Status: DISCONTINUED | OUTPATIENT
Start: 2025-01-29 | End: 2025-01-29 | Stop reason: HOSPADM

## 2025-01-29 RX ORDER — NIFEDIPINE 60 MG/1
60 TABLET, EXTENDED RELEASE ORAL 2 TIMES DAILY
Status: DISCONTINUED | OUTPATIENT
Start: 2025-01-29 | End: 2025-01-29 | Stop reason: HOSPADM

## 2025-01-29 RX ORDER — SENNA AND DOCUSATE SODIUM 50; 8.6 MG/1; MG/1
2 TABLET, FILM COATED ORAL DAILY
Status: DISCONTINUED | OUTPATIENT
Start: 2025-01-29 | End: 2025-01-29 | Stop reason: HOSPADM

## 2025-01-29 RX ORDER — HYDROCODONE BITARTRATE AND ACETAMINOPHEN 5; 325 MG/1; MG/1
1 TABLET ORAL 3 TIMES DAILY PRN
Status: DISCONTINUED | OUTPATIENT
Start: 2025-01-29 | End: 2025-01-29 | Stop reason: HOSPADM

## 2025-01-29 RX ORDER — SENNA AND DOCUSATE SODIUM 50; 8.6 MG/1; MG/1
2 TABLET, FILM COATED ORAL DAILY PRN
Qty: 20 TABLET | Refills: 0 | Status: SHIPPED | OUTPATIENT
Start: 2025-01-29 | End: 2025-02-08

## 2025-01-29 RX ORDER — POLYETHYLENE GLYCOL 3350 17 G/17G
17 POWDER, FOR SOLUTION ORAL DAILY PRN
Qty: 30 G | Refills: 1 | Status: SHIPPED | OUTPATIENT
Start: 2025-01-29 | End: 2025-02-28

## 2025-01-29 RX ORDER — POLYETHYLENE GLYCOL 3350 17 G/17G
17 POWDER, FOR SOLUTION ORAL DAILY
Status: DISCONTINUED | OUTPATIENT
Start: 2025-01-29 | End: 2025-01-29 | Stop reason: HOSPADM

## 2025-01-29 RX ORDER — HYDROCODONE BITARTRATE AND ACETAMINOPHEN 5; 325 MG/1; MG/1
1 TABLET ORAL EVERY 8 HOURS PRN
Qty: 9 TABLET | Refills: 0 | Status: SHIPPED | OUTPATIENT
Start: 2025-01-29 | End: 2025-02-01

## 2025-01-29 RX ADMIN — KETOROLAC TROMETHAMINE 15 MG: 15 INJECTION, SOLUTION INTRAMUSCULAR; INTRAVENOUS at 09:08

## 2025-01-29 RX ADMIN — MORPHINE SULFATE 4 MG: 4 INJECTION, SOLUTION INTRAMUSCULAR; INTRAVENOUS at 00:27

## 2025-01-29 RX ADMIN — IPRATROPIUM BROMIDE AND ALBUTEROL SULFATE 1 DOSE: 2.5; .5 SOLUTION RESPIRATORY (INHALATION) at 12:48

## 2025-01-29 RX ADMIN — PREGABALIN 300 MG: 100 CAPSULE ORAL at 08:57

## 2025-01-29 RX ADMIN — IPRATROPIUM BROMIDE AND ALBUTEROL SULFATE 1 DOSE: 2.5; .5 SOLUTION RESPIRATORY (INHALATION) at 16:05

## 2025-01-29 RX ADMIN — POLYETHYLENE GLYCOL 3350 17 G: 17 POWDER, FOR SOLUTION ORAL at 14:17

## 2025-01-29 RX ADMIN — Medication 10 ML: at 08:58

## 2025-01-29 RX ADMIN — BUPROPION HYDROCHLORIDE 150 MG: 150 TABLET, EXTENDED RELEASE ORAL at 08:57

## 2025-01-29 RX ADMIN — HYDROXYZINE HYDROCHLORIDE 25 MG: 25 TABLET, FILM COATED ORAL at 08:57

## 2025-01-29 RX ADMIN — TIZANIDINE 6 MG: 4 TABLET ORAL at 09:17

## 2025-01-29 RX ADMIN — NIFEDIPINE 60 MG: 60 TABLET, EXTENDED RELEASE ORAL at 09:18

## 2025-01-29 RX ADMIN — MORPHINE SULFATE 4 MG: 4 INJECTION, SOLUTION INTRAMUSCULAR; INTRAVENOUS at 04:31

## 2025-01-29 RX ADMIN — PANTOPRAZOLE SODIUM 20 MG: 20 TABLET, DELAYED RELEASE ORAL at 08:58

## 2025-01-29 RX ADMIN — SENNOSIDES AND DOCUSATE SODIUM 2 TABLET: 50; 8.6 TABLET ORAL at 14:17

## 2025-01-29 RX ADMIN — ENOXAPARIN SODIUM 40 MG: 100 INJECTION SUBCUTANEOUS at 08:57

## 2025-01-29 RX ADMIN — CARVEDILOL 12.5 MG: 12.5 TABLET, FILM COATED ORAL at 08:57

## 2025-01-29 RX ADMIN — IPRATROPIUM BROMIDE AND ALBUTEROL SULFATE 1 DOSE: 2.5; .5 SOLUTION RESPIRATORY (INHALATION) at 08:55

## 2025-01-29 RX ADMIN — FLUTICASONE PROPIONATE 1 PUFF: 110 AEROSOL, METERED RESPIRATORY (INHALATION) at 08:55

## 2025-01-29 ASSESSMENT — PAIN SCALES - GENERAL
PAINLEVEL_OUTOF10: 10
PAINLEVEL_OUTOF10: 10
PAINLEVEL_OUTOF10: 6
PAINLEVEL_OUTOF10: 10
PAINLEVEL_OUTOF10: 10

## 2025-01-29 ASSESSMENT — PAIN DESCRIPTION - ONSET
ONSET: ON-GOING
ONSET: ON-GOING

## 2025-01-29 ASSESSMENT — PAIN DESCRIPTION - ORIENTATION
ORIENTATION: RIGHT

## 2025-01-29 ASSESSMENT — PAIN DESCRIPTION - DESCRIPTORS
DESCRIPTORS: ACHING
DESCRIPTORS: ACHING
DESCRIPTORS: THROBBING
DESCRIPTORS: ACHING
DESCRIPTORS: THROBBING

## 2025-01-29 ASSESSMENT — PAIN DESCRIPTION - LOCATION
LOCATION: FLANK
LOCATION: FLANK
LOCATION: BACK;FLANK
LOCATION: BACK;FLANK
LOCATION: FLANK

## 2025-01-29 ASSESSMENT — PAIN DESCRIPTION - FREQUENCY
FREQUENCY: CONTINUOUS
FREQUENCY: CONTINUOUS

## 2025-01-29 NOTE — PROGRESS NOTES
Patient in bed, awake, a&ox4. Patient tolerating PO intake, and took medications without any issues. Patient denies n/v at this time. Patient voiding, patient states no issues with voiding at this time. Patient states she having 10/10 right flank pain and also on the right side of her abdomen, and taking PRN pain medication as prescribed. Patient IV flushed and infusing fluids as ordered. Patient inquired about a couple home medications. Physician assistant notified and concerns addressed. Patient resting at this time. Patient able to make needs known, no other needs voiced at this time. Call light and bedside table within reach. No other questions or concerns at this time.

## 2025-01-29 NOTE — CARE COORDINATION
Per chart review, patient is alert and oriented, independent, has insurance and a PCP.  Please advise Case Management if patient will have discharge planning needs.   Electronically signed by Marcy Quiroz on 1/29/2025 at 10:21 AM  #[phone]

## 2025-01-29 NOTE — DISCHARGE SUMMARY
Hospital Medicine Discharge Summary    Patient ID: Greyson Shelby      Patient's PCP: Ehsan Patel, APRN - CNP    Admit Date: 1/28/2025     Discharge Date:   1/29/25    Admitting Physician: Zacarias Greenberg MD     Discharge Physician: Patricia Wilson PA-C         Hospital Course: 44-year-old female with past medical history of fibromyalgia, chronic pain syndrome, essential hypertension, anxiety, depression who presented to the ED with complaints of right sided flank pain which have been persistent for the prior 2 weeks.  Patient reportedly had history of kidney stones with most recently in August 2024.  While in the ED, she was mildly tachycardic but otherwise hemodynamically stable.  CBC and CMP were unremarkable.  Initial UA was concerning for infection but was contaminated with epithelial cells.  She was empirically started on ceftriaxone.  CT A/P was obtained which showed possible mild enterocolitis, nonobstructing bilateral nephrolithiasis and moderate colonic stool burden.  Repeat urinalysis was obtained which was negative for infection.  Urine culture ended up growing mixed urogenital/skin joseph.  Antibiotics were discontinued.  Urology was consulted, do not feel flank pain was coming from urological process.  Signed off with no further workup recommended.  Patient was started on bowel regimen for constipation and resumed on her home oral pain medication regimen.  It was recommended patient follow-up with her PCP in 1 week for posthospital management.  Patient will follow-up with her pain medication doctor, she missed her appointment today due to being admitted to the hospital.  She will follow-up with them at discharge for further pain medication patient needs. Patient was agreeable for discharge plan.  Return precautions were given and she verbalized understanding.         Right flank pain  Right lower quadrant pain  -CT A/P shows possible mild enterocolitis, nonobstructing bilateral

## 2025-01-29 NOTE — PROGRESS NOTES
4 Eyes Skin Assessment     NAME:  Greyson Shelby  YOB: 1980  MEDICAL RECORD NUMBER:  [id number]    The patient is being assessed for  Admission    I agree that at least one RN has performed a thorough Head to Toe Skin Assessment on the patient. ALL assessment sites listed below have been assessed.      Areas assessed by both nurses:    Head, Face, Ears, Shoulders, Back, Chest, Arms, Elbows, Hands, Sacrum. Buttock, Coccyx, Ischium, Legs. Feet and Heels, and Under Medical Devices         Does the Patient have a Wound? No noted wound(s)       Doron Prevention initiated by RN: No  Wound Care Orders initiated by RN: No    Pressure Injury (Stage 3,4, Unstageable, DTI, NWPT, and Complex wounds) if present, place Wound referral order by RN under : No    New Ostomies, if present place, Ostomy referral order under : No     Nurse 1 eSignature: Electronically signed by Concha Guzman RN on 1/29/25 at 2:16 AM EST    **SHARE this note so that the co-signing nurse can place an eSignature**    Nurse 2 eSignature: Electronically signed by Brielle Pino RN on 1/29/25 at 3:12 AM EST

## 2025-01-29 NOTE — PROGRESS NOTES
Patient requesting home dose of zanaflex and 100mg of trazodone. PerfectServe sent to Antonietta Quintanilla NP. No new orders at this time.

## 2025-01-29 NOTE — ED NOTES
Report given to HANNAH Leroy to assume care at this time. All EDSBAR questions have been answered.

## 2025-01-29 NOTE — ED NOTES
SBAR given to YonyW HANNAH Hawthorne to assume care; all questions addressed    ED TO INPATIENT SBAR HANDOFF    Patient Name: Greyson Shelby   Preferred Name: Greyson  : 1980  44 y.o.   Family/Caregiver Present: no   Code Status Order: Full Code  PO Status: NPO:No  Telemetry Order: Yes  C-SSRS: Risk of Suicide: No Risk  Sitter no  n/a  Restraints:     Sepsis Risk Score      Situation  Chief Complaint   Patient presents with    Flank Pain     Right sided flank pain for two weeks. Pt states she gets recurrent kidney stones and this feels like the same pain.      Brief Description of Patient's Condition: Admitted for flank and abd pain, acute pyelonephritis, n/v; hx of kidney stones; vomited clear emesis after receiving PO medication in ED  Mental Status: oriented  Arrived from:Home  Imaging:   US NON OB TRANSVAGINAL   Final Result   Normal appearing uterus      Ovaries not visualized         US PELVIS COMPLETE   Final Result   1.  Normal appearance of the uterus and endometrium.      2.  The ovaries are not seen         CT ABDOMEN PELVIS W IV CONTRAST Additional Contrast? None   Preliminary Result   CT findings which could represent mild enterocolitis in the appropriate   clinical setting.      Nonobstructive bilateral nephrolithiasis.      Moderate colonic stool burden.      No evidence of acute appendicitis.           Abnormal labs:   Abnormal Labs Reviewed   URINALYSIS WITH REFLEX TO CULTURE - Abnormal; Notable for the following components:       Result Value    Color, UA DARK YELLOW (*)     Clarity, UA TURBID (*)     Bilirubin, Urine MODERATE (*)     Ketones, Urine TRACE (*)     Protein, UA 30 (*)     Leukocyte Esterase, Urine MODERATE (*)     All other components within normal limits   MICROSCOPIC URINALYSIS - Abnormal; Notable for the following components:    Bacteria, UA 4+ (*)     WBC, UA 62 (*)     Epithelial Cells, UA 63 (*)     Calcium Oxalate Crystals Present (*)     All other components within normal

## 2025-01-29 NOTE — CONSULTS
Consulting Physician: Zacarias Greenberg MD  Reason for Consult: Nephrolithiasis     History of Present Illness: Greyson Shelby is a 44 y.o. with a PMHx of anxiety, constipation, fibromyalgia, kidney stones, who presented to  ED yesterday secondary to right sided abdominal pain x 2 weeks. She denies other symptoms, no urinary complaints, no GH. Denies fevers, vomiting. She reports that she was sent to have labs done by her doctor and decided to come to the ED. In the ED, afebrile. Labs and UA unremarkable. Ucx 1/28 with skin joseph. CTAP 1/28/2025 showed tiny renal stones, nonobstructing, no hydro; Mild wall thickening and submucosal enhancement of the small and large bowel within the upper abdomen;  Moderate colonic stool burden. Patient admitted for abdominal pain.     Past Medical History:   Past Medical History:   Diagnosis Date    Acute asthma exacerbation 12/15/2021    JUAN (acute kidney injury) (Formerly [facility]) 2/14/2021    Anxiety     Asthma     Constipation 1/13/2014    Conversion disorder     Depression     Fibromyalgia     Focal motor deficit 8/31/2020    Graves disease     History of blood transfusion     Hx of blood clots     2015 LT LUNG W/ PNEUMONIA    Hypertension     Hyperthyroidism 9/30/2016    Joint pain 1/13/2014    Various joints (back, hands, knees, hips), recurrent flares since age 24, RF+ at one point, never fully worked up for rheumatologic dz yet    Kidney stone     Multiple    Localized rash 1/13/2014    Recurrent, on inner surface of upper arms, q 3 months, sometimes on chest, no facial involvement    Lupus     Lupus arthritis (Formerly [facility]) 9/14/2022    MDRO (multiple drug resistant organisms) resistance     Nephroureterolithiasis 7/31/2018    Paresthesia of right arm and leg 8/31/2020    PONV (postoperative nausea and vomiting)     Right sided weakness     Shortness of breath 12/14/2021    SLE exacerbation (Formerly [facility]) 1/12/2023    Yeast vaginitis 11/27/2014       Past Surgical History:  Past Surgical

## 2025-01-29 NOTE — PROGRESS NOTES
Patient alert and oriented x4, discharged to home with documented belongings. IV removed with no complications. Transported out of hospital via wheelchair by writer. Reviewed discharge, follow up, and medication instructions with patient and patient verbalized understanding. Patient denies any questions and concerns.

## 2025-01-29 NOTE — PLAN OF CARE
Problem: Discharge Planning  Goal: Discharge to home or other facility with appropriate resources  Outcome: Progressing  Flowsheets (Taken 1/29/2025 1137)  Discharge to home or other facility with appropriate resources:   Identify barriers to discharge with patient and caregiver   Arrange for needed discharge resources and transportation as appropriate   Identify discharge learning needs (meds, wound care, etc)     Problem: Pain  Goal: Verbalizes/displays adequate comfort level or baseline comfort level  Outcome: Progressing  Flowsheets (Taken 1/29/2025 1137)  Verbalizes/displays adequate comfort level or baseline comfort level:   Encourage patient to monitor pain and request assistance   Assess pain using appropriate pain scale   Administer analgesics based on type and severity of pain and evaluate response   Implement non-pharmacological measures as appropriate and evaluate response     Problem: Safety - Adult  Goal: Free from fall injury  Outcome: Progressing  Flowsheets (Taken 1/29/2025 1137)  Free From Fall Injury: Instruct family/caregiver on patient safety      01-Dec-2022 09:33

## 2025-01-30 ENCOUNTER — TELEPHONE (OUTPATIENT)
Dept: INTERNAL MEDICINE CLINIC | Age: 45
End: 2025-01-30

## 2025-01-30 NOTE — TELEPHONE ENCOUNTER
Care Transitions Initial Follow Up Call    Outreach made within 2 business days of discharge: Yes    Patient: Greyson Shelby Patient : 1980   MRN: [id number]  Reason for Admission: UTI   Discharge Date: 25       Spoke with: Sent my chart message to call office to schedule.  Phone not in service

## 2025-01-31 ENCOUNTER — TELEPHONE (OUTPATIENT)
Dept: INTERNAL MEDICINE CLINIC | Age: 45
End: 2025-01-31

## 2025-01-31 NOTE — TELEPHONE ENCOUNTER
Care Transitions Initial Follow Up Call    Outreach made within 2 business days of discharge: Yes    Patient: Greyson Shelby Patient : 1980   MRN: [id number]  Reason for Admission: UTI  Discharge Date: 25       Spoke with: Attempted 2nd call for TCM, no answer, no voice mail

## 2025-02-05 DIAGNOSIS — Z00.01 ENCOUNTER FOR WELL ADULT EXAM WITH ABNORMAL FINDINGS: ICD-10-CM

## 2025-02-05 DIAGNOSIS — M79.7 FIBROMYALGIA: ICD-10-CM

## 2025-02-05 NOTE — TELEPHONE ENCOUNTER
Refill request for buPROPion (WELLBUTRIN XL) 150 MG extended release tablet   JOLESSA 0.15-0.03 MG per tablet   pregabalin (LYRICA) 300 MG capsule    tiZANidine (ZANAFLEX) 4 MG tablet    medication.     [facility] of [facility]acy- [facility]       Last visit - 8/30/34     Pending visit - NONE    Last refill -11-4-24,7-11-24,2-27-24      Medication Contract signed -   Last Oarrs ran-        Additional Comments

## 2025-02-07 RX ORDER — LEVONORGESTREL AND ETHINYL ESTRADIOL 0.15-0.03
1 KIT ORAL DAILY
Qty: 91 TABLET | Refills: 2 | Status: SHIPPED | OUTPATIENT
Start: 2025-02-07

## 2025-02-07 RX ORDER — BUPROPION HYDROCHLORIDE 150 MG/1
150 TABLET ORAL EVERY MORNING
Qty: 30 TABLET | Refills: 3 | Status: SHIPPED | OUTPATIENT
Start: 2025-02-07

## 2025-02-07 RX ORDER — PREGABALIN 300 MG/1
CAPSULE ORAL
Qty: 60 CAPSULE | Refills: 2 | Status: SHIPPED | OUTPATIENT
Start: 2025-02-07 | End: 2026-01-07

## 2025-02-10 ENCOUNTER — OFFICE VISIT (OUTPATIENT)
Dept: INTERNAL MEDICINE CLINIC | Age: 45
End: 2025-02-10

## 2025-02-10 VITALS
TEMPERATURE: 98.1 F | SYSTOLIC BLOOD PRESSURE: 152 MMHG | WEIGHT: 174 LBS | DIASTOLIC BLOOD PRESSURE: 94 MMHG | HEART RATE: 106 BPM | OXYGEN SATURATION: 98 % | BODY MASS INDEX: 27.31 KG/M2 | HEIGHT: 67 IN

## 2025-02-10 DIAGNOSIS — Z09 HOSPITAL DISCHARGE FOLLOW-UP: ICD-10-CM

## 2025-02-10 DIAGNOSIS — K52.9 ENTEROCOLITIS: Primary | ICD-10-CM

## 2025-02-10 DIAGNOSIS — N39.0 URINARY TRACT INFECTION WITHOUT HEMATURIA, SITE UNSPECIFIED: ICD-10-CM

## 2025-02-10 DIAGNOSIS — I10 PRIMARY HYPERTENSION: ICD-10-CM

## 2025-02-10 DIAGNOSIS — M32.9 LUPUS ARTHRITIS (HCC): ICD-10-CM

## 2025-02-10 DIAGNOSIS — K29.70 GASTRITIS WITHOUT BLEEDING, UNSPECIFIED CHRONICITY, UNSPECIFIED GASTRITIS TYPE: ICD-10-CM

## 2025-02-10 DIAGNOSIS — K52.9 ENTERITIS: ICD-10-CM

## 2025-02-10 DIAGNOSIS — F33.1 MODERATE EPISODE OF RECURRENT MAJOR DEPRESSIVE DISORDER (HCC): ICD-10-CM

## 2025-02-10 PROBLEM — R11.2 INTRACTABLE NAUSEA AND VOMITING: Status: RESOLVED | Noted: 2024-03-26 | Resolved: 2025-02-10

## 2025-02-10 RX ORDER — DICYCLOMINE HYDROCHLORIDE 10 MG/1
10 CAPSULE ORAL 2 TIMES DAILY PRN
Qty: 30 CAPSULE | Refills: 0 | Status: SHIPPED | OUTPATIENT
Start: 2025-02-10

## 2025-02-10 RX ORDER — ONDANSETRON 4 MG/1
4 TABLET, FILM COATED ORAL DAILY PRN
Qty: 30 TABLET | Refills: 0 | Status: SHIPPED | OUTPATIENT
Start: 2025-02-10

## 2025-02-10 ASSESSMENT — ENCOUNTER SYMPTOMS
DIARRHEA: 0
SHORTNESS OF BREATH: 0
VOMITING: 0
ABDOMINAL DISTENTION: 0
CONSTIPATION: 1
ABDOMINAL PAIN: 1
NAUSEA: 1
CHEST TIGHTNESS: 0

## 2025-02-10 ASSESSMENT — PATIENT HEALTH QUESTIONNAIRE - PHQ9
8. MOVING OR SPEAKING SO SLOWLY THAT OTHER PEOPLE COULD HAVE NOTICED. OR THE OPPOSITE, BEING SO FIGETY OR RESTLESS THAT YOU HAVE BEEN MOVING AROUND A LOT MORE THAN USUAL: NOT AT ALL
SUM OF ALL RESPONSES TO PHQ QUESTIONS 1-9: 9
5. POOR APPETITE OR OVEREATING: NEARLY EVERY DAY
10. IF YOU CHECKED OFF ANY PROBLEMS, HOW DIFFICULT HAVE THESE PROBLEMS MADE IT FOR YOU TO DO YOUR WORK, TAKE CARE OF THINGS AT HOME, OR GET ALONG WITH OTHER PEOPLE: VERY DIFFICULT
1. LITTLE INTEREST OR PLEASURE IN DOING THINGS: NOT AT ALL
7. TROUBLE CONCENTRATING ON THINGS, SUCH AS READING THE NEWSPAPER OR WATCHING TELEVISION: SEVERAL DAYS
3. TROUBLE FALLING OR STAYING ASLEEP: NEARLY EVERY DAY
SUM OF ALL RESPONSES TO PHQ QUESTIONS 1-9: 9
4. FEELING TIRED OR HAVING LITTLE ENERGY: MORE THAN HALF THE DAYS
2. FEELING DOWN, DEPRESSED OR HOPELESS: NOT AT ALL
SUM OF ALL RESPONSES TO PHQ QUESTIONS 1-9: 9
SUM OF ALL RESPONSES TO PHQ QUESTIONS 1-9: 9
9. THOUGHTS THAT YOU WOULD BE BETTER OFF DEAD, OR OF HURTING YOURSELF: NOT AT ALL
6. FEELING BAD ABOUT YOURSELF - OR THAT YOU ARE A FAILURE OR HAVE LET YOURSELF OR YOUR FAMILY DOWN: NOT AT ALL
SUM OF ALL RESPONSES TO PHQ9 QUESTIONS 1 & 2: 0

## 2025-02-10 NOTE — PROGRESS NOTES
Greyson Shelby (:  1980) is a 44 y.o. female, here for evaluation of the following chief complaint(s):  Follow-Up from Hospital ([facility]  -/25, abdominal pain; UTI and intestinal virus )    Assessment & Plan  1. Abdominal pain.  The patient's symptoms are likely due to mild enterocolitis, as evidenced by submucosal enhancement in the small and large bowel and trace free intrapelvic fluid. The presence of nonobstructive kidney stones was noted, but these are not causing significant issues. The urine culture was negative, suggesting a possible viral etiology. The patient has a history of elevated calcium levels, which are currently on the lower end. Dicyclomine has been prescribed to alleviate cramping, with instructions to take it once daily before meals for 2 to 3 days and then as needed. She has been advised to maintain a bland diet, consume small portions, and gradually increase her intake as tolerated. The use of stool softeners such as MiraLAX or docusate has been recommended to prevent constipation. She has also been advised to incorporate fiber supplements into her diet and ensure adequate hydration. If symptoms persist, a referral to a gastroenterologist will be considered.    2. Constipation.  The patient reports constipation likely due to pain medications. She has been advised to use stool softeners such as MiraLAX or docusate regularly. Increasing fiber intake through supplements or fiber-rich foods and maintaining adequate hydration have also been recommended.    3. Left eye irritation.  The patient reports irritation in the left eye following minor trauma. Artificial tears have been recommended to provide relief. She has been advised to monitor for any changes in vision or worsening pain and to seek medical attention if these occur.    Follow-up  The patient will follow up in 6 months.    Results  Laboratory Studies  Urine culture showed no bacteria. Calcium levels

## 2025-02-27 PROBLEM — N39.0 UTI (URINARY TRACT INFECTION): Status: RESOLVED | Noted: 2025-01-28 | Resolved: 2025-02-27

## 2025-03-04 ENCOUNTER — OFFICE VISIT (OUTPATIENT)
Dept: PAIN MANAGEMENT | Age: 45
End: 2025-03-04
Payer: COMMERCIAL

## 2025-03-04 VITALS
HEART RATE: 102 BPM | DIASTOLIC BLOOD PRESSURE: 71 MMHG | TEMPERATURE: 96.9 F | SYSTOLIC BLOOD PRESSURE: 113 MMHG | OXYGEN SATURATION: 99 %

## 2025-03-04 DIAGNOSIS — M79.7 FIBROMYALGIA: ICD-10-CM

## 2025-03-04 DIAGNOSIS — G43.101 MIGRAINE WITH AURA AND WITH STATUS MIGRAINOSUS, NOT INTRACTABLE: ICD-10-CM

## 2025-03-04 DIAGNOSIS — M62.830 BACK SPASM: ICD-10-CM

## 2025-03-04 DIAGNOSIS — M54.40 CHRONIC MIDLINE LOW BACK PAIN WITH SCIATICA, SCIATICA LATERALITY UNSPECIFIED: ICD-10-CM

## 2025-03-04 DIAGNOSIS — G89.4 CHRONIC PAIN SYNDROME: ICD-10-CM

## 2025-03-04 DIAGNOSIS — F51.01 PRIMARY INSOMNIA: ICD-10-CM

## 2025-03-04 DIAGNOSIS — M48.02 STENOSIS, CERVICAL SPINE: ICD-10-CM

## 2025-03-04 DIAGNOSIS — F32.A DEPRESSION, UNSPECIFIED DEPRESSION TYPE: ICD-10-CM

## 2025-03-04 DIAGNOSIS — G89.29 CHRONIC MIDLINE LOW BACK PAIN WITH SCIATICA, SCIATICA LATERALITY UNSPECIFIED: ICD-10-CM

## 2025-03-04 DIAGNOSIS — M32.9 LUPUS ARTHRITIS (HCC): ICD-10-CM

## 2025-03-04 DIAGNOSIS — M47.812 SPONDYLOSIS, CERVICAL: ICD-10-CM

## 2025-03-04 DIAGNOSIS — K59.03 DRUG-INDUCED CONSTIPATION: ICD-10-CM

## 2025-03-04 PROCEDURE — 3074F SYST BP LT 130 MM HG: CPT | Performed by: NURSE PRACTITIONER

## 2025-03-04 PROCEDURE — G8417 CALC BMI ABV UP PARAM F/U: HCPCS | Performed by: NURSE PRACTITIONER

## 2025-03-04 PROCEDURE — G8427 DOCREV CUR MEDS BY ELIG CLIN: HCPCS | Performed by: NURSE PRACTITIONER

## 2025-03-04 PROCEDURE — 99214 OFFICE O/P EST MOD 30 MIN: CPT | Performed by: NURSE PRACTITIONER

## 2025-03-04 PROCEDURE — 1036F TOBACCO NON-USER: CPT | Performed by: NURSE PRACTITIONER

## 2025-03-04 PROCEDURE — 3078F DIAST BP <80 MM HG: CPT | Performed by: NURSE PRACTITIONER

## 2025-03-04 RX ORDER — LIDOCAINE 50 MG/G
1 PATCH TOPICAL DAILY
Qty: 30 PATCH | Refills: 0 | Status: SHIPPED | OUTPATIENT
Start: 2025-03-04

## 2025-03-04 RX ORDER — HYDROCODONE BITARTRATE AND ACETAMINOPHEN 5; 325 MG/1; MG/1
1 TABLET ORAL EVERY 8 HOURS PRN
Qty: 84 TABLET | Refills: 0 | Status: SHIPPED | OUTPATIENT
Start: 2025-03-04 | End: 2025-04-01

## 2025-03-04 RX ORDER — TRAZODONE HYDROCHLORIDE 50 MG/1
50 TABLET ORAL NIGHTLY
Qty: 90 TABLET | Refills: 1 | Status: SHIPPED | OUTPATIENT
Start: 2025-03-04

## 2025-03-04 RX ORDER — BACLOFEN 10 MG/1
10 TABLET ORAL DAILY
Qty: 30 TABLET | Refills: 0 | Status: SHIPPED | OUTPATIENT
Start: 2025-03-04 | End: 2025-04-03

## 2025-03-04 NOTE — PROGRESS NOTES
include improvement in pain, restoration of functioning- with focus on improvement in physical performance, general activity, work or disability,emotional distress, health care utilization and  decreased medication consumption. Will continue to monitor progress towards achieving/maintaining therapeutic goals with special emphasis on  1. Improvement in perceived interfernce  of pain with ADL's. Ability to do home exercises independently. Ability to do household chores indoor and/or outdoor work and social and leisure activities.Improve psychosocial and physical functioning. - she is showing progression towards this treatment goal with the current regimen.     She was advised against drinking alcohol with the narcotic pain medicines, advised against driving or handling machinery while adjusting the dose of medicines or if having cognitive  issues related to the current medications.Risk of overdose and death, if medicines not taken as prescribed, were also discussed. If the patient develops new symptoms or if the symptoms worsen, the patient should call the office.    While transcribing every attempt was made to maintain the accuracy of the note in terms of it's contents,there may have been some errors made inadvertently.  Thank you for allowing me to participate in the care of this patient.    Kimmy Manzano CNP.    Cc: Ehsan Nice, APRN - CNP

## 2025-03-05 NOTE — TELEPHONE ENCOUNTER
Additional Comments     Patient stated that she takes up 6 mg a day and for 30 days. She said that her insurance will not pay for 6 mg for 30 days and she has to take 4 mg tablet and she splits one in half. She also stated the Rx that she has for 30 days isn't lasting for the 30 days and was wanting to know if she can have them for 60 days for 30 days from where she cuts it in half. Patient Is Not taking Baclofen any longer, but still takes the Norco every day from Pain Management.            Refill request for TIZANIDINE (ZANAFLEX) 4 MG medication.     Name of [facility]acy- [facility]      Last visit - 2-     Pending visit - 8-    Last refill - 2-7-2025      Medication Contract signed -   Last Oarrs ran-

## 2025-03-24 ENCOUNTER — TELEPHONE (OUTPATIENT)
Dept: INTERNAL MEDICINE CLINIC | Age: 45
End: 2025-03-24

## 2025-03-24 NOTE — TELEPHONE ENCOUNTER
Patient is calling and states she went out of town and when she was at the airport the took her medicine out of her bag searched it. They took everything out of the bag at the airport and put everything back in except her Tizanidine and they didn't put it back in the bag when she went to take her medicine for the night. She wants to know if she can get a emergency fill?

## 2025-03-31 ENCOUNTER — TELEPHONE (OUTPATIENT)
Dept: INTERNAL MEDICINE CLINIC | Age: 45
End: 2025-03-31

## 2025-03-31 NOTE — TELEPHONE ENCOUNTER
Patient is calling today stating the pharmacy needs an ok to fill her tizanidine.  Phoned pharmacy and gave the ok for an early refill.

## 2025-04-01 ENCOUNTER — OFFICE VISIT (OUTPATIENT)
Dept: PAIN MANAGEMENT | Age: 45
End: 2025-04-01

## 2025-04-01 VITALS
HEART RATE: 140 BPM | SYSTOLIC BLOOD PRESSURE: 130 MMHG | DIASTOLIC BLOOD PRESSURE: 75 MMHG | OXYGEN SATURATION: 99 % | TEMPERATURE: 96.8 F

## 2025-04-01 DIAGNOSIS — M32.9 LUPUS ARTHRITIS (HCC): ICD-10-CM

## 2025-04-01 DIAGNOSIS — M48.02 STENOSIS, CERVICAL SPINE: ICD-10-CM

## 2025-04-01 DIAGNOSIS — M62.830 BACK SPASM: ICD-10-CM

## 2025-04-01 DIAGNOSIS — G43.101 MIGRAINE WITH AURA AND WITH STATUS MIGRAINOSUS, NOT INTRACTABLE: ICD-10-CM

## 2025-04-01 DIAGNOSIS — M79.7 FIBROMYALGIA: ICD-10-CM

## 2025-04-01 DIAGNOSIS — F51.01 PRIMARY INSOMNIA: ICD-10-CM

## 2025-04-01 DIAGNOSIS — G89.4 CHRONIC PAIN SYNDROME: ICD-10-CM

## 2025-04-01 DIAGNOSIS — F32.A DEPRESSION, UNSPECIFIED DEPRESSION TYPE: ICD-10-CM

## 2025-04-01 DIAGNOSIS — G89.29 CHRONIC MIDLINE LOW BACK PAIN WITH SCIATICA, SCIATICA LATERALITY UNSPECIFIED: ICD-10-CM

## 2025-04-01 DIAGNOSIS — M47.812 SPONDYLOSIS, CERVICAL: ICD-10-CM

## 2025-04-01 DIAGNOSIS — K59.03 DRUG-INDUCED CONSTIPATION: ICD-10-CM

## 2025-04-01 DIAGNOSIS — M54.40 CHRONIC MIDLINE LOW BACK PAIN WITH SCIATICA, SCIATICA LATERALITY UNSPECIFIED: ICD-10-CM

## 2025-04-01 DIAGNOSIS — E66.811 CLASS 1 OBESITY DUE TO EXCESS CALORIES WITH SERIOUS COMORBIDITY IN ADULT, UNSPECIFIED BMI: ICD-10-CM

## 2025-04-01 DIAGNOSIS — E66.09 CLASS 1 OBESITY DUE TO EXCESS CALORIES WITH SERIOUS COMORBIDITY IN ADULT, UNSPECIFIED BMI: ICD-10-CM

## 2025-04-01 RX ORDER — BACLOFEN 10 MG/1
10 TABLET ORAL DAILY
Qty: 30 TABLET | Refills: 0 | Status: SHIPPED | OUTPATIENT
Start: 2025-04-01 | End: 2025-05-01

## 2025-04-01 RX ORDER — TRAZODONE HYDROCHLORIDE 50 MG/1
50 TABLET ORAL NIGHTLY
Qty: 90 TABLET | Refills: 1 | Status: SHIPPED | OUTPATIENT
Start: 2025-04-01

## 2025-04-01 RX ORDER — HYDROCODONE BITARTRATE AND ACETAMINOPHEN 5; 325 MG/1; MG/1
1 TABLET ORAL EVERY 8 HOURS PRN
Qty: 84 TABLET | Refills: 0 | Status: SHIPPED | OUTPATIENT
Start: 2025-04-01 | End: 2025-04-29

## 2025-04-01 RX ORDER — LIDOCAINE 50 MG/G
1 PATCH TOPICAL DAILY
Qty: 30 PATCH | Refills: 0 | Status: SHIPPED | OUTPATIENT
Start: 2025-04-01

## 2025-04-01 NOTE — PROGRESS NOTES
Greyson Shelby  1980  [id number]    HISTORY OF PRESENT ILLNESS: Ms. Shelby is a 44 y.o. female returns for a follow up visit for pain management  She has a diagnosis of   1. Chronic pain syndrome    2. Fibromyalgia    3. Spondylosis, cervical    4. Stenosis, cervical spine    5. Chronic midline low back pain with sciatica, sciatica laterality unspecified    6. Back spasm    7. Lupus arthritis (HCC)    8. Migraine with aura and with status migrainosus, not intractable    9. Primary insomnia    10. Depression, unspecified depression type    11. Drug-induced constipation    12. Class 1 obesity due to excess calories with serious comorbidity in adult, unspecified BMI      As per Information Obtained from the PADT (Patient Assessment and Documentation Tool)    She complains of pain in the  neck, down the spine, both arms. Both legs  She rates the pain 9/10 and describes it as aching. Current treatment regimen has helped relieve about 10% of the pain.  She denies any side effects from the current pain regimen. Patient reports that since the last follow up visit the physical functioning is unchanged, family/social relationships are unchanged, mood is unchanged sleep patterns are unchanged, and that the overall functioning is unchanged.  Patient denies misusing/abusing her narcotic pain medications or using any illegal drugs.      Upon obtaining medical history from Ms. Shelby states that pain is manageable on current pain therapy. Takes the pain medications as prescribed. Mood/anxiety is stable. Sleep is fair with an average of 5-6 hours. Denies to having issues of constipation. Tolerating activities/house chores with moderate tenderness to the lower back.     ALLERGIES: Patients list of allergies were reviewed     MEDICATIONS: Ms. Shelby list of medications were reviewed.Her current medications are   Outpatient Medications Prior to Visit   Medication Sig Dispense Refill    tiZANidine (ZANAFLEX) 4 MG

## 2025-04-28 ENCOUNTER — TELEPHONE (OUTPATIENT)
Dept: INTERNAL MEDICINE CLINIC | Age: 45
End: 2025-04-28

## 2025-04-28 NOTE — TELEPHONE ENCOUNTER
Patient called asking if her [facility] paperwork had been completed yet, blank copy was scanned into media on 4/24/25 but did not see any completed papers. Patient stated she needs this completed and returned to her work by 4/30/25. Please advise.

## 2025-04-29 NOTE — TELEPHONE ENCOUNTER
Called and informed patient, she requested that it be faxed to the number provided on the paperwork.

## 2025-05-05 NOTE — H&P
Chest CT shows bibasilar atelectasis, suspect mucous plugging.  He is having difficulty expectorating his secretions.  Would rule out aspiration.  I will order vest therapy and flutter device to help with sputum expectoration.  Obtain speech therapy evaluation.  No evidence of acute infectious process.  Observe off antibiotics.     HOSPITALISTS HISTORY AND PHYSICAL    9/1/2020 3:40 AM    Patient Information:  Sarina Luis is a 42 y.o. female [id number]  PCP:  LEESA Waite CNP (Tel: [phone] )    Chief complaint:    Chief Complaint   Patient presents with    Extremity Weakness     Pt to ER with c/o right arm and leg numbness and weakness sinc 2200 last night, able to move arm with difficulty, decreased sensation. Dr guzman requested to bedside for eval.        History of Present Illness:  Kaci Duong is a 42 y.o. female who presented to the ED concerned that she was having a stroke. The patient presents with headache, right-sided weakness, and paresthesia ongoing for greater than 12 hours. The patient does endorse being under a great deal of stress related to home schooling, full-time employment, and PTSD related to murder of family member. She has known hypertension and reports that her BP has been quite elevated resulting in recurrent epistaxis lately. Labs were obtained and essentially unremarkable. EKG as well as neuro imaging failed to reveal any acute process. Hospitalist team consulted to admit this patient for possible CVA. History obtained from patient and [facility] chart  Old medical records show patient has a longstanding diagnosis of fibromyalgia, KARO, PTSD, and depression. Patient recently visited pain management for possible opiate prescription, however this was denied due to longstanding use of muscle relaxants and benzodiazepines. She also states that she uses THC for relief of her chronic pain syndrome      REVIEW OF SYSTEMS:   Constitutional: Negative for fever,chills or night sweats  ENT: Negative for rhinorrhea, epistaxis, hoarseness, sore throat.   Respiratory: Negative for shortness of breath,wheezing  Cardiovascular: Negative for chest pain, palpitations   Gastrointestinal: Negative CT HEAD WO CONTRAST   Final Result   No acute intracranial abnormality. Stroke alert results were called by Dr. Tamica Zamorano. MD Sebastian to Linda Mccallum   on 8/31/2020 at 13:51. MRI brain with and without contrast    (Results Pending)     Chest Xray:   EKG:    I visualized CXR images and EKG strips    Discussed case  with ED provider`    Problem List  Principal Problem:    Focal motor deficit  Active Problems:    HTN (hypertension)    Fibromyalgia    Moderate episode of recurrent major depressive disorder (HCC)    Paresthesia of right arm and leg  Resolved Problems:    * No resolved hospital problems. *        Assessment/Plan:     1. Patient will be observed overnight on telemetry under CVA pathway with every 4 hours serial neuro checks; highly suspect motor deficit is secondary to conversion disorder  2. ASA and statin ordered; echo in a.m.; MRI of the head scheduled for a.m. to rule out intracranial pathology  3. Hydralazine 10 mg IV x1 prior to floor transfer; permissive hypertension overnight with plans to resume diuretic in a.m.; likely requires addition of Norvasc which patient has been prescribed in past  4. Counseled regarding risks of polypharmacy; minimize doses of combined opiate/benzo/muscle relaxant/neuroleptic      DVT prophylaxis- Lovenox subcu 40 daily  Code status-full code  Diet- n.p.o. pending bedside swallow then cardiac BECKY  IV access-PIV established in ED    Admit as observation. I anticipate hospitalization spanning less than two midnights for investigation and treatment of the above medically necessary diagnoses. Please note that some part of this chart was generated using Dragon dictation software. Although every effort was made to ensure the accuracy of this automated transcription, some errors in transcription may have occurred inadvertently. If you may need any clarification, please do not hesitate to contact me through [facility].        Richar Tolliver MD    9/1/2020 3:40 AM

## 2025-05-06 ENCOUNTER — OFFICE VISIT (OUTPATIENT)
Dept: PAIN MANAGEMENT | Age: 45
End: 2025-05-06

## 2025-05-06 VITALS
SYSTOLIC BLOOD PRESSURE: 170 MMHG | OXYGEN SATURATION: 99 % | TEMPERATURE: 96.8 F | HEART RATE: 82 BPM | DIASTOLIC BLOOD PRESSURE: 108 MMHG

## 2025-05-06 DIAGNOSIS — M48.02 STENOSIS, CERVICAL SPINE: ICD-10-CM

## 2025-05-06 DIAGNOSIS — G89.4 CHRONIC PAIN SYNDROME: ICD-10-CM

## 2025-05-06 DIAGNOSIS — M54.40 CHRONIC MIDLINE LOW BACK PAIN WITH SCIATICA, SCIATICA LATERALITY UNSPECIFIED: ICD-10-CM

## 2025-05-06 DIAGNOSIS — M32.9 LUPUS ARTHRITIS (HCC): ICD-10-CM

## 2025-05-06 DIAGNOSIS — K59.03 DRUG-INDUCED CONSTIPATION: ICD-10-CM

## 2025-05-06 DIAGNOSIS — E66.09 CLASS 1 OBESITY DUE TO EXCESS CALORIES WITH SERIOUS COMORBIDITY IN ADULT, UNSPECIFIED BMI: ICD-10-CM

## 2025-05-06 DIAGNOSIS — G89.29 CHRONIC MIDLINE LOW BACK PAIN WITH SCIATICA, SCIATICA LATERALITY UNSPECIFIED: ICD-10-CM

## 2025-05-06 DIAGNOSIS — M47.812 SPONDYLOSIS, CERVICAL: ICD-10-CM

## 2025-05-06 DIAGNOSIS — G43.101 MIGRAINE WITH AURA AND WITH STATUS MIGRAINOSUS, NOT INTRACTABLE: ICD-10-CM

## 2025-05-06 DIAGNOSIS — M62.830 BACK SPASM: ICD-10-CM

## 2025-05-06 DIAGNOSIS — M79.7 FIBROMYALGIA: ICD-10-CM

## 2025-05-06 DIAGNOSIS — M79.18 MYOFASCIAL PAIN: ICD-10-CM

## 2025-05-06 DIAGNOSIS — F51.01 PRIMARY INSOMNIA: ICD-10-CM

## 2025-05-06 DIAGNOSIS — E66.811 CLASS 1 OBESITY DUE TO EXCESS CALORIES WITH SERIOUS COMORBIDITY IN ADULT, UNSPECIFIED BMI: ICD-10-CM

## 2025-05-06 DIAGNOSIS — F32.A DEPRESSION, UNSPECIFIED DEPRESSION TYPE: ICD-10-CM

## 2025-05-06 RX ORDER — BUPIVACAINE HYDROCHLORIDE 2.5 MG/ML
9 INJECTION, SOLUTION INFILTRATION; PERINEURAL ONCE
Status: COMPLETED | OUTPATIENT
Start: 2025-05-06 | End: 2025-05-06

## 2025-05-06 RX ORDER — HYDROCODONE BITARTRATE AND ACETAMINOPHEN 5; 325 MG/1; MG/1
1 TABLET ORAL EVERY 8 HOURS PRN
Qty: 84 TABLET | Refills: 0 | Status: SHIPPED | OUTPATIENT
Start: 2025-05-06 | End: 2025-06-03

## 2025-05-06 RX ORDER — BACLOFEN 10 MG/1
10 TABLET ORAL DAILY
Qty: 30 TABLET | Refills: 0 | Status: SHIPPED | OUTPATIENT
Start: 2025-05-06 | End: 2025-06-05

## 2025-05-06 RX ORDER — TRAZODONE HYDROCHLORIDE 50 MG/1
50 TABLET ORAL NIGHTLY
Qty: 90 TABLET | Refills: 1 | Status: SHIPPED | OUTPATIENT
Start: 2025-05-06

## 2025-05-06 RX ORDER — TRIAMCINOLONE ACETONIDE 40 MG/ML
40 INJECTION, SUSPENSION INTRA-ARTICULAR; INTRAMUSCULAR ONCE
Status: COMPLETED | OUTPATIENT
Start: 2025-05-06 | End: 2025-05-06

## 2025-05-06 RX ADMIN — BUPIVACAINE HYDROCHLORIDE 22.5 MG: 2.5 INJECTION, SOLUTION INFILTRATION; PERINEURAL at 16:53

## 2025-05-06 RX ADMIN — TRIAMCINOLONE ACETONIDE 40 MG: 40 INJECTION, SUSPENSION INTRA-ARTICULAR; INTRAMUSCULAR at 16:53

## 2025-05-06 NOTE — PROGRESS NOTES
Greyson Shelby  1980  [id number]    HISTORY OF PRESENT ILLNESS: Ms. Shelby is a 44 y.o. female returns for a follow up visit for pain management  She has a diagnosis of   1. Chronic pain syndrome    2. Myofascial pain    3. Fibromyalgia    4. Chronic midline low back pain with sciatica, sciatica laterality unspecified    5. Back spasm    6. Spondylosis, cervical    7. Stenosis, cervical spine    8. Primary insomnia    9. Lupus arthritis (HCC)    10. Migraine with aura and with status migrainosus, not intractable    11. Depression, unspecified depression type    12. Drug-induced constipation    13. Class 1 obesity due to excess calories with serious comorbidity in adult, unspecified BMI      As per Information Obtained from the PADT (Patient Assessment and Documentation Tool)    She complains of pain in the  the whole body  She rates the pain 9/10 and describes it as aching. Current treatment regimen has helped relieve about 10% of the pain.  She denies any side effects from the current pain regimen. Patient reports that since the last follow up visit the physical functioning is unchanged, family/social relationships are unchanged, mood is unchanged sleep patterns are unchanged, and that the overall functioning is unchanged.  Patient denies misusing/abusing her narcotic pain medications or using any illegal drugs.      Upon obtaining medical history from Ms. Shelby states that pain is manageable on current pain therapy. Takes the pain medications as prescribed. Pain in the lower back, requesting for TPI of the Lumbar spine. Mood/anxiety is stable. Sleep is fair with an average of 5-6 hours. Denies to having issues of constipation. Tolerating activities/house chores with moderate tenderness to the lower back.     ALLERGIES: Patients list of allergies were reviewed     MEDICATIONS: Ms. Shelby list of medications were reviewed.Her current medications are   Outpatient Medications Prior to Visit

## 2025-05-09 DIAGNOSIS — Z00.01 ENCOUNTER FOR WELL ADULT EXAM WITH ABNORMAL FINDINGS: ICD-10-CM

## 2025-05-09 RX ORDER — LEVONORGESTREL AND ETHINYL ESTRADIOL 0.15-0.03
1 KIT ORAL DAILY
Qty: 91 TABLET | Refills: 2 | Status: SHIPPED | OUTPATIENT
Start: 2025-05-09

## 2025-05-09 RX ORDER — BENZONATATE 100 MG/1
100-200 CAPSULE ORAL 3 TIMES DAILY PRN
Qty: 60 CAPSULE | Refills: 0 | Status: SHIPPED | OUTPATIENT
Start: 2025-05-09 | End: 2025-05-19

## 2025-05-09 RX ORDER — BUPROPION HYDROCHLORIDE 150 MG/1
150 TABLET ORAL EVERY MORNING
Qty: 30 TABLET | Refills: 3 | Status: SHIPPED | OUTPATIENT
Start: 2025-05-09

## 2025-05-09 RX ORDER — HYDROXYZINE HYDROCHLORIDE 50 MG/1
50 TABLET, FILM COATED ORAL EVERY 8 HOURS PRN
Qty: 30 TABLET | Refills: 0 | Status: SHIPPED | OUTPATIENT
Start: 2025-05-09

## 2025-05-09 NOTE — TELEPHONE ENCOUNTER
Additional Comments PER PATIENT  NEEDS REFILL OF BENZONATATE DUE TO POLLEN AND ALLERGIES           Refill request for  medication.     LEVONORGESTREL-ETHINYL (JOLESSA)    HYDROXYZINE 50 MG     BUPROPION 150 MG ER    BENZONATATE 100 MG         Name of Pharmacy- [facility]      Last visit - 02/10/2025     Pending visit - 08/11/2025    Last refill -02/07/2025;12/26/2024;02/27/2024;

## 2025-05-27 ENCOUNTER — TELEPHONE (OUTPATIENT)
Dept: PAIN MANAGEMENT | Age: 45
End: 2025-05-27

## 2025-05-27 NOTE — TELEPHONE ENCOUNTER
Pharmacy called regards to patients Rx for Baclofen that was sent on 5/6/2025 by [facility]. Per pharmacy patient is getting Zanaflex 4 mg, take 1.5 tablets twice a day by Dr. Ehsan Carter. Pharmacy is wanting to know if [facility] still wants them to fill the Baclofen. They stated when they called  he advised them to discontinue the patients Baclofen and fill the Zanaflex that he wrote. Pharmacy is asking if some one can call back and let them know.

## 2025-06-03 ENCOUNTER — OFFICE VISIT (OUTPATIENT)
Dept: PAIN MANAGEMENT | Age: 45
End: 2025-06-03
Payer: COMMERCIAL

## 2025-06-03 VITALS
DIASTOLIC BLOOD PRESSURE: 133 MMHG | HEART RATE: 117 BPM | OXYGEN SATURATION: 98 % | SYSTOLIC BLOOD PRESSURE: 206 MMHG | TEMPERATURE: 97.3 F

## 2025-06-03 DIAGNOSIS — K59.03 DRUG-INDUCED CONSTIPATION: ICD-10-CM

## 2025-06-03 DIAGNOSIS — F32.A DEPRESSION, UNSPECIFIED DEPRESSION TYPE: ICD-10-CM

## 2025-06-03 DIAGNOSIS — M79.7 FIBROMYALGIA: ICD-10-CM

## 2025-06-03 DIAGNOSIS — E66.09 CLASS 1 OBESITY DUE TO EXCESS CALORIES WITH SERIOUS COMORBIDITY IN ADULT, UNSPECIFIED BMI: ICD-10-CM

## 2025-06-03 DIAGNOSIS — F51.01 PRIMARY INSOMNIA: ICD-10-CM

## 2025-06-03 DIAGNOSIS — M32.9 LUPUS ARTHRITIS (HCC): ICD-10-CM

## 2025-06-03 DIAGNOSIS — G89.4 CHRONIC PAIN SYNDROME: ICD-10-CM

## 2025-06-03 DIAGNOSIS — M47.812 SPONDYLOSIS, CERVICAL: ICD-10-CM

## 2025-06-03 DIAGNOSIS — M54.40 CHRONIC MIDLINE LOW BACK PAIN WITH SCIATICA, SCIATICA LATERALITY UNSPECIFIED: ICD-10-CM

## 2025-06-03 DIAGNOSIS — G89.29 CHRONIC MIDLINE LOW BACK PAIN WITH SCIATICA, SCIATICA LATERALITY UNSPECIFIED: ICD-10-CM

## 2025-06-03 DIAGNOSIS — M62.830 BACK SPASM: ICD-10-CM

## 2025-06-03 DIAGNOSIS — E66.811 CLASS 1 OBESITY DUE TO EXCESS CALORIES WITH SERIOUS COMORBIDITY IN ADULT, UNSPECIFIED BMI: ICD-10-CM

## 2025-06-03 DIAGNOSIS — G43.101 MIGRAINE WITH AURA AND WITH STATUS MIGRAINOSUS, NOT INTRACTABLE: ICD-10-CM

## 2025-06-03 DIAGNOSIS — M79.18 MYOFASCIAL PAIN: ICD-10-CM

## 2025-06-03 DIAGNOSIS — M48.02 STENOSIS, CERVICAL SPINE: ICD-10-CM

## 2025-06-03 PROCEDURE — 3077F SYST BP >= 140 MM HG: CPT | Performed by: NURSE PRACTITIONER

## 2025-06-03 PROCEDURE — G8417 CALC BMI ABV UP PARAM F/U: HCPCS | Performed by: NURSE PRACTITIONER

## 2025-06-03 PROCEDURE — 99214 OFFICE O/P EST MOD 30 MIN: CPT | Performed by: NURSE PRACTITIONER

## 2025-06-03 PROCEDURE — 3080F DIAST BP >= 90 MM HG: CPT | Performed by: NURSE PRACTITIONER

## 2025-06-03 PROCEDURE — 1036F TOBACCO NON-USER: CPT | Performed by: NURSE PRACTITIONER

## 2025-06-03 PROCEDURE — G8427 DOCREV CUR MEDS BY ELIG CLIN: HCPCS | Performed by: NURSE PRACTITIONER

## 2025-06-03 RX ORDER — TRAZODONE HYDROCHLORIDE 50 MG/1
50 TABLET ORAL NIGHTLY
Qty: 90 TABLET | Refills: 1 | Status: SHIPPED | OUTPATIENT
Start: 2025-06-03

## 2025-06-03 RX ORDER — LIDOCAINE 50 MG/G
1 PATCH TOPICAL DAILY
Qty: 30 PATCH | Refills: 0 | Status: SHIPPED | OUTPATIENT
Start: 2025-06-03

## 2025-06-03 RX ORDER — BACLOFEN 10 MG/1
10 TABLET ORAL DAILY
Qty: 30 TABLET | Refills: 0 | Status: SHIPPED | OUTPATIENT
Start: 2025-06-03 | End: 2025-07-03

## 2025-06-03 RX ORDER — HYDROCODONE BITARTRATE AND ACETAMINOPHEN 5; 325 MG/1; MG/1
1 TABLET ORAL EVERY 8 HOURS PRN
Qty: 84 TABLET | Refills: 0 | Status: SHIPPED | OUTPATIENT
Start: 2025-06-03 | End: 2025-07-01

## 2025-06-03 NOTE — PROGRESS NOTES
common side effects of these medications were also explained to the patient.  Informed verbal consent was obtained.   Goals of current treatment regimen include improvement in pain, restoration of functioning- with focus on improvement in physical performance, general activity, work or disability,emotional distress, health care utilization and  decreased medication consumption. Will continue to monitor progress towards achieving/maintaining therapeutic goals with special emphasis on  1. Improvement in perceived interfernce  of pain with ADL's. Ability to do home exercises independently. Ability to do household chores indoor and/or outdoor work and social and leisure activities.Improve psychosocial and physical functioning. - she is showing progression towards this treatment goal with the current regimen.     She was advised against drinking alcohol with the narcotic pain medicines, advised against driving or handling machinery while adjusting the dose of medicines or if having cognitive  issues related to the current medications.Risk of overdose and death, if medicines not taken as prescribed, were also discussed. If the patient develops new symptoms or if the symptoms worsen, the patient should call the office.    While transcribing every attempt was made to maintain the accuracy of the note in terms of it's contents,there may have been some errors made inadvertently.  Thank you for allowing me to participate in the care of this patient.    Kimmy Manzano CNP.    Cc: Ehsan Nice, APRN - CNP

## 2025-06-20 ENCOUNTER — TELEPHONE (OUTPATIENT)
Dept: INTERNAL MEDICINE CLINIC | Age: 45
End: 2025-06-20

## 2025-06-20 NOTE — TELEPHONE ENCOUNTER
Patient called and asked if Ehsan could send her some type of medication in to help her sleep.    Patient stated she has been up for five days and can't sleep.    Patient stated she is currently on Trazodone that's being prescribed by pain management but it is not helping her at all.

## 2025-06-23 NOTE — TELEPHONE ENCOUNTER
Would suggest discussing with her pain management if she could increase to 100 mg trazodone. This is a commonly used dose to help with insomnia if 50 mg is ineffective

## 2025-07-08 ENCOUNTER — OFFICE VISIT (OUTPATIENT)
Dept: PAIN MANAGEMENT | Age: 45
End: 2025-07-08

## 2025-07-08 VITALS
SYSTOLIC BLOOD PRESSURE: 155 MMHG | OXYGEN SATURATION: 98 % | DIASTOLIC BLOOD PRESSURE: 107 MMHG | TEMPERATURE: 96.4 F | HEART RATE: 108 BPM

## 2025-07-08 DIAGNOSIS — M62.830 BACK SPASM: ICD-10-CM

## 2025-07-08 DIAGNOSIS — G43.101 MIGRAINE WITH AURA AND WITH STATUS MIGRAINOSUS, NOT INTRACTABLE: ICD-10-CM

## 2025-07-08 DIAGNOSIS — F32.A DEPRESSION, UNSPECIFIED DEPRESSION TYPE: ICD-10-CM

## 2025-07-08 DIAGNOSIS — M79.18 MYOFASCIAL PAIN: ICD-10-CM

## 2025-07-08 DIAGNOSIS — G89.4 CHRONIC PAIN SYNDROME: ICD-10-CM

## 2025-07-08 DIAGNOSIS — M79.7 FIBROMYALGIA: ICD-10-CM

## 2025-07-08 DIAGNOSIS — F51.01 PRIMARY INSOMNIA: ICD-10-CM

## 2025-07-08 DIAGNOSIS — M54.40 CHRONIC MIDLINE LOW BACK PAIN WITH SCIATICA, SCIATICA LATERALITY UNSPECIFIED: ICD-10-CM

## 2025-07-08 DIAGNOSIS — G89.29 CHRONIC MIDLINE LOW BACK PAIN WITH SCIATICA, SCIATICA LATERALITY UNSPECIFIED: ICD-10-CM

## 2025-07-08 DIAGNOSIS — E66.811 CLASS 1 OBESITY DUE TO EXCESS CALORIES WITH SERIOUS COMORBIDITY IN ADULT, UNSPECIFIED BMI: ICD-10-CM

## 2025-07-08 DIAGNOSIS — M32.9 LUPUS ARTHRITIS (HCC): ICD-10-CM

## 2025-07-08 DIAGNOSIS — E66.09 CLASS 1 OBESITY DUE TO EXCESS CALORIES WITH SERIOUS COMORBIDITY IN ADULT, UNSPECIFIED BMI: ICD-10-CM

## 2025-07-08 DIAGNOSIS — M47.812 SPONDYLOSIS, CERVICAL: ICD-10-CM

## 2025-07-08 DIAGNOSIS — K59.03 DRUG-INDUCED CONSTIPATION: ICD-10-CM

## 2025-07-08 DIAGNOSIS — M48.02 STENOSIS, CERVICAL SPINE: ICD-10-CM

## 2025-07-08 RX ORDER — NORTRIPTYLINE HYDROCHLORIDE 50 MG/1
50 CAPSULE ORAL NIGHTLY
Qty: 30 CAPSULE | Refills: 0 | Status: SHIPPED | OUTPATIENT
Start: 2025-07-08 | End: 2025-08-07

## 2025-07-08 RX ORDER — LIDOCAINE 50 MG/G
1 PATCH TOPICAL DAILY
Qty: 30 PATCH | Refills: 0 | Status: SHIPPED | OUTPATIENT
Start: 2025-07-08

## 2025-07-08 RX ORDER — HYDROCODONE BITARTRATE AND ACETAMINOPHEN 5; 325 MG/1; MG/1
1 TABLET ORAL EVERY 8 HOURS PRN
Qty: 84 TABLET | Refills: 0 | Status: SHIPPED | OUTPATIENT
Start: 2025-07-08 | End: 2025-08-05

## 2025-07-08 NOTE — PROGRESS NOTES
achieving/maintaining therapeutic goals with special emphasis on  1. Improvement in perceived interfernce  of pain with ADL's. Ability to do home exercises independently. Ability to do household chores indoor and/or outdoor work and social and leisure activities.Improve psychosocial and physical functioning. - she is showing progression towards this treatment goal with the current regimen.     She was advised against drinking alcohol with the narcotic pain medicines, advised against driving or handling machinery while adjusting the dose of medicines or if having cognitive  issues related to the current medications.Risk of overdose and death, if medicines not taken as prescribed, were also discussed. If the patient develops new symptoms or if the symptoms worsen, the patient should call the office.    While transcribing every attempt was made to maintain the accuracy of the note in terms of it's contents,there may have been some errors made inadvertently.  Thank you for allowing me to participate in the care of this patient.    Kimmy Manzano CNP.    Cc: Ehsan Nice, APRN - CNP

## 2025-07-22 NOTE — TELEPHONE ENCOUNTER
Refill request for TIZANIDINE medication.     Name of Pharmacy- [facility]      Last visit - 2-10-25     Pending visit - 8-11-25    Last refill -5-27-25      Medication Contract signed -   Last Oarrs ran-         Additional Comments

## 2025-08-05 ENCOUNTER — OFFICE VISIT (OUTPATIENT)
Dept: PAIN MANAGEMENT | Age: 45
End: 2025-08-05
Payer: COMMERCIAL

## 2025-08-05 VITALS
WEIGHT: 188 LBS | DIASTOLIC BLOOD PRESSURE: 127 MMHG | OXYGEN SATURATION: 99 % | HEART RATE: 101 BPM | SYSTOLIC BLOOD PRESSURE: 205 MMHG | BODY MASS INDEX: 29.44 KG/M2

## 2025-08-05 DIAGNOSIS — M54.40 CHRONIC MIDLINE LOW BACK PAIN WITH SCIATICA, SCIATICA LATERALITY UNSPECIFIED: ICD-10-CM

## 2025-08-05 DIAGNOSIS — F51.01 PRIMARY INSOMNIA: ICD-10-CM

## 2025-08-05 DIAGNOSIS — M79.7 FIBROMYALGIA: ICD-10-CM

## 2025-08-05 DIAGNOSIS — G89.4 CHRONIC PAIN SYNDROME: ICD-10-CM

## 2025-08-05 DIAGNOSIS — E66.811 CLASS 1 OBESITY DUE TO EXCESS CALORIES WITH SERIOUS COMORBIDITY IN ADULT, UNSPECIFIED BMI: ICD-10-CM

## 2025-08-05 DIAGNOSIS — M32.9 LUPUS ARTHRITIS (HCC): ICD-10-CM

## 2025-08-05 DIAGNOSIS — G89.29 CHRONIC MIDLINE LOW BACK PAIN WITH SCIATICA, SCIATICA LATERALITY UNSPECIFIED: ICD-10-CM

## 2025-08-05 DIAGNOSIS — K59.03 DRUG-INDUCED CONSTIPATION: ICD-10-CM

## 2025-08-05 DIAGNOSIS — M62.830 BACK SPASM: ICD-10-CM

## 2025-08-05 DIAGNOSIS — G43.101 MIGRAINE WITH AURA AND WITH STATUS MIGRAINOSUS, NOT INTRACTABLE: ICD-10-CM

## 2025-08-05 DIAGNOSIS — M47.812 SPONDYLOSIS, CERVICAL: ICD-10-CM

## 2025-08-05 DIAGNOSIS — E66.09 CLASS 1 OBESITY DUE TO EXCESS CALORIES WITH SERIOUS COMORBIDITY IN ADULT, UNSPECIFIED BMI: ICD-10-CM

## 2025-08-05 DIAGNOSIS — F32.A DEPRESSION, UNSPECIFIED DEPRESSION TYPE: ICD-10-CM

## 2025-08-05 DIAGNOSIS — M48.02 STENOSIS, CERVICAL SPINE: ICD-10-CM

## 2025-08-05 PROCEDURE — G8417 CALC BMI ABV UP PARAM F/U: HCPCS | Performed by: NURSE PRACTITIONER

## 2025-08-05 PROCEDURE — 3077F SYST BP >= 140 MM HG: CPT | Performed by: NURSE PRACTITIONER

## 2025-08-05 PROCEDURE — 1036F TOBACCO NON-USER: CPT | Performed by: NURSE PRACTITIONER

## 2025-08-05 PROCEDURE — G8428 CUR MEDS NOT DOCUMENT: HCPCS | Performed by: NURSE PRACTITIONER

## 2025-08-05 PROCEDURE — 99214 OFFICE O/P EST MOD 30 MIN: CPT | Performed by: NURSE PRACTITIONER

## 2025-08-05 PROCEDURE — 3080F DIAST BP >= 90 MM HG: CPT | Performed by: NURSE PRACTITIONER

## 2025-08-05 RX ORDER — TRAZODONE HYDROCHLORIDE 50 MG/1
50 TABLET ORAL NIGHTLY
Qty: 90 TABLET | Refills: 1 | Status: SHIPPED | OUTPATIENT
Start: 2025-08-05

## 2025-08-05 RX ORDER — LIDOCAINE 50 MG/G
1 PATCH TOPICAL DAILY
Qty: 30 PATCH | Refills: 0 | Status: SHIPPED | OUTPATIENT
Start: 2025-08-05

## 2025-08-05 RX ORDER — NORTRIPTYLINE HYDROCHLORIDE 50 MG/1
50 CAPSULE ORAL NIGHTLY
Qty: 30 CAPSULE | Refills: 0 | Status: SHIPPED | OUTPATIENT
Start: 2025-08-05 | End: 2025-09-04

## 2025-08-05 RX ORDER — HYDROCODONE BITARTRATE AND ACETAMINOPHEN 5; 325 MG/1; MG/1
1 TABLET ORAL EVERY 8 HOURS PRN
Qty: 84 TABLET | Refills: 0 | Status: SHIPPED | OUTPATIENT
Start: 2025-08-05 | End: 2025-09-02

## 2025-08-11 ENCOUNTER — OFFICE VISIT (OUTPATIENT)
Dept: INTERNAL MEDICINE CLINIC | Age: 45
End: 2025-08-11
Payer: COMMERCIAL

## 2025-08-11 VITALS
WEIGHT: 197 LBS | BODY MASS INDEX: 30.85 KG/M2 | OXYGEN SATURATION: 100 % | DIASTOLIC BLOOD PRESSURE: 82 MMHG | SYSTOLIC BLOOD PRESSURE: 124 MMHG | HEART RATE: 70 BPM

## 2025-08-11 DIAGNOSIS — F33.1 MODERATE EPISODE OF RECURRENT MAJOR DEPRESSIVE DISORDER (HCC): ICD-10-CM

## 2025-08-11 DIAGNOSIS — M32.9 LUPUS ARTHRITIS (HCC): ICD-10-CM

## 2025-08-11 DIAGNOSIS — M79.7 FIBROMYALGIA: ICD-10-CM

## 2025-08-11 DIAGNOSIS — I10 PRIMARY HYPERTENSION: Primary | ICD-10-CM

## 2025-08-11 DIAGNOSIS — J45.20 MILD INTERMITTENT ASTHMA WITHOUT COMPLICATION: ICD-10-CM

## 2025-08-11 DIAGNOSIS — F99 INSOMNIA DUE TO OTHER MENTAL DISORDER: ICD-10-CM

## 2025-08-11 DIAGNOSIS — Z12.39 SCREENING BREAST EXAMINATION: ICD-10-CM

## 2025-08-11 DIAGNOSIS — G43.101 MIGRAINE WITH AURA AND WITH STATUS MIGRAINOSUS, NOT INTRACTABLE: ICD-10-CM

## 2025-08-11 DIAGNOSIS — F51.05 INSOMNIA DUE TO OTHER MENTAL DISORDER: ICD-10-CM

## 2025-08-11 PROBLEM — K52.9 GASTROENTERITIS: Status: RESOLVED | Noted: 2024-03-25 | Resolved: 2025-08-11

## 2025-08-11 PROCEDURE — G8428 CUR MEDS NOT DOCUMENT: HCPCS | Performed by: NURSE PRACTITIONER

## 2025-08-11 PROCEDURE — 3079F DIAST BP 80-89 MM HG: CPT | Performed by: NURSE PRACTITIONER

## 2025-08-11 PROCEDURE — 99214 OFFICE O/P EST MOD 30 MIN: CPT | Performed by: NURSE PRACTITIONER

## 2025-08-11 PROCEDURE — 3074F SYST BP LT 130 MM HG: CPT | Performed by: NURSE PRACTITIONER

## 2025-08-11 PROCEDURE — G8417 CALC BMI ABV UP PARAM F/U: HCPCS | Performed by: NURSE PRACTITIONER

## 2025-08-11 PROCEDURE — 1036F TOBACCO NON-USER: CPT | Performed by: NURSE PRACTITIONER

## 2025-08-11 RX ORDER — HYDROXYZINE HYDROCHLORIDE 50 MG/1
50-100 TABLET, FILM COATED ORAL EVERY 8 HOURS PRN
Qty: 90 TABLET | Refills: 2 | Status: SHIPPED | OUTPATIENT
Start: 2025-08-11

## 2025-08-11 RX ORDER — HYDROXYZINE HYDROCHLORIDE 50 MG/1
50-100 TABLET, FILM COATED ORAL EVERY 8 HOURS PRN
Qty: 90 TABLET | Refills: 2 | Status: SHIPPED | OUTPATIENT
Start: 2025-08-11 | End: 2025-08-11

## 2025-08-11 ASSESSMENT — ENCOUNTER SYMPTOMS
NAUSEA: 0
CONSTIPATION: 0
DIARRHEA: 0
CHEST TIGHTNESS: 0
ABDOMINAL DISTENTION: 0
SHORTNESS OF BREATH: 0
VOMITING: 0

## 2025-09-02 ENCOUNTER — OFFICE VISIT (OUTPATIENT)
Dept: PAIN MANAGEMENT | Age: 45
End: 2025-09-02
Payer: COMMERCIAL

## 2025-09-02 VITALS
DIASTOLIC BLOOD PRESSURE: 89 MMHG | BODY MASS INDEX: 30.69 KG/M2 | WEIGHT: 196 LBS | OXYGEN SATURATION: 98 % | HEART RATE: 104 BPM | SYSTOLIC BLOOD PRESSURE: 142 MMHG

## 2025-09-02 DIAGNOSIS — F51.01 PRIMARY INSOMNIA: ICD-10-CM

## 2025-09-02 DIAGNOSIS — M54.40 CHRONIC MIDLINE LOW BACK PAIN WITH SCIATICA, SCIATICA LATERALITY UNSPECIFIED: ICD-10-CM

## 2025-09-02 DIAGNOSIS — M79.7 FIBROMYALGIA: ICD-10-CM

## 2025-09-02 DIAGNOSIS — M47.812 SPONDYLOSIS, CERVICAL: ICD-10-CM

## 2025-09-02 DIAGNOSIS — M32.9 LUPUS ARTHRITIS (HCC): ICD-10-CM

## 2025-09-02 DIAGNOSIS — G43.101 MIGRAINE WITH AURA AND WITH STATUS MIGRAINOSUS, NOT INTRACTABLE: ICD-10-CM

## 2025-09-02 DIAGNOSIS — M62.830 BACK SPASM: ICD-10-CM

## 2025-09-02 DIAGNOSIS — G89.4 CHRONIC PAIN SYNDROME: ICD-10-CM

## 2025-09-02 DIAGNOSIS — M48.02 STENOSIS, CERVICAL SPINE: ICD-10-CM

## 2025-09-02 DIAGNOSIS — K59.03 DRUG-INDUCED CONSTIPATION: ICD-10-CM

## 2025-09-02 DIAGNOSIS — F32.A DEPRESSION, UNSPECIFIED DEPRESSION TYPE: ICD-10-CM

## 2025-09-02 DIAGNOSIS — G89.29 CHRONIC MIDLINE LOW BACK PAIN WITH SCIATICA, SCIATICA LATERALITY UNSPECIFIED: ICD-10-CM

## 2025-09-02 PROCEDURE — 3079F DIAST BP 80-89 MM HG: CPT | Performed by: NURSE PRACTITIONER

## 2025-09-02 PROCEDURE — 99214 OFFICE O/P EST MOD 30 MIN: CPT | Performed by: NURSE PRACTITIONER

## 2025-09-02 PROCEDURE — 1036F TOBACCO NON-USER: CPT | Performed by: NURSE PRACTITIONER

## 2025-09-02 PROCEDURE — G8427 DOCREV CUR MEDS BY ELIG CLIN: HCPCS | Performed by: NURSE PRACTITIONER

## 2025-09-02 PROCEDURE — 3077F SYST BP >= 140 MM HG: CPT | Performed by: NURSE PRACTITIONER

## 2025-09-02 PROCEDURE — G8417 CALC BMI ABV UP PARAM F/U: HCPCS | Performed by: NURSE PRACTITIONER

## 2025-09-02 RX ORDER — HYDROCODONE BITARTRATE AND ACETAMINOPHEN 5; 325 MG/1; MG/1
1 TABLET ORAL EVERY 8 HOURS PRN
Qty: 84 TABLET | Refills: 0 | Status: SHIPPED | OUTPATIENT
Start: 2025-09-02 | End: 2025-09-30

## 2025-09-02 RX ORDER — METHYLNALTREXONE BROMIDE 150 MG/1
TABLET ORAL
Qty: 90 TABLET | Refills: 0 | Status: SHIPPED | OUTPATIENT
Start: 2025-09-02

## 2025-09-02 RX ORDER — NORTRIPTYLINE HYDROCHLORIDE 50 MG/1
50 CAPSULE ORAL NIGHTLY
Qty: 30 CAPSULE | Refills: 0 | Status: SHIPPED | OUTPATIENT
Start: 2025-09-02 | End: 2025-10-02

## 2025-09-02 RX ORDER — LIDOCAINE 50 MG/G
1 PATCH TOPICAL DAILY
Qty: 30 PATCH | Refills: 0 | Status: SHIPPED | OUTPATIENT
Start: 2025-09-02